# Patient Record
Sex: FEMALE | Race: WHITE | NOT HISPANIC OR LATINO | Employment: OTHER | URBAN - METROPOLITAN AREA
[De-identification: names, ages, dates, MRNs, and addresses within clinical notes are randomized per-mention and may not be internally consistent; named-entity substitution may affect disease eponyms.]

---

## 2017-02-15 ENCOUNTER — OFFICE VISIT - HEALTHEAST (OUTPATIENT)
Dept: AUDIOLOGY | Facility: CLINIC | Age: 67
End: 2017-02-15

## 2017-02-15 DIAGNOSIS — H93.13 TINNITUS, BILATERAL: ICD-10-CM

## 2017-02-15 DIAGNOSIS — H90.5 SENSORINEURAL HEARING LOSS OF LEFT EAR: ICD-10-CM

## 2017-03-22 ENCOUNTER — RECORDS - HEALTHEAST (OUTPATIENT)
Dept: MAMMOGRAPHY | Facility: CLINIC | Age: 67
End: 2017-03-22

## 2017-03-22 DIAGNOSIS — Z12.31 ENCOUNTER FOR SCREENING MAMMOGRAM FOR MALIGNANT NEOPLASM OF BREAST: ICD-10-CM

## 2017-03-27 ENCOUNTER — COMMUNICATION - HEALTHEAST (OUTPATIENT)
Dept: INTERNAL MEDICINE | Facility: CLINIC | Age: 67
End: 2017-03-27

## 2017-03-27 DIAGNOSIS — R92.8 MAMMOGRAM ABNORMAL: ICD-10-CM

## 2017-03-29 ENCOUNTER — HOSPITAL ENCOUNTER (OUTPATIENT)
Dept: MAMMOGRAPHY | Facility: HOSPITAL | Age: 67
Discharge: HOME | End: 2017-03-29
Attending: INTERNAL MEDICINE

## 2017-03-29 DIAGNOSIS — R92.8 ABNORMAL SCREENING MAMMOGRAM: ICD-10-CM

## 2017-10-04 ENCOUNTER — COMMUNICATION - HEALTHEAST (OUTPATIENT)
Dept: INTERNAL MEDICINE | Facility: CLINIC | Age: 67
End: 2017-10-04

## 2017-10-04 DIAGNOSIS — K21.9 GASTROESOPHAGEAL REFLUX DISEASE WITHOUT ESOPHAGITIS: ICD-10-CM

## 2017-10-19 ENCOUNTER — AMBULATORY - HEALTHEAST (OUTPATIENT)
Dept: NURSING | Facility: CLINIC | Age: 67
End: 2017-10-19

## 2017-10-19 DIAGNOSIS — Z23 NEED FOR IMMUNIZATION AGAINST INFLUENZA: ICD-10-CM

## 2018-01-31 ENCOUNTER — OFFICE VISIT - HEALTHEAST (OUTPATIENT)
Dept: INTERNAL MEDICINE | Facility: CLINIC | Age: 68
End: 2018-01-31

## 2018-01-31 DIAGNOSIS — M76.891 HAMSTRING TENDONITIS OF RIGHT THIGH: ICD-10-CM

## 2018-01-31 DIAGNOSIS — Z12.31 ENCOUNTER FOR SCREENING MAMMOGRAM FOR MALIGNANT NEOPLASM OF BREAST: ICD-10-CM
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
## 2018-01-31 DIAGNOSIS — Z78.0 POSTMENOPAUSAL: ICD-10-CM

## 2018-01-31 DIAGNOSIS — Z00.00 ROUTINE GENERAL MEDICAL EXAMINATION AT A HEALTH CARE FACILITY: ICD-10-CM

## 2018-01-31 DIAGNOSIS — C50.919 MALIGNANT NEOPLASM OF FEMALE BREAST (H): ICD-10-CM

## 2018-01-31 DIAGNOSIS — Z13.1 ENCOUNTER FOR SCREENING FOR DIABETES MELLITUS: ICD-10-CM

## 2018-01-31 DIAGNOSIS — Z13.220 ENCOUNTER FOR SCREENING FOR LIPOID DISORDERS: ICD-10-CM

## 2018-01-31 LAB
CHOLEST SERPL-MCNC: 227 MG/DL
FASTING STATUS PATIENT QL REPORTED: YES
FASTING STATUS PATIENT QL REPORTED: YES
GLUCOSE BLD-MCNC: 112 MG/DL (ref 70–99)
HDLC SERPL-MCNC: 77 MG/DL
LDLC SERPL CALC-MCNC: 134 MG/DL
TRIGL SERPL-MCNC: 80 MG/DL

## 2018-01-31 ASSESSMENT — MIFFLIN-ST. JEOR: SCORE: 1695.44

## 2018-02-07 ENCOUNTER — COMMUNICATION - HEALTHEAST (OUTPATIENT)
Dept: INTERNAL MEDICINE | Facility: CLINIC | Age: 68
End: 2018-02-07

## 2018-03-26 ENCOUNTER — RECORDS - HEALTHEAST (OUTPATIENT)
Dept: MAMMOGRAPHY | Facility: CLINIC | Age: 68
End: 2018-03-26

## 2018-03-26 ENCOUNTER — RECORDS - HEALTHEAST (OUTPATIENT)
Dept: BONE DENSITY | Facility: CLINIC | Age: 68
End: 2018-03-26

## 2018-03-26 ENCOUNTER — RECORDS - HEALTHEAST (OUTPATIENT)
Dept: ADMINISTRATIVE | Facility: OTHER | Age: 68
End: 2018-03-26

## 2018-03-26 DIAGNOSIS — Z12.31 ENCOUNTER FOR SCREENING MAMMOGRAM FOR MALIGNANT NEOPLASM OF BREAST: ICD-10-CM

## 2018-03-26 DIAGNOSIS — Z78.0 ASYMPTOMATIC MENOPAUSAL STATE: ICD-10-CM

## 2018-04-06 ENCOUNTER — COMMUNICATION - HEALTHEAST (OUTPATIENT)
Dept: INTERNAL MEDICINE | Facility: CLINIC | Age: 68
End: 2018-04-06

## 2018-04-06 DIAGNOSIS — K21.9 GASTROESOPHAGEAL REFLUX DISEASE WITHOUT ESOPHAGITIS: ICD-10-CM

## 2018-05-09 ENCOUNTER — RECORDS - HEALTHEAST (OUTPATIENT)
Dept: ADMINISTRATIVE | Facility: OTHER | Age: 68
End: 2018-05-09

## 2018-10-24 ENCOUNTER — COMMUNICATION - HEALTHEAST (OUTPATIENT)
Dept: INTERNAL MEDICINE | Facility: CLINIC | Age: 68
End: 2018-10-24

## 2018-10-24 DIAGNOSIS — K21.9 GASTROESOPHAGEAL REFLUX DISEASE WITHOUT ESOPHAGITIS: ICD-10-CM

## 2018-11-13 ENCOUNTER — AMBULATORY - HEALTHEAST (OUTPATIENT)
Dept: NURSING | Facility: CLINIC | Age: 68
End: 2018-11-13

## 2018-11-13 DIAGNOSIS — Z23 FLU VACCINE NEED: ICD-10-CM

## 2019-02-28 ENCOUNTER — OFFICE VISIT - HEALTHEAST (OUTPATIENT)
Dept: INTERNAL MEDICINE | Facility: CLINIC | Age: 69
End: 2019-02-28

## 2019-02-28 DIAGNOSIS — K21.9 GASTROESOPHAGEAL REFLUX DISEASE WITHOUT ESOPHAGITIS: ICD-10-CM

## 2019-02-28 DIAGNOSIS — M77.52 LEFT ANKLE TENDONITIS: ICD-10-CM

## 2019-02-28 DIAGNOSIS — Z12.11 SCREEN FOR COLON CANCER: ICD-10-CM

## 2019-04-02 ENCOUNTER — RECORDS - HEALTHEAST (OUTPATIENT)
Dept: ADMINISTRATIVE | Facility: OTHER | Age: 69
End: 2019-04-02

## 2019-04-02 ENCOUNTER — COMMUNICATION - HEALTHEAST (OUTPATIENT)
Dept: INTERNAL MEDICINE | Facility: CLINIC | Age: 69
End: 2019-04-02

## 2019-04-02 DIAGNOSIS — Z12.31 VISIT FOR SCREENING MAMMOGRAM: ICD-10-CM

## 2019-04-04 ENCOUNTER — RECORDS - HEALTHEAST (OUTPATIENT)
Dept: ADMINISTRATIVE | Facility: OTHER | Age: 69
End: 2019-04-04

## 2019-04-19 ENCOUNTER — COMMUNICATION - HEALTHEAST (OUTPATIENT)
Dept: SCHEDULING | Facility: CLINIC | Age: 69
End: 2019-04-19

## 2019-04-19 ENCOUNTER — OFFICE VISIT - HEALTHEAST (OUTPATIENT)
Dept: INTERNAL MEDICINE | Facility: CLINIC | Age: 69
End: 2019-04-19

## 2019-04-19 DIAGNOSIS — R42 POSTURAL DIZZINESS: ICD-10-CM

## 2019-04-19 ASSESSMENT — MIFFLIN-ST. JEOR: SCORE: 1718.48

## 2019-05-02 ENCOUNTER — AMBULATORY - HEALTHEAST (OUTPATIENT)
Dept: OTHER | Facility: CLINIC | Age: 69
End: 2019-05-02

## 2019-05-03 ENCOUNTER — RECORDS - HEALTHEAST (OUTPATIENT)
Dept: MAMMOGRAPHY | Facility: CLINIC | Age: 69
End: 2019-05-03

## 2019-05-03 ENCOUNTER — COMMUNICATION - HEALTHEAST (OUTPATIENT)
Dept: INTERNAL MEDICINE | Facility: CLINIC | Age: 69
End: 2019-05-03

## 2019-05-03 DIAGNOSIS — Z12.31 ENCOUNTER FOR SCREENING MAMMOGRAM FOR MALIGNANT NEOPLASM OF BREAST: ICD-10-CM

## 2019-05-17 ENCOUNTER — COMMUNICATION - HEALTHEAST (OUTPATIENT)
Dept: SCHEDULING | Facility: CLINIC | Age: 69
End: 2019-05-17

## 2019-05-21 ENCOUNTER — COMMUNICATION - HEALTHEAST (OUTPATIENT)
Dept: INTERNAL MEDICINE | Facility: CLINIC | Age: 69
End: 2019-05-21

## 2019-07-01 ENCOUNTER — AMBULATORY - HEALTHEAST (OUTPATIENT)
Dept: OTHER | Facility: CLINIC | Age: 69
End: 2019-07-01

## 2019-07-10 ENCOUNTER — AMBULATORY - HEALTHEAST (OUTPATIENT)
Dept: OTHER | Facility: CLINIC | Age: 69
End: 2019-07-10

## 2019-08-21 ENCOUNTER — OFFICE VISIT - HEALTHEAST (OUTPATIENT)
Dept: INTERNAL MEDICINE | Facility: CLINIC | Age: 69
End: 2019-08-21

## 2019-08-21 DIAGNOSIS — Z12.31 ENCOUNTER FOR SCREENING MAMMOGRAM FOR BREAST CANCER: ICD-10-CM

## 2019-08-21 DIAGNOSIS — R05.3 CHRONIC COUGH: ICD-10-CM

## 2019-08-21 DIAGNOSIS — C50.919 MALIGNANT NEOPLASM OF FEMALE BREAST, UNSPECIFIED ESTROGEN RECEPTOR STATUS, UNSPECIFIED LATERALITY, UNSPECIFIED SITE OF BREAST (H): ICD-10-CM

## 2019-08-21 DIAGNOSIS — K21.9 GASTROESOPHAGEAL REFLUX DISEASE WITHOUT ESOPHAGITIS: ICD-10-CM

## 2019-08-21 ASSESSMENT — MIFFLIN-ST. JEOR: SCORE: 1700.88

## 2019-10-09 ENCOUNTER — COMMUNICATION - HEALTHEAST (OUTPATIENT)
Dept: INTERNAL MEDICINE | Facility: CLINIC | Age: 69
End: 2019-10-09

## 2019-10-16 ENCOUNTER — OFFICE VISIT - HEALTHEAST (OUTPATIENT)
Dept: INTERNAL MEDICINE | Facility: CLINIC | Age: 69
End: 2019-10-16

## 2019-10-16 DIAGNOSIS — R05.3 CHRONIC COUGH: ICD-10-CM

## 2019-10-16 DIAGNOSIS — Z00.00 ROUTINE GENERAL MEDICAL EXAMINATION AT A HEALTH CARE FACILITY: ICD-10-CM

## 2019-10-16 DIAGNOSIS — Z13.220 ENCOUNTER FOR SCREENING FOR LIPOID DISORDERS: ICD-10-CM

## 2019-10-16 DIAGNOSIS — C50.919 MALIGNANT NEOPLASM OF FEMALE BREAST, UNSPECIFIED ESTROGEN RECEPTOR STATUS, UNSPECIFIED LATERALITY, UNSPECIFIED SITE OF BREAST (H): ICD-10-CM

## 2019-10-16 DIAGNOSIS — M72.2 PLANTAR FASCIITIS: ICD-10-CM

## 2019-10-16 LAB
ALBUMIN SERPL-MCNC: 3.9 G/DL (ref 3.5–5)
ALP SERPL-CCNC: 81 U/L (ref 45–120)
ALT SERPL W P-5'-P-CCNC: 20 U/L (ref 0–45)
ANION GAP SERPL CALCULATED.3IONS-SCNC: 7 MMOL/L (ref 5–18)
AST SERPL W P-5'-P-CCNC: 16 U/L (ref 0–40)
BILIRUB DIRECT SERPL-MCNC: 0.1 MG/DL
BILIRUB SERPL-MCNC: 0.5 MG/DL (ref 0–1)
BUN SERPL-MCNC: 16 MG/DL (ref 8–28)
CALCIUM SERPL-MCNC: 9.7 MG/DL (ref 8.5–10.5)
CHLORIDE BLD-SCNC: 106 MMOL/L (ref 98–107)
CHOLEST SERPL-MCNC: 258 MG/DL
CO2 SERPL-SCNC: 27 MMOL/L (ref 22–31)
CREAT SERPL-MCNC: 1 MG/DL (ref 0.6–1.1)
ERYTHROCYTE [DISTWIDTH] IN BLOOD BY AUTOMATED COUNT: 11.6 % (ref 11–14.5)
FASTING STATUS PATIENT QL REPORTED: YES
GFR SERPL CREATININE-BSD FRML MDRD: 55 ML/MIN/1.73M2
GLUCOSE BLD-MCNC: 109 MG/DL (ref 70–125)
HCT VFR BLD AUTO: 41.9 % (ref 35–47)
HDLC SERPL-MCNC: 84 MG/DL
HGB BLD-MCNC: 13.5 G/DL (ref 12–16)
LDLC SERPL CALC-MCNC: 160 MG/DL
MCH RBC QN AUTO: 31.6 PG (ref 27–34)
MCHC RBC AUTO-ENTMCNC: 32.3 G/DL (ref 32–36)
MCV RBC AUTO: 98 FL (ref 80–100)
PLATELET # BLD AUTO: 260 THOU/UL (ref 140–440)
PMV BLD AUTO: 6.9 FL (ref 7–10)
POTASSIUM BLD-SCNC: 4 MMOL/L (ref 3.5–5)
PROT SERPL-MCNC: 6.5 G/DL (ref 6–8)
RBC # BLD AUTO: 4.29 MILL/UL (ref 3.8–5.4)
SODIUM SERPL-SCNC: 140 MMOL/L (ref 136–145)
TRIGL SERPL-MCNC: 68 MG/DL
WBC: 7.3 THOU/UL (ref 4–11)

## 2019-10-16 ASSESSMENT — MIFFLIN-ST. JEOR: SCORE: 1696.86

## 2019-10-23 ENCOUNTER — COMMUNICATION - HEALTHEAST (OUTPATIENT)
Dept: INTERNAL MEDICINE | Facility: CLINIC | Age: 69
End: 2019-10-23

## 2019-11-01 ENCOUNTER — HOSPITAL ENCOUNTER (OUTPATIENT)
Dept: RESPIRATORY THERAPY | Facility: HOSPITAL | Age: 69
Discharge: HOME | End: 2019-11-01

## 2019-11-01 DIAGNOSIS — R05.3 CHRONIC COUGH: ICD-10-CM

## 2019-11-04 ENCOUNTER — COMMUNICATION - HEALTHEAST (OUTPATIENT)
Dept: INTERNAL MEDICINE | Facility: CLINIC | Age: 69
End: 2019-11-04

## 2019-11-04 DIAGNOSIS — R05.9 COUGH: ICD-10-CM

## 2019-11-14 ENCOUNTER — OFFICE VISIT - HEALTHEAST (OUTPATIENT)
Dept: PULMONOLOGY | Facility: OTHER | Age: 69
End: 2019-11-14

## 2019-11-14 DIAGNOSIS — E66.01 MORBID OBESITY (H): ICD-10-CM

## 2019-11-14 DIAGNOSIS — J45.30 MILD PERSISTENT ASTHMA WITHOUT COMPLICATION: ICD-10-CM

## 2019-11-14 DIAGNOSIS — K21.9 GASTROESOPHAGEAL REFLUX DISEASE, ESOPHAGITIS PRESENCE NOT SPECIFIED: ICD-10-CM

## 2019-11-14 DIAGNOSIS — R09.82 PND (POST-NASAL DRIP): ICD-10-CM

## 2019-11-14 RX ORDER — ALBUTEROL SULFATE 90 UG/1
2 AEROSOL, METERED RESPIRATORY (INHALATION) EVERY 4 HOURS PRN
Qty: 1 INHALER | Refills: 12 | Status: SHIPPED | OUTPATIENT
Start: 2019-11-14 | End: 2022-03-24

## 2019-11-14 ASSESSMENT — MIFFLIN-ST. JEOR: SCORE: 1716.98

## 2019-12-03 ENCOUNTER — COMMUNICATION - HEALTHEAST (OUTPATIENT)
Dept: INTERNAL MEDICINE | Facility: CLINIC | Age: 69
End: 2019-12-03

## 2019-12-09 ENCOUNTER — COMMUNICATION - HEALTHEAST (OUTPATIENT)
Dept: PULMONOLOGY | Facility: OTHER | Age: 69
End: 2019-12-09

## 2020-01-03 ENCOUNTER — COMMUNICATION - HEALTHEAST (OUTPATIENT)
Dept: PULMONOLOGY | Facility: OTHER | Age: 70
End: 2020-01-03

## 2020-02-07 ENCOUNTER — OFFICE VISIT - HEALTHEAST (OUTPATIENT)
Dept: PULMONOLOGY | Facility: OTHER | Age: 70
End: 2020-02-07

## 2020-02-07 DIAGNOSIS — J32.9 CHRONIC SINUSITIS, UNSPECIFIED LOCATION: ICD-10-CM

## 2020-02-07 DIAGNOSIS — J45.30 MILD PERSISTENT ASTHMA WITHOUT COMPLICATION: ICD-10-CM

## 2020-02-07 RX ORDER — FAMOTIDINE 10 MG
10 TABLET ORAL DAILY
Status: SHIPPED | COMMUNITY
Start: 2020-02-07

## 2020-02-07 ASSESSMENT — MIFFLIN-ST. JEOR: SCORE: 1726.06

## 2020-02-12 ENCOUNTER — AMBULATORY - HEALTHEAST (OUTPATIENT)
Dept: PULMONOLOGY | Facility: OTHER | Age: 70
End: 2020-02-12

## 2020-03-10 ENCOUNTER — COMMUNICATION - HEALTHEAST (OUTPATIENT)
Dept: PULMONOLOGY | Facility: OTHER | Age: 70
End: 2020-03-10

## 2020-03-21 ENCOUNTER — COMMUNICATION - HEALTHEAST (OUTPATIENT)
Dept: PULMONOLOGY | Facility: OTHER | Age: 70
End: 2020-03-21

## 2020-04-06 ENCOUNTER — RECORDS - HEALTHEAST (OUTPATIENT)
Dept: ADMINISTRATIVE | Facility: OTHER | Age: 70
End: 2020-04-06

## 2020-04-07 ENCOUNTER — COMMUNICATION - HEALTHEAST (OUTPATIENT)
Dept: INTERNAL MEDICINE | Facility: CLINIC | Age: 70
End: 2020-04-07

## 2020-04-30 ENCOUNTER — COMMUNICATION - HEALTHEAST (OUTPATIENT)
Dept: ADMINISTRATIVE | Facility: CLINIC | Age: 70
End: 2020-04-30

## 2020-05-08 ENCOUNTER — RECORDS - HEALTHEAST (OUTPATIENT)
Dept: ADMINISTRATIVE | Facility: OTHER | Age: 70
End: 2020-05-08

## 2020-05-08 ENCOUNTER — OFFICE VISIT - HEALTHEAST (OUTPATIENT)
Dept: PULMONOLOGY | Facility: OTHER | Age: 70
End: 2020-05-08

## 2020-05-08 DIAGNOSIS — J45.30 MILD PERSISTENT ASTHMA WITHOUT COMPLICATION: ICD-10-CM

## 2020-06-28 ENCOUNTER — COMMUNICATION - HEALTHEAST (OUTPATIENT)
Dept: PULMONOLOGY | Facility: OTHER | Age: 70
End: 2020-06-28

## 2020-07-06 ENCOUNTER — HOSPITAL ENCOUNTER (OUTPATIENT)
Dept: MAMMOGRAPHY | Facility: CLINIC | Age: 70
Discharge: HOME | End: 2020-07-06
Attending: INTERNAL MEDICINE

## 2020-07-06 DIAGNOSIS — Z12.31 VISIT FOR SCREENING MAMMOGRAM: ICD-10-CM

## 2020-07-15 ENCOUNTER — AMBULATORY - HEALTHEAST (OUTPATIENT)
Dept: MAMMOGRAPHY | Facility: CLINIC | Age: 70
End: 2020-07-15

## 2020-07-15 ENCOUNTER — HOSPITAL ENCOUNTER (OUTPATIENT)
Dept: MAMMOGRAPHY | Facility: CLINIC | Age: 70
Discharge: HOME | End: 2020-07-15
Attending: INTERNAL MEDICINE

## 2020-07-15 DIAGNOSIS — N64.89 BREAST ASYMMETRY: ICD-10-CM

## 2020-07-15 DIAGNOSIS — Z11.59 ENCOUNTER FOR SCREENING FOR OTHER VIRAL DISEASES: ICD-10-CM

## 2020-07-19 ENCOUNTER — COMMUNICATION - HEALTHEAST (OUTPATIENT)
Dept: INTERNAL MEDICINE | Facility: CLINIC | Age: 70
End: 2020-07-19

## 2020-07-19 ENCOUNTER — AMBULATORY - HEALTHEAST (OUTPATIENT)
Dept: FAMILY MEDICINE | Facility: CLINIC | Age: 70
End: 2020-07-19

## 2020-07-19 DIAGNOSIS — Z11.59 ENCOUNTER FOR SCREENING FOR OTHER VIRAL DISEASES: ICD-10-CM

## 2020-07-22 ENCOUNTER — HOSPITAL ENCOUNTER (OUTPATIENT)
Dept: MAMMOGRAPHY | Facility: CLINIC | Age: 70
Discharge: HOME | End: 2020-07-22
Attending: INTERNAL MEDICINE

## 2020-07-22 DIAGNOSIS — N63.20 LEFT BREAST MASS: ICD-10-CM

## 2020-07-22 DIAGNOSIS — N63.21 UNSPECIFIED LUMP IN THE LEFT BREAST, UPPER OUTER QUADRANT: ICD-10-CM

## 2020-07-24 ENCOUNTER — COMMUNICATION - HEALTHEAST (OUTPATIENT)
Dept: ONCOLOGY | Facility: HOSPITAL | Age: 70
End: 2020-07-24

## 2020-07-30 ENCOUNTER — HOSPITAL ENCOUNTER (OUTPATIENT)
Dept: SURGERY | Facility: CLINIC | Age: 70
Discharge: HOME | End: 2020-07-30
Attending: SPECIALIST

## 2020-07-30 DIAGNOSIS — Z17.0 MALIGNANT NEOPLASM OF CENTRAL PORTION OF LEFT BREAST IN FEMALE, ESTROGEN RECEPTOR POSITIVE (H): ICD-10-CM

## 2020-07-30 DIAGNOSIS — C50.112 MALIGNANT NEOPLASM OF CENTRAL PORTION OF LEFT BREAST IN FEMALE, ESTROGEN RECEPTOR POSITIVE (H): ICD-10-CM

## 2020-07-30 ASSESSMENT — MIFFLIN-ST. JEOR: SCORE: 1733.22

## 2020-08-04 ENCOUNTER — COMMUNICATION - HEALTHEAST (OUTPATIENT)
Dept: ONCOLOGY | Facility: HOSPITAL | Age: 70
End: 2020-08-04

## 2020-08-04 LAB
LAB AP CHARGES (HE HISTORICAL CONVERSION): ABNORMAL
LAB AP FISH HER2/NEU REPORT,ADDENDUM (HE HISTORICAL CONVERSION): ABNORMAL
PATH REPORT.COMMENTS IMP SPEC: ABNORMAL
PATH REPORT.COMMENTS IMP SPEC: ABNORMAL
PATH REPORT.FINAL DX SPEC: ABNORMAL
PATH REPORT.GROSS SPEC: ABNORMAL
PATH REPORT.MICROSCOPIC SPEC OTHER STN: ABNORMAL
PATH REPORT.MICROSCOPIC SPEC OTHER STN: ABNORMAL
PATH REPORT.RELEVANT HX SPEC: ABNORMAL
RESULT FLAG (HE HISTORICAL CONVERSION): ABNORMAL

## 2020-08-07 ENCOUNTER — COMMUNICATION - HEALTHEAST (OUTPATIENT)
Dept: ONCOLOGY | Facility: HOSPITAL | Age: 70
End: 2020-08-07

## 2020-08-07 ENCOUNTER — AMBULATORY - HEALTHEAST (OUTPATIENT)
Dept: SURGERY | Facility: CLINIC | Age: 70
End: 2020-08-07

## 2020-08-07 DIAGNOSIS — C50.112 MALIGNANT NEOPLASM OF CENTRAL PORTION OF LEFT BREAST IN FEMALE, ESTROGEN RECEPTOR POSITIVE (H): ICD-10-CM

## 2020-08-07 DIAGNOSIS — Z17.0 MALIGNANT NEOPLASM OF CENTRAL PORTION OF LEFT BREAST IN FEMALE, ESTROGEN RECEPTOR POSITIVE (H): ICD-10-CM

## 2020-08-10 ENCOUNTER — AMBULATORY - HEALTHEAST (OUTPATIENT)
Dept: SURGERY | Facility: AMBULATORY SURGERY CENTER | Age: 70
End: 2020-08-10

## 2020-08-10 ENCOUNTER — COMMUNICATION - HEALTHEAST (OUTPATIENT)
Dept: SURGERY | Facility: CLINIC | Age: 70
End: 2020-08-10

## 2020-08-10 DIAGNOSIS — Z11.59 ENCOUNTER FOR SCREENING FOR OTHER VIRAL DISEASES: ICD-10-CM

## 2020-08-13 ENCOUNTER — COMMUNICATION - HEALTHEAST (OUTPATIENT)
Dept: INTERNAL MEDICINE | Facility: CLINIC | Age: 70
End: 2020-08-13

## 2020-08-14 ENCOUNTER — OFFICE VISIT - HEALTHEAST (OUTPATIENT)
Dept: INTERNAL MEDICINE | Facility: CLINIC | Age: 70
End: 2020-08-14

## 2020-08-14 ENCOUNTER — OFFICE VISIT - HEALTHEAST (OUTPATIENT)
Dept: PULMONOLOGY | Facility: OTHER | Age: 70
End: 2020-08-14

## 2020-08-14 DIAGNOSIS — Z17.0 MALIGNANT NEOPLASM OF CENTRAL PORTION OF LEFT BREAST IN FEMALE, ESTROGEN RECEPTOR POSITIVE (H): ICD-10-CM

## 2020-08-14 DIAGNOSIS — J45.40 MODERATE PERSISTENT ASTHMA WITHOUT COMPLICATION: ICD-10-CM

## 2020-08-14 DIAGNOSIS — J45.909 UNCOMPLICATED ASTHMA, UNSPECIFIED ASTHMA SEVERITY, UNSPECIFIED WHETHER PERSISTENT: ICD-10-CM

## 2020-08-14 DIAGNOSIS — K21.9 GASTROESOPHAGEAL REFLUX DISEASE WITHOUT ESOPHAGITIS: ICD-10-CM

## 2020-08-14 DIAGNOSIS — Z01.818 PREOP GENERAL PHYSICAL EXAM: ICD-10-CM

## 2020-08-14 DIAGNOSIS — C50.112 MALIGNANT NEOPLASM OF CENTRAL PORTION OF LEFT BREAST IN FEMALE, ESTROGEN RECEPTOR POSITIVE (H): ICD-10-CM

## 2020-08-14 LAB
ERYTHROCYTE [DISTWIDTH] IN BLOOD BY AUTOMATED COUNT: 13.3 % (ref 11–14.5)
HCT VFR BLD AUTO: 39.3 % (ref 35–47)
HGB BLD-MCNC: 12.9 G/DL (ref 12–16)
MCH RBC QN AUTO: 32 PG (ref 27–34)
MCHC RBC AUTO-ENTMCNC: 32.8 G/DL (ref 32–36)
MCV RBC AUTO: 98 FL (ref 80–100)
PLATELET # BLD AUTO: 229 THOU/UL (ref 140–440)
PMV BLD AUTO: 7.3 FL (ref 7–10)
RBC # BLD AUTO: 4.03 MILL/UL (ref 3.8–5.4)
WBC: 5.9 THOU/UL (ref 4–11)

## 2020-08-14 ASSESSMENT — MIFFLIN-ST. JEOR: SCORE: 1737.39

## 2020-08-18 ENCOUNTER — COMMUNICATION - HEALTHEAST (OUTPATIENT)
Dept: SURGERY | Facility: CLINIC | Age: 70
End: 2020-08-18

## 2020-08-18 ENCOUNTER — AMBULATORY - HEALTHEAST (OUTPATIENT)
Dept: FAMILY MEDICINE | Facility: CLINIC | Age: 70
End: 2020-08-18

## 2020-08-18 DIAGNOSIS — Z11.59 ENCOUNTER FOR SCREENING FOR OTHER VIRAL DISEASES: ICD-10-CM

## 2020-08-19 ENCOUNTER — ANESTHESIA - HEALTHEAST (OUTPATIENT)
Dept: SURGERY | Facility: AMBULATORY SURGERY CENTER | Age: 70
End: 2020-08-19

## 2020-08-19 ASSESSMENT — MIFFLIN-ST. JEOR: SCORE: 1729.45

## 2020-08-20 ENCOUNTER — COMMUNICATION - HEALTHEAST (OUTPATIENT)
Dept: SCHEDULING | Facility: CLINIC | Age: 70
End: 2020-08-20

## 2020-08-21 ENCOUNTER — HOSPITAL ENCOUNTER (OUTPATIENT)
Dept: MAMMOGRAPHY | Facility: CLINIC | Age: 70
Discharge: HOME | End: 2020-08-21
Attending: SPECIALIST

## 2020-08-21 ENCOUNTER — HOSPITAL ENCOUNTER (OUTPATIENT)
Dept: NUCLEAR MEDICINE | Facility: HOSPITAL | Age: 70
Discharge: HOME | End: 2020-08-21
Attending: SPECIALIST

## 2020-08-21 ENCOUNTER — SURGERY - HEALTHEAST (OUTPATIENT)
Dept: SURGERY | Facility: AMBULATORY SURGERY CENTER | Age: 70
End: 2020-08-21

## 2020-08-21 DIAGNOSIS — Z17.0 MALIGNANT NEOPLASM OF CENTRAL PORTION OF LEFT BREAST IN FEMALE, ESTROGEN RECEPTOR POSITIVE (H): ICD-10-CM

## 2020-08-21 DIAGNOSIS — C50.112 MALIGNANT NEOPLASM OF CENTRAL PORTION OF LEFT BREAST IN FEMALE, ESTROGEN RECEPTOR POSITIVE (H): ICD-10-CM

## 2020-08-21 ASSESSMENT — MIFFLIN-ST. JEOR: SCORE: 1729.45

## 2020-08-25 ENCOUNTER — AMBULATORY - HEALTHEAST (OUTPATIENT)
Dept: SURGERY | Facility: CLINIC | Age: 70
End: 2020-08-25

## 2020-09-03 ENCOUNTER — RECORDS - HEALTHEAST (OUTPATIENT)
Dept: ADMINISTRATIVE | Facility: OTHER | Age: 70
End: 2020-09-03

## 2020-09-04 ENCOUNTER — HOSPITAL ENCOUNTER (OUTPATIENT)
Dept: SURGERY | Facility: CLINIC | Age: 70
Discharge: HOME | End: 2020-09-04
Attending: SPECIALIST

## 2020-09-04 ENCOUNTER — AMBULATORY - HEALTHEAST (OUTPATIENT)
Dept: SURGERY | Facility: HOSPITAL | Age: 70
End: 2020-09-04

## 2020-09-04 ENCOUNTER — COMMUNICATION - HEALTHEAST (OUTPATIENT)
Dept: SCHEDULING | Facility: CLINIC | Age: 70
End: 2020-09-04

## 2020-09-04 DIAGNOSIS — Z85.3 PERSONAL HISTORY OF BREAST CANCER: ICD-10-CM

## 2020-09-04 DIAGNOSIS — C50.112 MALIGNANT NEOPLASM OF CENTRAL PORTION OF LEFT BREAST IN FEMALE, ESTROGEN RECEPTOR POSITIVE (H): ICD-10-CM

## 2020-09-04 DIAGNOSIS — Z17.0 MALIGNANT NEOPLASM OF CENTRAL PORTION OF LEFT BREAST IN FEMALE, ESTROGEN RECEPTOR POSITIVE (H): ICD-10-CM

## 2020-09-04 DIAGNOSIS — Z11.59 ENCOUNTER FOR SCREENING FOR OTHER VIRAL DISEASES: ICD-10-CM

## 2020-09-09 ENCOUNTER — AMBULATORY - HEALTHEAST (OUTPATIENT)
Dept: INTERNAL MEDICINE | Facility: CLINIC | Age: 70
End: 2020-09-09

## 2020-09-09 ENCOUNTER — OFFICE VISIT - HEALTHEAST (OUTPATIENT)
Dept: INTERNAL MEDICINE | Facility: CLINIC | Age: 70
End: 2020-09-09

## 2020-09-09 DIAGNOSIS — D25.0 INTRAMURAL, SUBMUCOUS, AND SUBSEROUS LEIOMYOMA OF UTERUS: ICD-10-CM

## 2020-09-09 DIAGNOSIS — D25.2 INTRAMURAL, SUBMUCOUS, AND SUBSEROUS LEIOMYOMA OF UTERUS: ICD-10-CM

## 2020-09-09 DIAGNOSIS — J45.40 MODERATE PERSISTENT ASTHMA WITHOUT COMPLICATION: ICD-10-CM

## 2020-09-09 DIAGNOSIS — Z17.0 MALIGNANT NEOPLASM OF CENTRAL PORTION OF LEFT BREAST IN FEMALE, ESTROGEN RECEPTOR POSITIVE (H): ICD-10-CM

## 2020-09-09 DIAGNOSIS — Z00.00 HEALTH MAINTENANCE EXAMINATION: ICD-10-CM

## 2020-09-09 DIAGNOSIS — C50.112 MALIGNANT NEOPLASM OF CENTRAL PORTION OF LEFT BREAST IN FEMALE, ESTROGEN RECEPTOR POSITIVE (H): ICD-10-CM

## 2020-09-09 DIAGNOSIS — D25.1 INTRAMURAL, SUBMUCOUS, AND SUBSEROUS LEIOMYOMA OF UTERUS: ICD-10-CM

## 2020-09-10 ENCOUNTER — OFFICE VISIT - HEALTHEAST (OUTPATIENT)
Dept: INTERNAL MEDICINE | Facility: CLINIC | Age: 70
End: 2020-09-10

## 2020-09-10 DIAGNOSIS — Z01.818 PRE-OP EXAM: ICD-10-CM

## 2020-09-10 DIAGNOSIS — E66.01 MORBID OBESITY (H): ICD-10-CM

## 2020-09-10 DIAGNOSIS — Z23 NEED FOR PROPHYLACTIC VACCINATION AND INOCULATION AGAINST INFLUENZA: ICD-10-CM

## 2020-09-10 LAB
ATRIAL RATE - MUSE: 66 BPM
DIASTOLIC BLOOD PRESSURE - MUSE: NORMAL
INTERPRETATION ECG - MUSE: NORMAL
P AXIS - MUSE: 8 DEGREES
PR INTERVAL - MUSE: 158 MS
QRS DURATION - MUSE: 100 MS
QT - MUSE: 412 MS
QTC - MUSE: 431 MS
R AXIS - MUSE: -1 DEGREES
SYSTOLIC BLOOD PRESSURE - MUSE: NORMAL
T AXIS - MUSE: 13 DEGREES
VENTRICULAR RATE- MUSE: 66 BPM

## 2020-09-11 ENCOUNTER — AMBULATORY - HEALTHEAST (OUTPATIENT)
Dept: FAMILY MEDICINE | Facility: CLINIC | Age: 70
End: 2020-09-11

## 2020-09-11 DIAGNOSIS — Z11.59 ENCOUNTER FOR SCREENING FOR OTHER VIRAL DISEASES: ICD-10-CM

## 2020-09-11 ASSESSMENT — MIFFLIN-ST. JEOR: SCORE: 1694.3

## 2020-09-13 ENCOUNTER — COMMUNICATION - HEALTHEAST (OUTPATIENT)
Dept: SCHEDULING | Facility: CLINIC | Age: 70
End: 2020-09-13

## 2020-09-14 ENCOUNTER — ANESTHESIA - HEALTHEAST (OUTPATIENT)
Dept: SURGERY | Facility: HOSPITAL | Age: 70
End: 2020-09-14

## 2020-09-14 ENCOUNTER — SURGERY - HEALTHEAST (OUTPATIENT)
Dept: SURGERY | Facility: HOSPITAL | Age: 70
End: 2020-09-14

## 2020-09-14 ASSESSMENT — MIFFLIN-ST. JEOR: SCORE: 1703.38

## 2020-09-15 ASSESSMENT — MIFFLIN-ST. JEOR
SCORE: 1694.3
SCORE: 1694.3

## 2020-09-17 ENCOUNTER — HOSPITAL ENCOUNTER (OUTPATIENT)
Dept: SURGERY | Facility: CLINIC | Age: 70
Discharge: HOME | End: 2020-09-17
Attending: SPECIALIST

## 2020-09-17 ENCOUNTER — COMMUNICATION - HEALTHEAST (OUTPATIENT)
Dept: SURGERY | Facility: CLINIC | Age: 70
End: 2020-09-17

## 2020-09-17 DIAGNOSIS — Z17.0 MALIGNANT NEOPLASM OF CENTRAL PORTION OF LEFT BREAST IN FEMALE, ESTROGEN RECEPTOR POSITIVE (H): ICD-10-CM

## 2020-09-17 DIAGNOSIS — C50.112 MALIGNANT NEOPLASM OF CENTRAL PORTION OF LEFT BREAST IN FEMALE, ESTROGEN RECEPTOR POSITIVE (H): ICD-10-CM

## 2020-09-17 DIAGNOSIS — Z85.3 PERSONAL HISTORY OF BREAST CANCER: ICD-10-CM

## 2020-09-18 ENCOUNTER — COMMUNICATION - HEALTHEAST (OUTPATIENT)
Dept: ONCOLOGY | Facility: HOSPITAL | Age: 70
End: 2020-09-18

## 2020-09-21 ENCOUNTER — HOSPITAL ENCOUNTER (OUTPATIENT)
Dept: SURGERY | Facility: CLINIC | Age: 70
Discharge: HOME | End: 2020-09-21

## 2020-09-21 ENCOUNTER — OFFICE VISIT - HEALTHEAST (OUTPATIENT)
Dept: FAMILY MEDICINE | Facility: CLINIC | Age: 70
End: 2020-09-21

## 2020-09-21 DIAGNOSIS — C50.112 MALIGNANT NEOPLASM OF CENTRAL PORTION OF LEFT BREAST IN FEMALE, ESTROGEN RECEPTOR POSITIVE (H): ICD-10-CM

## 2020-09-21 DIAGNOSIS — Z17.0 MALIGNANT NEOPLASM OF CENTRAL PORTION OF LEFT BREAST IN FEMALE, ESTROGEN RECEPTOR POSITIVE (H): ICD-10-CM

## 2020-09-21 DIAGNOSIS — T81.31XA POSTOPERATIVE WOUND DEHISCENCE, INITIAL ENCOUNTER: ICD-10-CM

## 2020-09-22 ENCOUNTER — HOSPITAL ENCOUNTER (OUTPATIENT)
Dept: SURGERY | Facility: CLINIC | Age: 70
Discharge: HOME | End: 2020-09-22
Attending: SPECIALIST

## 2020-09-25 ENCOUNTER — COMMUNICATION - HEALTHEAST (OUTPATIENT)
Dept: SURGERY | Facility: CLINIC | Age: 70
End: 2020-09-25

## 2020-09-26 ENCOUNTER — AMBULATORY - HEALTHEAST (OUTPATIENT)
Dept: SURGERY | Facility: CLINIC | Age: 70
End: 2020-09-26

## 2020-09-26 DIAGNOSIS — L08.9 WOUND INFECTION: ICD-10-CM

## 2020-09-26 DIAGNOSIS — T14.8XXA WOUND INFECTION: ICD-10-CM

## 2020-09-27 ENCOUNTER — COMMUNICATION - HEALTHEAST (OUTPATIENT)
Dept: SURGERY | Facility: CLINIC | Age: 70
End: 2020-09-27

## 2020-09-28 ENCOUNTER — COMMUNICATION - HEALTHEAST (OUTPATIENT)
Dept: SURGERY | Facility: CLINIC | Age: 70
End: 2020-09-28

## 2020-09-29 ENCOUNTER — HOSPITAL ENCOUNTER (OUTPATIENT)
Dept: SURGERY | Facility: CLINIC | Age: 70
Discharge: HOME | End: 2020-09-29
Attending: SPECIALIST

## 2020-09-29 DIAGNOSIS — L08.9 WOUND INFECTION: ICD-10-CM

## 2020-09-29 DIAGNOSIS — T14.8XXA WOUND INFECTION: ICD-10-CM

## 2020-09-29 DIAGNOSIS — Z17.0 MALIGNANT NEOPLASM OF CENTRAL PORTION OF LEFT BREAST IN FEMALE, ESTROGEN RECEPTOR POSITIVE (H): ICD-10-CM

## 2020-09-29 DIAGNOSIS — C50.112 MALIGNANT NEOPLASM OF CENTRAL PORTION OF LEFT BREAST IN FEMALE, ESTROGEN RECEPTOR POSITIVE (H): ICD-10-CM

## 2020-10-06 ENCOUNTER — HOSPITAL ENCOUNTER (OUTPATIENT)
Dept: SURGERY | Facility: CLINIC | Age: 70
Discharge: HOME | End: 2020-10-06
Attending: SPECIALIST

## 2020-10-06 DIAGNOSIS — Z48.89 POSTOPERATIVE VISIT: ICD-10-CM

## 2020-10-12 ENCOUNTER — COMMUNICATION - HEALTHEAST (OUTPATIENT)
Dept: SURGERY | Facility: CLINIC | Age: 70
End: 2020-10-12

## 2020-10-12 ENCOUNTER — OFFICE VISIT - HEALTHEAST (OUTPATIENT)
Dept: ONCOLOGY | Facility: HOSPITAL | Age: 70
End: 2020-10-12

## 2020-10-12 DIAGNOSIS — Z17.0 MALIGNANT NEOPLASM OF CENTRAL PORTION OF LEFT BREAST IN FEMALE, ESTROGEN RECEPTOR POSITIVE (H): ICD-10-CM

## 2020-10-12 DIAGNOSIS — Z71.83 ENCOUNTER FOR NONPROCREATIVE GENETIC COUNSELING: ICD-10-CM

## 2020-10-12 DIAGNOSIS — Z80.0 FAMILY HISTORY OF COLON CANCER: ICD-10-CM

## 2020-10-12 DIAGNOSIS — C50.112 MALIGNANT NEOPLASM OF CENTRAL PORTION OF LEFT BREAST IN FEMALE, ESTROGEN RECEPTOR POSITIVE (H): ICD-10-CM

## 2020-10-12 DIAGNOSIS — Z80.3 FAMILY HISTORY OF MALIGNANT NEOPLASM OF BREAST: ICD-10-CM

## 2020-10-12 DIAGNOSIS — Z85.3 PERSONAL HISTORY OF MALIGNANT NEOPLASM OF BREAST: ICD-10-CM

## 2020-10-13 ENCOUNTER — HOSPITAL ENCOUNTER (OUTPATIENT)
Dept: SURGERY | Facility: CLINIC | Age: 70
Discharge: HOME | End: 2020-10-13
Attending: SPECIALIST

## 2020-10-13 DIAGNOSIS — Z48.89 POSTOPERATIVE VISIT: ICD-10-CM

## 2020-10-13 DIAGNOSIS — I89.0 LYMPHEDEMA OF LEFT UPPER EXTREMITY: ICD-10-CM

## 2020-10-15 ENCOUNTER — OFFICE VISIT - HEALTHEAST (OUTPATIENT)
Dept: VASCULAR SURGERY | Facility: CLINIC | Age: 70
End: 2020-10-15

## 2020-10-15 DIAGNOSIS — E66.01 MORBID OBESITY (H): ICD-10-CM

## 2020-10-15 DIAGNOSIS — I87.8 VENOUS STASIS OF BOTH LOWER EXTREMITIES: ICD-10-CM

## 2020-10-15 DIAGNOSIS — C50.911 MUCINOUS CARCINOMA OF BREAST, RIGHT (H): ICD-10-CM

## 2020-10-15 DIAGNOSIS — I97.2 POST-MASTECTOMY LYMPHEDEMA SYNDROME: ICD-10-CM

## 2020-10-15 DIAGNOSIS — Z17.0 MALIGNANT NEOPLASM OF CENTRAL PORTION OF LEFT BREAST IN FEMALE, ESTROGEN RECEPTOR POSITIVE (H): ICD-10-CM

## 2020-10-15 DIAGNOSIS — M79.89 LEG SWELLING: ICD-10-CM

## 2020-10-15 DIAGNOSIS — M24.511 CONTRACTURE OF SHOULDER, RIGHT: ICD-10-CM

## 2020-10-15 DIAGNOSIS — M24.512 CONTRACTURE OF SHOULDER, LEFT: ICD-10-CM

## 2020-10-15 DIAGNOSIS — C50.112 MALIGNANT NEOPLASM OF CENTRAL PORTION OF LEFT BREAST IN FEMALE, ESTROGEN RECEPTOR POSITIVE (H): ICD-10-CM

## 2020-10-15 ASSESSMENT — MIFFLIN-ST. JEOR: SCORE: 1698.84

## 2020-10-16 ENCOUNTER — COMMUNICATION - HEALTHEAST (OUTPATIENT)
Dept: VASCULAR SURGERY | Facility: CLINIC | Age: 70
End: 2020-10-16

## 2020-10-16 ENCOUNTER — COMMUNICATION - HEALTHEAST (OUTPATIENT)
Dept: SURGERY | Facility: CLINIC | Age: 70
End: 2020-10-16

## 2020-10-20 ENCOUNTER — COMMUNICATION - HEALTHEAST (OUTPATIENT)
Dept: ONCOLOGY | Facility: HOSPITAL | Age: 70
End: 2020-10-20

## 2020-10-20 ENCOUNTER — HOSPITAL ENCOUNTER (OUTPATIENT)
Dept: SURGERY | Facility: CLINIC | Age: 70
Discharge: HOME | End: 2020-10-20
Attending: SPECIALIST

## 2020-10-20 DIAGNOSIS — Z48.89 POSTOPERATIVE VISIT: ICD-10-CM

## 2020-10-22 ENCOUNTER — OFFICE VISIT - HEALTHEAST (OUTPATIENT)
Dept: RADIATION ONCOLOGY | Facility: HOSPITAL | Age: 70
End: 2020-10-22

## 2020-10-22 ENCOUNTER — OFFICE VISIT - HEALTHEAST (OUTPATIENT)
Dept: ONCOLOGY | Facility: HOSPITAL | Age: 70
End: 2020-10-22

## 2020-10-22 DIAGNOSIS — C50.112 MALIGNANT NEOPLASM OF CENTRAL PORTION OF LEFT BREAST IN FEMALE, ESTROGEN RECEPTOR POSITIVE (H): ICD-10-CM

## 2020-10-22 DIAGNOSIS — Z17.0 MALIGNANT NEOPLASM OF NIPPLE OF BOTH BREASTS IN FEMALE, ESTROGEN RECEPTOR POSITIVE (H): ICD-10-CM

## 2020-10-22 DIAGNOSIS — C50.012 MALIGNANT NEOPLASM OF NIPPLE OF BOTH BREASTS IN FEMALE, ESTROGEN RECEPTOR POSITIVE (H): ICD-10-CM

## 2020-10-22 DIAGNOSIS — Z17.0 MALIGNANT NEOPLASM OF CENTRAL PORTION OF LEFT BREAST IN FEMALE, ESTROGEN RECEPTOR POSITIVE (H): ICD-10-CM

## 2020-10-22 DIAGNOSIS — Z85.3 PERSONAL HISTORY OF BREAST CANCER: ICD-10-CM

## 2020-10-22 DIAGNOSIS — C50.011 MALIGNANT NEOPLASM OF NIPPLE OF BOTH BREASTS IN FEMALE, ESTROGEN RECEPTOR POSITIVE (H): ICD-10-CM

## 2020-10-22 DIAGNOSIS — M85.80 OSTEOPENIA, UNSPECIFIED LOCATION: ICD-10-CM

## 2020-10-22 DIAGNOSIS — M81.0 AGE-RELATED OSTEOPOROSIS WITHOUT CURRENT PATHOLOGICAL FRACTURE: ICD-10-CM

## 2020-10-22 RX ORDER — EXEMESTANE 25 MG/1
25 TABLET ORAL DAILY
Qty: 90 TABLET | Refills: 3 | Status: SHIPPED | OUTPATIENT
Start: 2020-10-29 | End: 2021-11-22

## 2020-10-22 ASSESSMENT — MIFFLIN-ST. JEOR: SCORE: 1683.87

## 2020-10-24 ENCOUNTER — COMMUNICATION - HEALTHEAST (OUTPATIENT)
Dept: ONCOLOGY | Facility: HOSPITAL | Age: 70
End: 2020-10-24

## 2020-10-26 ENCOUNTER — COMMUNICATION - HEALTHEAST (OUTPATIENT)
Dept: ONCOLOGY | Facility: HOSPITAL | Age: 70
End: 2020-10-26

## 2020-10-27 ENCOUNTER — HOSPITAL ENCOUNTER (OUTPATIENT)
Dept: SURGERY | Facility: CLINIC | Age: 70
Discharge: HOME | End: 2020-10-27
Attending: SPECIALIST

## 2020-10-27 DIAGNOSIS — T88.8XXD FLUID COLLECTION AT SURGICAL SITE, SUBSEQUENT ENCOUNTER: ICD-10-CM

## 2020-10-27 DIAGNOSIS — C50.112 MALIGNANT NEOPLASM OF CENTRAL PORTION OF LEFT BREAST IN FEMALE, ESTROGEN RECEPTOR POSITIVE (H): ICD-10-CM

## 2020-10-27 DIAGNOSIS — Z17.0 MALIGNANT NEOPLASM OF CENTRAL PORTION OF LEFT BREAST IN FEMALE, ESTROGEN RECEPTOR POSITIVE (H): ICD-10-CM

## 2020-10-27 DIAGNOSIS — Z48.89 POSTOPERATIVE VISIT: ICD-10-CM

## 2020-10-28 ENCOUNTER — COMMUNICATION - HEALTHEAST (OUTPATIENT)
Dept: ONCOLOGY | Facility: HOSPITAL | Age: 70
End: 2020-10-28

## 2020-10-28 ENCOUNTER — HOSPITAL ENCOUNTER (OUTPATIENT)
Dept: PET IMAGING | Facility: HOSPITAL | Age: 70
Setting detail: RADIATION/ONCOLOGY SERIES
Discharge: STILL ADMITTED OR ADMINISTRATIVE | End: 2020-10-28
Attending: INTERNAL MEDICINE

## 2020-10-28 DIAGNOSIS — C50.011 MALIGNANT NEOPLASM OF NIPPLE OF BOTH BREASTS IN FEMALE, ESTROGEN RECEPTOR POSITIVE (H): ICD-10-CM

## 2020-10-28 DIAGNOSIS — C50.012 MALIGNANT NEOPLASM OF NIPPLE OF BOTH BREASTS IN FEMALE, ESTROGEN RECEPTOR POSITIVE (H): ICD-10-CM

## 2020-10-28 DIAGNOSIS — Z17.0 MALIGNANT NEOPLASM OF NIPPLE OF BOTH BREASTS IN FEMALE, ESTROGEN RECEPTOR POSITIVE (H): ICD-10-CM

## 2020-10-28 LAB — GLUCOSE BLDC GLUCOMTR-MCNC: 99 MG/DL (ref 70–139)

## 2020-10-28 ASSESSMENT — MIFFLIN-ST. JEOR: SCORE: 1680.7

## 2020-11-03 ENCOUNTER — HOSPITAL ENCOUNTER (OUTPATIENT)
Dept: SURGERY | Facility: CLINIC | Age: 70
Discharge: HOME | End: 2020-11-03
Attending: SPECIALIST

## 2020-11-03 ENCOUNTER — AMBULATORY - HEALTHEAST (OUTPATIENT)
Dept: OTHER | Facility: CLINIC | Age: 70
End: 2020-11-03

## 2020-11-03 DIAGNOSIS — Z48.89 POSTOPERATIVE VISIT: ICD-10-CM

## 2020-11-04 ENCOUNTER — OFFICE VISIT - HEALTHEAST (OUTPATIENT)
Dept: PHYSICAL THERAPY | Facility: REHABILITATION | Age: 70
End: 2020-11-04

## 2020-11-04 DIAGNOSIS — I97.2 POST-MASTECTOMY LYMPHEDEMA SYNDROME: ICD-10-CM

## 2020-11-04 DIAGNOSIS — M25.612 DECREASED RANGE OF MOTION OF LEFT SHOULDER: ICD-10-CM

## 2020-11-04 DIAGNOSIS — I89.0 LYMPHEDEMA: ICD-10-CM

## 2020-11-04 DIAGNOSIS — C50.112 MALIGNANT NEOPLASM OF CENTRAL PORTION OF LEFT BREAST (H): ICD-10-CM

## 2020-11-04 DIAGNOSIS — R22.43 LOCALIZED SWELLING OF BOTH LOWER LEGS: ICD-10-CM

## 2020-11-05 ENCOUNTER — COMMUNICATION - HEALTHEAST (OUTPATIENT)
Dept: ONCOLOGY | Facility: HOSPITAL | Age: 70
End: 2020-11-05

## 2020-11-09 ENCOUNTER — COMMUNICATION - HEALTHEAST (OUTPATIENT)
Dept: ADMINISTRATIVE | Facility: HOSPITAL | Age: 70
End: 2020-11-09

## 2020-11-09 ENCOUNTER — AMBULATORY - HEALTHEAST (OUTPATIENT)
Dept: RADIATION ONCOLOGY | Facility: HOSPITAL | Age: 70
End: 2020-11-09

## 2020-11-17 ENCOUNTER — AMBULATORY - HEALTHEAST (OUTPATIENT)
Dept: OTHER | Facility: CLINIC | Age: 70
End: 2020-11-17

## 2020-11-17 ENCOUNTER — COMMUNICATION - HEALTHEAST (OUTPATIENT)
Dept: SURGERY | Facility: CLINIC | Age: 70
End: 2020-11-17

## 2020-11-18 ENCOUNTER — COMMUNICATION - HEALTHEAST (OUTPATIENT)
Dept: SURGERY | Facility: CLINIC | Age: 70
End: 2020-11-18

## 2020-11-19 ENCOUNTER — OFFICE VISIT - HEALTHEAST (OUTPATIENT)
Dept: INTERNAL MEDICINE | Facility: CLINIC | Age: 70
End: 2020-11-19

## 2020-11-19 ENCOUNTER — OFFICE VISIT - HEALTHEAST (OUTPATIENT)
Dept: PHYSICAL THERAPY | Facility: REHABILITATION | Age: 70
End: 2020-11-19

## 2020-11-19 ENCOUNTER — COMMUNICATION - HEALTHEAST (OUTPATIENT)
Dept: INTERNAL MEDICINE | Facility: CLINIC | Age: 70
End: 2020-11-19

## 2020-11-19 DIAGNOSIS — Z17.0 MALIGNANT NEOPLASM OF NIPPLE OF BOTH BREASTS IN FEMALE, ESTROGEN RECEPTOR POSITIVE (H): ICD-10-CM

## 2020-11-19 DIAGNOSIS — I89.0 LYMPHEDEMA: ICD-10-CM

## 2020-11-19 DIAGNOSIS — R22.43 LOCALIZED SWELLING OF BOTH LOWER LEGS: ICD-10-CM

## 2020-11-19 DIAGNOSIS — E78.2 MIXED HYPERLIPIDEMIA: ICD-10-CM

## 2020-11-19 DIAGNOSIS — E55.9 VITAMIN D INSUFFICIENCY: ICD-10-CM

## 2020-11-19 DIAGNOSIS — R73.03 PREDIABETES: ICD-10-CM

## 2020-11-19 DIAGNOSIS — J45.40 MODERATE PERSISTENT ASTHMA WITHOUT COMPLICATION: ICD-10-CM

## 2020-11-19 DIAGNOSIS — I97.2 POST-MASTECTOMY LYMPHEDEMA SYNDROME: ICD-10-CM

## 2020-11-19 DIAGNOSIS — M25.612 DECREASED RANGE OF MOTION OF LEFT SHOULDER: ICD-10-CM

## 2020-11-19 DIAGNOSIS — Z00.00 HEALTH MAINTENANCE EXAMINATION: ICD-10-CM

## 2020-11-19 DIAGNOSIS — C50.112 MALIGNANT NEOPLASM OF CENTRAL PORTION OF LEFT BREAST (H): ICD-10-CM

## 2020-11-19 DIAGNOSIS — C50.011 MALIGNANT NEOPLASM OF NIPPLE OF BOTH BREASTS IN FEMALE, ESTROGEN RECEPTOR POSITIVE (H): ICD-10-CM

## 2020-11-19 DIAGNOSIS — C50.012 MALIGNANT NEOPLASM OF NIPPLE OF BOTH BREASTS IN FEMALE, ESTROGEN RECEPTOR POSITIVE (H): ICD-10-CM

## 2020-11-19 LAB
ALBUMIN SERPL-MCNC: 4 G/DL (ref 3.5–5)
ALP SERPL-CCNC: 83 U/L (ref 45–120)
ALT SERPL W P-5'-P-CCNC: 29 U/L (ref 0–45)
ANION GAP SERPL CALCULATED.3IONS-SCNC: 8 MMOL/L (ref 5–18)
AST SERPL W P-5'-P-CCNC: 19 U/L (ref 0–40)
BILIRUB SERPL-MCNC: 0.7 MG/DL (ref 0–1)
BUN SERPL-MCNC: 11 MG/DL (ref 8–28)
CALCIUM SERPL-MCNC: 9.5 MG/DL (ref 8.5–10.5)
CHLORIDE BLD-SCNC: 106 MMOL/L (ref 98–107)
CHOLEST SERPL-MCNC: 226 MG/DL
CO2 SERPL-SCNC: 27 MMOL/L (ref 22–31)
CREAT SERPL-MCNC: 0.84 MG/DL (ref 0.6–1.1)
FASTING STATUS PATIENT QL REPORTED: YES
GFR SERPL CREATININE-BSD FRML MDRD: >60 ML/MIN/1.73M2
GLUCOSE BLD-MCNC: 96 MG/DL (ref 70–125)
HBA1C MFR BLD: 5.4 %
HDLC SERPL-MCNC: 73 MG/DL
LDLC SERPL CALC-MCNC: 139 MG/DL
POTASSIUM BLD-SCNC: 4.3 MMOL/L (ref 3.5–5)
PROT SERPL-MCNC: 6.6 G/DL (ref 6–8)
SODIUM SERPL-SCNC: 141 MMOL/L (ref 136–145)
TRIGL SERPL-MCNC: 68 MG/DL
TSH SERPL DL<=0.005 MIU/L-ACNC: 1.45 UIU/ML (ref 0.3–5)

## 2020-11-19 ASSESSMENT — MIFFLIN-ST. JEOR: SCORE: 1659.15

## 2020-11-20 ENCOUNTER — OFFICE VISIT - HEALTHEAST (OUTPATIENT)
Dept: RADIATION ONCOLOGY | Facility: HOSPITAL | Age: 70
End: 2020-11-20

## 2020-11-20 ENCOUNTER — COMMUNICATION - HEALTHEAST (OUTPATIENT)
Dept: RADIATION ONCOLOGY | Facility: HOSPITAL | Age: 70
End: 2020-11-20

## 2020-11-20 ENCOUNTER — AMBULATORY - HEALTHEAST (OUTPATIENT)
Dept: RADIATION ONCOLOGY | Facility: HOSPITAL | Age: 70
End: 2020-11-20

## 2020-11-20 DIAGNOSIS — C50.112 MALIGNANT NEOPLASM OF CENTRAL PORTION OF LEFT BREAST IN FEMALE, ESTROGEN RECEPTOR POSITIVE (H): ICD-10-CM

## 2020-11-20 DIAGNOSIS — Z17.0 MALIGNANT NEOPLASM OF CENTRAL PORTION OF LEFT BREAST IN FEMALE, ESTROGEN RECEPTOR POSITIVE (H): ICD-10-CM

## 2020-11-20 LAB
25(OH)D3 SERPL-MCNC: 49.7 NG/ML (ref 30–80)
25(OH)D3 SERPL-MCNC: 49.7 NG/ML (ref 30–80)

## 2020-11-23 ENCOUNTER — AMBULATORY - HEALTHEAST (OUTPATIENT)
Dept: RADIATION ONCOLOGY | Facility: HOSPITAL | Age: 70
End: 2020-11-23

## 2020-11-23 ENCOUNTER — OFFICE VISIT - HEALTHEAST (OUTPATIENT)
Dept: PHYSICAL THERAPY | Facility: REHABILITATION | Age: 70
End: 2020-11-23

## 2020-11-23 DIAGNOSIS — I89.0 LYMPHEDEMA: ICD-10-CM

## 2020-11-23 DIAGNOSIS — R22.43 LOCALIZED SWELLING OF BOTH LOWER LEGS: ICD-10-CM

## 2020-11-23 DIAGNOSIS — I97.2 POST-MASTECTOMY LYMPHEDEMA SYNDROME: ICD-10-CM

## 2020-11-23 DIAGNOSIS — M25.612 DECREASED RANGE OF MOTION OF LEFT SHOULDER: ICD-10-CM

## 2020-11-23 DIAGNOSIS — C50.112 MALIGNANT NEOPLASM OF CENTRAL PORTION OF LEFT BREAST (H): ICD-10-CM

## 2020-11-25 ENCOUNTER — OFFICE VISIT - HEALTHEAST (OUTPATIENT)
Dept: PHYSICAL THERAPY | Facility: REHABILITATION | Age: 70
End: 2020-11-25

## 2020-11-25 DIAGNOSIS — C50.112 MALIGNANT NEOPLASM OF CENTRAL PORTION OF LEFT BREAST (H): ICD-10-CM

## 2020-11-25 DIAGNOSIS — I89.0 LYMPHEDEMA: ICD-10-CM

## 2020-11-25 DIAGNOSIS — I97.2 POST-MASTECTOMY LYMPHEDEMA SYNDROME: ICD-10-CM

## 2020-11-25 DIAGNOSIS — M25.612 DECREASED RANGE OF MOTION OF LEFT SHOULDER: ICD-10-CM

## 2020-11-25 DIAGNOSIS — R22.43 LOCALIZED SWELLING OF BOTH LOWER LEGS: ICD-10-CM

## 2020-11-28 ENCOUNTER — AMBULATORY - HEALTHEAST (OUTPATIENT)
Dept: FAMILY MEDICINE | Facility: CLINIC | Age: 70
End: 2020-11-28

## 2020-11-28 DIAGNOSIS — C50.112 MALIGNANT NEOPLASM OF CENTRAL PORTION OF LEFT BREAST IN FEMALE, ESTROGEN RECEPTOR POSITIVE (H): ICD-10-CM

## 2020-11-28 DIAGNOSIS — Z17.0 MALIGNANT NEOPLASM OF CENTRAL PORTION OF LEFT BREAST IN FEMALE, ESTROGEN RECEPTOR POSITIVE (H): ICD-10-CM

## 2020-11-29 ENCOUNTER — COMMUNICATION - HEALTHEAST (OUTPATIENT)
Dept: SCHEDULING | Facility: CLINIC | Age: 70
End: 2020-11-29

## 2020-11-30 ENCOUNTER — OFFICE VISIT - HEALTHEAST (OUTPATIENT)
Dept: PHYSICAL THERAPY | Facility: REHABILITATION | Age: 70
End: 2020-11-30

## 2020-11-30 ENCOUNTER — AMBULATORY - HEALTHEAST (OUTPATIENT)
Dept: RADIATION ONCOLOGY | Facility: HOSPITAL | Age: 70
End: 2020-11-30

## 2020-11-30 DIAGNOSIS — I89.0 LYMPHEDEMA: ICD-10-CM

## 2020-11-30 DIAGNOSIS — C50.112 MALIGNANT NEOPLASM OF CENTRAL PORTION OF LEFT BREAST (H): ICD-10-CM

## 2020-11-30 DIAGNOSIS — M25.612 DECREASED RANGE OF MOTION OF LEFT SHOULDER: ICD-10-CM

## 2020-11-30 DIAGNOSIS — R22.43 LOCALIZED SWELLING OF BOTH LOWER LEGS: ICD-10-CM

## 2020-11-30 DIAGNOSIS — I97.2 POST-MASTECTOMY LYMPHEDEMA SYNDROME: ICD-10-CM

## 2020-12-01 ENCOUNTER — HOSPITAL ENCOUNTER (OUTPATIENT)
Dept: SURGERY | Facility: CLINIC | Age: 70
Discharge: HOME | End: 2020-12-01
Attending: SPECIALIST | Admitting: SPECIALIST

## 2020-12-01 DIAGNOSIS — T88.8XXD FLUID COLLECTION AT SURGICAL SITE, SUBSEQUENT ENCOUNTER: ICD-10-CM

## 2020-12-02 ENCOUNTER — OFFICE VISIT - HEALTHEAST (OUTPATIENT)
Dept: PHYSICAL THERAPY | Facility: REHABILITATION | Age: 70
End: 2020-12-02

## 2020-12-02 DIAGNOSIS — M25.612 DECREASED RANGE OF MOTION OF LEFT SHOULDER: ICD-10-CM

## 2020-12-02 DIAGNOSIS — C50.112 MALIGNANT NEOPLASM OF CENTRAL PORTION OF LEFT BREAST (H): ICD-10-CM

## 2020-12-02 DIAGNOSIS — I89.0 LYMPHEDEMA: ICD-10-CM

## 2020-12-02 DIAGNOSIS — I97.2 POST-MASTECTOMY LYMPHEDEMA SYNDROME: ICD-10-CM

## 2020-12-02 DIAGNOSIS — R22.43 LOCALIZED SWELLING OF BOTH LOWER LEGS: ICD-10-CM

## 2020-12-03 ENCOUNTER — AMBULATORY - HEALTHEAST (OUTPATIENT)
Dept: RADIATION ONCOLOGY | Facility: HOSPITAL | Age: 70
End: 2020-12-03

## 2020-12-04 ENCOUNTER — OFFICE VISIT - HEALTHEAST (OUTPATIENT)
Dept: PHYSICAL THERAPY | Facility: REHABILITATION | Age: 70
End: 2020-12-04

## 2020-12-04 ENCOUNTER — AMBULATORY - HEALTHEAST (OUTPATIENT)
Dept: RADIATION ONCOLOGY | Facility: HOSPITAL | Age: 70
End: 2020-12-04

## 2020-12-04 DIAGNOSIS — M25.612 DECREASED RANGE OF MOTION OF LEFT SHOULDER: ICD-10-CM

## 2020-12-04 DIAGNOSIS — C50.112 MALIGNANT NEOPLASM OF CENTRAL PORTION OF LEFT BREAST (H): ICD-10-CM

## 2020-12-04 DIAGNOSIS — I89.0 LYMPHEDEMA: ICD-10-CM

## 2020-12-04 DIAGNOSIS — I97.2 POST-MASTECTOMY LYMPHEDEMA SYNDROME: ICD-10-CM

## 2020-12-04 DIAGNOSIS — R22.43 LOCALIZED SWELLING OF BOTH LOWER LEGS: ICD-10-CM

## 2020-12-05 ENCOUNTER — COMMUNICATION - HEALTHEAST (OUTPATIENT)
Dept: ONCOLOGY | Facility: HOSPITAL | Age: 70
End: 2020-12-05

## 2020-12-07 ENCOUNTER — OFFICE VISIT - HEALTHEAST (OUTPATIENT)
Dept: RADIATION ONCOLOGY | Facility: HOSPITAL | Age: 70
End: 2020-12-07

## 2020-12-07 ENCOUNTER — COMMUNICATION - HEALTHEAST (OUTPATIENT)
Dept: OTHER | Facility: CLINIC | Age: 70
End: 2020-12-07

## 2020-12-07 ENCOUNTER — OFFICE VISIT - HEALTHEAST (OUTPATIENT)
Dept: PHYSICAL THERAPY | Facility: REHABILITATION | Age: 70
End: 2020-12-07

## 2020-12-07 ENCOUNTER — AMBULATORY - HEALTHEAST (OUTPATIENT)
Dept: RADIATION ONCOLOGY | Facility: HOSPITAL | Age: 70
End: 2020-12-07

## 2020-12-07 DIAGNOSIS — C50.112 MALIGNANT NEOPLASM OF CENTRAL PORTION OF LEFT BREAST (H): ICD-10-CM

## 2020-12-07 DIAGNOSIS — M25.612 DECREASED RANGE OF MOTION OF LEFT SHOULDER: ICD-10-CM

## 2020-12-07 DIAGNOSIS — R22.43 LOCALIZED SWELLING OF BOTH LOWER LEGS: ICD-10-CM

## 2020-12-07 DIAGNOSIS — C50.112 MALIGNANT NEOPLASM OF CENTRAL PORTION OF LEFT BREAST IN FEMALE, ESTROGEN RECEPTOR POSITIVE (H): ICD-10-CM

## 2020-12-07 DIAGNOSIS — I97.2 POST-MASTECTOMY LYMPHEDEMA SYNDROME: ICD-10-CM

## 2020-12-07 DIAGNOSIS — Z17.0 MALIGNANT NEOPLASM OF CENTRAL PORTION OF LEFT BREAST IN FEMALE, ESTROGEN RECEPTOR POSITIVE (H): ICD-10-CM

## 2020-12-07 DIAGNOSIS — I89.0 LYMPHEDEMA: ICD-10-CM

## 2020-12-08 ENCOUNTER — AMBULATORY - HEALTHEAST (OUTPATIENT)
Dept: RADIATION ONCOLOGY | Facility: HOSPITAL | Age: 70
End: 2020-12-08

## 2020-12-09 ENCOUNTER — AMBULATORY - HEALTHEAST (OUTPATIENT)
Dept: RADIATION ONCOLOGY | Facility: HOSPITAL | Age: 70
End: 2020-12-09

## 2020-12-09 ENCOUNTER — OFFICE VISIT - HEALTHEAST (OUTPATIENT)
Dept: PHYSICAL THERAPY | Facility: REHABILITATION | Age: 70
End: 2020-12-09

## 2020-12-09 DIAGNOSIS — I89.0 LYMPHEDEMA: ICD-10-CM

## 2020-12-09 DIAGNOSIS — R22.43 LOCALIZED SWELLING OF BOTH LOWER LEGS: ICD-10-CM

## 2020-12-09 DIAGNOSIS — C50.112 MALIGNANT NEOPLASM OF CENTRAL PORTION OF LEFT BREAST (H): ICD-10-CM

## 2020-12-09 DIAGNOSIS — I97.2 POST-MASTECTOMY LYMPHEDEMA SYNDROME: ICD-10-CM

## 2020-12-09 DIAGNOSIS — M25.612 DECREASED RANGE OF MOTION OF LEFT SHOULDER: ICD-10-CM

## 2020-12-10 ENCOUNTER — AMBULATORY - HEALTHEAST (OUTPATIENT)
Dept: RADIATION ONCOLOGY | Facility: HOSPITAL | Age: 70
End: 2020-12-10

## 2020-12-11 ENCOUNTER — AMBULATORY - HEALTHEAST (OUTPATIENT)
Dept: RADIATION ONCOLOGY | Facility: HOSPITAL | Age: 70
End: 2020-12-11

## 2020-12-11 ENCOUNTER — OFFICE VISIT - HEALTHEAST (OUTPATIENT)
Dept: PHYSICAL THERAPY | Facility: REHABILITATION | Age: 70
End: 2020-12-11

## 2020-12-11 DIAGNOSIS — C50.112 MALIGNANT NEOPLASM OF CENTRAL PORTION OF LEFT BREAST (H): ICD-10-CM

## 2020-12-11 DIAGNOSIS — I89.0 LYMPHEDEMA: ICD-10-CM

## 2020-12-11 DIAGNOSIS — R22.43 LOCALIZED SWELLING OF BOTH LOWER LEGS: ICD-10-CM

## 2020-12-11 DIAGNOSIS — M25.612 DECREASED RANGE OF MOTION OF LEFT SHOULDER: ICD-10-CM

## 2020-12-11 DIAGNOSIS — I97.2 POST-MASTECTOMY LYMPHEDEMA SYNDROME: ICD-10-CM

## 2020-12-14 ENCOUNTER — AMBULATORY - HEALTHEAST (OUTPATIENT)
Dept: RADIATION ONCOLOGY | Facility: HOSPITAL | Age: 70
End: 2020-12-14

## 2020-12-14 ENCOUNTER — OFFICE VISIT - HEALTHEAST (OUTPATIENT)
Dept: PHYSICAL THERAPY | Facility: REHABILITATION | Age: 70
End: 2020-12-14

## 2020-12-14 ENCOUNTER — OFFICE VISIT - HEALTHEAST (OUTPATIENT)
Dept: RADIATION ONCOLOGY | Facility: HOSPITAL | Age: 70
End: 2020-12-14

## 2020-12-14 DIAGNOSIS — M25.612 DECREASED RANGE OF MOTION OF LEFT SHOULDER: ICD-10-CM

## 2020-12-14 DIAGNOSIS — I97.2 POST-MASTECTOMY LYMPHEDEMA SYNDROME: ICD-10-CM

## 2020-12-14 DIAGNOSIS — Z17.0 MALIGNANT NEOPLASM OF CENTRAL PORTION OF LEFT BREAST IN FEMALE, ESTROGEN RECEPTOR POSITIVE (H): ICD-10-CM

## 2020-12-14 DIAGNOSIS — I89.0 LYMPHEDEMA: ICD-10-CM

## 2020-12-14 DIAGNOSIS — R22.43 LOCALIZED SWELLING OF BOTH LOWER LEGS: ICD-10-CM

## 2020-12-14 DIAGNOSIS — C50.112 MALIGNANT NEOPLASM OF CENTRAL PORTION OF LEFT BREAST IN FEMALE, ESTROGEN RECEPTOR POSITIVE (H): ICD-10-CM

## 2020-12-14 DIAGNOSIS — C50.112 MALIGNANT NEOPLASM OF CENTRAL PORTION OF LEFT BREAST (H): ICD-10-CM

## 2020-12-15 ENCOUNTER — AMBULATORY - HEALTHEAST (OUTPATIENT)
Dept: RADIATION ONCOLOGY | Facility: HOSPITAL | Age: 70
End: 2020-12-15

## 2020-12-16 ENCOUNTER — OFFICE VISIT - HEALTHEAST (OUTPATIENT)
Dept: PHYSICAL THERAPY | Facility: REHABILITATION | Age: 70
End: 2020-12-16

## 2020-12-16 ENCOUNTER — AMBULATORY - HEALTHEAST (OUTPATIENT)
Dept: RADIATION ONCOLOGY | Facility: HOSPITAL | Age: 70
End: 2020-12-16

## 2020-12-16 DIAGNOSIS — C50.112 MALIGNANT NEOPLASM OF CENTRAL PORTION OF LEFT BREAST (H): ICD-10-CM

## 2020-12-16 DIAGNOSIS — R22.43 LOCALIZED SWELLING OF BOTH LOWER LEGS: ICD-10-CM

## 2020-12-16 DIAGNOSIS — I89.0 LYMPHEDEMA: ICD-10-CM

## 2020-12-16 DIAGNOSIS — M25.612 DECREASED RANGE OF MOTION OF LEFT SHOULDER: ICD-10-CM

## 2020-12-16 DIAGNOSIS — I97.2 POST-MASTECTOMY LYMPHEDEMA SYNDROME: ICD-10-CM

## 2020-12-17 ENCOUNTER — AMBULATORY - HEALTHEAST (OUTPATIENT)
Dept: RADIATION ONCOLOGY | Facility: HOSPITAL | Age: 70
End: 2020-12-17

## 2020-12-18 ENCOUNTER — COMMUNICATION - HEALTHEAST (OUTPATIENT)
Dept: RADIATION ONCOLOGY | Facility: HOSPITAL | Age: 70
End: 2020-12-18

## 2020-12-18 ENCOUNTER — AMBULATORY - HEALTHEAST (OUTPATIENT)
Dept: RADIATION ONCOLOGY | Facility: HOSPITAL | Age: 70
End: 2020-12-18

## 2020-12-18 ENCOUNTER — OFFICE VISIT - HEALTHEAST (OUTPATIENT)
Dept: RADIATION ONCOLOGY | Facility: HOSPITAL | Age: 70
End: 2020-12-18

## 2020-12-18 DIAGNOSIS — Z17.0 MALIGNANT NEOPLASM OF CENTRAL PORTION OF LEFT BREAST IN FEMALE, ESTROGEN RECEPTOR POSITIVE (H): ICD-10-CM

## 2020-12-18 DIAGNOSIS — C50.112 MALIGNANT NEOPLASM OF CENTRAL PORTION OF LEFT BREAST IN FEMALE, ESTROGEN RECEPTOR POSITIVE (H): ICD-10-CM

## 2020-12-21 ENCOUNTER — AMBULATORY - HEALTHEAST (OUTPATIENT)
Dept: RADIATION ONCOLOGY | Facility: HOSPITAL | Age: 70
End: 2020-12-21

## 2020-12-21 ENCOUNTER — OFFICE VISIT - HEALTHEAST (OUTPATIENT)
Dept: PHYSICAL THERAPY | Facility: REHABILITATION | Age: 70
End: 2020-12-21

## 2020-12-21 ENCOUNTER — OFFICE VISIT - HEALTHEAST (OUTPATIENT)
Dept: RADIATION ONCOLOGY | Facility: HOSPITAL | Age: 70
End: 2020-12-21

## 2020-12-21 DIAGNOSIS — Z17.0 MALIGNANT NEOPLASM OF CENTRAL PORTION OF LEFT BREAST IN FEMALE, ESTROGEN RECEPTOR POSITIVE (H): ICD-10-CM

## 2020-12-21 DIAGNOSIS — I89.0 LYMPHEDEMA: ICD-10-CM

## 2020-12-21 DIAGNOSIS — C50.112 MALIGNANT NEOPLASM OF CENTRAL PORTION OF LEFT BREAST (H): ICD-10-CM

## 2020-12-21 DIAGNOSIS — I97.2 POST-MASTECTOMY LYMPHEDEMA SYNDROME: ICD-10-CM

## 2020-12-21 DIAGNOSIS — M25.612 DECREASED RANGE OF MOTION OF LEFT SHOULDER: ICD-10-CM

## 2020-12-21 DIAGNOSIS — R22.43 LOCALIZED SWELLING OF BOTH LOWER LEGS: ICD-10-CM

## 2020-12-21 DIAGNOSIS — C50.112 MALIGNANT NEOPLASM OF CENTRAL PORTION OF LEFT BREAST IN FEMALE, ESTROGEN RECEPTOR POSITIVE (H): ICD-10-CM

## 2020-12-22 ENCOUNTER — AMBULATORY - HEALTHEAST (OUTPATIENT)
Dept: RADIATION ONCOLOGY | Facility: HOSPITAL | Age: 70
End: 2020-12-22

## 2020-12-22 ENCOUNTER — AMBULATORY - HEALTHEAST (OUTPATIENT)
Dept: OTHER | Facility: CLINIC | Age: 70
End: 2020-12-22

## 2020-12-23 ENCOUNTER — AMBULATORY - HEALTHEAST (OUTPATIENT)
Dept: RADIATION ONCOLOGY | Facility: HOSPITAL | Age: 70
End: 2020-12-23

## 2020-12-23 ENCOUNTER — AMBULATORY - HEALTHEAST (OUTPATIENT)
Dept: VASCULAR SURGERY | Facility: CLINIC | Age: 70
End: 2020-12-23

## 2020-12-23 DIAGNOSIS — I97.2 POST-MASTECTOMY LYMPHEDEMA SYNDROME: ICD-10-CM

## 2020-12-24 ENCOUNTER — AMBULATORY - HEALTHEAST (OUTPATIENT)
Dept: RADIATION ONCOLOGY | Facility: HOSPITAL | Age: 70
End: 2020-12-24

## 2020-12-28 ENCOUNTER — AMBULATORY - HEALTHEAST (OUTPATIENT)
Dept: RADIATION ONCOLOGY | Facility: HOSPITAL | Age: 70
End: 2020-12-28

## 2020-12-28 ENCOUNTER — OFFICE VISIT - HEALTHEAST (OUTPATIENT)
Dept: ONCOLOGY | Facility: HOSPITAL | Age: 70
End: 2020-12-28

## 2020-12-28 ENCOUNTER — OFFICE VISIT - HEALTHEAST (OUTPATIENT)
Dept: RADIATION ONCOLOGY | Facility: HOSPITAL | Age: 70
End: 2020-12-28

## 2020-12-28 ENCOUNTER — OFFICE VISIT - HEALTHEAST (OUTPATIENT)
Dept: PHYSICAL THERAPY | Facility: REHABILITATION | Age: 70
End: 2020-12-28

## 2020-12-28 DIAGNOSIS — C50.112 MALIGNANT NEOPLASM OF CENTRAL PORTION OF LEFT BREAST (H): ICD-10-CM

## 2020-12-28 DIAGNOSIS — Z17.0 MALIGNANT NEOPLASM OF CENTRAL PORTION OF LEFT BREAST IN FEMALE, ESTROGEN RECEPTOR POSITIVE (H): ICD-10-CM

## 2020-12-28 DIAGNOSIS — Z17.0 MALIGNANT NEOPLASM OF NIPPLE OF RIGHT BREAST IN FEMALE, ESTROGEN RECEPTOR POSITIVE (H): ICD-10-CM

## 2020-12-28 DIAGNOSIS — R22.43 LOCALIZED SWELLING OF BOTH LOWER LEGS: ICD-10-CM

## 2020-12-28 DIAGNOSIS — C50.112 MALIGNANT NEOPLASM OF CENTRAL PORTION OF LEFT BREAST IN FEMALE, ESTROGEN RECEPTOR POSITIVE (H): ICD-10-CM

## 2020-12-28 DIAGNOSIS — I97.2 POST-MASTECTOMY LYMPHEDEMA SYNDROME: ICD-10-CM

## 2020-12-28 DIAGNOSIS — M25.612 DECREASED RANGE OF MOTION OF LEFT SHOULDER: ICD-10-CM

## 2020-12-28 DIAGNOSIS — C50.011 MALIGNANT NEOPLASM OF NIPPLE OF RIGHT BREAST IN FEMALE, ESTROGEN RECEPTOR POSITIVE (H): ICD-10-CM

## 2020-12-28 DIAGNOSIS — I89.0 LYMPHEDEMA: ICD-10-CM

## 2020-12-29 ENCOUNTER — AMBULATORY - HEALTHEAST (OUTPATIENT)
Dept: RADIATION ONCOLOGY | Facility: HOSPITAL | Age: 70
End: 2020-12-29

## 2020-12-30 ENCOUNTER — AMBULATORY - HEALTHEAST (OUTPATIENT)
Dept: RADIATION ONCOLOGY | Facility: HOSPITAL | Age: 70
End: 2020-12-30

## 2020-12-31 ENCOUNTER — AMBULATORY - HEALTHEAST (OUTPATIENT)
Dept: OTHER | Facility: CLINIC | Age: 70
End: 2020-12-31

## 2020-12-31 ENCOUNTER — AMBULATORY - HEALTHEAST (OUTPATIENT)
Dept: RADIATION ONCOLOGY | Facility: HOSPITAL | Age: 70
End: 2020-12-31

## 2021-01-04 ENCOUNTER — OFFICE VISIT - HEALTHEAST (OUTPATIENT)
Dept: RADIATION ONCOLOGY | Facility: HOSPITAL | Age: 71
End: 2021-01-04

## 2021-01-04 ENCOUNTER — AMBULATORY - HEALTHEAST (OUTPATIENT)
Dept: RADIATION ONCOLOGY | Facility: HOSPITAL | Age: 71
End: 2021-01-04

## 2021-01-04 DIAGNOSIS — Z17.0 MALIGNANT NEOPLASM OF CENTRAL PORTION OF LEFT BREAST IN FEMALE, ESTROGEN RECEPTOR POSITIVE (H): ICD-10-CM

## 2021-01-04 DIAGNOSIS — C50.112 MALIGNANT NEOPLASM OF CENTRAL PORTION OF LEFT BREAST IN FEMALE, ESTROGEN RECEPTOR POSITIVE (H): ICD-10-CM

## 2021-01-05 ENCOUNTER — AMBULATORY - HEALTHEAST (OUTPATIENT)
Dept: RADIATION ONCOLOGY | Facility: HOSPITAL | Age: 71
End: 2021-01-05

## 2021-01-06 ENCOUNTER — AMBULATORY - HEALTHEAST (OUTPATIENT)
Dept: RADIATION ONCOLOGY | Facility: HOSPITAL | Age: 71
End: 2021-01-06

## 2021-01-07 ENCOUNTER — AMBULATORY - HEALTHEAST (OUTPATIENT)
Dept: RADIATION ONCOLOGY | Facility: HOSPITAL | Age: 71
End: 2021-01-07

## 2021-01-08 ENCOUNTER — AMBULATORY - HEALTHEAST (OUTPATIENT)
Dept: RADIATION ONCOLOGY | Facility: HOSPITAL | Age: 71
End: 2021-01-08

## 2021-01-11 ENCOUNTER — AMBULATORY - HEALTHEAST (OUTPATIENT)
Dept: RADIATION ONCOLOGY | Facility: HOSPITAL | Age: 71
End: 2021-01-11

## 2021-01-11 ENCOUNTER — OFFICE VISIT - HEALTHEAST (OUTPATIENT)
Dept: RADIATION ONCOLOGY | Facility: HOSPITAL | Age: 71
End: 2021-01-11

## 2021-01-11 DIAGNOSIS — Z17.0 MALIGNANT NEOPLASM OF CENTRAL PORTION OF LEFT BREAST IN FEMALE, ESTROGEN RECEPTOR POSITIVE (H): ICD-10-CM

## 2021-01-11 DIAGNOSIS — C50.112 MALIGNANT NEOPLASM OF CENTRAL PORTION OF LEFT BREAST IN FEMALE, ESTROGEN RECEPTOR POSITIVE (H): ICD-10-CM

## 2021-01-12 ENCOUNTER — AMBULATORY - HEALTHEAST (OUTPATIENT)
Dept: RADIATION ONCOLOGY | Facility: HOSPITAL | Age: 71
End: 2021-01-12

## 2021-01-13 ENCOUNTER — AMBULATORY - HEALTHEAST (OUTPATIENT)
Dept: RADIATION ONCOLOGY | Facility: HOSPITAL | Age: 71
End: 2021-01-13

## 2021-01-13 ENCOUNTER — OFFICE VISIT - HEALTHEAST (OUTPATIENT)
Dept: PHYSICAL THERAPY | Facility: REHABILITATION | Age: 71
End: 2021-01-13

## 2021-01-13 DIAGNOSIS — I89.0 LYMPHEDEMA: ICD-10-CM

## 2021-01-13 DIAGNOSIS — M25.612 DECREASED RANGE OF MOTION OF LEFT SHOULDER: ICD-10-CM

## 2021-01-13 DIAGNOSIS — I97.2 POST-MASTECTOMY LYMPHEDEMA SYNDROME: ICD-10-CM

## 2021-01-13 DIAGNOSIS — C50.112 MALIGNANT NEOPLASM OF CENTRAL PORTION OF LEFT BREAST (H): ICD-10-CM

## 2021-01-13 DIAGNOSIS — R22.43 LOCALIZED SWELLING OF BOTH LOWER LEGS: ICD-10-CM

## 2021-01-14 ENCOUNTER — AMBULATORY - HEALTHEAST (OUTPATIENT)
Dept: RADIATION ONCOLOGY | Facility: HOSPITAL | Age: 71
End: 2021-01-14

## 2021-01-15 ENCOUNTER — AMBULATORY - HEALTHEAST (OUTPATIENT)
Dept: RADIATION ONCOLOGY | Facility: HOSPITAL | Age: 71
End: 2021-01-15

## 2021-01-18 ENCOUNTER — OFFICE VISIT - HEALTHEAST (OUTPATIENT)
Dept: RADIATION ONCOLOGY | Facility: HOSPITAL | Age: 71
End: 2021-01-18

## 2021-01-18 ENCOUNTER — AMBULATORY - HEALTHEAST (OUTPATIENT)
Dept: RADIATION ONCOLOGY | Facility: HOSPITAL | Age: 71
End: 2021-01-18

## 2021-01-18 DIAGNOSIS — C50.112 MALIGNANT NEOPLASM OF CENTRAL PORTION OF LEFT BREAST IN FEMALE, ESTROGEN RECEPTOR POSITIVE (H): ICD-10-CM

## 2021-01-18 DIAGNOSIS — Z17.0 MALIGNANT NEOPLASM OF CENTRAL PORTION OF LEFT BREAST IN FEMALE, ESTROGEN RECEPTOR POSITIVE (H): ICD-10-CM

## 2021-01-19 ENCOUNTER — AMBULATORY - HEALTHEAST (OUTPATIENT)
Dept: RADIATION ONCOLOGY | Facility: HOSPITAL | Age: 71
End: 2021-01-19

## 2021-01-20 ENCOUNTER — AMBULATORY - HEALTHEAST (OUTPATIENT)
Dept: RADIATION ONCOLOGY | Facility: HOSPITAL | Age: 71
End: 2021-01-20

## 2021-01-20 ENCOUNTER — COMMUNICATION - HEALTHEAST (OUTPATIENT)
Dept: INTERNAL MEDICINE | Facility: CLINIC | Age: 71
End: 2021-01-20

## 2021-01-21 ENCOUNTER — AMBULATORY - HEALTHEAST (OUTPATIENT)
Dept: RADIATION ONCOLOGY | Facility: HOSPITAL | Age: 71
End: 2021-01-21

## 2021-01-21 DIAGNOSIS — C50.112 MALIGNANT NEOPLASM OF CENTRAL PORTION OF LEFT BREAST IN FEMALE, ESTROGEN RECEPTOR POSITIVE (H): ICD-10-CM

## 2021-01-21 DIAGNOSIS — Z17.0 MALIGNANT NEOPLASM OF CENTRAL PORTION OF LEFT BREAST IN FEMALE, ESTROGEN RECEPTOR POSITIVE (H): ICD-10-CM

## 2021-02-01 ENCOUNTER — COMMUNICATION - HEALTHEAST (OUTPATIENT)
Dept: RADIATION ONCOLOGY | Facility: HOSPITAL | Age: 71
End: 2021-02-01

## 2021-02-02 ENCOUNTER — AMBULATORY - HEALTHEAST (OUTPATIENT)
Dept: OTHER | Facility: CLINIC | Age: 71
End: 2021-02-02

## 2021-02-11 ENCOUNTER — OFFICE VISIT - HEALTHEAST (OUTPATIENT)
Dept: VASCULAR SURGERY | Facility: CLINIC | Age: 71
End: 2021-02-11

## 2021-02-11 DIAGNOSIS — I97.2 POST-MASTECTOMY LYMPHEDEMA SYNDROME: ICD-10-CM

## 2021-02-11 DIAGNOSIS — E66.01 MORBID OBESITY (H): ICD-10-CM

## 2021-02-11 DIAGNOSIS — M24.512 CONTRACTURE OF SHOULDER, LEFT: ICD-10-CM

## 2021-02-11 DIAGNOSIS — M24.511 CONTRACTURE OF SHOULDER, RIGHT: ICD-10-CM

## 2021-02-11 DIAGNOSIS — C50.112 MALIGNANT NEOPLASM OF CENTRAL PORTION OF LEFT BREAST IN FEMALE, ESTROGEN RECEPTOR POSITIVE (H): ICD-10-CM

## 2021-02-11 DIAGNOSIS — I87.8 VENOUS STASIS OF BOTH LOWER EXTREMITIES: ICD-10-CM

## 2021-02-11 DIAGNOSIS — Z17.0 MALIGNANT NEOPLASM OF CENTRAL PORTION OF LEFT BREAST IN FEMALE, ESTROGEN RECEPTOR POSITIVE (H): ICD-10-CM

## 2021-02-11 DIAGNOSIS — C50.911 MUCINOUS CARCINOMA OF BREAST, RIGHT (H): ICD-10-CM

## 2021-02-11 DIAGNOSIS — M79.89 LEG SWELLING: ICD-10-CM

## 2021-02-11 ASSESSMENT — MIFFLIN-ST. JEOR: SCORE: 1685.23

## 2021-02-13 ENCOUNTER — COMMUNICATION - HEALTHEAST (OUTPATIENT)
Dept: VASCULAR SURGERY | Facility: CLINIC | Age: 71
End: 2021-02-13

## 2021-02-13 ENCOUNTER — COMMUNICATION - HEALTHEAST (OUTPATIENT)
Dept: ONCOLOGY | Facility: HOSPITAL | Age: 71
End: 2021-02-13

## 2021-02-17 ENCOUNTER — COMMUNICATION - HEALTHEAST (OUTPATIENT)
Dept: ONCOLOGY | Facility: HOSPITAL | Age: 71
End: 2021-02-17

## 2021-02-18 ENCOUNTER — OFFICE VISIT - HEALTHEAST (OUTPATIENT)
Dept: PULMONOLOGY | Facility: OTHER | Age: 71
End: 2021-02-18

## 2021-02-18 DIAGNOSIS — J45.909 UNCOMPLICATED ASTHMA, UNSPECIFIED ASTHMA SEVERITY, UNSPECIFIED WHETHER PERSISTENT: ICD-10-CM

## 2021-02-18 DIAGNOSIS — J30.9 ALLERGIC RHINITIS, UNSPECIFIED SEASONALITY, UNSPECIFIED TRIGGER: ICD-10-CM

## 2021-02-19 ENCOUNTER — COMMUNICATION - HEALTHEAST (OUTPATIENT)
Dept: RADIATION ONCOLOGY | Facility: HOSPITAL | Age: 71
End: 2021-02-19

## 2021-02-26 ENCOUNTER — OFFICE VISIT - HEALTHEAST (OUTPATIENT)
Dept: RADIATION ONCOLOGY | Facility: HOSPITAL | Age: 71
End: 2021-02-26

## 2021-02-26 DIAGNOSIS — C50.112 MALIGNANT NEOPLASM OF CENTRAL PORTION OF LEFT BREAST IN FEMALE, ESTROGEN RECEPTOR POSITIVE (H): ICD-10-CM

## 2021-02-26 DIAGNOSIS — Z17.0 MALIGNANT NEOPLASM OF CENTRAL PORTION OF LEFT BREAST IN FEMALE, ESTROGEN RECEPTOR POSITIVE (H): ICD-10-CM

## 2021-02-26 RX ORDER — NIACIN 500 MG
2000 TABLET ORAL
Status: SHIPPED | COMMUNITY
Start: 2021-02-26 | End: 2021-11-22

## 2021-03-03 ENCOUNTER — COMMUNICATION - HEALTHEAST (OUTPATIENT)
Dept: PULMONOLOGY | Facility: OTHER | Age: 71
End: 2021-03-03

## 2021-03-03 DIAGNOSIS — J45.909 UNCOMPLICATED ASTHMA, UNSPECIFIED ASTHMA SEVERITY, UNSPECIFIED WHETHER PERSISTENT: ICD-10-CM

## 2021-03-10 ENCOUNTER — COMMUNICATION - HEALTHEAST (OUTPATIENT)
Dept: INTERNAL MEDICINE | Facility: CLINIC | Age: 71
End: 2021-03-10

## 2021-03-13 ENCOUNTER — COMMUNICATION - HEALTHEAST (OUTPATIENT)
Dept: PULMONOLOGY | Facility: OTHER | Age: 71
End: 2021-03-13

## 2021-03-13 DIAGNOSIS — J45.909 UNCOMPLICATED ASTHMA, UNSPECIFIED ASTHMA SEVERITY, UNSPECIFIED WHETHER PERSISTENT: ICD-10-CM

## 2021-03-15 ENCOUNTER — COMMUNICATION - HEALTHEAST (OUTPATIENT)
Dept: RADIATION ONCOLOGY | Facility: HOSPITAL | Age: 71
End: 2021-03-15

## 2021-03-19 ENCOUNTER — COMMUNICATION - HEALTHEAST (OUTPATIENT)
Dept: INTERNAL MEDICINE | Facility: CLINIC | Age: 71
End: 2021-03-19

## 2021-03-29 ENCOUNTER — OFFICE VISIT - HEALTHEAST (OUTPATIENT)
Dept: ONCOLOGY | Facility: HOSPITAL | Age: 71
End: 2021-03-29

## 2021-03-29 DIAGNOSIS — C50.011 MALIGNANT NEOPLASM OF NIPPLE OF RIGHT BREAST IN FEMALE, UNSPECIFIED ESTROGEN RECEPTOR STATUS (H): ICD-10-CM

## 2021-04-09 ENCOUNTER — AMBULATORY - HEALTHEAST (OUTPATIENT)
Dept: OTHER | Facility: CLINIC | Age: 71
End: 2021-04-09

## 2021-04-09 DIAGNOSIS — C50.011 MALIGNANT NEOPLASM OF NIPPLE AND AREOLA OF FEMALE BREAST, RIGHT (H): ICD-10-CM

## 2021-04-27 ENCOUNTER — AMBULATORY - HEALTHEAST (OUTPATIENT)
Dept: OTHER | Facility: CLINIC | Age: 71
End: 2021-04-27

## 2021-05-22 ENCOUNTER — COMMUNICATION - HEALTHEAST (OUTPATIENT)
Dept: PULMONOLOGY | Facility: OTHER | Age: 71
End: 2021-05-22

## 2021-05-22 DIAGNOSIS — J45.909 UNCOMPLICATED ASTHMA, UNSPECIFIED ASTHMA SEVERITY, UNSPECIFIED WHETHER PERSISTENT: ICD-10-CM

## 2021-05-24 ENCOUNTER — RECORDS - HEALTHEAST (OUTPATIENT)
Dept: ADMINISTRATIVE | Facility: CLINIC | Age: 71
End: 2021-05-24

## 2021-05-24 RX ORDER — BUDESONIDE 180 UG/1
AEROSOL, POWDER RESPIRATORY (INHALATION)
Qty: 1 INHALER | Refills: 11 | Status: SHIPPED | OUTPATIENT
Start: 2021-05-24 | End: 2022-03-24

## 2021-05-25 ENCOUNTER — RECORDS - HEALTHEAST (OUTPATIENT)
Dept: ADMINISTRATIVE | Facility: CLINIC | Age: 71
End: 2021-05-25

## 2021-05-26 ENCOUNTER — RECORDS - HEALTHEAST (OUTPATIENT)
Dept: ADMINISTRATIVE | Facility: CLINIC | Age: 71
End: 2021-05-26

## 2021-05-26 VITALS
OXYGEN SATURATION: 96 % | TEMPERATURE: 97.9 F | SYSTOLIC BLOOD PRESSURE: 118 MMHG | DIASTOLIC BLOOD PRESSURE: 70 MMHG | RESPIRATION RATE: 12 BRPM | HEART RATE: 83 BPM

## 2021-05-27 ENCOUNTER — RECORDS - HEALTHEAST (OUTPATIENT)
Dept: ADMINISTRATIVE | Facility: CLINIC | Age: 71
End: 2021-05-27

## 2021-05-27 VITALS
OXYGEN SATURATION: 98 % | DIASTOLIC BLOOD PRESSURE: 66 MMHG | SYSTOLIC BLOOD PRESSURE: 152 MMHG | TEMPERATURE: 97.6 F | HEART RATE: 66 BPM

## 2021-05-28 ENCOUNTER — RECORDS - HEALTHEAST (OUTPATIENT)
Dept: ADMINISTRATIVE | Facility: CLINIC | Age: 71
End: 2021-05-28

## 2021-05-28 ASSESSMENT — ASTHMA QUESTIONNAIRES
ACT_TOTALSCORE: 20
ACT_TOTALSCORE: 25
ACT_TOTALSCORE: 24

## 2021-05-28 NOTE — TELEPHONE ENCOUNTER
Spoke with pt to get more information.  Pt wants help filling out her health care directive, would like to go over it with someone. Per Lynne, with care management, pt can call the Advance Care Planning team at [phone] to fill out documents.  CA gave this information to pt.  Pt understanding.

## 2021-05-28 NOTE — TELEPHONE ENCOUNTER
"    Vertigo / dizziness - started a few weeks ago  - slow/gradual onset     > Worsening in the past week    > Walking - \"tippier than NL\"    > \"Head not felt quite right\"   > No fevers   > Bending over and standing upright can trigger the spells  - is fine when sitting still     > \"Ears feel a little plugged\"  > No falls or head injuries       No recent med changes around time of the onset of this       A/P   > Appt for today            Reason for Disposition    Lightheadedness (dizziness) present now, after 2 hours of rest and fluids    Protocols used: DIZZINESS-A-OH      "

## 2021-05-28 NOTE — PROGRESS NOTES
"  Office Visit - Follow Up   Camilla Wilson   69 y.o. female    Date of Visit: 4/19/2019    Chief Complaint   Patient presents with     Dizziness     Feels off balance x few wks. Today first episode of spinning room sensation        Assessment and Plan   1. Postural dizziness  Her physical exam is rather normal.  Possibly just a mild amount of balance difficulty.  He plans to try and resume some yoga poses to restore some of her balance difficulty.  If this trouble with imbalance worsen she needs further evaluation by Dr. Austin.  Do not think imaging studies are indicated at this time.          No follow-ups on file.     History of Present Illness   This 69 y.o. old planes that she is been feeling a bit off balance for the past 2-3 weeks.  She prided herself in the past of being able to do some balance yoga poses quite well.  She is not been active involved in her yoga in the past year or 2 noticed that she has a hard time standing on one foot now.  She is had occasional diligent dizziness but this morning upon arising she stood up in the room spin for 1-2 minutes.  She reports no ear pain.  No trauma.  No falls.  Head injuries.  No syncope.    Review of Systems: A comprehensive review of systems was negative except as noted.     Medications, Allergies and Problem List   Reviewed, reconciled and updated     Physical Exam   General Appearance:     /82 (Patient Site: Left Arm, Patient Position: Sitting, Cuff Size: Adult Regular)   Pulse (!) 53   Ht 5' 4.5\" (1.638 m)   Wt (!) 269 lb 1.3 oz (122.1 kg)   SpO2 99%   Breastfeeding? No   BMI 45.47 kg/m      EOMs are full.  No nystagmus.  Cranial nerves III through XII are intact.  No carotid bruit.  Rapid alternating movements are normal.  Finger dexterity is normal.  Heel to toe walk is fairly good.  Walking on her heels is normal.  Dizziness on changing position notes no dizziness on turning her head.  Speech is normal she is alert and oriented.  No " headaches.     Additional Information   Current Outpatient Medications   Medication Sig Dispense Refill     5-hydroxytryptophan (5-HTP) 100 mg cap Take 2 capsules by mouth daily. 1000mg       calcium citrate 250 mg calcium Tab Take 500 mg by mouth 2 (two) times a day.        cholecalciferol, vitamin D3, (VITAMIN D3) 2,000 unit cap Take 1 capsule by mouth daily.        melatonin 5 mg cap 1 cap nightly  0     niacin (SLO-NIACIN) 500 mg ER tablet Take 2,000 mg by mouth.       peg 400-propylene glycol (SYSTANE) 0.4-0.3 % Drop Administer 1 drop to both eyes 4 (four) times a day as needed.       s-adenosylmethionine (AISHA-E, ENTERIC COATED,) 200 mg TbEC Take 2 tablets by mouth daily.       sodium fluoride-pot nitrate (PREVIDENT 5000 SENSITIVE) 1.1-5 % Pste Take by mouth daily.        vit C,R-Gl-vkktu-lutein-zeaxan (OCUVITE LUTEIN & ZEAXANTHIN) 60 mg-30 unit- 15 mg-2 mg-6 mg cap Take by mouth.       OMEGA-3/DHA/EPA/FISH OIL (FISH OIL-OMEGA-3 FATTY ACIDS) 300-1,000 mg capsule Take 2 g by mouth daily.       omeprazole (PRILOSEC) 20 MG capsule Take 1 capsule (20 mg total) by mouth daily. 90 capsule 1     No current facility-administered medications for this visit.      Allergies   Allergen Reactions     Lipitor [Atorvastatin]      Memory  Impairment       Social History     Tobacco Use     Smoking status: Former Smoker     Last attempt to quit: 1982     Years since quittin.9     Smokeless tobacco: Never Used   Substance Use Topics     Alcohol use: Not on file     Drug use: Not on file       Review and/or order of clinical lab tests:  Review and/or order of radiology tests:  Review and/or order of medicine tests:  Discussion of test results with performing physician:  Decision to obtain old records and/or obtain history from someone other than the patient:  Review and summarization of old records and/or obtaining history from someone other than the patient and.or discussion of case with another health care  provider:  Independent visualization of image, tracing or specimen itself:    Time: total time spent with the patient was 25 minutes of which >50% was spent in counseling and coordination of care     Oliver Caal MD

## 2021-05-29 ENCOUNTER — RECORDS - HEALTHEAST (OUTPATIENT)
Dept: ADMINISTRATIVE | Facility: CLINIC | Age: 71
End: 2021-05-29

## 2021-05-30 ENCOUNTER — RECORDS - HEALTHEAST (OUTPATIENT)
Dept: ADMINISTRATIVE | Facility: CLINIC | Age: 71
End: 2021-05-30

## 2021-05-31 VITALS — BODY MASS INDEX: 43.99 KG/M2 | HEIGHT: 65 IN | WEIGHT: 264 LBS

## 2021-05-31 NOTE — PROGRESS NOTES
[facility] Note    Camilla Wilson   69 y.o. female    Date of Visit: 8/21/2019  Chief Complaint   Patient presents with     Cough     Comes and goes. Ongoing x 3 month     Chest Congestion     Breast Cancer     2002. [place] info on news regarding lab testing with this DX       ASSESSMENT/PLAN  1. Encounter for screening mammogram for breast cancer     2. Malignant neoplasm of female breast, unspecified estrogen receptor status, unspecified laterality, unspecified site of breast (H)     3. Chronic cough  ipratropium (ATROVENT) 0.03 % nasal spray   4. Gastroesophageal reflux disease without esophagitis       ---------------------------------------------    1.  She has history of breast cancer, treated with lumpectomy, radiation, and exemestane.  She heard in the news that there are new recommendations by the [place]PSTF for BRCA screening.  She was under the impression that anyone who had breast cancer should be screened, though this was not my interpretation of this.  This news just came out today.  I will research this further, we can readdress at the annual physical.  The only possible risk factor would be a family member who had bilateral breast cancer (at different times)    2.  See above    3.  Chronic cough, differential diagnosis includes postnasal drip, GERD, cough variant asthma.  Structural lung disease also possibility, but lung exam was normal, and this is low likelihood.  I did offer a chest x-ray.  We decided together to try Atrovent first to see if that helps to alleviate it.  I do not see her on any medications which could cause chronic cough.    4.  See above.  She does not feel that this cough is likely GERD cough, which she feels is different.    Return in about 3 months (around 11/21/2019) for Annual physical.      SUBJECTIVE    Camilla Wilson is here for follow-up.    She has a 3 month cough which comes and goes, feels dry, mild congestion in the chest.  It often will just go away.     It may last 7 days.    She is not on prescription meds.    She does since she has posterior nasal drainage.  She has history of GERD, has expressed cough with this, but feels that the current cough is different.    A while ago every spring she would get itchy eyes.  She had an allergy test and was negative.  She does not think this is from allergies.    Her left ankle still hurts-- I saw her for this Feb 2019.  It is more swollen.  Everyone in her family amongst the women have swollen ankles.  We talked about getting her into a foot and ankle specialist since some of the discomfort has not gone away since earlier this year.  She would like to just see if it goes away by itself.    She saw something on the news about breast cancer screening- BCRA.  Tx lumpectomy and radiation 2002, exemestane clinical trial.  Read on the news today that BRCA1 screening guidelines have been updated by the [place] preventative services task force.  She asked about this.    No fhx uterine, ovarian, cervical cancer.  Maternal aunt had breast cancer in each breast, sister had colon cancer.  Uncle Ed had lung cancer.  One uncle had myasthenia gravis.     She stopped omeprazole- still takes pepcid occ.     ROS:   Per HPI, all other systems negative     Medications, allergies, and problem list were reviewed and updated    Patient Active Problem List   Diagnosis     Breast Cancer     Dyslipidemia     Spinal Stenosis     Obesity     Osteopenia     GERD (gastroesophageal reflux disease)     Skin cancer     Hamstring tendonitis of right thigh     Left ankle tendonitis     Past Medical History:   Diagnosis Date     Acute streptococcal pharyngitis      Breast cancer (H)      Breast cyst      Hx of radiation therapy      Uterine fibroid     embolized     Current Outpatient Medications   Medication Sig Dispense Refill     5-hydroxytryptophan (5-HTP) 100 mg cap Take 2 capsules by mouth daily. 1000mg       calcium citrate 250 mg calcium Tab Take 500 mg  "by mouth 2 (two) times a day.        cholecalciferol, vitamin D3, (VITAMIN D3) 2,000 unit cap Take 1 capsule by mouth daily.        melatonin 5 mg cap 1 cap nightly  0     niacin (SLO-NIACIN) 500 mg ER tablet Take 2,000 mg by mouth.       peg 400-propylene glycol (SYSTANE) 0.4-0.3 % Drop Administer 1 drop to both eyes 4 (four) times a day as needed.       s-adenosylmethionine (AISHA-E, ENTERIC COATED,) 200 mg TbEC Take 2 tablets by mouth daily.       sodium fluoride-pot nitrate (PREVIDENT 5000 SENSITIVE) 1.1-5 % Pste Take by mouth daily.        vit C,R-Ri-jndjj-lutein-zeaxan (OCUVITE LUTEIN & ZEAXANTHIN) 60 mg-30 unit- 15 mg-2 mg-6 mg cap Take by mouth.       ipratropium (ATROVENT) 0.03 % nasal spray 2 sprays into each nostril 3 (three) times a day. 30 mL 2     No current facility-administered medications for this visit.      Allergies   Allergen Reactions     Lipitor [Atorvastatin]      Memory  Impairment         EXAM  Vitals:    08/21/19 1336   BP: 120/80   Patient Site: Left Arm   Patient Position: Sitting   Cuff Size: Adult Large   Pulse: 60   SpO2: 97%   Weight: (!) 265 lb 3.2 oz (120.3 kg)   Height: 5' 4.5\" (1.638 m)         General: Alert, no distress  Heart: Regular rate rhythm, no murmurs  Lungs: Clear to auscultation  Lymphatics: Lymphedematous changes on left ankle greater than the right ankle.  She does have varicose veins.    This visit lasted a total of 25 minutes.  Over 50% of the time was spent counseling regarding he management of leg edema, breast cancer screening, chronic cough.       Willie Austin, DO  Internal Medicine  [facility]    "

## 2021-06-02 VITALS — HEIGHT: 65 IN | WEIGHT: 269.08 LBS | BODY MASS INDEX: 44.83 KG/M2

## 2021-06-02 VITALS — BODY MASS INDEX: 45.29 KG/M2 | WEIGHT: 268 LBS

## 2021-06-02 NOTE — PROGRESS NOTES
Assessment and Plan:       1. Routine general medical examination at a health care facility  Camilla Wilson is a 69-year-old woman who presents for annual wellness visit.  Her current BMI is 44, I recommended getting more exercise, currently she is only swimming about once per week.    2. Malignant neoplasm of female breast, unspecified estrogen receptor status, unspecified laterality, unspecified site of breast (H)  She has history of breast cancer in the year 2000, received radiation, she is done with chronic cough, see below.  We will check the following labs as follow-up for breast cancer.  She declines clinical breast exam, though I recommended this in accordance with the guidelines.  She had a normal mammogram earlier this year.  Apparently she has history of breast lumps which have caused unnecessary concern in the past, and she would rather not deal with that.  We also talked about BRCA screening, I did not find reason to screen her, but if still interested, she could follow-up with a genetic counselor.  She is not sure she wants to do this.  - Hepatic Profile  - HM2(CBC w/o Differential)  - Basic Metabolic Panel    3. Encounter for screening for lipoid disorders  Check fasting labs  - Lipid Collier, FASTING    4. Chronic cough  He has chronic cough at this point, going on for about 5 months, but intermittently.  Ipratropium nasal spray did not work.  She is treating herself intermittently with Pepcid for GERD.  I recommend doing a chest x-ray to evaluate for structural issues, also pulmonary function tests.  She did receive radiation as part of breast cancer, though unclear if this is playing a role.  If she has a restriction on lung function tests, could consider high-resolution CT versus pulmonary referral.  - XR Chest 2 Views  - PFT Complete; Future    5. Plantar fasciitis  She has mild plantar fasciitis of the right heel, recommended calf stretching.  This was likely when she was walking a lot in  Amount Injected At This Location In Cc (Will Not Render If 0): 0 Detail Level: Zone the [place] as part of her trip in September.  I also mention the possibility of ankle brace, podiatry referral.  She still has left ankle pain, has more swelling in the left ankle, and some restricted inversion.  She likely has some structural issues with her left ankle, though she did not want to follow-up with that right now.        The patient's current medical problems were reviewed.    I have had an Advance Directives discussion with the patient.  The following high BMI interventions were performed this visit: encouragement to exercise and weight monitoring  The following health maintenance schedule was reviewed with the patient and provided in printed form in the after visit summary:   Health Maintenance   Topic Date Due     ZOSTER VACCINES (2 of 3) 03/02/2012     MEDICARE ANNUAL WELLNESS VISIT  01/31/2019     INFLUENZA VACCINE RULE BASED (1) 08/01/2019     FALL RISK ASSESSMENT  02/28/2020     DXA SCAN  03/26/2020     MAMMOGRAM  05/03/2021     TD 18+ HE  08/15/2021     COLONOSCOPY  04/04/2024     ADVANCE CARE PLANNING  07/10/2024     HEPATITIS C SCREENING  Completed     PNEUMOCOCCAL IMMUNIZATION 65+ LOW/MEDIUM RISK  Completed        Subjective:     Camilla is a 69-year-old woman who presents for AWV/physical.     At the last visit in August, she asked about BRCA screening for women who have had breast cancer in the past.    We talked about chronic cough for 3 months as well, decided to do Atrovent to see if that helped prior to entertaining further work-up such as x-ray, though this was offered to her.  She did not feel the cough was related to GERD.  She is on Pepcid instead of omeprazole.  She has had a negative allergy panel which was tested.    She mentioned her left ankle which is chronically painful and swollen, though attributes some of this to family history of swollen ankles.    She notes she is forgetful at times, such as walking into the room and not remembering.      She swims weekly.       The left arch has been painful to walk.  She got some good walking shoes for her [place] trip.  Now on the right foot she has pain on the heel.    She is in the midst of completing advanced directive.    Review of Systems:    Please see above.  The rest of the review of systems are negative for all systems.    Patient Care Team:  Willie Austin DO as PCP - General (Internal Medicine)  Willie Austin DO as Assigned PCP     Patient Active Problem List   Diagnosis     Breast Cancer     Dyslipidemia     Spinal Stenosis     Obesity     Osteopenia     GERD (gastroesophageal reflux disease)     Skin cancer     Hamstring tendonitis of right thigh     Left ankle tendonitis     Past Medical History:   Diagnosis Date     Acute streptococcal pharyngitis      Breast cancer (H)      Breast cyst      Hx of radiation therapy      Uterine fibroid     embolized      Past Surgical History:   Procedure Laterality Date     BREAST BIOPSY Left      BREAST LUMPECTOMY Right 2007     PA EMBOLIZATION UTERINE FIBROID      Description: Uterine Fibroid Embolization;  Proc Date: 09/06/2000;     PA EXCISE BREAST CYST      Description: Breast Surgery Lumpectomy;  Recorded: 12/18/2007;     PA SHLDR ARTHROSCOP,SURG,W/REMOVAL,LOOSE/FB      Description: Shoulder Arthroscopy;  Recorded: 02/25/2009;  Comments: right 2004; left 2007      Family History   Problem Relation Age of Onset     Colon cancer Maternal Aunt      Breast cancer Maternal Aunt         40s     Kidney disease Mother      Pneumonia Father      Hyperlipidemia Father       Social History     Socioeconomic History     Marital status: Single     Spouse name: Not on file     Number of children: Not on file     Years of education: Not on file     Highest education level: Not on file   Occupational History     Not on file   Social Needs     Financial resource strain: Not on file     Food insecurity:     Worry: Not on file     Inability: Not on file     Transportation needs:      Location #1: tear troughs , nlfs , marionette lines Depth In Mm (Will Not Render If 0): 0.4 Medical: Not on file     Non-medical: Not on file   Tobacco Use     Smoking status: Former Smoker     Last attempt to quit: 1982     Years since quittin.4     Smokeless tobacco: Never Used   Substance and Sexual Activity     Alcohol use: Not on file     Drug use: Not on file     Sexual activity: Not on file   Lifestyle     Physical activity:     Days per week: Not on file     Minutes per session: Not on file     Stress: Not on file   Relationships     Social connections:     Talks on phone: Not on file     Gets together: Not on file     Attends Sikh service: Not on file     Active member of club or organization: Not on file     Attends meetings of clubs or organizations: Not on file     Relationship status: Not on file     Intimate partner violence:     Fear of current or ex partner: Not on file     Emotionally abused: Not on file     Physically abused: Not on file     Forced sexual activity: Not on file   Other Topics Concern     Not on file   Social History Narrative     Not on file      Current Outpatient Medications   Medication Sig Dispense Refill     5-hydroxytryptophan (5-HTP) 100 mg cap Take 2 capsules by mouth daily. 1000mg       calcium citrate 250 mg calcium Tab Take 500 mg by mouth 2 (two) times a day.        cholecalciferol, vitamin D3, (VITAMIN D3) 2,000 unit cap Take 1 capsule by mouth daily.        melatonin 5 mg cap 1 cap nightly  0     niacin (SLO-NIACIN) 500 mg ER tablet Take 2,000 mg by mouth.       peg 400-propylene glycol (SYSTANE) 0.4-0.3 % Drop Administer 1 drop to both eyes 4 (four) times a day as needed.       s-adenosylmethionine (AISHA-E, ENTERIC COATED,) 200 mg TbEC Take 2 tablets by mouth daily.       sodium fluoride-pot nitrate (PREVIDENT 5000 SENSITIVE) 1.1-5 % Pste Take by mouth daily.        vit C,K-Pt-inpou-lutein-zeaxan (OCUVITE LUTEIN & ZEAXANTHIN) 60 mg-30 unit- 15 mg-2 mg-6 mg cap Take by mouth.       No current facility-administered medications for this visit.      "  Objective:   Vital Signs:   Visit Vitals  /76 (Patient Site: Left Arm, Patient Position: Sitting, Cuff Size: Adult Large)   Pulse 76   Ht 5' 5\" (1.651 m)   Wt (!) 262 lb 9 oz (119.1 kg)   SpO2 97%   BMI 43.69 kg/m         VisionScreening:  No exam data present     PHYSICAL EXAM    General: alert, no distress  HEENT: sclerae anicteric, moist oral mucosa  Heart: Regular rate and rhythm, no murmurs.  No pretibial edema.  Warm extremities  Lungs: Clear to auscultation bilat  Gastrointestinal: abdomen is soft, non-tender, non-distended.    Skin: warm/dry, no rashes  Neuro: no gross abnormalities  Breast: pt declines due to history of lumps leading to unnecessary concern       Assessment Results 10/16/2019   Activities of Daily Living No help needed   Instrumental Activities of Daily Living No help needed   Mini Cog Total Score 3   Some recent data might be hidden     A Mini-Cog score of 0-2 suggests the possibility of dementia, score of 3-5 suggests no dementia    Identified Health Risks:     She is at risk for lack of exercise and has been provided with information to increase physical activity for the benefit of her well-being.  The patient was counseled and encouraged to consider modifying their diet and eating habits. She was provided with information on recommended healthy diet options.  Information on urinary incontinence and treatment options given to patient.  She is at risk for falling and has been provided with information to reduce the risk of falling at home.  Information regarding advance directives (living sosa), including where she can download the appropriate form, was provided to the patient via the AVS.     The 10-year ASCVD risk score (Donaldo DIEGO Jr., et al., 2013) is: 9.1%    Values used to calculate the score:      Age: 69 years      Sex: Female      Is Non- : No      Diabetic: No      Tobacco smoker: No      Systolic Blood Pressure: 118 mmHg      Is BP treated: No      " Consent: Written consent obtained, risks reviewed including but not limited to pain, scarring, infection and incomplete improvement. Patient understands the procedure is cosmetic in nature and will require out of pocket payment. Depth In Mm (Will Not Render If 0): 1 HDL Cholesterol: 84 mg/dL      Total Cholesterol: 258 mg/dL    Which Technique?: Default Show Additional Techniques: Yes Post-Care Instructions: After the procedure, take precautions agains sun exposure. Do not apply sunscreen for 12 hours after the procedure. Do not apply make-up for 12 hours after the procedure. Avoid alcohol based toners for 10-14 days. After 2-3 days patients can return to their regular skin regimen. Recommended boost therapy treatment next visit as needed in 4 weeks. Amount Injected At This Location In Cc (Will Not Render If 0): 7 Amount Injected At This Location In Cc (Will Not Render If 0): 4

## 2021-06-02 NOTE — PROGRESS NOTES
RESPIRATORY CARE NOTE    Complete Pulmonary Function Test completed by patient.  Good patient effort and cooperation. Albuterol 2.5 mg neb given for bronchodilation.  The results of this test meet the ATS standards for acceptability and repeatability. PT returned to baseline and left in no distress.    Evan Bhat, LRT  11/1/2019

## 2021-06-03 VITALS
HEIGHT: 65 IN | OXYGEN SATURATION: 97 % | DIASTOLIC BLOOD PRESSURE: 62 MMHG | RESPIRATION RATE: 12 BRPM | HEART RATE: 68 BPM | WEIGHT: 267 LBS | BODY MASS INDEX: 44.48 KG/M2 | SYSTOLIC BLOOD PRESSURE: 122 MMHG

## 2021-06-03 VITALS — HEIGHT: 65 IN | WEIGHT: 265.2 LBS | BODY MASS INDEX: 44.18 KG/M2

## 2021-06-03 VITALS
HEIGHT: 65 IN | OXYGEN SATURATION: 97 % | HEART RATE: 76 BPM | BODY MASS INDEX: 43.75 KG/M2 | WEIGHT: 262.56 LBS | DIASTOLIC BLOOD PRESSURE: 76 MMHG | SYSTOLIC BLOOD PRESSURE: 118 MMHG

## 2021-06-03 NOTE — PATIENT INSTRUCTIONS - HE
Thank you for coming in for your appointment to discuss your cough.     Your imaging showed normal lungs on a recent chest X-ray.    Your lung function testing showed normal lung function. The only unusual finding we see is the increased efficiency of oxygen diffusion/passage through the lung tissue -- this can be seen with asthma, obesity (unclear reason for why this happens), abnormally high hemoglobin (which you do not have), and certain cardiac defects (you do not have usual symptoms of this).    I think that it would be reasonable to start you on treatment for asthma -- you have quite a few symptoms that are very suggestive of asthma.    Plan:     Start FLOVENT (fluticasone) 1 puff twice (2 times) daily. This is an inhaler you will use everyday, even if your breathing feels fine. You will not feel the effects of this inhaler immediately -- it works in the background to help reduce the inflammation in the airways and to help open up the airways. It does not work better if you use it more frequently than prescribed. If you have acute breathing issues, you should use your rescue inhaler (Albuterol).     Start using ALBUTEROL inhaler as needed for acutely worsened shortness of breath, coughing, or wheezing. This is a rescue inhaler that starts working within 5-15 minutes of administration. Its effects last for 4-6 hours. You can use it even if you are using your maintenance inhaler (Flovent/fluticasone).  I recommend trying nasal irrigation to help with your nasal congestion and post-nasal drip symptoms. Nasal irrigation can help wash away dirt, allergens, and mucus from your nose & communicating sinuses.   Make sure to follow all the instructions provided with the irrigation devices for cleaning, use the premixed solution packets, & follow all the instructions regarding appropriate water preparation. Do NOT add any oils (essential or mineral) to the solution.   I recommend performing nasal irrigation at least 1-2  times per day, shortly before you use your nasal spray (Flonase/fluticasone nasal spray).     You can safely use nasal irrigation several times a day for your symptoms.     There are multiple devices available at pharmacies for nasal irrigation: a neti pot (ex/ [employer] Med NasaFlo Neti Pot), a squeeze bottle (ex/ [employer] Sinus rinse), a pulsating irrigation device (ex/ [employer] Med Sinugator, Grossan HydroPulse, Waterpik Sinusense Water Pulsator).     There are numerous videos available online to help you use the irrigation devices correctly.    Flonase (fluticasone) is a steroid nasal spray that works by reducing inflammation in nasal passages. Because it is a steroid, you will not feel the effects immediately -- it can take 2-7 days before you start to notice the results. It works best when used regularly (daily) to maintain the anti-inflammatory effect. If you use nasal irrigation devices such as neti pot, you should irrigate your nasal passages BEFORE using your Flonase spray.     After using FLOVENT (fluticasone), remember to thoroughly gargle your mouth and your throat to wash away any medication particles, reduce risk of developing thrush (a yeast infection in the mouth), & to prevent voice hoarseness. Some people find it helpful to drink a few sips of warm water after gargling to clean the throat more thoroughly.     I want you to start taking daily acid reflux medication to help reduce possible triggers for your asthma and cough.    Please, remember that appropriate use of inhalers is essential to their efficacy. If you cannot remember the instructions on how to properly use the prescribed inhalers, you can visit your pharmacist to request a demonstration, or you can review a video online. There are many helpful videos on [employer] that you can access to review instructions and demonstrations on the use of different inhalers. You should exercise common sense when looking for videos -- best and most informative  inhaler videos come from health care organizations.    You should follow up in 3 months.     If you have any questions or concerns, please, call our clinic at [phone].     My Asthma Action Plan    Name: Camilla Wilson   YOB: 1950  Date: 11/14/2019   My doctor: Gemini Rosaroi MD   My clinic: [facility]        My Control Medicine: Fluticasone propionate (Flovent HFA) - 110 mcg 1 puff twice a day  My Rescue Medicine: Albuterol (Proair/Ventolin/Proventil HFA) 2-4 puffs EVERY 4 HOURS as needed. Use a spacer if recommended by your provider.   My Oral Steroid Medicine: NONE My Asthma Severity:   Mild Persistent  Know your asthma triggers: upper respiratory infections and Gastric Reflux               GREEN ZONE   Good Control    I feel good    No cough or wheeze    Can work, sleep and play without asthma symptoms     Take your asthma control medicine every day.     1. If exercise triggers your asthma, take your rescue medication    15 minutes before exercise or sports, and    During exercise if you have asthma symptoms  2. Spacer to use with inhaler: If you have a spacer, make sure to use it with your inhaler             YELLOW ZONE Getting Worse  I have ANY of these:    I do not feel good    Cough or wheeze    Chest feels tight    Wake up at night 1. Keep taking your Green Zone medications  2. Start taking your rescue medicine:    every 20 minutes for up to 1 hour. Then every 4 hours for 24-48 hours.  3. If you stay in the Yellow Zone for more than 12-24 hours, contact your doctor.  4. If you do not return to the Green Zone in 12-24 hours or you get worse, start taking your oral steroid medicine if prescribed by your provider.           RED ZONE Medical Alert - Get Help  I have ANY of these:    I feel awful    Medicine is not helping    Breathing getting harder    Trouble walking or talking    Nose opens wide to breathe     1. Take your rescue medicine NOW  2. If your  provider has prescribed an oral steroid medicine, start taking it NOW  3. Call your doctor NOW  4. If you are still in the Red Zone after 20 minutes and you have not reached your doctor:    Take your rescue medicine again and    Call 911 or go to the emergency room right away    See your regular doctor within 2 weeks of an Emergency Room or Urgent Care visit for follow-up treatment.          Annual Reminders:  Meet with Asthma Educator,  Flu Shot in the Fall, consider Pneumonia Vaccination for patients with asthma (aged 19 and older).    Pharmacy:   [facility] - [place], [place] - [address]  [address] [place] [address]  Phone: [phone] Fax: [phone]                            Asthma Triggers  How To Control Things That Make Your Asthma Worse    Triggers are things that make your asthma worse.  Look at the list below to help you find your triggers and what you can do about them.  You can help prevent asthma flare-ups by staying away from your triggers.      Trigger                                                          What you can do   Cigarette Smoke  Tobacco smoke can make asthma worse. Do not allow smoking in your home, car or around you.  Be sure no one smokes at a child s day care or school.  If you smoke, ask your health care provider for ways to help you quit.  Ask family members to quit too.  Ask your health care provider for a referral to Quit Plan to help you quit smoking, or call 3-075-619-PLAN.     Colds, Flu, Bronchitis  These are common triggers of asthma. Wash your hands often.  Don t touch your eyes, nose or mouth.  Get a flu shot every year.     Dust Mites  These are tiny bugs that live in cloth or carpet. They are too small to see. Wash sheets and blankets in hot water every week.   Encase pillows and mattress in dust mite proof covers.  Avoid having carpet if you can. If you have carpet, vacuum weekly.   Use a dust mask and HEPA vacuum.   Pollen and Outdoor Mold  Some  people are allergic to trees, grass, or weed pollen, or molds. Try to keep your windows closed.  Limit time out doors when pollen count is high.   Ask you health care provider about taking medicine during allergy season.     Animal Dander  Some people are allergic to skin flakes, urine or saliva from pets with fur or feathers. Keep pets with fur or feathers out of your home.    If you can t keep the pet outdoors, then keep the pet out of your bedroom.  Keep the bedroom door closed.  Keep pets off cloth furniture and away from stuffed toys.     Mice, Rats, and Cockroaches   Some people are allergic to the waste from these pests.   Cover food and garbage.  Clean up spills and food crumbs.  Store grease in the refrigerator.   Keep food out of the bedroom.   Indoor Mold  This can be a trigger if your home has high moisture. Fix leaking faucets, pipes, or other sources of water.   Clean moldy surfaces.  Dehumidify basement if it is damp and smelly.   Smoke, Strong Odors, and Sprays  These can reduce air quality. Stay away from strong odors and sprays, such as perfume, powder, hair spray, paints, smoke incense, paint, cleaning products, candles and new carpet.   Exercise or Sports  Some people with asthma have this trigger. Be active!  Ask your doctor about taking medicine before sports or exercise to prevent symptoms.    Warm up for 5-10 minutes before and after sports or exercise.     Other Triggers of Asthma  Cold air:  Cover your nose and mouth with a scarf.  Sometimes laughing or crying can be a trigger.  Some medicines and food can trigger asthma.

## 2021-06-03 NOTE — PROGRESS NOTES
Patient instructed in use of flovent hfa and albuterol hfa.  Patient states good understanding of how to use the inhaler device.  Return demo completed in clinic with good technique demonstrated.  Printed instructions as well as phone numbers to call with any questions sent home with patient.  Patient also instructed to rinse, gargle, spit after using the flovent inhaler.

## 2021-06-04 VITALS
BODY MASS INDEX: 44.82 KG/M2 | WEIGHT: 269 LBS | OXYGEN SATURATION: 95 % | HEART RATE: 98 BPM | HEIGHT: 65 IN | DIASTOLIC BLOOD PRESSURE: 62 MMHG | SYSTOLIC BLOOD PRESSURE: 112 MMHG

## 2021-06-04 VITALS
OXYGEN SATURATION: 96 % | BODY MASS INDEX: 42.94 KG/M2 | DIASTOLIC BLOOD PRESSURE: 72 MMHG | WEIGHT: 262 LBS | HEART RATE: 69 BPM | SYSTOLIC BLOOD PRESSURE: 136 MMHG

## 2021-06-04 VITALS
BODY MASS INDEX: 43.07 KG/M2 | SYSTOLIC BLOOD PRESSURE: 110 MMHG | DIASTOLIC BLOOD PRESSURE: 68 MMHG | OXYGEN SATURATION: 98 % | WEIGHT: 268 LBS | HEART RATE: 93 BPM | HEIGHT: 66 IN

## 2021-06-04 VITALS — BODY MASS INDEX: 42.92 KG/M2 | WEIGHT: 267.08 LBS | HEIGHT: 66 IN

## 2021-06-04 VITALS — WEIGHT: 268 LBS | HEIGHT: 66 IN | BODY MASS INDEX: 43.07 KG/M2

## 2021-06-04 NOTE — TELEPHONE ENCOUNTER
Call from patient. States she was started on an asthma med at her last office visit (I am assuming the Flovent).  She has been using for 3 weeks now and does not notice any changes. She still feels the same.    Should she continue to use?  Or how long should it take for this to start working??

## 2021-06-04 NOTE — TELEPHONE ENCOUNTER
Was not able to reach patient at number she provided.  Left detailed VM message with Dr Rosario's response:  I'd like her to use it for at least 6 weeks before we decide it is not working for her. I want her to do her nasal irrigation & sprays regularly as well. If after another 3 weeks she is still feeling the same, we can switch her to Advair & see if that makes a difference. OB     Left my phone number to call back if any questions.

## 2021-06-05 ENCOUNTER — RECORDS - HEALTHEAST (OUTPATIENT)
Dept: BONE DENSITY | Facility: CLINIC | Age: 71
End: 2021-06-05

## 2021-06-05 VITALS — BODY MASS INDEX: 43.47 KG/M2 | WEIGHT: 257.2 LBS

## 2021-06-05 VITALS
TEMPERATURE: 98.6 F | HEIGHT: 65 IN | BODY MASS INDEX: 43.32 KG/M2 | RESPIRATION RATE: 20 BRPM | HEART RATE: 72 BPM | SYSTOLIC BLOOD PRESSURE: 116 MMHG | WEIGHT: 260 LBS | DIASTOLIC BLOOD PRESSURE: 78 MMHG

## 2021-06-05 VITALS
WEIGHT: 267.3 LBS | OXYGEN SATURATION: 96 % | HEART RATE: 80 BPM | SYSTOLIC BLOOD PRESSURE: 183 MMHG | BODY MASS INDEX: 44.48 KG/M2 | TEMPERATURE: 98.4 F | DIASTOLIC BLOOD PRESSURE: 82 MMHG

## 2021-06-05 VITALS — BODY MASS INDEX: 43.15 KG/M2 | HEIGHT: 65 IN | WEIGHT: 259 LBS

## 2021-06-05 VITALS
RESPIRATION RATE: 16 BRPM | HEIGHT: 65 IN | TEMPERATURE: 97.6 F | SYSTOLIC BLOOD PRESSURE: 128 MMHG | WEIGHT: 263 LBS | DIASTOLIC BLOOD PRESSURE: 60 MMHG | BODY MASS INDEX: 43.82 KG/M2 | HEART RATE: 72 BPM

## 2021-06-05 VITALS
HEART RATE: 63 BPM | DIASTOLIC BLOOD PRESSURE: 55 MMHG | BODY MASS INDEX: 43.31 KG/M2 | WEIGHT: 256.3 LBS | OXYGEN SATURATION: 97 % | SYSTOLIC BLOOD PRESSURE: 154 MMHG

## 2021-06-05 VITALS — BODY MASS INDEX: 43.65 KG/M2 | WEIGHT: 262 LBS | HEIGHT: 65 IN

## 2021-06-05 VITALS — BODY MASS INDEX: 43.82 KG/M2 | WEIGHT: 259.3 LBS

## 2021-06-05 VITALS
HEIGHT: 65 IN | DIASTOLIC BLOOD PRESSURE: 66 MMHG | BODY MASS INDEX: 43.27 KG/M2 | OXYGEN SATURATION: 98 % | SYSTOLIC BLOOD PRESSURE: 152 MMHG | HEART RATE: 66 BPM | TEMPERATURE: 97.6 F | WEIGHT: 259.7 LBS

## 2021-06-05 VITALS
HEART RATE: 56 BPM | SYSTOLIC BLOOD PRESSURE: 138 MMHG | TEMPERATURE: 97.6 F | WEIGHT: 256 LBS | BODY MASS INDEX: 42.65 KG/M2 | HEIGHT: 65 IN | DIASTOLIC BLOOD PRESSURE: 80 MMHG | OXYGEN SATURATION: 99 %

## 2021-06-05 VITALS
SYSTOLIC BLOOD PRESSURE: 149 MMHG | HEART RATE: 66 BPM | OXYGEN SATURATION: 100 % | TEMPERATURE: 97.7 F | WEIGHT: 257.7 LBS | DIASTOLIC BLOOD PRESSURE: 56 MMHG | BODY MASS INDEX: 43.55 KG/M2

## 2021-06-05 VITALS — WEIGHT: 256.2 LBS | BODY MASS INDEX: 43.3 KG/M2

## 2021-06-05 VITALS — BODY MASS INDEX: 43.26 KG/M2 | WEIGHT: 256 LBS

## 2021-06-05 VITALS — WEIGHT: 259.4 LBS | BODY MASS INDEX: 43.84 KG/M2

## 2021-06-05 DIAGNOSIS — M81.0 OSTEOPOROSIS: ICD-10-CM

## 2021-06-05 NOTE — PROGRESS NOTES
Pulmonary Clinic Outpatient Consultation  2/7/2020     Assessment and Plan:   69 year old former smoker with the most pertinent medical history of breast cancer, GERD, & obesity, presenting for evaluation of subacute-on-chronic cough.     #. Cough, intermittent, subacute-on-chronic.  Associated with respiratory symptoms suggestive of asthma.  Based on our discussion today, I suspect that the sensation of congestion she is experiencing might be in fact sensation of airway obstruction or tightness.  #.  Postnasal drip, chronic.  Possible environmental allergies versus vasomotor rhinitis.  No clear seasonal triggers.  Compliant with Flonase and a regular nasal rinses.  #.  Acid reflux, not addressed on this visit.  #.  Obesity.      Switch Flovent to Symbicort high-dose 2 puffs twice daily    Patient provided with hands-on education on how to use the Symbicort inhaler with a spacer    Start DuoNeb nebulizer treatments twice a day as needed; patient provided with nebulizer machine and hands-on instructions on its use    Recommended patient try using her DuoNeb nebulizer about 15 minutes before her Symbicort to see if this might have an added effect    Reiterated importance of gargling her throat after using the Symbicort    Patient should return to clinic in 3 months for a follow up.    Gemini Rosario MD  Pulmonary and Critical Care   ______________________________________________________________________________    CC: follow up asthma      HPI:   Camilla Wilson is a 69 y.o. former smoker with history of GERD, breast cancer (s/p lumpectomy, radiation, & chemotherapy), & obesity, presenting today for follow up of subacute-on-chronic cough. Initially seen 11/14/19 --I started her on Flovent for possible asthma with bronchitis symptoms, albuterol as needed, Flonase, nasal irrigation regimen, and recommended she restart taking her acid reflux medication.    Today patient reports that she is feeling about the same.  She used  "Flovent for about a month and felt that it did improve some of her breathlessness, but it did not remove the sensation of \"congestion\".  In fact she felt that before starting Flovent her congestion felt \"spongy\" and it was easier to cough.  Once she started Flovent she started to feel that the congestion became \"more concentrated\" and became harder to cough up.  She stopped using her Flovent about 2 weeks ago and feels that her congestion went back to \"spongy\" sensation, but she still unable to cough up any mucus.  She still having issues with sinus congestion, but overall she feels that she is doing better since starting to use her Neti pot regularly and using her Flonase daily.    Again, she recall to me that while her congestion cough have bothered her for a while, she did notice a significant improvement in her symptoms when she was on a weeklong prednisone burst several years ago.    Her ACT score is 5+5+5+5+5 = 25.  No emergency room visits or hospitalizations for her asthma in the last 12 months.    She is a former smoker with a 10 pack smoking history.  She quit smoking in 1983.  No childhood respiratory symptoms or recurrent illnesses.  She has always fostered or kept cats. Has a cat named Kasey Murillo.  The cat sleeps in her bed.  The animal is healthy.    ROS:  Review of Systems - 10 point ROS reviewed and noted to be negative w/ exceptions as detailed in the HPI.    PMH:  Patient Active Problem List    Diagnosis Date Noted     Morbid obesity (H) 11/14/2019     Left ankle tendonitis 02/28/2019     Hamstring tendonitis of right thigh 01/31/2018     GERD (gastroesophageal reflux disease) 02/04/2016     Skin cancer 02/04/2016     Osteopenia 12/07/2015     Obesity      Breast Cancer      Dyslipidemia      Spinal Stenosis      PSH:  Past Surgical History:   Procedure Laterality Date     BREAST BIOPSY Left      BREAST LUMPECTOMY Right 2007     NC EMBOLIZATION UTERINE FIBROID      Description: " Uterine Fibroid Embolization;  Proc Date: 2000;     IN EXCISE BREAST CYST      Description: Breast Surgery Lumpectomy;  Recorded: 2007;     IN SHLDR ARTHROSCOP,SURG,W/REMOVAL,LOOSE/FB      Description: Shoulder Arthroscopy;  Recorded: 2009;  Comments: right ; left      Allergies:  Allergies   Allergen Reactions     Lipitor [Atorvastatin]      Memory  Impairment       Family HX:  Family History   Problem Relation Age of Onset     Colon cancer Maternal Aunt      Breast cancer Maternal Aunt         40s     Kidney disease Mother      Pneumonia Father      Hyperlipidemia Father      Social Hx:  Social History     Socioeconomic History     Marital status: Single     Spouse name: Not on file     Number of children: Not on file     Years of education: Not on file     Highest education level: Not on file   Occupational History     Not on file   Social Needs     Financial resource strain: Not on file     Food insecurity:     Worry: Not on file     Inability: Not on file     Transportation needs:     Medical: Not on file     Non-medical: Not on file   Tobacco Use     Smoking status: Former Smoker     Packs/day: 0.50     Years: 15.00     Pack years: 7.50     Last attempt to quit: 1982     Years since quittin.7     Smokeless tobacco: Never Used   Substance and Sexual Activity     Alcohol use: Not on file     Drug use: Not on file     Sexual activity: Not on file   Lifestyle     Physical activity:     Days per week: Not on file     Minutes per session: Not on file     Stress: Not on file   Relationships     Social connections:     Talks on phone: Not on file     Gets together: Not on file     Attends Christianity service: Not on file     Active member of club or organization: Not on file     Attends meetings of clubs or organizations: Not on file     Relationship status: Not on file     Intimate partner violence:     Fear of current or ex partner: Not on file     Emotionally abused: Not on file      "Physically abused: Not on file     Forced sexual activity: Not on file   Other Topics Concern     Not on file   Social History Narrative     Not on file     Current Meds:  Current Outpatient Medications   Medication Sig Dispense Refill     5-hydroxytryptophan (5-HTP) 100 mg cap Take 2 capsules by mouth daily. 1000mg       albuterol (PROAIR HFA;PROVENTIL HFA;VENTOLIN HFA) 90 mcg/actuation inhaler Inhale 2 puffs every 4 (four) hours as needed for wheezing or shortness of breath (cough). 1 Inhaler 12     calcium citrate 250 mg calcium Tab Take 500 mg by mouth 2 (two) times a day.        cholecalciferol, vitamin D3, (VITAMIN D3) 2,000 unit cap Take 1 capsule by mouth daily.        fluticasone propionate (FLONASE) 50 mcg/actuation nasal spray 2 sprays into each nostril daily. 16 g 12     fluticasone propionate (FLOVENT HFA) 110 mcg/actuation inhaler Inhale 1 puff 2 (two) times a day. 1 Inhaler 12     melatonin 5 mg cap 1 cap nightly  0     niacin (SLO-NIACIN) 500 mg ER tablet Take 2,000 mg by mouth.       peg 400-propylene glycol (SYSTANE) 0.4-0.3 % Drop Administer 1 drop to both eyes 4 (four) times a day as needed.       s-adenosylmethionine (AISHA-E, ENTERIC COATED,) 200 mg TbEC Take 2 tablets by mouth daily.       sodium fluoride-pot nitrate (PREVIDENT 5000 SENSITIVE) 1.1-5 % Pste Take by mouth daily.        vit C,M-Se-drmpk-lutein-zeaxan (OCUVITE LUTEIN & ZEAXANTHIN) 60 mg-30 unit- 15 mg-2 mg-6 mg cap Take by mouth.       No current facility-administered medications for this visit.      Physical Exam:  /62   Pulse 98   Ht 5' 5\" (1.651 m)   Wt (!) 269 lb (122 kg)   SpO2 95%   Breastfeeding No   BMI 44.76 kg/m    Gen: obese elderly  woman, alert, oriented, no distress  HEENT: EOMI, MMM; no stridor  CV: RRR, no M/G/R  Resp: equal bilateral air entry, breath sounds clear throughout, no focal crackles or wheezes; able to converse in full sentences w/ no respiratory distress  Abd: soft, nontender, no " palpable organomegaly  Skin: no apparent rashes  Ext: no cyanosis, clubbing or edema  Neuro: alert, nonfocal    Labs: personally reviewed in the EMR.    Imaging studies: personally reviewed and interpreted. Below are the Radiology interpretations.  CXR, 10/16/19: Negative chest.    PFT's (11/01/19): normal spirometry without clinically significant bronchodilator response, normal lung volumes, and supranormal diffusion capacity.   FEV1/FVC is 0.80 and is normal. FEV1 is 2.62 L (116% predicted) and is normal. FVC is 3.27 L (112% predicted) and normal. There was no improvement in spirometry after a single inhaled dose of bronchodilator. TLC is 5.29 L (103% predicted) and is normal. DLCO is 126% predicted and is increased when it is corrected for hemoglobin.

## 2021-06-05 NOTE — PROGRESS NOTES
[facility] Patient states good understanding of how to use the nebulizer machine. All paperwork signed and scanned to patient chart.  Phone numbers given to patient to call with any questions.

## 2021-06-05 NOTE — PATIENT INSTRUCTIONS - HE
Today you were seen for follow up of your breathing and congestion.     Plan:    Start SYMBICORT 2 puffs TWICE daily. This is an inhaler you will use everyday, even if your breathing feels fine. You will not feel the effects of this inhaler immediately -- it works in the background to help reduce the inflammation in the airways and to help open up the airways. It does not work better if you use it more frequently than prescribed. If you have acute breathing issues, you should use your rescue inhaler (albuterol). This medication has both an inhaled steroid (simillar to Flovent) and an additional medication to see if it helps open up your airways a bit more.    After using Symbicort, remember to thoroughly gargle your mouth and your throat to wash away any medication particles, reduce risk of developing thrush (a yeast infection in the mouth), & to prevent voice hoarseness. Some people find it helpful to drink a few sips of warm water after gargling to clean the throat more thoroughly.     Start using DUONEB nebulizer as needed for acutely worsened shortness of breath, coughing, or wheezing. This is a rescue nebulizer that starts working within 5-15 minutes of administration. Its effects last for 4-6 hours.    I suggest you try using DUONEB nebulizer before using your SYMBICORT to see if this combination can help you feel a bit better, or feel that you are getting more of the Symbicort into your airways.    Keep your albuterol around for any sort of breathing emergencies.    I think you should still keep using your Flonase and nasal rinses.    It is very important to use good technique when using inhalers. If you cannot remember the instructions provided in clinic, you can visit your pharmacist to request a demonstration, or you can review a video online. There are also many helpful videos online. You should exercise common sense when looking for videos -- best and most informative inhaler videos come from health care  organizations.    You should follow up in 3 months.     If you have any questions or concerns, please, call our clinic at [phone].

## 2021-06-05 NOTE — PROGRESS NOTES
Patient instructed in use of symbicort inhaler with a spacer device.  Patient states good understanding of how to use the inhaler device with a spacer.  Return demo completed in clinic with good technique demonstrated.  Printed instructions as well as phone numbers to call with any questions sent home with patient.

## 2021-06-06 NOTE — TELEPHONE ENCOUNTER
Call from patient.  States that she does not feel that the fluticasone-salmeterol that was prescribed at last office visit has done anything for her.  Thinks that the Flovent she was on was working better.  Will switch back to using the Flovent and albuterol.

## 2021-06-08 NOTE — PATIENT INSTRUCTIONS - HE
"Pulmonary concerns: COUGH, CHEST/AIRWAY CONGESTION, ACID REFLUX    Plan:    Switch Flovent to a twice daily nebulized medication called BUDESONIDE (PULMICORT). This is a nebulized form of an inhaled steroid. Once you start using it, you should stop using your Flovent. You do not need to use the albuterol nebulizer before using your budesonide nebulizer.    You can use your albuterol nebulizer on an \"as-needed\" basis for cough, chest congestion, or shortness of breath.    For you acid reflux I would like you to start with high dose omeprazole 40 mg two times a day. After a month if your acid reflux is gone, you can decrease that to either 20 mg two times a day or 40 mg every morning -- based on that response, we can determine how you should decrease this dosing in the future.    Omeprazole works best if you take it regularly with plain water on an empty stomach -- about 20-30 minutes before eating.     I recommend that you stick to your nasal rinses and daily Flonase nasal spray use.    I also think that for the time being you would benefit from having a humidifier on in your bedroom at night to help loosen up any nasal/airway congestion.    You should follow up in via phone or video in 3 months.     If you have any questions or concerns, please, call our clinic at [phone].     "

## 2021-06-08 NOTE — PROGRESS NOTES
Audiology Report:    Referring Provider:  Dr. Willie Austin    History:  Camilla Wilson is seen today for comprehensive hearing evaluation. She has a history of difficulty hearing bilaterally for the past few years. She reportedly experiences intermittent tinnitus bilaterally. Camilla reports that she experiences dizziness infrequently when she is getting out of her car after a long drive. The dizziness typically feels like a lightheadedness and it is sometimes accompanied by a plugged feeling in her ears and tinnitus.  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 Camilla states that her mother and father experienced some difficulties hearing as they got older. She denies a history of otalgia, otorrhea, aural fullness, and ear surgery.    Results:     Left Ear Right Ear   Otoscopy clear canals clear canals   Pure Tone Audiometry normal hearing from 250-6000 Hz sloping to moderate likely sensorineural hearing loss at 8000 Hz   normal hearing   Word Recognition excellent excellent   Tympanometry normal (Type A)  normal (Type A)     Transducer: Insert earphones and Circumaural headphones (retested left ear with circumaural headphones)    Reliability was good  and there was good  SRT to PTA agreement.       Plan:  Results are discussed in detail.  She should return for retesting annually to monitor the difference between the ears at 8000 Hz.    Please see audiogram under  media  and  audiogram  in the patient s chart.     Ash Maza., CCC-A  [place] Licensed Audiologist #[id number]

## 2021-06-08 NOTE — PROGRESS NOTES
"Camilla Wilson is a 70 y.o. female who is being evaluated via a billable video visit.      The patient has been notified of following:     \"This video visit will be conducted via a call between you and your physician/provider. We have found that certain health care needs can be provided without the need for an in-person physical exam.  This service lets us provide the care you need with a video conversation.  If a prescription is necessary we can send it directly to your pharmacy.  If lab work is needed we can place an order for that and you can then stop by our lab to have the test done at a later time.    Video visits are billed at different rates depending on your insurance coverage. Please reach out to your insurance provider with any questions.    If during the course of the call the physician/provider feels a video visit is not appropriate, you will not be charged for this service.\"    Patient has given verbal consent to a Video visit? Yes --> patient could not get the video to work, so we proceeded w/ a phone visit.    Patient would like to receive their AVS by AVS Preference: Lizy.    Patient would like the video invitation sent by: Send to e-mail at: [email or web address]    Will anyone else be joining your video visit? No        Additional provider notes:  Camilla Wilson was last seen in person 2/7/20 for cough & asthma follow-up. We tried Flovent w/o much success (did not improve cough, but did improve some dyspnea). Switched to Symbicort, but she did not feel that it was as helpful as Flovent, & she went back to using Flovent.     Today she reports that her cough is a bit better. Initially Flovent dried up her bronchial secretions. Symbicort has \"loosened\" it up. She is now back on Flovent + albuterol nebulizer. She thinks that her phlegm remains \"loose\". The cough remains persistent, but a bit better. Her acid reflux is acting up right now as well.     Assessment/Plan:  70 y.o. former smoker with " most pertinent history of breast cancer, GERD, chronic cough, PND, & moderate persistent asthma, being evaluated via phone for an asthma/cough follow-up.      Restart high dose omeprazole two times a day      Switch Flovent two times a day to budesonide neb two times a day    Continue albuterol neb PRN    RTC via phone/video in 3 months    Phone visit time: 24 minutes    Gemini Rosario MD  Pulmonary and Critical Care

## 2021-06-10 NOTE — ANESTHESIA POSTPROCEDURE EVALUATION
Patient: Camilla Wilson  Procedure(s):  Left Lumpectomy after Wire Localization: [place] Lymph Node Biopsy (Left)  Anesthesia type: MAC    Patient location: Phase II Recovery  Last vitals:   Vitals Value Taken Time   /78 8/21/2020  1:00 PM   Temp 35.9  C (96.6  F) 8/21/2020 12:20 PM   Pulse 55 8/21/2020  1:01 PM   Resp 16 8/21/2020  1:00 PM   SpO2 94 % 8/21/2020  1:01 PM   Vitals shown include unvalidated device data.  Post vital signs: stable  Level of consciousness: awake and responds to simple questions  Post-anesthesia pain: pain controlled  Post-anesthesia nausea and vomiting: no  Pulmonary: unassisted, return to baseline  Cardiovascular: stable and blood pressure at baseline  Hydration: adequate  Anesthetic events: no    QCDR Measures:  ASA# 11 - Lyssa-op Cardiac Arrest: ASA11B - Patient did NOT experience unanticipated cardiac arrest  ASA# 12 - Lyssa-op Mortality Rate: ASA12B - Patient did NOT die  ASA# 13 - PACU Re-Intubation Rate: ASA13B - Patient did NOT require a new airway mgmt  ASA# 10 - Composite Anes Safety: ASA10A - No serious adverse event    Additional Notes: required LMA placement shortly after initiation of anesthetic, due to airway obstruction and desaturation. Tolerated remaining anesthetic well.

## 2021-06-10 NOTE — ANESTHESIA PREPROCEDURE EVALUATION
Anesthesia Evaluation      Patient summary reviewed   History of anesthetic complications (ponv)     Airway   Mallampati: III  Neck ROM: full   Pulmonary - normal exam   (+) asthma  mild,  well controlled, a smoker (remote)                         Cardiovascular - negative ROS and normal exam  Exercise tolerance: > or = 4 METS   Neuro/Psych - negative ROS     Endo/Other    (+) obesity (bmi 44),      GI/Hepatic/Renal    (+) GERD well controlled and intermittent,             Dental - normal exam   (+) caps                       Anesthesia Plan  Planned anesthetic: MAC  MAC - propofol gtt, versed/fentanyl/ketamine bolus PRN for pain  Decadron/Zofran for antiemesis   Convert to General with LMA if unable to maintain adequate SpO2 on reduced FiO2 (<30%)  Fire precautions     ASA 3   Induction: intravenous   Anesthetic plan and risks discussed with: patient  Anesthesia plan special considerations: antiemetics,   Post-op plan: routine recovery        Results for orders placed or performed in visit on 08/18/20   COVID-19 Virus PCR MRF    Specimen: Respiratory   Result Value Ref Range    COVID-19 VIRUS SPECIMEN SOURCE Nasopharyngeal     2019-nCOV Not Detected      Lab Results   Component Value Date    WBC 5.9 08/14/2020    HGB 12.9 08/14/2020    HCT 39.3 08/14/2020    MCV 98 08/14/2020     08/14/2020       Chemistry        Component Value Date/Time     10/16/2019 1140    K 4.0 10/16/2019 1140     10/16/2019 1140    CO2 27 10/16/2019 1140    BUN 16 10/16/2019 1140    CREATININE 1.00 10/16/2019 1140     10/16/2019 1140        Component Value Date/Time    CALCIUM 9.7 10/16/2019 1140    ALKPHOS 81 10/16/2019 1140    AST 16 10/16/2019 1140    ALT 20 10/16/2019 1140    BILITOT 0.5 10/16/2019 1140

## 2021-06-10 NOTE — TELEPHONE ENCOUNTER
Telephoned and left voice message for Camilla requesting call back to inform her of HER2 by FISH results.

## 2021-06-10 NOTE — TELEPHONE ENCOUNTER
Telephoned and discussed HER2 negative by FISH results from Camilla's left breast US-guided core biopsy of mass performed on 7/22/2020 at [facility].  She verbalized understanding of the results.  She reflected on her experience of breast cancer in [id number] and we discussed aspects of radiation therapy and how the technology and research may have changed since then. Keisha Manjarrez

## 2021-06-10 NOTE — TELEPHONE ENCOUNTER
New Appointment Needed  What is the reason for the visit:    Pre-Op Appt Request  When is the surgery? :  8/21/20  Where is the surgery?:   [facility]  Who is the surgeon? :  Margarita Cook MD  What type of surgery is being done?: Left Lumpectomy after Wire Localization: [place] Lymph Node Biopsy  Provider Preference: PCP only  How soon do you need to be seen?: tomorrow  Waitlist offered?: No  Okay to leave a detailed message:  Yes

## 2021-06-10 NOTE — PROGRESS NOTES
"Camilla presents to [facility] today for a surgical consult with Dr. Cook regarding her newly diagnosed breast cancer.  She is 18 years out from a previous right breast cancer, having had lumpectomy with radiation followed by 5 years of hormonal therapy.  RN assessment and EMR update. Resp 16   Ht 5' 6\" (1.676 m)   Wt (!) 267 lb 1.3 oz (121.1 kg)   Breastfeeding No   BMI 43.11 kg/m  .  Patient given a breast cancer packet, contents reviewed.  She met with Dr. Cook, see dictation for details of visit. She will await results of Her 2 by FISH and schedule her surgery once we have that.  RN time 15 mins  "

## 2021-06-10 NOTE — TELEPHONE ENCOUNTER
Telephoned and spoke with Camilla to follow up on her voice message inquiring about surgery scheduling.  This will be handled by surgery scheduler and writer sent message about Camilla's interest in proceeding. Keisha Manjarrez RN

## 2021-06-10 NOTE — PATIENT INSTRUCTIONS - HE
"  Preparing for Your Surgery  Getting started  A surgery nurse will call you to review your health history and instructions. They will give you an arrival time based on your scheduled surgery time.  Please be ready to share the following:    Your doctor's clinic name and phone number    Your medical, surgical and anesthesia history    A list of allergies and sensitivities    A list of medicines, including herbal treatments and over-the-counter drugs    Whether the patient has a legal guardian (ask how to send us the papers in advance)  If your child is having surgery, please ask for a copy of Preparing for Your Child's Surgery.    Preparing for surgery    Within 30 days of surgery: Have an exam at your family clinic (primary care clinic), or go to a pre-operative clinic. This exam is called a \"History and Physical,\" or H&P.    At your H&P exam, talk to your care team about all medicines you take. If you need to stop any medicines before surgery, ask when to start taking them again.  ? We do this for your safety. Many medicines can make you bleed too much during surgery. Some change how well surgery (anesthesia) drugs work.    Call your insurance company to see what it will and won't pay for. Ask if they need to pre-approve the surgery. (If no insurance, call [phone].)    Call your surgeon's clinic if there's any change in your health. This includes signs of a cold or flu (sore throat, runny nose, cough, rash, fever). It also includes a scrape or scratch near the surgery site.    If you have questions on the day of surgery, call your surgery center.  Eating and drinking guidelines  For your safety: Unless your surgeon tells you otherwise, follow the guidelines below.    Eat and drink as usual until 8 hours before surgery. After that, no food or milk.    Drink clear liquids until 2 hours before surgery. These are liquids you can see through, like water, Gatorade and Propel Water. You may also have black coffee " and tea (no cream or milk).    Nothing by mouth within 2 hours of surgery. This includes gum, candy and breath mints.    Stop alcohol the midnight before surgery.    If your family clinic tells you to take medicine on the morning of surgery, it's okay to take it with a sip of water.  Preventing infection    Shower or bathe the night before and morning of your surgery. Follow the instructions your clinic gave you. (If no instructions, use regular soap.)    Don't shave or clip hair near your surgery site. This can lead to skin infection.    Don't smoke the morning of surgery. Smoking increases the risk of infection. You may chew nicotine gum up to 2 hours before surgery. A nicotine patch is okay.  ? Note: Some surgeries require you to completely quit smoking and nicotine. Check with your surgeon.    Your care team will make every effort to keep you safe from infection. We will:  ? Clean our hands often with soap and water (or an alcohol-based hand rub).  ? Clean the skin at your surgery site with a special soap that kills germs. We'll also remove hair from the site as needed.  ? Wear special hair covers, masks, gowns and gloves during surgery.  ? Give antibiotic medicine, if prescribed. Not all surgeries need antibiotics.  What to bring on the day of surgery    Photo ID and insurance card    Copy of your health care directive, if you have one    Glasses and hearing aides (bring cases)  ? You can't wear contacts during surgery    Inhaler and eye drops, if you use them (tell us about these when you arrive)    CPAP machine or breathing device, if you use them    A few personal items, if spending the night    If you have . . .  ? A pacemaker or ICD (cardiac defibrillator): Bring the ID card.  ? An implanted stimulator: Bring the remote control.  ? A legal guardian: Bring a copy of the certified (court-stamped) guardianship papers.  Please remove any jewelry, including body piercings. Leave jewelry and other valuables at  home.  If you're going home the day of surgery  Important: If you don't follow the rules below, we must cancel your surgery.     Arrange for someone to drive you home after surgery. You may not drive, take a taxi or take public transportation by yourself (unless you'll have local anesthesia only).    Arrange for a responsible adult to stay with you overnight. If you don't, we may keep you in the hospital overnight, and you may need to pay the costs yourself.  Questions?   If you have any questions for your care team, list them here: _________________________________________________________________________________________________________________________________________________________________________________________________________________________________________________________________________________________________________________________  For informational purposes only. Not to replace the advice of your health care provider. Copyright   8305-2283 [employer]. All rights reserved. Clinically reviewed by Jodi Bailey MD. [employer] 828266 - REV 07/19.      Before Your Procedure or Hospital Admission  Testing for COVID-19 (Coronavirus)  Thank you for choosing [facility] for your health care needs. This is a very challenging time for everyone. The World Health Organization and the [place] of [place] have declared the COVID-19 virus a pandemic.   Our goal is to keep you and our team here at [facility] safe and healthy. We've taken several steps to make this happen. For example:    We screen our staff, care teams and patients for COVID-19.    Everyone at [facility] must wear a mask and stay 6 feet apart.    We are limiting hospital and clinic visitors.  Before you come in  All patients must get tested for COVID-19. Your test needs to happen 1 to 4 days before you check in to the hospital or surgery site.  We'll call about a week in advance to set up a testing time. The call may come  from a phone number you don't know. Then, you'll need to travel to a testing clinic, where we'll take a swab of your nose or throat.   After the test, please stay at home and stay out of contact with other people. It will be harder for you to recover if you get COVID-19 before your treatment.   Please follow all current safety guidelines, including:    Limit trips outside your home.    Limit the number of people you see.    Always wear a mask outside your home.    Use social distancing. (Stay 6 feet away from others whenever you can.)    Wash your hands often.  If your test shows you have COVID-19  If your test is positive, we'll let you know. A positive test means that you have the virus.   We'll probably have to postpone your admission, surgery or procedure. Your doctor will discuss this with you. After that, we'll let you know what to do and when you can reschedule.   We may need to cancel your treatment on short notice for other reasons, too.  If your test shows you DON'T have COVID-19  Even if your test is negative, you may still get COVID-19. It's rare but, sometimes, the test result is wrong. You could also catch the virus after taking the test.   There's a very small chance that you could catch COVID-19 in the hospital or surgery center. [facility] has taken many steps to prevent this from happening.   Day of your surgery or procedure    Please come wearing a mask or something else that covers both your nose and mouth.    When you arrive, we'll ask you some questions to find out if you have any signs or symptoms of COVID-19.    Ask your care team if you can have visitors. All visitors must wear masks and will be screened for signs of COVID-19.  ? Even if no visitors are allowed, you can still have with you:    Your legal guardian or legal decision maker    A parent and one other visitor, if you are younger than 18 years old    A partner and a , if you are in labor  ? We might need to teach you  about taking care of yourself after surgery. If so, a visitor can come into the hospital to learn about it, too.  ? The rules for visitors change often, depending on how much the virus is spreading. To learn more, see Visiting a Loved One in the Hospital during the COVID-19 Outbreak.  Please call your care team, hospital or surgery center if you have any questions. We thank you for your understanding and for choosing [facility] for your care.   Questions and Answers  Does it matter where I get tested for COVID-19?  Yes. We urge you to get tested at one of our [facility] COVID-19 testing sites. We process these tests in our lab and can get the results quickly. Your [facility] care team needs to get your results before you check in.  What should I do if I can't get tested at [facility]?  You can get tested somewhere else, but you'll need to take these extra steps:  1. Contact your family doctor or clinic to arrange your test.  2. Take the test within 4 days of your surgery or procedure. We can't accept tests older than 4 days.  3. Make sure your doctor or clinic faxes your results to [facility] at [phone].  If we don't get your results in time, we may have to postpone or cancel your treatment.  For informational purposes only. Not to replace the advice of your health care provider. Copyright   2020 [facility]. All rights reserved. Clinically reviewed by Infection Prevention and the [facility] COVID-19 Clinical Team. [employer] 307816 - 07/20.    How to Take Your Medication Before Surgery  Stop taking all vitamins and herbal medication 7 days prior to surgery  Avoid aspirin and NSAIDs for 7 days prior to surgery  Keep taking your budesonide nebs and Advair/Flovent up until and including the morning of surgery  Take your usual dose of Pepcid on the morning of surgery with small sip of water

## 2021-06-10 NOTE — PROGRESS NOTES
Preoperative Exam    Scheduled Procedure: Left Lumpectomy  Surgery Date:  08/21/2020  Surgery Location: [facility], fax [phone]    Surgeon:  Dr Cook    Assessment/Plan:     1. Preop general physical exam  Low to moderate risk surgery and patient without risk of major adverse cardiac events.  No contraindications to proceeding with surgery and anesthesia.  History of postop nausea should be noted and appropriate preop medication provided.  She will avoid aspirin, NSAIDs and vitamins/herbal medications for 1 week prior to surgery.  - HM2(CBC w/o Differential)    2. Malignant neoplasm of central portion of left breast in female, estrogen receptor positive (H)  Found to have cancer involving left breast with plans for lumpectomy next week    3. Moderate persistent asthma without complication  Asthma well controlled.  She will continue usual doses of budesonide nebs and Advair up until surgery including on the morning of surgery.    4. Gastroesophageal reflux disease without esophagitis  Reflux well controlled and she will take her usual dose of Pepcid on the morning of surgery with small sip of water.        Surgical Procedure Risk: Intermediate (reported cardiac risk generally 1-5%)  Have you had prior anesthesia?: Yes  Have you or any family members had a previous anesthesia reaction:  No  Do you or any family members have a history of a clotting or bleeding disorder?: No  Cardiac Risk Assessment: no increased risk for major cardiac complications    APPROVAL GIVEN to proceed with proposed procedure, without further diagnostic evaluation        Functional Status: Independent  Patient plans to recover at home with family.     Subjective:      Camilla Wilson is a 70 y.o. female who presents for a preoperative consultation.  Previous history of rest cancer with right lumpectomy 2002 now with abnormal mammogram and biopsy confirming cancer involving left breast.  Plans for lumpectomy next week.    She  has tolerated previous surgeries including her lumpectomy with no significant problems although has had some mild postop nausea past.    Asthma is well controlled using budesonide nebs twice daily.    No history of coronary artery disease or CHF.  No exertional chest pain.  Able to exercise including swimming without symptoms.    No personal or family history of DVT or pulmonary embolus    All other systems reviewed and are negative, other than those listed in the HPI.    Pertinent History  Do you have difficulty breathing or chest pain after walking up a flight of stairs: Yes: little out of breath  History of obstructive sleep apnea: No  Steroid use in the last 6 months: No  Frequent Aspirin/NSAID use: No  Prior Blood Transfusion: No  Prior Blood Transfusion Reaction: No  If for some reason prior to, during or after the procedure, if it is medically indicated, would you be willing to have a blood transfusion?:  There is no transfusion refusal.    Current Outpatient Medications   Medication Sig Dispense Refill     5-hydroxytryptophan (5-HTP) 100 mg cap Take 2 capsules by mouth daily. 1000mg       albuterol (PROAIR HFA;PROVENTIL HFA;VENTOLIN HFA) 90 mcg/actuation inhaler Inhale 2 puffs every 4 (four) hours as needed for wheezing or shortness of breath (cough). 1 Inhaler 12     calcium citrate 250 mg calcium Tab Take 500 mg by mouth 2 (two) times a day.        cholecalciferol, vitamin D3, (VITAMIN D3) 2,000 unit cap Take 1 capsule by mouth daily.        famotidine (FOR PEPCID) 10 MG tablet Take 10 mg by mouth daily.       fluticasone propionate (FLONASE) 50 mcg/actuation nasal spray 2 sprays into each nostril daily. 16 g 12     ipratropium-albuterol (DUO-NEB) 0.5-2.5 mg/3 mL nebulizer Take 3 mL by nebulization 2 (two) times a day as needed. 360 mL 6     melatonin 5 mg cap 1 cap nightly  0     niacin (SLO-NIACIN) 500 mg ER tablet Take 2,000 mg by mouth.       peg 400-propylene glycol (SYSTANE) 0.4-0.3 % Drop Administer  1 drop to both eyes 4 (four) times a day as needed.       s-adenosylmethionine (AISHA-E, ENTERIC COATED,) 200 mg TbEC Take 2 tablets by mouth daily.       sodium fluoride-pot nitrate (PREVIDENT 5000 SENSITIVE) 1.1-5 % Pste Take by mouth daily.        vit C,L-Qq-pzlds-lutein-zeaxan (OCUVITE LUTEIN & ZEAXANTHIN) 60 mg-30 unit- 15 mg-2 mg-6 mg cap Take by mouth.       budesonide (PULMICORT) 180 mcg/actuation inhaler Inhale 2 puffs 2 (two) times a day. 1 Inhaler 6     cetirizine (ZYRTEC) 10 MG tablet Take 1 tablet (10 mg total) by mouth daily. 30 tablet 6     No current facility-administered medications for this visit.         Allergies   Allergen Reactions     Lipitor [Atorvastatin]      Memory  Impairment         Patient Active Problem List   Diagnosis     Breast cancer (H)     Dyslipidemia     Spinal Stenosis     Obesity     Osteopenia     GERD (gastroesophageal reflux disease)     Skin cancer     Hamstring tendonitis of right thigh     Left ankle tendonitis     Morbid obesity (H)     Pain in joint involving lower leg     Malignant neoplasm of central portion of left breast in female, estrogen receptor positive (H)     Moderate persistent asthma without complication       Past Medical History:   Diagnosis Date     Acute streptococcal pharyngitis      Breast cancer (H) 2000     Breast cyst      Hx of radiation therapy      Moderate persistent asthma without complication 8/14/2020     Uterine fibroid     embolized       Past Surgical History:   Procedure Laterality Date     BREAST BIOPSY Left      BREAST LUMPECTOMY Right 2007     VT EMBOLIZATION UTERINE FIBROID      Description: Uterine Fibroid Embolization;  Proc Date: 09/06/2000;     VT EXCISE BREAST CYST      Description: Breast Surgery Lumpectomy;  Recorded: 12/18/2007;     VT SHLDR ARTHROSCOP,SURG,W/REMOVAL,LOOSE/FB      Description: Shoulder Arthroscopy;  Recorded: 02/25/2009;  Comments: right 2004; left 2007      BREAST CORE BIOPSY LEFT Left 7/22/2020  "      Social History     Socioeconomic History     Marital status: Single     Spouse name: Not on file     Number of children: Not on file     Years of education: Not on file     Highest education level: Not on file   Occupational History     Not on file   Social Needs     Financial resource strain: Not on file     Food insecurity     Worry: Not on file     Inability: Not on file     Transportation needs     Medical: Not on file     Non-medical: Not on file   Tobacco Use     Smoking status: Former Smoker     Packs/day: 0.50     Years: 15.00     Pack years: 7.50     Last attempt to quit: 1982     Years since quittin.3     Smokeless tobacco: Never Used   Substance and Sexual Activity     Alcohol use: Not on file     Drug use: Not on file     Sexual activity: Not on file   Lifestyle     Physical activity     Days per week: Not on file     Minutes per session: Not on file     Stress: Not on file   Relationships     Social connections     Talks on phone: Not on file     Gets together: Not on file     Attends Buddhist service: Not on file     Active member of club or organization: Not on file     Attends meetings of clubs or organizations: Not on file     Relationship status: Not on file     Intimate partner violence     Fear of current or ex partner: Not on file     Emotionally abused: Not on file     Physically abused: Not on file     Forced sexual activity: Not on file   Other Topics Concern     Not on file   Social History Narrative     Not on file             Objective:     Vitals:    20 0827   BP: 110/68   Pulse: 93   SpO2: 98%   Weight: (!) 268 lb (121.6 kg)   Height: 5' 6\" (1.676 m)         Physical Exam:  HEENT normal  RESPIRATORY: Breathing pattern was normal and the chest moved symmetrically.   Lung sounds were normal and there were no rales or wheezes.  CARDIOVASCULAR: Heart rate and rhythm were normal.  S1 and S2 were normal and there were no extra sounds or murmurs. Peripheral pulses in arms " "and legs were normal.  Jugular venous pressure was normal.  There was no peripheral edema.  No carotid bruits.  SKIN/HAIR/NAILS: No cyanosis or pallor  NEUROLOGIC: Grossly nonfocal  PSYCHIATRIC:  Mood and affect were normal       Patient Instructions       Preparing for Your Surgery  Getting started  A surgery nurse will call you to review your health history and instructions. They will give you an arrival time based on your scheduled surgery time.  Please be ready to share the following:    Your doctor's clinic name and phone number    Your medical, surgical and anesthesia history    A list of allergies and sensitivities    A list of medicines, including herbal treatments and over-the-counter drugs    Whether the patient has a legal guardian (ask how to send us the papers in advance)  If your child is having surgery, please ask for a copy of Preparing for Your Child's Surgery.    Preparing for surgery    Within 30 days of surgery: Have an exam at your family clinic (primary care clinic), or go to a pre-operative clinic. This exam is called a \"History and Physical,\" or H&P.    At your H&P exam, talk to your care team about all medicines you take. If you need to stop any medicines before surgery, ask when to start taking them again.  ? We do this for your safety. Many medicines can make you bleed too much during surgery. Some change how well surgery (anesthesia) drugs work.    Call your insurance company to see what it will and won't pay for. Ask if they need to pre-approve the surgery. (If no insurance, call [phone].)    Call your surgeon's clinic if there's any change in your health. This includes signs of a cold or flu (sore throat, runny nose, cough, rash, fever). It also includes a scrape or scratch near the surgery site.    If you have questions on the day of surgery, call your surgery center.  Eating and drinking guidelines  For your safety: Unless your surgeon tells you otherwise, follow the guidelines " below.    Eat and drink as usual until 8 hours before surgery. After that, no food or milk.    Drink clear liquids until 2 hours before surgery. These are liquids you can see through, like water, Gatorade and Propel Water. You may also have black coffee and tea (no cream or milk).    Nothing by mouth within 2 hours of surgery. This includes gum, candy and breath mints.    Stop alcohol the midnight before surgery.    If your family clinic tells you to take medicine on the morning of surgery, it's okay to take it with a sip of water.  Preventing infection    Shower or bathe the night before and morning of your surgery. Follow the instructions your clinic gave you. (If no instructions, use regular soap.)    Don't shave or clip hair near your surgery site. This can lead to skin infection.    Don't smoke the morning of surgery. Smoking increases the risk of infection. You may chew nicotine gum up to 2 hours before surgery. A nicotine patch is okay.  ? Note: Some surgeries require you to completely quit smoking and nicotine. Check with your surgeon.    Your care team will make every effort to keep you safe from infection. We will:  ? Clean our hands often with soap and water (or an alcohol-based hand rub).  ? Clean the skin at your surgery site with a special soap that kills germs. We'll also remove hair from the site as needed.  ? Wear special hair covers, masks, gowns and gloves during surgery.  ? Give antibiotic medicine, if prescribed. Not all surgeries need antibiotics.  What to bring on the day of surgery    Photo ID and insurance card    Copy of your health care directive, if you have one    Glasses and hearing aides (bring cases)  ? You can't wear contacts during surgery    Inhaler and eye drops, if you use them (tell us about these when you arrive)    CPAP machine or breathing device, if you use them    A few personal items, if spending the night    If you have . . .  ? A pacemaker or ICD (cardiac defibrillator):  Bring the ID card.  ? An implanted stimulator: Bring the remote control.  ? A legal guardian: Bring a copy of the certified (court-stamped) guardianship papers.  Please remove any jewelry, including body piercings. Leave jewelry and other valuables at home.  If you're going home the day of surgery  Important: If you don't follow the rules below, we must cancel your surgery.     Arrange for someone to drive you home after surgery. You may not drive, take a taxi or take public transportation by yourself (unless you'll have local anesthesia only).    Arrange for a responsible adult to stay with you overnight. If you don't, we may keep you in the hospital overnight, and you may need to pay the costs yourself.  Questions?   If you have any questions for your care team, list them here: _________________________________________________________________________________________________________________________________________________________________________________________________________________________________________________________________________________________________________________________  For informational purposes only. Not to replace the advice of your health care provider. Copyright   9165-7106 [employer] [facility]. All rights reserved. Clinically reviewed by Jodi Bailey MD. [employer] [id number] - REV 07/19.      Before Your Procedure or Hospital Admission  Testing for COVID-19 (Coronavirus)  Thank you for choosing [facility] for your health care needs. This is a very challenging time for everyone. The World Health Organization and the [facility] have declared the COVID-19 virus a pandemic.   Our goal is to keep you and our team here at [facility] safe and healthy. We've taken several steps to make this happen. For example:    We screen our staff, care teams and patients for COVID-19.    Everyone at [facility] must wear a mask and stay 6 feet apart.    We are limiting hospital and  clinic visitors.  Before you come in  All patients must get tested for COVID-19. Your test needs to happen 1 to 4 days before you check in to the hospital or surgery site.  We'll call about a week in advance to set up a testing time. The call may come from a phone number you don't know. Then, you'll need to travel to a testing clinic, where we'll take a swab of your nose or throat.   After the test, please stay at home and stay out of contact with other people. It will be harder for you to recover if you get COVID-19 before your treatment.   Please follow all current safety guidelines, including:    Limit trips outside your home.    Limit the number of people you see.    Always wear a mask outside your home.    Use social distancing. (Stay 6 feet away from others whenever you can.)    Wash your hands often.  If your test shows you have COVID-19  If your test is positive, we'll let you know. A positive test means that you have the virus.   We'll probably have to postpone your admission, surgery or procedure. Your doctor will discuss this with you. After that, we'll let you know what to do and when you can reschedule.   We may need to cancel your treatment on short notice for other reasons, too.  If your test shows you DON'T have COVID-19  Even if your test is negative, you may still get COVID-19. It's rare but, sometimes, the test result is wrong. You could also catch the virus after taking the test.   There's a very small chance that you could catch COVID-19 in the hospital or surgery center. [facility] has taken many steps to prevent this from happening.   Day of your surgery or procedure    Please come wearing a mask or something else that covers both your nose and mouth.    When you arrive, we'll ask you some questions to find out if you have any signs or symptoms of COVID-19.    Ask your care team if you can have visitors. All visitors must wear masks and will be screened for signs of COVID-19.  ? Even if  no visitors are allowed, you can still have with you:    Your legal guardian or legal decision maker    A parent and one other visitor, if you are younger than 18 years old    A partner and a , if you are in labor  ? We might need to teach you about taking care of yourself after surgery. If so, a visitor can come into the hospital to learn about it, too.  ? The rules for visitors change often, depending on how much the virus is spreading. To learn more, see Visiting a Loved One in the Hospital during the COVID-19 Outbreak.  Please call your care team, hospital or surgery center if you have any questions. We thank you for your understanding and for choosing [facility] for your care.   Questions and Answers  Does it matter where I get tested for COVID-19?  Yes. We urge you to get tested at one of our [facility] COVID-19 testing sites. We process these tests in our lab and can get the results quickly. Your [facility] care team needs to get your results before you check in.  What should I do if I can't get tested at [facility]?  You can get tested somewhere else, but you'll need to take these extra steps:  1. Contact your family doctor or clinic to arrange your test.  2. Take the test within 4 days of your surgery or procedure. We can't accept tests older than 4 days.  3. Make sure your doctor or clinic faxes your results to [facility] at [phone].  If we don't get your results in time, we may have to postpone or cancel your treatment.  For informational purposes only. Not to replace the advice of your health care provider. Copyright   2020 [facility]. All rights reserved. Clinically reviewed by Infection Prevention and the [facility] COVID-19 Clinical Team. [employer] [id number] - 07/20.    How to Take Your Medication Before Surgery  Stop taking all vitamins and herbal medication 7 days prior to surgery  Avoid aspirin and NSAIDs for 7 days prior to  surgery  Keep taking your budesonide nebs and Advair/Flovent up until and including the morning of surgery  Take your usual dose of Pepcid on the morning of surgery with small sip of water          EKG not indicated    Labs:  Recent Results (from the past 24 hour(s))   HM2(CBC w/o Differential)    Collection Time: 08/14/20  9:05 AM   Result Value Ref Range    WBC 5.9 4.0 - 11.0 thou/uL    RBC 4.03 3.80 - 5.40 mill/uL    Hemoglobin 12.9 12.0 - 16.0 g/dL    Hematocrit 39.3 35.0 - 47.0 %    MCV 98 80 - 100 fL    MCH 32.0 27.0 - 34.0 pg    MCHC 32.8 32.0 - 36.0 g/dL    RDW 13.3 11.0 - 14.5 %    Platelets 229 140 - 440 thou/uL    MPV 7.3 7.0 - 10.0 fL       Immunization History   Administered Date(s) Administered     DT (pediatric) 04/12/2001     Influenza high dose,seasonal,PF, 65+ yrs 11/04/2015, 10/19/2017, 11/13/2018     Influenza, Seasonal, Inj PF IIV3 10/01/2009     Influenza, inj, historic,unspecified 12/10/2008, 11/15/2016     Influenza, seasonal,quad inj 6-35 mos 11/13/2013, 09/26/2014     Influenza,seasonal, Inj IIV3 10/23/2006, 12/10/2008, 10/11/2010, 11/13/2013, 09/26/2014     Pneumo Conj 13-V (2010&after) 05/06/2015     Pneumo Polysac 23-V 09/26/2014     Td, Adult, Absorbed 03/29/1984, 12/23/1994, 04/12/2001     Td,adult,historic,unspecified 03/29/1984, 04/12/2001     Tdap 08/15/2011     ZOSTER, LIVE 01/06/2012           Electronically signed by Antwan Douglass MD 08/14/20 8:32 AM

## 2021-06-10 NOTE — ANESTHESIA CARE TRANSFER NOTE
Last vitals:   Vitals:    08/21/20 1220   BP: (!) 197/81   Pulse: 70   Resp: 18   Temp: 35.9  C (96.6  F)   SpO2: 91%     Patient's level of consciousness is awake, wide awake and alert. Denies pain. Denies nuasea. Room air.   Spontaneous respirations: yes  Maintains airway independently: yes  Dentition unchanged: yes  Oropharynx: oropharynx clear of all foreign objects    QCDR Measures:  ASA# 20 - Surgical Safety Checklist: WHO surgical safety checklist completed prior to induction    PQRS# 430 - Adult PONV Prevention: 4558F - Pt received => 2 anti-emetic agents (different classes) preop & intraop  ASA# 8 - Peds PONV Prevention: NA - Not pediatric patient, not GA or 2 or more risk factors NOT present  PQRS# 424 - [place]-op Temp Management: 4559F - At least one body temp DOCUMENTED => 35.5C or 95.9F within required timeframe  PQRS# 426 - PACU Transfer Protocol: - Transfer of care checklist used  ASA# 14 - Acute Post-op Pain: ASA14B - Patient did NOT experience pain >= 7 out of 10

## 2021-06-10 NOTE — PROGRESS NOTES
This is a 70 y.o.  female who I'm asked to see by Willie Austni DO for evaluation of a left breast cancer.  This was picked up  on screening mammogram.  The patient cannot feel a mass.  A needle biopsy was done which shows an invasive lobular carcinoma.  It is estrogen receptor positive, progesterone receptor positive and HER-2 Is indeterminate and has been sent for FISH.  She is about 18 years status post right lumpectomy with radiation followed by 5 years of hormone therapy.    She has no significant family history of breast cancer.      PAST MEDICAL HISTORY:  Past Medical History:   Diagnosis Date     Acute streptococcal pharyngitis      Breast cancer (H) 2000     Breast cyst      Hx of radiation therapy      Uterine fibroid     embolized     Past Surgical History:   Procedure Laterality Date     BREAST BIOPSY Left      BREAST LUMPECTOMY Right 2007     ID EMBOLIZATION UTERINE FIBROID      Description: Uterine Fibroid Embolization;  Proc Date: 09/06/2000;     ID EXCISE BREAST CYST      Description: Breast Surgery Lumpectomy;  Recorded: 12/18/2007;     ID SHLDR ARTHROSCOP,SURG,W/REMOVAL,LOOSE/FB      Description: Shoulder Arthroscopy;  Recorded: 02/25/2009;  Comments: right 2004; left 2007      BREAST CORE BIOPSY LEFT Left 7/22/2020       Medications:    Current Outpatient Medications:      5-hydroxytryptophan (5-HTP) 100 mg cap, Take 2 capsules by mouth daily. 1000mg, Disp: , Rfl:      albuterol (PROAIR HFA;PROVENTIL HFA;VENTOLIN HFA) 90 mcg/actuation inhaler, Inhale 2 puffs every 4 (four) hours as needed for wheezing or shortness of breath (cough)., Disp: 1 Inhaler, Rfl: 12     budesonide (PULMICORT) 0.5 mg/2 mL nebulizer solution, Take 2 mL (0.5 mg total) by nebulization 2 (two) times a day., Disp: 360 mL, Rfl: 2     calcium citrate 250 mg calcium Tab, Take 500 mg by mouth 2 (two) times a day. , Disp: , Rfl:      cholecalciferol, vitamin D3, (VITAMIN D3) 2,000 unit cap, Take 1 capsule by mouth daily. ,  "Disp: , Rfl:      famotidine (FOR PEPCID) 10 MG tablet, Take 10 mg by mouth daily., Disp: , Rfl:      fluticasone propion-salmeterol (ADVAIR DISKUS) 250-50 mcg/dose DISKUS, Inhale 1 puff 2 (two) times a day. Rinse mouth with water, gargle,and spit after each use, Disp: 60 each, Rfl: 11     fluticasone propionate (FLONASE) 50 mcg/actuation nasal spray, 2 sprays into each nostril daily., Disp: 16 g, Rfl: 12     ipratropium-albuterol (DUO-NEB) 0.5-2.5 mg/3 mL nebulizer, Take 3 mL by nebulization 2 (two) times a day as needed., Disp: 360 mL, Rfl: 6     melatonin 5 mg cap, 1 cap nightly, Disp: , Rfl: 0     niacin (SLO-NIACIN) 500 mg ER tablet, Take 2,000 mg by mouth., Disp: , Rfl:      peg 400-propylene glycol (SYSTANE) 0.4-0.3 % Drop, Administer 1 drop to both eyes 4 (four) times a day as needed., Disp: , Rfl:      s-adenosylmethionine (AISHA-E, ENTERIC COATED,) 200 mg TbEC, Take 2 tablets by mouth daily., Disp: , Rfl:      sodium fluoride-pot nitrate (PREVIDENT 5000 SENSITIVE) 1.1-5 % Pste, Take by mouth daily. , Disp: , Rfl:      vit C,A-Lr-nuimf-lutein-zeaxan (OCUVITE LUTEIN & ZEAXANTHIN) 60 mg-30 unit- 15 mg-2 mg-6 mg cap, Take by mouth., Disp: , Rfl:     Allergies:  Allergies   Allergen Reactions     Lipitor [Atorvastatin]      Memory  Impairment         Social History:   reports that she quit smoking about 38 years ago. She has a 7.50 pack-year smoking history. She has never used smokeless tobacco.    ROS:  A 12 point comprehensive review of systems was negative except as noted.    Physical Exam  Resp 16   Ht 5' 6\" (1.676 m)   Wt (!) 267 lb 1.3 oz (121.1 kg)   Breastfeeding No   BMI 43.11 kg/m      General:alert, appears stated age, cooperative and morbidly obese  Lungs:clear to auscultation bilaterally  CV:regular rate and rhythm, S1, S2 normal, no murmur, click, rub or gallop  Breasts: No palpable masses in either breast.  Her scar on the right is not even visible anymore.  There are some mild radiation " changes on the right.  Lymph Nodes:no axillary nodes palpated  Neuro:Grossly normal  Musculoskeletal: Normal range of motion of her upper extremities.  Skin: Normal skin turgor.  Psych: Very calm.    Reviewed her mammograms and ultrasound and pathology.     Impression: Left Breast Cancer. Clinically T2, N0.  Discussed the surgical options for treatment of breast cancer which generally are a lumpectomy (partial mastectomy) combined with radiation versus a mastectomy.  Explained that the survival benefit is the same for both.  The difference is in local recurrence risk.  The patient is a Good candidate for a lumpectomy.  I did warn her that lobular carcinomas are often times larger than they appear on mammogram and ultrasound and so you never know if you are a good candidate for lumpectomy and to you try it and see if you can get negative margins.  It is not uncommon to need to go back for reexcision.  Her biggest concern is the radiation.  She really does not want radiation.  I told her that at her age, if we can get a good margin around this tumor then we would not necessarily push radiation.  I really would not recommend a mastectomy just to avoid radiation because if the lymph node is involved, we are still going to recommend radiation.  Discussed SLN biopsy.  The procedure and rationale were explained.  Discussed that at this point we do not know yet whether or not she will need chemotherapy and we may not know until we get all of the results of surgery back.  If, however the FISH comes back positive, then she clearly will need chemotherapy and then we should consider neoadjuvant chemotherapy.      Plan: We are going to wait for the FISH to come back but likely we'll schedule for a left  lumpectomy with wire localization with sentinel lymph node biopsy.  This is typically an outpatient procedure under local MAC anesthetic.  The risks and benefits of surgery were explained.  Also talked about expected recovery  time.           Statement Selected Attending Attestation (For Attendings USE Only)...

## 2021-06-10 NOTE — PATIENT INSTRUCTIONS - HE
Pulmonary concerns: asthma, post-nasal drip, environmental allergies    Plan:    We will switch your budesonide nebulizer to a budesonide inhaler. Hopefully, this medication would be more effective for your airways (compared to Flovent), but more convenient than a nebulizer version.    Remember to rinse & gargle your mouth & throat after using the budesonide inhaler.    Let's add a daily allergy medication to you Flonase -- Zyrtec is a pretty gentle allergy pill that you would take once a day (every day). Try it for the next month to see if it makes a positive difference in your breathing and your congestion.     One of the ways to help with the thicker mucus/phlegm in post-nasal drip is to do regular (at least once daily) nasal rinses. It helps moisturize the airways, AND helps flush out the sinuses over time. The idea would be to thin out the mucus you feel at the back of your throat. It usually takes about a week or two of consistent use before you notice a difference. If you do re-start using nasal rinses, use them BEFORE the Flonase. It will also make your Flonase more effective as a result.    It is very important to use good technique when using inhalers. If you cannot remember the instructions provided in clinic, you can visit your pharmacist to request a demonstration, or you can review a video online. You should exercise common sense when looking for videos -- best and most informative inhaler videos come from health care organizations. [employer] has videos posted by [employer] & [facility] (hospital organization) that provide detailed demonstrations.    You should follow up in about 6 months. Of course, if you have worsening in your breathing or you think that we need to switch up your inhaler back to Flovent, you should give us a call sooner (or message me in [employer]).     I am going to be sending the most positive thoughts in your direction while you take on breast cancer once  again.    If you have any questions or concerns, please, call our clinic at [phone].

## 2021-06-10 NOTE — PROGRESS NOTES
"Camilla Wilson is a 70 y.o. female who is being evaluated via a billable video visit.      The patient has been notified of following:     \"This video visit will be conducted via a call between you and your physician/provider. We have found that certain health care needs can be provided without the need for an in-person physical exam.  This service lets us provide the care you need with a video conversation.  If a prescription is necessary we can send it directly to your pharmacy.  If lab work is needed we can place an order for that and you can then stop by our lab to have the test done at a later time.    Video visits are billed at different rates depending on your insurance coverage. Please reach out to your insurance provider with any questions.    If during the course of the call the physician/provider feels a video visit is not appropriate, you will not be charged for this service.\"    Patient has given verbal consent to a Video visit? Yes  How would you like to obtain your AVS? AVS Preference: [employer].  If dropped by the video visit, the video invitation should be sent to: Send to e-mail at: [email or web address]  Will anyone else be joining your video visit? No        Additional provider notes:   Camilla Wilson was last seen on 5/08/20 for discussion of her asthma & cough. We switched her Flovent to Pulmicort neb & restarted omeprazole.     Camilla Wilson reports that the nebulizer makes it easier to breath \"deeper\" but it is more cumbersome to use, and does not seem to impact her lung for the better (basically her breathing feels the same). She would like to go back to an inhaler for the convenience. She is rarely using her rescue inhaler. Flonase helped w/ her nasal congestion, but now she feels that she has more pronounced PND d/t thicker phlegm running down the back of her throat.     She will be getting treatment for a newly diagnosed breast cancer soon.   She is spending time outside regularly w/ " her ca.    Limited physical examination:  General: well-appearing  woman in no acute distress  HEENT: MMM, normal caliber voice, EOMI  Resp: no accessory muscle use; able to talk in full sentences; no audible wheezing  Neuro: AOx3  Psych: calm    Assessment/Plan:  70 y.o. non-smoker with most pertinent history of asthma, environmental allergies, & PND, being evaluated via video for her asthma & PND.      Switch budesonide neb to budesonide inhaler two times a day    Continue PRN albuterol    Start Zyrtec daily    Continue daily Flonase AND re-start nasal rinses    RTC in 6 months      Video-Visit Details    Type of service:  Video Visit    Video Start Time: 11:34 AM  Video End Time (time video stopped): 11:56 AM  Originating Location (pt. Location): Home    Distant Location (provider location):  [facility]     Platform used for Video Visit: Mora Rosario MD  Pulmonary and Critical Care

## 2021-06-11 NOTE — TELEPHONE ENCOUNTER
Camilla will meet with Dr. Princess Laguna, medical oncologist, and Dr. Anna Beckman, radiation oncologist, on 10/22/2020.  Patient verbalized understanding of visitor policy and will arrive wearing a mask.  Release of Information will be sent to patient in US mail with return envelope.  A separate mailing will include welcome letters and patient history form.      New Patient Oncology Nurse Navigator Note     Referring provider: Margarita Cook MD     Referring Clinic/Organization: [facility]     Referred to (specialty): Medical and radiation oncology    Requested provider (if applicable): N/A     Date Referral Received: 9/17/2020     Evaluation for : Breast cancer     Clinical History (per Nurse review of records provided):     7/22/20 - Left core biopsy   1) INVASIVE LOBULAR CARCINOMA:         a) GRADE: JAYSON GRADE II (of III)         b) SCORE: 6 (OF 9)     2) LOBULAR CARCINOMA IN SITU (DCIS):     3) ADDITIONAL FINDINGS: STROMAL FIBROSIS     4)  a) ESTROGEN RECEPTOR: STRONGLY POSITIVE (GREATER THAN 95%; 3+)         b) PROGESTERONE RECEPTOR: STRONGLY POSITIVE (GREATER THAN 95%; 3+)         c) HER-2/FABRICE: INDETERMINATE (2+); PARAFFIN EMBEDDED TISSUE WILL BE SUBMITTED FOR               REFLEX HER-2/FABRICE ANALYSIS BY FISH (SEE SUPPLEMENTAL REPORT)         d) E-CADHERIN:  NEGATIVE; CONSISTENT WITH LOBULAR CARCINOMA,  HER2 NEGATIVE BY FISH    8/21/20 - left lumpectomy after wire localization, and sentinel lymph node biopsy   A) BREAST, LEFT, ORIENTED LUMPECTOMY        1) INVASIVE LOBULAR CARCINOMA             a) Grade: Jayson grade II (of III)             b) Size:  65 X 28 X 25 mm  c) Margins: Positive, superior and inferior margins, also very close to   (< 1 mm) lateral, anterior and posterior margins        2) DUCTAL CARCINOMA IN SITU: Not identified        3) LOBULAR CARCINOMA IN SITU: Present, diffuse   B) LEFT SENTINEL LYMPH NODE, BIOPSY:   - FOUR BENIGN LYMPH NODES (0/4) WITH NO  EVIDENCE OF METASTATIC CARCINOMA   IN HE STAINED SLIDES        - NO EVIDENCE OF MICROSCOPIC METASTASIS IDENTIFIED IN CONTRERAS   CYTOKERATIN          IMMUNOSTAIN     PATHOLOGIC STAGE: pT3, (sn)pN0, pM-Not applicable     9/14/20-  bilateral mastectomies without reconstruction  A) BREAST AND AXILLARY LYMPH NODES, LEFT, MASTECTOMY:        1) RESIDUAL INVASIVE LOBULAR CARCINOMA:            a) GRADE: JAYSON GRADE II (of III)            b) SIZE: 12 X 8 X 8-MM TUMOR NODULE, LATERAL ASPECT OF LUMPECTOMY CAVITY;                 0.2-MM TUMOR NODULE SUBAREOLAR BREAST TISSUE            c) MARGINS: NEGATIVE; LUMPECTOMY CAVITY 3 MM FROM DEEP MARGIN        2) LOBULAR CARCINOMA IN SITU        3) METASTATIC LOBULAR CARCINOMA PRESENT IN 2 OF 5 AXILLARY LYMPH NODES (2 OF 5)            a) 2.5-MM MACROMETASTASES            b) RARE CYTOKERATIN-POSITIVE ISOLATED TUMOR CELLS PRESENT IN ONE LYMPH NODE        4) BACKGROUND BREAST SHOWS:            a) 8.9 X 7.6 X 4.3-CM LUMPECTOMY CAVITY WITH SURROUNDING FIBROSIS, FAT                NECROSIS, AND FOREIGN BODY GRANULOMATOUS INFLAMMATION, LOWER OUTER                QUADRANT            b) PROLIFERATIVE FIBROCYSTIC CHANGES WITH RADIAL SCAR, FLORID USUAL DUCTAL                HYPERPLASIA, DUCT ECTASIA WITH APOCRINE METAPLASIA, COLUMNAR CELL CHANGES,                FIBROADENOMATOSIS AND SCLEROSING ADENOSIS, WITH ASSOCIATED                MICROCALCIFICATIONS        5) MULTIPLE BENIGN SEBORRHEIC KERATOSES     B) RIGHT BREAST AND AXILLARY LYMPH NODES, SIMPLE MASTECTOMY:        1) 1.4 X 1.1 X 0.9 CM FIBROTIC SCAR WITH FAT NECROSIS AND DYSTROPHIC CALCIFICATION,            CONSISTENT WITH PREVIOUS LUMPECTOMY SITE, UPPER INNER QUADRANT        2) NO EVIDENCE OF IN SITU OR INVASIVE MALIGNANCY        3) PROLIFERATIVE FIBROCYSTIC CHANGES WITH USUAL DUCTAL HYPERPLASIA, COLUMNAR            CELL CHANGES AND ATYPICAL LOBULAR HYPERPLASIA        4) LATERAL LYMPH NODES WITH NO EVIDENCE OF METASTATIC CARCINOMA (0 OF 5)         5) MULTIPLE BENIGN SEBORRHEIC KERATOSES        6) NO EVIDENCE OF PAGET'S DISEASE OR DERMAL LYMPHATIC TUMOR SPREAD     2002 - Right breast cancer in 2002.  Dr. Cook performed a lumpectomy at that time and then Camilla recalls having radiation therapy at [facility].  Dr. Lisa Oh of [facility] was her medical oncologist and patient believes she participated in a trial of exemestane.  She does not recall the name of the trial, but knows she was in the exemestane arm.       Clinical Assessment / Barriers to Care (Per Nurse): Camilla feels well supported at present.  She's eager to get her drains out after bilateral mastectomies.  She has two friend from her book club that have been of support to her.  They are both nurses and one had bilateral mastectomies herself.     Records Location (Care Everywhere, Media, etc.):        Records Needed: [facility]     Additional testing needed prior to consult: N/A

## 2021-06-11 NOTE — PROGRESS NOTES
Patient presents to [facility] for a site check with Dr. Cook.  She is accompanied by her friend for consult.  RN assessment and EMR update. Patient met with Dr. Cook, see dictation for details of visit and follow up plan.  RN time 10mins

## 2021-06-11 NOTE — PROGRESS NOTES
[facility]  [address], [address]  [address]  [facility] [address]  Dept: [phone]  Dept Fax: [phone]  Primary Provider: Berna Morel MD  Pre-op Performing Provider: TAMRA LOPEZ    PREOPERATIVE EVALUATION:  Today's date: 9/10/2020  Camilla Wilson is a 70 y.o. female who presents for a preoperative evaluation.    Surgical Information:  Surgery Details 9/7/2020   Surgery/Procedure: bilateral mastectomy   Surgery Location: [facility]   Surgeon: Dr. Margarita Cook   Surgery Date: September 14, 2020   Time of Surgery: 5:30 a.m.   Where patient plans to recover: other   Additional recovery plan details: [facility] overnight, then at home with volunteer help     Fax number for surgical facility: Note does not need to be faxed, will be available electronically in Epic.  Type of Anesthesia Anticipated: General    Subjective     HPI related to upcoming procedure: BP has been elevated since lumpectomy since 8/21/20, scale 3/10 and she hates acetaminophen. Her friend Rayne is taking her to the surgery, and she will be in the hospital overnight. Lives alone and will be under the care of friends Rayne and Jackie. Invasive lobular carcinoma with positive margins and negative sentinel node biopsy. Does take NSAIDs for her pain, but not often.  Emotionally she thinks she is in a good mental state to tolerate the surgery and her status postoperatively.  She has a good friendship supportive network and trying to maintain a good outlook on the bigger picture for why she is undergoing the surgery.    Preop Questions 9/7/2020   Have you ever had a heart attack or stroke? No   Have you ever had surgery on your heart or blood vessels, such as a stent placement, a coronary artery bypass, or surgery on an artery in your head, neck, heart, or legs? No   Do you have chest pain with activity? No   Do you have a history of  heart failure? No   Do you currently have a cold, bronchitis or  symptoms of other infection? No   Do you have a cough, shortness of breath, or wheezing? YES - notes she has asthma and after a really bad flu 1.5 years ago. Started out in her chest and was dx by pulmonary for asthma. Uses pulmicort two times a day and has not needed to use albuterol   Do you or anyone in your family have previous history of blood clots? No - not to her knowledge   Do you or does anyone in your family have a serious bleeding problem such as prolonged bleeding following surgeries or cuts? No   Have you ever had problems with anemia or been told to take iron pills? YES - years ago, when menstruating   Have you had any abnormal blood loss such as black, tarry or bloody stools, or abnormal vaginal bleeding? No   Have you ever had a blood transfusion? No   Are you willing to have a blood transfusion if it is medically needed before, during, or after your surgery? Yes   Have you or any of your relatives ever had problems with anesthesia? YES - some nausea and emesis X 1 after first colonoscopy.    Do you have sleep apnea, excessive snoring or daytime drowsiness? UNKNOWN - but does have daytime drowsiness   Do you have any artifical heart valves or other implanted medical devices like a pacemaker, defibrillator, or continuous glucose monitor? No   Do you have artificial joints? No   Are you allergic to latex? No   Is there any chance that you may be pregnant? No   Patient does not have a Health Care Directive or Living Will: Discussed advance care planning with patient; however, patient declined at this time.  RX [place] ([place] reviewed:  reviewed - no concerns  25 tabs of hydrocodone-acetaminophen 5-325 filled on 8/21/2020.    HYPERLIPIDEMIA - Patient has a long history of significant Hyperlipidemia requiring medication for treatment with recent good control. Patient reports no problems or side effects with the medication.  10-year ASCVD event risk of 8.9%.  Optimal 8.3%.    RENAL  INSUFFICIENCY - Patient has a longstanding history of moderate-severe chronic renal insufficiency. Last CrCl 95 ml/min  ASTHMA: Mild to moderate asthma well controlled with Pulmicort.  Sparingly uses her rescue inhaler.    ELEVATED BLOOD PRESSURE: Not formally on any medication for hypertension.  Systolic blood pressures have been in the 130s to 140s since 8/21/2020 which was lumpectomy.    Review of Systems  Constitutional, neuro, ENT, endocrine, pulmonary, cardiac, gastrointestinal, genitourinary, musculoskeletal, integument and psychiatric systems are negative, except as otherwise noted.    Patient Active Problem List    Diagnosis Date Noted     Health maintenance examination 09/09/2020     Intramural, submucous, and subserous leiomyoma of uterus 09/09/2020     Personal history of breast cancer 09/04/2020     Moderate persistent asthma without complication 08/14/2020     Malignant neoplasm of central portion of left breast in female, estrogen receptor positive (H) 08/10/2020     Morbid obesity (H) 11/14/2019     GERD (gastroesophageal reflux disease) 02/04/2016     Skin cancer 02/04/2016     Osteopenia 12/07/2015     Obesity      Dyslipidemia      Spinal Stenosis      Breast cancer (H) 06/06/2011     Pain in joint involving lower leg 12/08/2010     Past Medical History:   Diagnosis Date     Acute streptococcal pharyngitis      Breast cancer (H) 2000     Breast cyst      Hx of radiation therapy      Moderate persistent asthma without complication 8/14/2020     PONV (postoperative nausea and vomiting)      Uterine fibroid     embolized     Past Surgical History:   Procedure Laterality Date     BREAST BIOPSY Left      BREAST LUMPECTOMY Right 2007     BREAST LUMPECTOMY Left 8/21/2020    Procedure: Left Lumpectomy after Wire Localization: [place] Lymph Node Biopsy;  Surgeon: Margarita Cook MD;  Location: [facility];  Service: General     ME EMBOLIZATION UTERINE FIBROID      Description: Uterine Fibroid  Embolization;  Proc Date: 09/06/2000;     CT EXCISE BREAST CYST      Description: Breast Surgery Lumpectomy;  Recorded: 12/18/2007;     CT SHLDR ARTHROSCOP,SURG,W/REMOVAL,LOOSE/FB      Description: Shoulder Arthroscopy;  Recorded: 02/25/2009;  Comments: right 2004; left 2007     US BREAST CORE BIOPSY LEFT Left 7/22/2020      BREAST LOC W SENT NODE INJ LEFT Left 8/21/2020     Current Outpatient Medications   Medication Sig Dispense Refill     5-hydroxytryptophan (5-HTP) 100 mg cap Take 2 capsules by mouth daily. 1000mg       albuterol (PROAIR HFA;PROVENTIL HFA;VENTOLIN HFA) 90 mcg/actuation inhaler Inhale 2 puffs every 4 (four) hours as needed for wheezing or shortness of breath (cough). 1 Inhaler 12     budesonide (PULMICORT) 180 mcg/actuation inhaler Inhale 2 puffs 2 (two) times a day. 1 Inhaler 6     calcium citrate 250 mg calcium Tab Take 500 mg by mouth 2 (two) times a day.        cetirizine (ZYRTEC) 10 MG tablet Take 1 tablet (10 mg total) by mouth daily. 30 tablet 6     cholecalciferol, vitamin D3, (VITAMIN D3) 2,000 unit cap Take 1 capsule by mouth daily.        famotidine (FOR PEPCID) 10 MG tablet Take 10 mg by mouth daily.       ipratropium-albuterol (DUO-NEB) 0.5-2.5 mg/3 mL nebulizer Take 3 mL by nebulization 2 (two) times a day as needed. 360 mL 6     melatonin 5 mg cap 1 cap nightly  0     niacin (SLO-NIACIN) 500 mg ER tablet Take 2,000 mg by mouth.       peg 400-propylene glycol (SYSTANE) 0.4-0.3 % Drop Administer 1 drop to both eyes 4 (four) times a day as needed.       s-adenosylmethionine (AISHA-E, ENTERIC COATED,) 200 mg TbEC Take 2 tablets by mouth daily.       sodium fluoride-pot nitrate (PREVIDENT 5000 SENSITIVE) 1.1-5 % Pste Take by mouth daily.        vit C,J-Qt-oupge-lutein-zeaxan (OCUVITE LUTEIN & ZEAXANTHIN) 60 mg-30 unit- 15 mg-2 mg-6 mg cap Take by mouth.       No current facility-administered medications for this visit.      Allergies   Allergen Reactions     Lipitor [Atorvastatin]       Memory  Impairment       Social History     Tobacco Use     Smoking status: Former Smoker     Packs/day: 0.50     Years: 15.00     Pack years: 7.50     Last attempt to quit: 1982     Years since quittin.3     Smokeless tobacco: Never Used   Substance Use Topics     Alcohol use: Yes      Family History   Problem Relation Age of Onset     Colon cancer Maternal Aunt      Breast cancer Maternal Aunt         40s     Kidney disease Mother      Pneumonia Father      Hyperlipidemia Father      Substance and Sexual Activity   Drug Use Never        Objective   /72 (Patient Site: Left Arm, Patient Position: Sitting, Cuff Size: Adult Regular)   Pulse 69   Wt (!) 262 lb (118.8 kg)   SpO2 96%   Breastfeeding No   BMI 42.94 kg/m    Physical Exam    GENERAL APPEARANCE: healthy, alert and no distress     RESP: lungs clear to auscultation - no rales, rhonchi or wheezes     CV: regular rates and rhythm, normal S1 S2, no S3 or S4 and no murmur, click or rub     ABDOMEN:  soft, nontender, no HSM or masses and bowel sounds normal     MS: extremities normal- no gross deformities noted, no evidence of inflammation in joints, FROM in all extremities.  No calf tenderness or pain.     NEURO: Normal strength and tone, mentation intact and speech normal     PSYCH: mentation appears normal. and affect normal/bright    Recent Labs   Lab Test 20  0905 10/16/19  1140   HGB 12.9 13.5    260   NA  --  140   K  --  4.0   CREATININE  --  1.00      PRE-OP Diagnostics:   No labs were ordered during this visit.  EKG: Heart rate 66, normal sinus rhythm, no ST/T wave changes,  MS     Assessment & Plan   The proposed surgical procedure is considered LOW risk.    REVISED CARDIAC RISK INDEX   The patient has the following serious cardiovascular risks for perioperative complications:  No serious cardiac risks = 0 points    INTERPRETATION: 0 points: Class I (very low risk - 0.4% complication rate)    1. Pre-op exam  3.  Morbid obesity (H)  Low risk surgery with no history of CAD.  Only risk factor is hyperlipidemia however ASCVD 10-year event risk is quite low.  Hemodynamically stable with most recent elevated systolic blood pressures in the setting of pain status post lumpectomy.  Has a history of asthma and thus will need to practice good pulmonary toileting postoperatively.  Mobility and ambulation is important in the setting of morbid obesity.  Also is some concern for sleep apnea which she intends to have worked up postoperatively, and thus encourage close monitoring of nocturnal oxygen saturations.  - Electrocardiogram Perform and Read    2. Need for prophylactic vaccination and inoculation against influenza  - Influenza,Quad,High Dose,PF 65 YR+    The patient has the following additional risks and recommendations for perioperative complications:    PULMONARY:    - Incentive spirometry post-op   -Has a history of asthma on Pulmicort which should be continued as an inpatient.  There is some concern for potential JONATHAN and thus please be aware for nocturnal desats during her brief hospital stay (see below)    POSSIBLE SLEEP APNEA:    - Hospital staff are advised to monitor for sleep related oxygen desaturations due to suspicion of JONATHAN     MEDICATION INSTRUCTIONS:  Patient is to take all scheduled medications on the day of surgery EXCEPT for modifications listed below:    RECOMMENDATION:  APPROVAL GIVEN to proceed with proposed procedure, without further diagnostic evaluation.    Return if symptoms worsen or fail to improve.    Signed Electronically by: Mina Jalloh MD    Copy of this evaluation report is provided to requesting physician.    Preop [facility] Preop Guidelines    Revised Cardiac Risk Index

## 2021-06-11 NOTE — PROGRESS NOTES
Patient presents to [facility] for a post op appointment with Dr. Cook.  She is accompanied by her friend for consult. RN assessment and EMR update.  Patient still has drains.  Frustrated at still having a large amount of drainage from both sides.  Support and encouragement provided.  Patient is on abx, feeling better since starting those. Patient met with Dr. Cook, see dictation for details of visit.  Gave her a pair of knitted knockers.  Made her an appointment to see Dr. Cook again in a week.  Told her if drains reach 20 cc or less in a 24 hour period x 2.  Support provided, invited calls.  RN time 20 mins

## 2021-06-11 NOTE — PROGRESS NOTES
Camilla presents to [facility] today for an RN site check.  She is accompanied by her friend today.  She was just seen in the [facility] as her right breast incision had opened up over the weekend again.  She said she called the on call md who suggested she go to [facility] for wound check.  Dr. Tee applied more steri strips then sent her up to [facility] for further assessment.  Wound is now closed, steri strips dry and intact.  No redness, pain or warmth.  Left mastectomy incision is closed with steri strips.  Drains are patent and draining clear pink drainage.  She is still having approx 90 cc out per 24 hours.  Will update Dr. Cook.  Made her an appointment to come see Dr. Cook tomorrow at 1120.  Told her when I hear back from Dr. Cook, I will call her if there is a change in plans.  Support provided, invited calls.  RN time 12 mins

## 2021-06-11 NOTE — PROGRESS NOTES
In for continued follow-up of her bilateral mastectomies. She is very frustrated today.  She developed what appeared to be an infection over the weekend and got started on antibiotics and has already improved markedly from that but she continues to complain of pain.  She is only taking 1 pain pill at night.  She states she cannot move with the drains in.    Physical exam:  Appears well.  Does not appear in any discomfort.  Breasts: Incisions are healing nicely.  No swelling.  CHIOMA drains are still putting out way too much to be pulled.  There is only some slight erythema around the drains.  The most lateral part of her right incision that had opened is clean and granulating.        Impression: Postop visit.  Reassured Camilla that all of this is within normal follow-up.  I did not tell her that 1 of the biggest contributors to wound infections is morbid obesity as I do not think she would have taken that well today.  She is just in a very angry mood.  Unfortunately I did have to tell her that the radiation oncologist would likely recommend radiation to her.  I told her it is her choice whether to do it or not.  She does not have to, but it likely would be recommended.    Plan: We will have them use a silver alginate dressing to the posterior edge of the right wound.  I suggested they cleanse the drain sites with hydrogen peroxide daily.  We will keep her on her antibiotic for another week just to be safe.  We will try to set her up for home health to help with the dressing change starting next week as her friends will not be available next week to help her.  Reinforced that she needs to start becoming more active and the drains and of themselves should not prevent her from getting out of bed and walking.  We will also put in a referral to genetic counseling although she is not sure that she would want to get it.  I think it is up to her whether or not she gets tested but I think it is worth at least having the visit.   She is also willing to at least visit with the radiation oncologist but just does not want to commit to it yet.

## 2021-06-11 NOTE — ANESTHESIA POSTPROCEDURE EVALUATION
Patient: Camilla Wilson  Procedure(s):  Bilateral Mastectomies (Bilateral)  Anesthesia type: general    Patient location: PACU  Last vitals:   Vitals Value Taken Time   /63 9/14/2020 12:59 PM   Temp 36.4  C (97.6  F) 9/14/2020 12:59 PM   Pulse 77 9/14/2020 12:59 PM   Resp 18 9/14/2020 12:59 PM   SpO2 94 % 9/14/2020 12:59 PM     Post vital signs: stable  Level of consciousness: awake and responds to simple questions  Post-anesthesia pain: pain controlled  Post-anesthesia nausea and vomiting: no  Pulmonary: unassisted, return to baseline  Cardiovascular: stable and blood pressure at baseline  Hydration: adequate  Anesthetic events: no    QCDR Measures:  ASA# 11 - [place]-op Cardiac Arrest: ASA11B - Patient did NOT experience unanticipated cardiac arrest  ASA# 12 - [place]-op Mortality Rate: ASA12B - Patient did NOT die  ASA# 13 - PACU Re-Intubation Rate: ASA13B - Patient did NOT require a new airway mgmt  ASA# 10 - Composite Anes Safety: ASA10A - No serious adverse event    Additional Notes:

## 2021-06-11 NOTE — ANESTHESIA CARE TRANSFER NOTE
Last vitals:   Vitals:    09/14/20 1030   BP: 148/70   Pulse: 72   Resp: 17   Temp: 36.4  C (97.6  F)   SpO2: 100%     Patient's level of consciousness is awake  Spontaneous respirations: yes  Maintains airway independently: yes  Dentition unchanged: yes  Oropharynx: oropharynx clear of all foreign objects    QCDR Measures:  ASA# 20 - Surgical Safety Checklist: WHO surgical safety checklist completed prior to induction    PQRS# 430 - Adult PONV Prevention: 4558F - Pt received => 2 anti-emetic agents (different classes) preop & intraop  ASA# 8 - Peds PONV Prevention: None peds patient  PQRS# 424 - Lyssa-op Temp Management: 4559F - At least one body temp DOCUMENTED => 35.5C or 95.9F within required timeframe  PQRS# 426 - PACU Transfer Protocol: - Transfer of care checklist used  ASA# 14 - Acute Post-op Pain: ASA14B - Patient did NOT experience pain >= 7 out of 10

## 2021-06-11 NOTE — TELEPHONE ENCOUNTER
New Appointment Needed  What is the reason for the visit:  Pre-op and Est Care for Dr. Austin is no longer here  Pre-Op Appt Request  When is the surgery? :  9/14  Where is the surgery?:   [facility]  Who is the surgeon? :  Dr. Margarita Cook  What type of surgery is being done?: Bilateral Mastectomy   Provider Preference: Dr. Addison or Shauna Flynn  How soon do you need to be seen?: next week; Any day but Tuesday   Waitlist offered?: No  Okay to leave a detailed message:  Yes

## 2021-06-11 NOTE — TELEPHONE ENCOUNTER
Per Dr. Cook's request, called [facility] to update them that following patient's mastectomy, it was found she has 2+ lymph nodes.  Per the , the report will be updated and will be issued in approx 2 business days.

## 2021-06-11 NOTE — TELEPHONE ENCOUNTER
Establish of care scheduled for 9/9/20 at 1:40 with Dr. Morel.    Pre-op scheduled with Dr. Jalloh on 9/10/20 at 2:40 pm.    Marta COLLAZO LPN .......... 1:36 PM  09/04/20  [facility]

## 2021-06-11 NOTE — TELEPHONE ENCOUNTER
Per Dr. Cook's request, called patient to let her know that due to her bilateral breast cancers, she would qualify for genetic testing if she was interested.  LMOM for her with a generic message to return my call or we can talk at her appointment tomorrow am.

## 2021-06-11 NOTE — PROGRESS NOTES
In for follow-up of her bilateral mastectomies.  She is feeling well but this morning when they took off the dressings and noticed a small opening in the very posterior part of the incision on the right.    Physical exam:  Appears well.  Does not appear in any discomfort.  Breasts: Overall things look great.  In the very lateral portion of her incision on the right the skin edges have come apart about half an inch.  I prepped this with Betadine and then was able to close it with long Steri-Strips.  I think this will hold it in place.    Pathology: Reviewed her pathology.  Unfortunately there was tumor found in 2 intramammary lymph nodes at the lateral portion of her breast.  There was also residual tumor at the lumpectomy site but her margins are now widely negative.    Impression: Status post bilateral mastectomies.  With this new finding of lymph node involvement on the left, I told her that very most likely they are going to recommend radiation to her.  She was not happy to hear this.  Apparently she really had a hard time with radiation on the right side.  We will also have them redo her Oncotype now based on node positive disease versus node-negative disease.    Plan: She will follow-up with me in 2 weeks as planned.  We'll refer her onto [facility] oncology.

## 2021-06-11 NOTE — PROGRESS NOTES
Assessment:     1. Postoperative wound dehiscence, initial encounter            Plan:     Patient is status post bilateral mastectomy postoperative day #7 now with wound dehiscence of the lateral aspect of her incision on the right side.  Wound was reapproximated with Steri-Strips and recommended that she make an appointment today for follow-up with her surgeon for definitive treatment of this.  Patient is agreeable with this plan.      Subjective:       70 y.o. female who status post bilateral mastectomy postop day 7 presents for evaluation of a wound dehiscence on the right.  It is gotten significantly worse over the past day.  Her friend has tried to apply some Steri-Strips onto her to keep the wound together but it continues to be gaping open and is now here today for further evaluation.  She has not had any surrounding erythema, warmth, or increased drainage.  It is mildly tender.    Patient Active Problem List   Diagnosis     Breast cancer (H)     Dyslipidemia     Spinal Stenosis     Obesity     Osteopenia     GERD (gastroesophageal reflux disease)     Skin cancer     Morbid obesity (H)     Pain in joint involving lower leg     Malignant neoplasm of central portion of left breast in female, estrogen receptor positive (H)     Moderate persistent asthma without complication     Personal history of breast cancer     Health maintenance examination     Intramural, submucous, and subserous leiomyoma of uterus       Past Medical History:   Diagnosis Date     Breast cancer (H) 2000     Breast cyst      Hx of radiation therapy      Moderate persistent asthma without complication 8/14/2020     PONV (postoperative nausea and vomiting)      Uterine fibroid     embolized       Past Surgical History:   Procedure Laterality Date     BREAST BIOPSY Left      BREAST LUMPECTOMY Right 2007     BREAST LUMPECTOMY Left 8/21/2020    Procedure: Left Lumpectomy after Wire Localization: [place] Lymph Node Biopsy;  Surgeon: Margarita Cook  MD NIRMALA;  Location: [facility];  Service: General     KS EMBOLIZATION UTERINE FIBROID      Description: Uterine Fibroid Embolization;  Proc Date: 09/06/2000;     KS EXCISE BREAST CYST      Description: Breast Surgery Lumpectomy;  Recorded: 12/18/2007;     KS MASTECTOMY, SIMPLE, COMPLETE Bilateral 9/14/2020    Procedure: Bilateral Mastectomies;  Surgeon: Margarita Cook MD;  Location: [facility];  Service: General     KS SHLDR ARTHROSCOP,SURG,W/REMOVAL,LOOSE/FB      Description: Shoulder Arthroscopy;  Recorded: 02/25/2009;  Comments: right 2004; left 2007     US BREAST CORE BIOPSY LEFT Left 7/22/2020     US BREAST LOC W SENT NODE INJ LEFT Left 8/21/2020       Current Outpatient Medications on File Prior to Visit   Medication Sig Dispense Refill     5-hydroxytryptophan (5-HTP) 100 mg cap Take 2 capsules by mouth daily. 1000mg       albuterol (PROAIR HFA;PROVENTIL HFA;VENTOLIN HFA) 90 mcg/actuation inhaler Inhale 2 puffs every 4 (four) hours as needed for wheezing or shortness of breath (cough). 1 Inhaler 12     budesonide (PULMICORT) 180 mcg/actuation inhaler Inhale 2 puffs 2 (two) times a day. 1 Inhaler 6     calcium citrate 250 mg calcium Tab Take 500 mg by mouth daily.        cetirizine (ZYRTEC) 10 MG tablet Take 10 mg by mouth daily.       cholecalciferol, vitamin D3, (VITAMIN D3) 2,000 unit cap Take 1 capsule by mouth daily.        famotidine (FOR PEPCID) 10 MG tablet Take 10 mg by mouth daily.       HYDROcodone-acetaminophen 5-325 mg per tablet Take 1-2 tablets by mouth every 6 (six) hours as needed. 20 tablet 0     melatonin 5 mg Tab tablet Take 5 mg by mouth at bedtime.       niacin (SLO-NIACIN) 500 mg ER tablet Take 2,000 mg by mouth daily.        peg 400-propylene glycol (SYSTANE) 0.4-0.3 % Drop Administer 1 drop to both eyes 4 (four) times a day as needed.       s-adenosylmethionine (AISHA-E, ENTERIC COATED,) 200 mg TbEC Take 2 tablets by mouth daily.       sodium fluoride-pot nitrate  (PREVIDENT 5000 SENSITIVE) 1.1-5 % Pste Take by mouth daily.        vit C,Z-Po-vrhuf-lutein-zeaxan (OCUVITE LUTEIN & ZEAXANTHIN) 60 mg-30 unit- 15 mg-2 mg-6 mg cap Take 1 tablet by mouth daily.        No current facility-administered medications on file prior to visit.        Allergies   Allergen Reactions     Lipitor [Atorvastatin]      Memory  Impairment         Family History   Problem Relation Age of Onset     Colon cancer Maternal Aunt      Breast cancer Maternal Aunt         40s     Kidney disease Mother      Pneumonia Father      Hyperlipidemia Father        Social History     Socioeconomic History     Marital status: Single     Spouse name: None     Number of children: None     Years of education: None     Highest education level: None   Occupational History     None   Social Needs     Financial resource strain: None     Food insecurity     Worry: None     Inability: None     Transportation needs     Medical: None     Non-medical: None   Tobacco Use     Smoking status: Former Smoker     Packs/day: 0.50     Years: 15.00     Pack years: 7.50     Last attempt to quit: 1982     Years since quittin.4     Smokeless tobacco: Never Used   Substance and Sexual Activity     Alcohol use: Yes     Alcohol/week: 7.0 standard drinks     Types: 7 Glasses of wine per week     Drug use: Never     Sexual activity: None   Lifestyle     Physical activity     Days per week: None     Minutes per session: None     Stress: None   Relationships     Social connections     Talks on phone: None     Gets together: None     Attends Voodoo service: None     Active member of club or organization: None     Attends meetings of clubs or organizations: None     Relationship status: None     Intimate partner violence     Fear of current or ex partner: None     Emotionally abused: None     Physically abused: None     Forced sexual activity: None   Other Topics Concern     None   Social History Narrative     None         Review of  Systems  A 12 point comprehensive review of systems was negative except as noted.      Objective:     Vitals:    09/21/20 0849   BP: 118/70   Pulse: 83   Resp: 12   Temp: 97.9  F (36.6  C)   SpO2: 96%      General appearance: alert, appears stated age and cooperative  Skin: Postoperative changes noted from bilateral mastectomy.  Drains are still in place.  Over the lateral aspect of her incision on the right side there is air in the area of wound dehiscence measuring approximately 7 cm.  There is no surrounding erythema or warmth.  No significant drainage noted.       This note has been dictated using voice recognition software. Any grammatical or context distortions are unintentional and inherent to the software

## 2021-06-11 NOTE — ANESTHESIA PREPROCEDURE EVALUATION
Anesthesia Evaluation      Patient summary reviewed   History of anesthetic complications (ponv)     Airway   Mallampati: III  Neck ROM: full   Pulmonary - normal exam    breath sounds clear to auscultation  (+) asthma  a smoker                         Cardiovascular - normal exam  Exercise tolerance: > or = 4 METS  ECG reviewed  Rhythm: regular  Rate: normal,         Neuro/Psych - negative ROS     Endo/Other    (+) obesity,      Comments: Breast ca      GI/Hepatic/Renal    (+) GERD,             Dental    (+) poor dentition and chipped                       Anesthesia Plan  Planned anesthetic: general endotracheal  -- RSI with Succ  -- Esmolol drawn up and ready for administration  -- PONV prophylaxis with Decadron 10 mg and Zofran 4 mg  -- toradol if ok with surgeon    ASA 3   Induction: intravenous   Anesthetic plan and risks discussed with: patient  Anesthesia plan special considerations: antiemetics,   Post-op plan: routine recovery

## 2021-06-11 NOTE — PATIENT INSTRUCTIONS - HE
Patient Education   Remember to speak with Dr Morel regarding a sleep study to evaluate for sleep apnea  Chronic Kidney Disease (CKD)     The role of the kidneys is to remove waste products and extra water from the blood.  When the kidneys do not work as they should, waste products begin to build up in the blood. This is called chronic kidney disease (CKD). CKD means that you have kidney damage or a decrease in kidney function lasting at least 3 months. CKD allows extra water, waste, and toxins to build up in the body. This can eventually become life-threatening. You might need dialysis or a kidney transplant to stay alive. This most severe form is called end stage renal disease.  Diabetes is the leading causes of chronic renal failure. Other causes include high blood pressure, hardening of the arteries (atherosclerosis), lupus, inflammation of the blood vessels (vasculitis), and past viral or bacterial infections. Certain over-the-counter pain medicines can cause renal failure when taken often over a long period of time. These include aspirin, ibuprofen, and related anti-inflammatory medicines called NSAIDs (nonsteroidal anti-inflammatory drugs).  Home care  The following guidelines will help you care for yourself at home:    If you have diabetes, talk with your healthcare provider about keeping your blood sugar under control. Ask if you need to make and changes to your diet, lifestyle, or medicines.    If you have high blood pressure:  ? Take prescribed medicine to lower your blood pressure to the recommended goal of less than 130/80.  ? Start a regular exercise program that you enjoy. Check with your healthcare provider to be sure your planned exercise program is right for you.  ? Eat less salt (sodium). Your healthcare provider can tell you how much salt per day is safe for you.    If you are overweight, talk with your healthcare provider about a weight loss plan.    If you smoke, you must quit. Smoking makes  kidney disease worse. Talk with your healthcare provider about ways to help you quit.  For more information, visit the following links:  ? www.smokefree.gov/sites/default/files/pdf/clearing-the-air-accessible.pdf  ? www.smokefree.gov  ? www.cancer.org/healthy/stayawayfromtobacco/guidetoquittingsmoking/    Most people with CKD need to follow a special diet.  Be sure you understand yours. In general, you will need to limit protein, salt, potassium, and phosphorus. You also need to limit how much fluid you drink.     CKD is a risk factor for heart disease. Talk with your healthcare provider about any other risk factors you might have and what you can do to lessen them.    Talk with your healthcare provider about any medicines you are taking to find out if they need to be reduced or stopped.    Don't use the following over-the-counter medicines, or consult your healthcare provider before using:  ? Aspirin and NSAIDs such as ibuprofen or naproxen. Using acetaminophen for fever or pain is OK.  ? Laxatives and antacids containing magnesium or aluminum  ? Fleet or phospho soda enemas containing phosphorus  ? Certain stomach acid-blocking medicine such as cimetidine or ranitidine   ? Decongestants containing pseudoephedrine   ? Herbal supplements  Follow-up care  Follow up with your healthcare provider, or as advised. Contact one of the following for more information:    American Association of Kidney Patients 030-539-1723 www.aakp.org    National Kidney Foundation 113-931-0638 www.kidney.org    American Kidney Fund 130-353-6230 www.kidneyfund.org    National Kidney Disease Education Program 866-4KIDNEY www.nkdep.nih.gov  If an X-ray, ECG (cardiogram), or other diagnostic test was taken, you will be told of any new findings that may affect your care.  Call 911  Call 911 if you have any of the following:    Severe weakness, dizziness, fainting, drowsiness, or confusion    Chest pain or shortness of breath    Heart beating  fast, slow, or irregularly  When to seek medical advice  Call your healthcare provider right away if any of these occur:    Nausea or vomiting    Fever of 100.4 F (38 C) or higher, or as directed by your healthcare provider    Unexpected weight gain or swelling in the legs, ankles, or around the eyes    Decrease or absent urine output

## 2021-06-11 NOTE — PROGRESS NOTES
Camilla presents to [facility] today for a post op appointment with Dr. Cook.  States she was taking her dressing off today and noticed her incision had opened up about an inch.  She is not having pain. She is accompanied by her friend for consult. RN assessment and EMR update. Patient met with Dr. Cook, see dictation for details of visit and follow up plan.  Rn time 10 mins

## 2021-06-11 NOTE — PROGRESS NOTES
Camilla presents to [facility] for a post op appointment with Dr. Cook .  She is accompanied by her friend for appointment.  She states she is doing well, minimal pain.  She has good ROM, reviewed ROM exercises with her.  Patient met with Dr. Cook .  See dictation for details of visit.  Unfortunately, due to her positive margins, she is needing more surgery.  She is planning a bilateral mastectomy. She declines reconstruction at this time.  Pre and post op teaching complete.  Walked her to [facility] for surgery scheduling.  Gave her a drain apron for her CHIOMA drains.   Invited calls sooner if she has any questions.  RN time 15 mins

## 2021-06-11 NOTE — PROGRESS NOTES
Camilla comes in again to recheck her incision.  She was seen in urgent care over the weekend because some more of the Steri-Strips fell off.    Physical exam:  Overall the incision looks good to me.  There is just some slight redness laterally and there is a couple areas where this Steri-Strips have fallen off but the skin edges are holding just fine.  I told them just to leave these alone for now.  If the Steri-Strips come off again in the skin edges  all I want him just to keep this covered with a little bacitracin or Neosporin and reassured her and her friend that this will all heal in secondarily.  The CHIOMA drains are still putting out too much to be pulled today.    Impression: Status post bilateral mastectomies.  Doing well.  Plan to see her back next week as planned.  I also did review the update on the Oncotype now that we know she is node positive.  The score is still 13 it just puts her at a slightly higher risk for recurrence but the feeling is there is still no benefit from doing chemotherapy.  We will be discussing her at tumor conference this week to discuss whether or not she would benefit from radiation.  I think she would.

## 2021-06-11 NOTE — PROGRESS NOTES
In for follow-up of her left lumpectomy  with sentinel lymph node biopsy.  She is feeling well.  She is having very minimal pain.      Physical exam:  Appears well.  Does not appear in any discomfort.  Breasts: Incisions are healing nicely with no signs of infection.  No swelling.    Pathology: The tumor was greater than 6 cm.  The margins are positive.  Her sentinel lymph nodes were negative.    Impression: Left breast cancer that is much larger than what we anticipated from her mammogram or exam.  Explained to her that because of the size of this mass, I do think that a mastectomy is in her best interest.  On a good note, her Oncotype has come back at 13 which means she does not need to think about chemotherapy at least.  Technically from the size of the mass radiation should be considered, but she has a very large breasts and I think if we can get a nice wide margin around this, I do not think radiation is necessary at her age.  I spent almost 1 hour with Camilla and a friend of hers answering questions about the surgery.  Camilla wants to do bilateral mastectomies which I think is reasonable given her past history of a cancer in the right breast.  Also with her size, it can be very hard to wear prosthesis to match the opposite breast.  She is not at all interested in reconstruction.  Answered all of her questions as best as we could.    Plan: Bilateral mastectomies.  Typically this can be done as an outpatient but Camilla does not feel that she would be able to do that and so she will spend the night in the hospital.  I did explain that this is still considered outpatient surgery.  We explained the typical follow-up and recovery but explained also how variable this can be depending on the patient.

## 2021-06-11 NOTE — TELEPHONE ENCOUNTER
Per Dr. Cook's request, I called Camilla to let her know that she would qualify for genetic testing if that was something she was interested.  She will think about this and talk with Dr. Cook about it at her appointment tomorrow.    She asked if she can apply bacitracin to her drain insertion sites.  She said they are still quite painful. She is taking an abx given to her by the surgeon on call over the weekend and said she is feeling a bit better.  Open area on incision is still draining but less.  She will bring her drain totals to her appointment tomorrow.  Support provided, invited calls.

## 2021-06-12 NOTE — PROGRESS NOTES
Comes in for continued follow-up of her bilateral mastectomies to recheck her seromas.  Feeling well.    Physical exam:  Both incisions look great.  Healing up nicely.  She maybe has a tiny seroma on the left but not enough to aspirate.    Impression: Status post bilateral mastectomies.  Healing well.  She has pretty much made up her mind that she will do radiation but is going to wait till after Thanksgiving.    Plan: Follow-up with me in 6 months.  Encouraged her to call if she has any questions or concerns.

## 2021-06-12 NOTE — PROGRESS NOTES
[facility] Certification Request    November 5, 2020      Patient: Camilla Wilson  MR Number: [id number]  YOB: 1950  Date of Visit: 11/4/2020      Dear Dr. Cantor:    Thank you for this referral.   We are seeing Camilla Wilson for Physical Therapy of post-mastectomy lymphedema syndrome and B LE swelling.    Medicare and/or Medicaid requires physician review and approval of the treatment plan. Please review the plan of care and verify that you agree with the therapy plan of care by co-signing this note.      Plan of Care  Authorization / Certification Start Date: 11/04/20  Authorization / Certification End Date: 02/02/21  Authorization / Certification Number of Visits: 12  Communication with: Referral Source  Patient Related Instruction: Nature of Condition;Treatment plan and rationale;Self Care instruction;Basis of treatment;Precautions;Next steps;Expected outcome  Times per Week: 2-3  Number of Weeks: 4-6  Number of Visits: 12  Discharge Planning: when goals are met or pt I with home program  Precautions / Restrictions :   Therapeutic Exercise: ROM;Stretching;Strengthening;Lymphedema  Neuromuscular Reeducation: core;TNE  Manual Therapy: soft tissue mobilization;myofascial release;joint mobilization;lymphatic drainage massage  Functional Training (ADL's): skin care;self care;lymphedema precautions;bandage/garment wear and care;meal prep;safety procedures;instructions for equipment;ADL's;compensatory training;instructions in transfers;ergonomics  Equipment: compression bandages      Goals:  Patient will reach / maintain arm movement: forward;overhead;with less pain;with less difficulty;in 12 weeks;Comment  Comment: be able to reach into overhead position to get chemo treatment with pain rating <2/10    Patient will have a decreased volume in : bilateral;LE;by 5%;to decrease risk of infection;for better fit of clothing;for improved body image;for ease of movement;in 12 weeks  Patient  will have decreased fibrosis, scar tissue for improved lymphatic mobilitiy : in 12 weeks;Comment  Comment: improved mastectomy fascial movement before/during/after radiation  Patient will perform, verbalize self-management of: skin care;self-monitoring;exercise;massage;self-compression;compression wear;compression care;infection prevention;in 12 weeks        If you have any questions or concerns, please don't hesitate to call.    Sincerely,      Betzy Rainey, PT, DPT, OCS, CLT        Physician recommendation:     _X__ Follow therapist's recommendation        ___ Modify therapy      *Physician co-signature indicates they certify the need for these services furnished within this plan and while under their care.      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 Lymphedema  I89.0    2. Decreased range of motion of left shoulder  M25.612    3. Localized swelling of both lower legs  R22.43    4. Post-mastectomy lymphedema syndrome  I97.2    5. Malignant neoplasm of central portion of left breast (H)  C50.112        Assessment:        Camilla Wilson is a 70 y.o. female who presents to therapy today with chief complaints of recent L UE swelling and pain after double mastectomy on 9/14/20 as well as B LE swelling for many years (leg swelling insidious onset, family female h/o leg swelling).  Pt reported h/o first cancer with R sided breast cancer in 2002 with lumpectomy only, then L lumpectomy  August 2020 with resulting double mastectomy Sept 2020.  Pt is currently awaiting to schedule radiation as it was recommended - pt conflicted about decision to participate in this. Pain symptoms are minimal with occasional burning sensation into her L hand but this pain is mostly resolving.  Functional impairments include difficulty and pain with ADLs with use of L UE and ability to wear shoes.  Pt demo's signs and sx consistent with post-mastectomy lymphedema syndrome with L UE tightness, decreased ROM and mild swelling as well as B LE swelling.     Pt. is appropriate for skilled PT intervention as outlined in the Plan of Care (POC).  Pt. is a good candidate for skilled PT services to improve pain levels and function.  Pt. would be a good candidate for edema management and to develop a home management program.    Goals:   Patient will reach / maintain arm movement: forward;overhead;with less pain;with less difficulty;in 12 weeks;Comment  Comment: be able to reach into overhead position to get chemo treatment with pain rating <2/10    Patient will have a decreased volume in : bilateral;LE;by 5%;to decrease risk of infection;for better fit of clothing;for improved body image;for ease of movement;in 12 weeks  Patient will have decreased fibrosis, scar tissue for improved lymphatic mobilitiy : in 12 weeks;Comment  Comment: improved mastectomy fascial movement before/during/after radiation  Patient will perform, verbalize self-management of: skin care;self-monitoring;exercise;massage;self-compression;compression wear;compression care;infection prevention;in 12 weeks      Patient's expectations/goals are realistic.    Barriers to Learning or Achieving Goals:  Co-morbidities or other medical factors.  Potential radiation to start soon    Lymphedema Category:  VI - Stage 2 with Mod-High Functional Demand       Plan / Patient Instructions:      Plan of Care:   Authorization / Certification Start Date:  11/04/20  Authorization / Certification End Date: 02/02/21  Authorization / Certification Number of Visits: 12  Communication with: Referral Source  Patient Related Instruction: Nature of Condition;Treatment plan and rationale;Self Care instruction;Basis of treatment;Precautions;Next steps;Expected outcome  Times per Week: 2-3  Number of Weeks: 4-6  Number of Visits: 12  Discharge Planning: when goals are met or pt I with home program  Precautions / Restrictions :   Therapeutic Exercise: ROM;Stretching;Strengthening;Lymphedema  Neuromuscular Reeducation: core;TNE  Manual Therapy: soft tissue mobilization;myofascial release;joint mobilization;lymphatic drainage massage  Functional Training (ADL's): skin care;self care;lymphedema precautions;bandage/garment wear and care;meal prep;safety procedures;instructions for equipment;ADL's;compensatory training;instructions in transfers;ergonomics  Equipment: compression bandages    Plan for next visit: Assess response to neck lymph ROM exercises and UE flexion stretching. Educate pt on rest of lymph HEP.  Measure B LE swelling and initiate B LE compression bandaging as needed.  Initiate MLD for L chest wall and UE and B LEs.  Perform L UQ MFR and stretching in preparation for potential radiation (need end range elevation and ER).    Treatment techniques, plan of care, and goals were discussed with the patient.  The patient agrees to the plan as outlined.  The plan of care is dynamic and will be modified on an ongoing basis.       Subjective:         Social information:   Occupation:retired   Work Status:NA    History of Present Illness:  R breast cancer with lumpectomy and radiation in 2002 and didn't have any issue with swelling on R arm - 1 sentinel.    Pt then had breast cancer again in 2020 - had a lumpectomy in August - took 1 sentinel node - but then they couldn't get all the tumor so she had a double mastectomy 9/14/20. Pt noticed after surgery that she was feeling  swelling into her L hand and forearm and some burning so she went to see Dr. Cantor about swelling and MD noticed some swelling in her ankles as well.  Pt will start radiation in a couple weeks - pt very conflicted about doing radiation. She hasn't scheduled this yet.  Pt has also started a hormone inhibitor as she is estrogen positive.    Pt reports her drain on the left side fell out so she had a couple of times that she had to have the left side drained but yesterday she went and they couldn't drain anymore so this is good now.  Pt is getting a compression sleeve for L UE - advised pt to wait on stockings for B LEs until decreased volumes through treatment.    Pt was trying to go swimming a couple times per week but not since having surgery.    Surgery: Lumpectomy  Mastectomy:  Bilateral  Treatments: Radiation  Precautions/Restrictions: Will start radiation soon, mastectomy ~7 weeks old    Pain Ratin  Pain rating at best: 0  Pain rating at worst: 1  Pain description: burning    Functional limitations are described as occurring with:         Objective:      Patient Outcome Measures :    Lymphedema Life Impact Score (%): 28     Scores range from %, where a score of 20% represents the least impact on the individual's life (minimal physical, psychosocial and functional concerns).     Right Upper Extremity Total Estimated Volume (cm3): 3712.44  Left Upper Extremity Total Estimated Volume (cm3): 3688.02  Upper Extremity Swelling Comparison (%): 0.66 %    Lymphedema Assessment 2020   Right Upper Extremity Total Estimated Volume (cm3) 3712.44   Left Upper Extremity Total Estimated Volume (cm3) 3688.02   Swelling Description Right>Left   Upper Extremity Swelling Comparison (%) 0.66   Unable to measure B LE volumes due to time constraints - visualized swelling B ankles to calves through clothing    L anterior chest wall with increased swelling near axilla versus R side    Precautions/Restrictions:  None  Involved side: Bilateral  Posture Observation:      General sitting posture is  fair.  Involved area: Bilateral Arm  Bilateral Leg  Edema: Minimal and Moderate min L UE, mod B LE  Induration: No  Fibrosis: mild L chest wall  Temperature: Normal  Sensation: hypo L chest wall near scar  Skin color: Normal  Skin texture: Normal B UE  Scars: Double mastectomy scar - closed without signs of redness or warmth  Wounds: Absent  Muscle tone: Normal  ROM: R UE WNL, L shoulder elevation mild limitation with increased L chest wall skin tension   B LE WFL  Strength: B UE grossly 5/5  Gait: WFL with B trendelenberg     Strength: (average of 3) normal      Treatment Today      TREATMENT MINUTES COMMENTS   Evaluation 34 Lymph, mastectomy, L chest wall, L UE, B LE   Self-care/ Home management 16 Educated pt on lymphedema, precautions, skin care, infection prevention and potential compression options.   Manual therapy     Neuromuscular Re-education     Therapeutic Activity     Therapeutic Exercises 8 Initiated lymph and stretching HEP per pt instructions and flow sheet and printed AVS for home.   Gait training     Modality__________________                Total 58    Blank areas are intentional and mean the treatment did not include these items.     PT Evaluation Code: (Please list factors)  Patient History/Comorbidities: see subj  Examination: L UE, B LE, chest wall  Clinical Presentation: evolving  Clinical Decision Making: kitty Rainey PT, DPT, OCS, CLT  11/5/2020  2:32 PM

## 2021-06-12 NOTE — PROGRESS NOTES
Pt here by herself for consult with Dr. Beckman. Pt intake form data entered into Epic. She had RT to right breast at [facility] in 2003 and we do not have those records today. She now has left breast cancer and had a double mastectomy by Dr. Cook just over a month ago. She states she's healing okay, still some scabbing and not full ROM yet with arms. I gave her the new patient folder and reviewed the routine for RT including consult, simulation, tx position, daily treatment, weekly RO visit, and common s/e of skin irritation and fatigue. She verbalized her understanding.

## 2021-06-12 NOTE — PROGRESS NOTES
"10/12/2020    Camilla Wilson is a 70 y.o. female who is being evaluated via a billable telephone visit.      The patient has been notified of following:     \"This telephone visit will be conducted via a call between you and your physician/provider. We have found that certain health care needs can be provided without the need for a physical exam.  This service lets us provide the care you need with a short phone conversation.  If a prescription is necessary we can send it directly to your pharmacy.  If lab work is needed we can place an order for that and you can then stop by our lab to have the test done at a later time.    Telephone visits are billed at different rates depending on your insurance coverage. During this emergency period, for some insurers they may be billed the same as an in-person visit.  Please reach out to your insurance provider with any questions.    If during the course of the call the physician/provider feels a telephone visit is not appropriate, you will not be charged for this service.\"    Patient has given verbal consent to a Telephone visit? Yes    What phone number would you like to be contacted at? [phone]    Patient would like to receive their AVS by AVS Preference: Lizy.    Referring Provider: Margarita Cook MD    Present for Today's Visit: Camilla    Presenting Information:   I spoke with Camilla Wilson over the phone today for genetic counseling to discuss her personal and family history of breast cancer.  She is here today to review this history, cancer screening recommendations, and available genetic testing options.    Personal History:  Camilla is a 70 y.o. female. She was diagnosed with a left breast cancer in July 2020 off of a screening mammogram. Biopsy completed on 7/22/2020 revealed an invasive lobular carcinoma; ER positive, ID positive, and HER2 negative. She underwent a lumpectomy on 08/21/2020 that unfortunately showed positive margins and subsequently " underwent bilateral mastectomies on 09/14/2020; 2 of 5 lymph nodes positive. She has appointments coming up with radiation oncology (10/22 Dr. Beckman) and medical oncology (10/22 Dr. Laguna).    Camilla also has a history of breast cancer in her right breast diagnosed in 2002 at age 52. She reports that she underwent a lumpectomy, radiation, and hormone therapy.     She also reports a history of a basal cell carcinoma on her lower back around 2010.       She had her first menstrual period at age 10, she does not have any biological children, and reports that she went through menopause between 2015-4373.  Camilla has her ovaries, fallopian tubes and uterus in place, and she has had no ovarian cancer screening to date.  She reports a short history of oral contraceptive use (1 year in the 1970's) and that she has never been on hormone replacement therapy.      Her most recent OB-GYN exam and Pap smear in 2013 were normal. She began having colonoscopies at the age of 53. His most recent colonoscopy in April of 2019 resulted in the removal of one 3mm tubular adenoma from her cecum and one 3mm sessile serrated adenoma from her ascending colon and follow-up was recommended in 5 years. She had a colonoscopy in 2013 in which one 3mm tubular adenoma was removed from her rectum and one 4mm sessile serrated adenoma was removed from her cecum. She does not regularly do any other cancer screening at this time.  Camilla reported that she quit smoking about 38 years ago, and about 3-4 drinks per week for alcohol use.    Family History: Camilla's family history is significant for the following (Please see scanned pedigree for detailed family history information)    Camilla has one brother, age 77, with a history of an unknown type of skin cancer removed from his ear in his mid 70's.     Camilla's mother passed away at age 98 from old age with no reported history of cancer.     Camilla has a maternal aunt who has passed away  with a history of colon cancer diagnosed at an unknown age, but Camilla believes she was over 50. This aunt has a daughter, in her late 60's, with a history of an unknown type of cancer (Camilla reports that she believes it may have been a kidney cancer.)    Camilla has a maternal aunt who passed in her early 90's with a history of breast cancer diagnosed in her 50's and contralateral breast cancer diagnosed in her 60's or 70's.     Camilla has a maternal first-cousin, in her late 60's/70's, with a history of bladder cancer diagnosed at an unknown age.     Camilla has a maternal uncle who passed at age 60 from lung cancer diagnosed at age 60. He was a smoker.     Camilla's father passed away at age 81 with a history of a non-melanoma skin cancer on his nose diagnosed a few years before he passed.     Her maternal ethnicity is Faroese. Her paternal ethnicity is English and Faroese.  There is no known Ashkenazi Muslim ancestry on either side of her family. There is no reported consanguinity.    Discussion:    Camilla's personal and family history of breast cancer is suggestive of a hereditary cancer syndrome such as hereditary breast and ovarian cancer syndrome.    We reviewed the features of sporadic, familial, and hereditary cancers. In looking at Camilla's family history, it is possible that a cancer susceptibility gene is present due to her bilateral breast cancer history and her maternal aunt's bilateral breast cancer history.    We discussed the natural history and genetics of hereditary breast cancer. A detailed handout regarding the information we discussed will be sent to Camilla via [employer]. Topics included: inheritance pattern, cancer risks, cancer screening recommendations, and also risks, benefits and limitations of testing.    We reviewed that the most common cause of hereditary breast cancer is Hereditary Breast and Ovarian Cancer (HBOC) syndrome, which is caused by mutations in the genes BRCA1  and BRCA2. BRCA1 and BRCA2 are two genes that increase the risk for breast and ovarian cancers, among others. Women who inherit a BRCA mutation have a 50 to 85% lifetime risk of breast cancer and up to a 40% lifetime risk of ovarian cancer. This is higher than the general population lifetime risks of 12% for breast cancer and less than 2% for ovarian cancer. Men with BRCA gene mutations have up to a 7% risk of breast cancer and 20% risk of prostate cancer. Other cancers, such as pancreatic cancer and melanoma, have also been associated with BRCA mutations.    Based on her personal and family history, Camilla meets current National Comprehensive Cancer Network (NCCN) criteria for genetic testing of high-penetrance breast and/or ovarian cancer susceptibility genes.      We discussed that there are additional genes that could cause increased risk for breast cancers. As many of these genes present with overlapping features in a family and accurate cancer risk cannot always be established based upon the pedigree analysis alone, it would be reasonable for Camilla to consider panel genetic testing to analyze multiple genes at once.    We discussed genetic testing options for Camilla given her personal and family history including breast-focused genetic testing (BRCANext pr BRCANext-Expanded) and including genes related to increased risk for colon cancer given her family history (CancerNext).     Medical Management: For Camilla, we reviewed that the information from genetic testing may determine:    additional cancer screening for which Camilla may qualify (i.e.  more frequent colonoscopies, more frequent dermatologic exams, etc.),    options for risk reducing surgeries Camilla could consider (i.e. surgery to remove her ovaries and/or uterus, etc.),      and targeted chemotherapies for Camilla's active cancer, or if she were to develop certain cancers in the future (i.e. immunotherapy for individuals with Schmidt  syndrome, PARP inhibitors, etc.).     These recommendations and possible targeted chemotherapies will be discussed in detail once genetic testing is completed.     Camilla expressed wanting to hold off on genetic testing at this time and think about her options first. I shared that she should reach out to me if she should decide that she would like to proceed with testing.     Plan:  1) Camilla opted to proceed with no genetic testing today.   2) Camilla should reach out to me if she should decide that she wants to move forward with genetic testing.     Time spent over the phone: 55 minutes    Stephanie Littlejohn MS, [facility]  Licensed Genetic Counselor  [facility]  [phone]

## 2021-06-12 NOTE — PROGRESS NOTES
Camilla presents to [facility] for a post op appointment with Dr. Cook .  She is accompanied by herself for appointment.  She states she is doing well, less discomfort.  She has good ROM, reviewed ROM exercises with her.  Patient met with Dr. Cook .  See dictation for details of visit.  One drain removed.   She will plan to see Dr. Laguna as planned on 10-22-20.  Made her a follow up post op in a week to see Dr. Cook. Invited calls sooner if she has any questions.  RN time 18 mins

## 2021-06-12 NOTE — PROGRESS NOTES
Camilla comes in for continued up of her bilateral mastectomies.  Overall feeling well.  Since I saw her last, she did visit with .  She felt that what he told her and what I told her were to different things.  We went over it again and it turns out that she really just did not hear me worried.  I explained to her that chest wall radiation it will include the entire chest and part of the axilla.  She did meet also with the genetic counselor but opted not to do genetic testing.  I do not feel strongly that she needs to do that and I left that one up to her.     Physical exam:  No seroma on the right. I aspirated 40cc of serous fluid on the left.  The incisions themselves look great.      Impression: Status post bilateral mastectomies for a left breast cancer.  Went over her pathology again with her.  Explained that the 2 lymph nodes that they found that were positive for actually before the sentinel node that was negative.  I am not particularly worried that she has residual lymph nodes involved.  She does have a PET scan ordered which I think is reasonable but I am not horribly worried that they will find anything.  Again went over the rationale for why I think radiation for her is a reasonable thing.  Did explain that what we consider high risk may not seem like high risk to her and that is why there is a lot of variability and what patients choose to do.  I told her she chooses not to do radiation, I am not going to be upset with her.  We will continue to treat her and watch her closely.  It is just that I would recommend radiation.    Plan: Follow-up with me again next week.  I did tell her she would not start radiation until the seroma is completely resolved.

## 2021-06-12 NOTE — TELEPHONE ENCOUNTER
Called pt to schedule her Planning Scan or SIM for Radiation.    lft breast - non contrast - per IB from Dr. Beckman pt needs to come early to sign consent.     vm lft for pt to c/b [phone] to schedule.

## 2021-06-12 NOTE — TELEPHONE ENCOUNTER
Patient called, said she had talked to Dr. Pierson over the weekend.  States her drain seems to have pulled out a bit, she can see approx 1/2 inch area of white tubing.  She said she can't see if the stitch is still there or not.  She said the drain is still patent and putting out approx 30 ml per day.  She has an appointment to see Dr. Cook tomorrow at 1240.  Dr. Cook consulted and agrees with keeping appointment for tomorrow.  Told Camilla in the meantime, she should keep a dressing over the top of the insertion site.  She states it is not leaking around drain insertion site.  Support provided, invited calls.

## 2021-06-12 NOTE — PROGRESS NOTES
Camilla comes in for continued follow-up of her bilateral mastectomies.  Overall doing well.  Having much less pain and is very happy about that.    Physical exam:  Looks well.  Incisions are both healing up very nicely.  She does have a fairly large seroma on the left and I aspirated that for about 80 cc of serous fluid today.  She also has a seroma on the right that I aspirated and removed about 40 to 50 cc of a somewhat bloody fluid.    Impression: Status post bilateral mastectomies.  Seromas are completely expected in her and I reassured her that this will not interfere with her further treatment.  She has appointments with radiation oncology and oncology later this week.    Plan: Follow-up with me again next week and I warned her she will likely need repeated aspirations for a while.

## 2021-06-12 NOTE — TELEPHONE ENCOUNTER
Telephoned and spoke with Camilla returning her voice message from earlier today.  She had numerous questions expressed in voice mail that have since been answered by Dr. Cook.      Camilla is still considering her options for radiation therapy locations and also is having aspiration for seroma on the right.   Keisha Manjarrez RN

## 2021-06-12 NOTE — PROGRESS NOTES
Camilla presents to [facility] for a post op/ seroma check with Dr. Cook.  She said she is doing well.  She met with Dr. Cook, see dictation for details of visit. Made Camilla an appointment to return in one week for seroma check again.  Support provided, invited calls.  RN time 10 mins

## 2021-06-12 NOTE — CONSULTS
[facility] Hematology and Oncology Consult Note    Patient: Camilla Wilson  MRN: [id number]  Date of Service: 10/22/2020        Reason for Visit    I was consulted by Dr. Cook regarding left breast cancer    Assessment/Plan    T3 N1 M0, stage IIIa left breast cancer status post bilateral mastectomies September 15, 2020  Tumor greater than 5 cm, 2 lymph nodes positive, ER/VT positive, HER-2 negative, Oncotype DX recurrence score of 13  Previous history of right breast cancer status post lumpectomy, radiation and 5 years of exemestane  Family history of cancer    Pathology is reviewed and shows stage IIIa breast cancer.  Recommend PET scan for systemic staging to rule out any metastatic disease.  She has been recommended adjuvant radiation therapy but is planning to wait until at least December or January to start this.  Discussed adjuvant hormonal therapy and would recommend an aromatase inhibitor daily for up to 10 years.  Will need to recheck bone density as she has history of osteopenia and was on Fosamax with her previous hormonal therapy.  Discussed potential benefit of adjuvant chemotherapy as well.  Based on her pathology there would be consideration for adjuvant chemotherapy which could provide a 3 to 4% reduction in risk of distant recurrence.  Her Oncotype recurrence score however shows no apparent benefit from chemotherapy.  Explained to the patient that this information is based on limited number of patients who were evaluated retrospectively.  Unfortunately results of the completed prospective trial are not available to help us make a decision.  Given her age she may not benefit from adjuvant chemotherapy.    After our discussion I recommended getting PET scan to rule out any metastatic disease.  Did not recommend adjuvant chemotherapy.  We will plan to start exemestane 25 mg p.o. daily after PET scan results are available.  We will see patient back in a month with bone density to determine if any  other treatment is indicated.  In her case may need to overlap hormonal therapy with adjuvant radiation therapy.    Discussed her personal and family history of cancer.  She did meet with the  and was recommended genetic testing but has not proceeded with this yet.  I reviewed potential implications and considerations if she has a positive genetic testing including bilateral salpingo-oophorectomy.    Discuss follow-up for the breast cancer.  We typically see every 3 months for 2 years and then every 6 months until she is 5 to 10 years out.  No role for any other lab or radiologic testing.    Patient's questions were answered.    Plan: Schedule PET scan now  And exemestane 25 mg p.o. daily in about a week  Follow-up in a month with bone density          ECOG Performance      Distress Assessment           Problem List    1. Malignant neoplasm of nipple of both breasts in female, estrogen receptor positive (H)  NM PET CT Skull to Mid Thigh    exemestane (AROMASIN) 25 mg tablet   2. Malignant neoplasm of central portion of left breast in female, estrogen receptor positive (H)     3. Personal history of breast cancer     4. Osteopenia, unspecified location  DXA Bone Density Scan    DXA Bone Density Scan   5. Age-related osteoporosis without current pathological fracture   DXA Bone Density Scan    DXA Bone Density Scan           CC: Berna Morel MD      ______________________________________________________________________________      Staging History    Cancer Staging  No matching staging information was found for the patient.    History    Ms. Camilla Wilson is a 70-year-old past history of asthma and right-sided breast cancer who is referred with diagnosis of a new left-sided breast cancer.  This was found back in July by screening mammogram and she underwent needle biopsy showing invasive lobular cancer, grade 2 which is ER/OK positive and HER-2 negative by FISH.  Her previous cancer was on the  right side treated with lumpectomy, radiation and exemestane on clinical trial for 5 years.  She initially had left lumpectomy and sentinel lymph node biopsy which showed a tumor greater than 6 cm in size, much larger than that suggested by exam or mammogram.  Margin was positive and sentinel lymph nodes were negative.  Initial Oncotype recurrence score was 13.  She then had bilateral mastectomy showing additional 1.2 cm tumor with 2 lymph nodes positive.  She has had some wound issues and is slowly recovering from surgery.  She did meet with and was recommended adjuvant radiation therapy.  She is probably going to consider delaying starting this until at least December or even into January.    No headaches dizziness or focal weakness.  No shortness of breath or cough.  No new bone or abdominal pain.  No change in bowels or urine.  No bleeding or rash.    Past medical history of asthma.  Also history of GERD and uterine fibroid.    Maternal aunt had breast cancer.  Maternal aunt had colon cancer.  Dalia have had other cancer as well.    She has 15-pack-year smoking history stopped in 1982.  Drinks occasionally.      Past History  Past Medical History:   Diagnosis Date     Breast cancer (H) 2000     Breast cyst      GERD (gastroesophageal reflux disease)      Hx of radiation therapy      Moderate persistent asthma without complication 8/14/2020     PONV (postoperative nausea and vomiting)      Uterine fibroid     embolized     Past Surgical History:   Procedure Laterality Date     BREAST BIOPSY Left      BREAST LUMPECTOMY Right 2007     BREAST LUMPECTOMY Left 8/21/2020    Procedure: Left Lumpectomy after Wire Localization: [place] Lymph Node Biopsy;  Surgeon: Margarita Cook MD;  Location: [facility];  Service: General     MASTECTOMY Bilateral 09/14/2020    Dr. Cook     DC EMBOLIZATION UTERINE FIBROID      Description: Uterine Fibroid Embolization;  Proc Date: 09/06/2000;     DC EXCISE BREAST CYST       Description: Breast Surgery Lumpectomy;  Recorded: 2007;     UT MASTECTOMY, SIMPLE, COMPLETE Bilateral 2020    Procedure: Bilateral Mastectomies;  Surgeon: Margarita Cook MD;  Location: [facility] OR;  Service: General     UT SHLDR ARTHROSCOP,SURG,W/REMOVAL,LOOSE/FB      Description: Shoulder Arthroscopy;  Recorded: 2009;  Comments: right ; left      US BREAST CORE BIOPSY LEFT Left 2020     US BREAST LOC W SENT NODE INJ LEFT Left 2020     Family History   Problem Relation Age of Onset     Colon cancer Maternal Aunt      Breast cancer Maternal Aunt         40s     Kidney disease Mother      Pneumonia Father      Hyperlipidemia Father      Cancer Cousin      Cancer Cousin      Social History     Socioeconomic History     Marital status: Single     Spouse name: None     Number of children: None     Years of education: None     Highest education level: None   Occupational History     None   Social Needs     Financial resource strain: None     Food insecurity     Worry: None     Inability: None     Transportation needs     Medical: None     Non-medical: None   Tobacco Use     Smoking status: Former Smoker     Packs/day: 0.50     Years: 15.00     Pack years: 7.50     Quit date: 1982     Years since quittin.4     Smokeless tobacco: Never Used   Substance and Sexual Activity     Alcohol use: Yes     Alcohol/week: 5.0 standard drinks     Types: 5 Glasses of wine per week     Drug use: Never     Sexual activity: Never   Lifestyle     Physical activity     Days per week: None     Minutes per session: None     Stress: None   Relationships     Social connections     Talks on phone: None     Gets together: None     Attends Rastafari service: None     Active member of club or organization: None     Attends meetings of clubs or organizations: None     Relationship status: None     Intimate partner violence     Fear of current or ex partner: None     Emotionally abused: None      Physically abused: None     Forced sexual activity: None   Other Topics Concern     None   Social History Narrative     None     Allergies    Allergies   Allergen Reactions     Lipitor [Atorvastatin]      Memory  Impairment         Review of Systems    General  General (WDL): Exceptions to WDL  Fever: Yes, Recent (Less than 3 months)  Generalized Muscle Weakness: Yes - Chronic (Greater than 3 months)  ENT  ENT (WDL): Exceptions to WDL  Glasses or Contacts: Yes - Chronic (Greater than 3 months)  Sinus Congestion/Drainage: Yes - Chronic (Greater than 3 months)  Hoarseness: Yes - Recent (Less than 3 months)  Respiratory  Respiratory (WDL): Exceptions to WDL  Dyspnea: Yes - Chronic (Greater than 3 months)  Cardiovascular  Cardiovascular (WDL): Exceptions to WDL  Edema Limbs: Yes - Chronic (Greater than 3 months)  Endocrine  Endocrine (WDL): Exceptions to WDL  Hotflashes: Yes- Chronic (Greater than 3 months)  Gastrointestinal  Gastrointestinal (WDL): Exceptions to WDL  Heartburn: Yes - Chronic (Greater than 3 months)  Hemorrhoids: Yes - Chronic (Greater than 3 months)  Musculoskeletal  Musculoskeletal (WDL): Exceptions to WDL  Range of Motion Limitation: Yes - Recent (Less than 3 months)  Joint pain: Yes - Chronic (Greater than 3 months)  Back Pain: Yes - Chronic (Greater than 3 months)  Pain interfering with walking: Yes - Recent (Less than 3 months)  Neurological  Neurological (WDL): All neurological elements are within defined limits  Dominant Hand: Right  Psychological/Emotional  Psychological/Emotional (WDL): Exceptions to WDL  Daytime sleepiness: Yes - Recent (Less than 3 months)  Hematological/Lymphatic  Hematological/Lymphatic (WDL): All hematological/lymphatic elements are within defined limits  Dermatological  Dermatologic (WDL): Exceptions to WDL  Open wounds: Yes - Recent (Less than 3 months)  Healing Incision: Yes - Recent (Less than 3 months)  Genitourinary/Reproductive  Genitourinary/Reproductive (WDL):  "All genitourinary/reproductive elements are within defined limits  Reproductive (Females only)     Pain       Physical Exam    Recent Vitals 10/22/2020   Height 5' 5\"   Weight 259 lbs 11 oz   BSA (m2) 2.32 m2   /66   Pulse 66   Temp 97.6   Temp src 1   SpO2 98   Some recent data might be hidden       GENERAL: Alert and oriented to time place and person. Seated comfortably. In no distress.    HEAD: Atraumatic and normocephalic.    EYES: MONIQUE, EOMI.  No pallor.  No icterus.    Oral cavity: no mucosal lesion or tonsillar enlargement.    NECK: supple. JVP normal.  No thyroid enlargement.    LYMPH NODES: No palpable, cervical, axillary or inguinal lymphadenopathy.    CHEST: clear to auscultation bilaterally.  Resonant to percussion throughout bilaterally.  Symmetrical breath movements bilaterally.    CVS: S1 and S2 are heard. Regular rate and rhythm.  No murmur or gallop or rub heard.  No peripheral edema.    ABDOMEN: Soft. Not tender. Not distended.  No palpable hepatomegaly or splenomegaly.  No other mass palpable.  Bowel sounds heard.    EXTREMITIES: Warm.    SKIN: no rash, or bruising or purpura.  Has a full head of hair.    CNS:: Nonfocal      Lab Results    No results found for this or any previous visit (from the past 168 hour(s)).    Imaging Results    No results found.      Signed by: Princess Laguna MD  "

## 2021-06-12 NOTE — PROGRESS NOTES
Camilla comes in for continued follow-up of her bilateral mastectomies.  She is in much better spirits today.  Pain seems to be better controlled and she is moving around better.    Physical exam:  Incisions are healing up nicely.  Even the small wound on the lateral aspect of the right incision is healing up very well.  Nearly completely healed already.  I did pull her right drain today.  The left drain is still putting out just a bit too much.    Impression: Status post bilateral mastectomies.  Overall doing better.  I told her that the wound on the edge of her right mastectomy site is healing in well.  I do not think she needs a visiting nurse.  The dressing I replaced today can come off in 2 to 3 days and I think she can just treat this with bacitracin and a dry gauze.  I asked her to put this on the drain site also.    Plan: Follow-up with me again in 1 week to recheck the other drain.

## 2021-06-12 NOTE — TELEPHONE ENCOUNTER
Telephoned and left voice message requesting call back to check in after Camilla's recent consults with Dr. Laguna and Dr. Beckman.

## 2021-06-12 NOTE — CONSULTS
Consults   [facility] Radiation Oncology Consult Note     Patient: Camilla Wilson  MRN: [id number]  Date of Service: 10/22/2020          Margarita Cook MD  [address] [place] [address]       Dear Dr. Cook:    Thank you very much for referring this patient for consideration of radiotherapy. As you know Ms. Wilson is a 70 y.o. female with a prior history of right breast cancer, early stage, status post lumpectomy sentinel resection followed by postop radiation therapy and 5 years hormone therapy.  The patient was find to have new left breast cancer, pathologic stage T3 N1 aM0, ER/AR positive, HER-2 negative, status post lumpectomy and sentinel resection followed by mastectomy and lymph node dissection with negative margin.  The Oncotype DX score is 13.  The patient is referred to radiation oncology for evaluation and discussion of possible postoperation therapy to further reduce likelihood of cancer recurrence.    HISTORY OF PRESENT ILLNESS:   Ms. Wilson is a 70 y.o. female who is well-known to radiation oncology.  The patient had a history of right breast cancer diagnosed initially in 2003.  The patient is status post lumpectomy sentinel resection followed by adjuvant radiation therapy and hormonal therapy.  The patient had a 6 weeks of radiation therapy to the right breast region only.  I do not have copy of the radiation record at the time of evaluation.  Patient has been doing well with no evidence of cancer recurrence.    Patient presented with a recent history of abnormal finding by routine screening mammogram for which she was a seeking further evaluation.  The mammogram followed by ultrasound reviewed a 1.3 x 1.1 x 1.1 cm irregular mass of the left breast at 3 o'clock position 6 cm from nipple suspicious for malignancy.  She underwent ultrasound-guided core biopsy on 7/22/2020 with pathology showed invasive lobular carcinoma.  ER/AR positive and HER-2 negative.  After discussed with  the patient about various treatment options, patient had to proceed with breast preservation protocol as her treatment choice and underwent left breast lumpectomy and sentinel resection on 8/21/2020.  The pathology reviewed 65 x 28 x 25 mm invasive lobular carcinoma, Amy grade 2 with multiple area of positive margin.  Four removed left sentinel node showed no evidence of metastatic disease.  Given the pathological finding, the patient proceeded with bilateral simple mastectomy and left axillary lymph node resection on 9/14/2020.  The pathology showed 12 mm residual lobular carcinoma with negative margin.  The closest margin was measured 3 mm deep.  There was also associated lobular carcinoma in situ.  2/5 removed axillary lymph nodes showed evidence of metastatic disease with the largest metastatic deposit measured 2.5 mm and no evidence of extra capsular extension.  Her Oncotype DX score is 13.  Postoperatively she has been recovering well.  The patient still have mildly limited motion of the left shoulder.  She is referred to radiation oncology for evaluation and discussion of possible postoperation therapy to further reduce likelihood of cancer recurrence.  She is also scheduled to see Dr. Laguna, medical oncology later on today for systemic therapy recommendation.    CHEMOTHERAPY HISTORY: Concurrent Chemotherapy: No    RADIATION THERAPY HISTORY: Prior Radiation: Yes    IMPLANTED CARDIAC DEVICE: none     PREGNANCY: The patient is informed not to be pregnant during the radiation therapy.  She is post menopausal.    Current Outpatient Medications   Medication Sig Dispense Refill     5-hydroxytryptophan (5-HTP) 100 mg cap Take 2 capsules by mouth daily. 1000mg       albuterol (PROAIR HFA;PROVENTIL HFA;VENTOLIN HFA) 90 mcg/actuation inhaler Inhale 2 puffs every 4 (four) hours as needed for wheezing or shortness of breath (cough). 1 Inhaler 12     budesonide (PULMICORT) 180 mcg/actuation inhaler Inhale 2  puffs 2 (two) times a day. 1 Inhaler 6     calcium citrate 250 mg calcium Tab Take 500 mg by mouth daily.        cetirizine (ZYRTEC) 10 MG tablet Take 10 mg by mouth daily.       cholecalciferol, vitamin D3, (VITAMIN D3) 2,000 unit cap Take 1 capsule by mouth daily.        [START ON 10/29/2020] exemestane (AROMASIN) 25 mg tablet Take 1 tablet (25 mg total) by mouth daily. 90 tablet 3     famotidine (FOR PEPCID) 10 MG tablet Take 10 mg by mouth daily.       melatonin 5 mg Tab tablet Take 5 mg by mouth at bedtime.       niacin (SLO-NIACIN) 500 mg ER tablet Take 2,000 mg by mouth daily.        peg 400-propylene glycol (SYSTANE) 0.4-0.3 % Drop Administer 1 drop to both eyes 4 (four) times a day as needed.       s-adenosylmethionine (AISHA-E, ENTERIC COATED,) 200 mg TbEC Take 2 tablets by mouth daily.       sodium fluoride-pot nitrate (PREVIDENT 5000 SENSITIVE) 1.1-5 % Pste Take by mouth daily.        vit C,L-Bo-ttbji-lutein-zeaxan (OCUVITE LUTEIN & ZEAXANTHIN) 60 mg-30 unit- 15 mg-2 mg-6 mg cap Take 1 tablet by mouth daily.        No current facility-administered medications for this visit.      Past Medical History:   Diagnosis Date     Breast cancer (H) 2000     Breast cyst      GERD (gastroesophageal reflux disease)      Hx of radiation therapy      Moderate persistent asthma without complication 8/14/2020     PONV (postoperative nausea and vomiting)      Uterine fibroid     embolized     Past Surgical History:   Procedure Laterality Date     BREAST BIOPSY Left      BREAST LUMPECTOMY Right 2007     BREAST LUMPECTOMY Left 8/21/2020    Procedure: Left Lumpectomy after Wire Localization: [place] Lymph Node Biopsy;  Surgeon: Margarita Cook MD;  Location: [facility];  Service: General     MASTECTOMY Bilateral 09/14/2020    Dr. Cook     [place] EMBOLIZATION UTERINE FIBROID      Description: Uterine Fibroid Embolization;  Proc Date: 09/06/2000;     [place] EXCISE BREAST CYST      Description: Breast Surgery Lumpectomy;   Recorded: 2007;     IN MASTECTOMY, SIMPLE, COMPLETE Bilateral 2020    Procedure: Bilateral Mastectomies;  Surgeon: Margarita Cook MD;  Location: [facility];  Service: General     IN SHLDR ARTHROSCOP,SURG,W/REMOVAL,LOOSE/FB      Description: Shoulder Arthroscopy;  Recorded: 2009;  Comments: right ; left      US BREAST CORE BIOPSY LEFT Left 2020     US BREAST LOC W SENT NODE INJ LEFT Left 2020     Lipitor [atorvastatin]  Family History   Problem Relation Age of Onset     Colon cancer Maternal Aunt      Breast cancer Maternal Aunt         40s     Kidney disease Mother      Pneumonia Father      Hyperlipidemia Father      Cancer Cousin      Cancer Cousin      Social History     Socioeconomic History     Marital status: Single     Spouse name: Not on file     Number of children: Not on file     Years of education: Not on file     Highest education level: Not on file   Occupational History     Not on file   Social Needs     Financial resource strain: Not on file     Food insecurity     Worry: Not on file     Inability: Not on file     Transportation needs     Medical: Not on file     Non-medical: Not on file   Tobacco Use     Smoking status: Former Smoker     Packs/day: 0.50     Years: 15.00     Pack years: 7.50     Quit date: 1982     Years since quittin.4     Smokeless tobacco: Never Used   Substance and Sexual Activity     Alcohol use: Yes     Alcohol/week: 5.0 standard drinks     Types: 5 Glasses of wine per week     Drug use: Never     Sexual activity: Never   Lifestyle     Physical activity     Days per week: Not on file     Minutes per session: Not on file     Stress: Not on file   Relationships     Social connections     Talks on phone: Not on file     Gets together: Not on file     Attends Shinto service: Not on file     Active member of club or organization: Not on file     Attends meetings of clubs or organizations: Not on file     Relationship status:  Not on file     Intimate partner violence     Fear of current or ex partner: Not on file     Emotionally abused: Not on file     Physically abused: Not on file     Forced sexual activity: Not on file   Other Topics Concern     Not on file   Social History Narrative     Not on file        Review of Systems:      General  General (WDL): Exceptions to WDL  Fever: Yes, Recent (Less than 3 months)  Generalized Muscle Weakness: Yes - Chronic (Greater than 3 months)  EENT  ENT (WDL): Exceptions to WDL  Glasses or Contacts: Yes - Chronic (Greater than 3 months)  Sinus Congestion/Drainage: Yes - Chronic (Greater than 3 months)  Hoarseness: Yes - Recent (Less than 3 months)  Respiratory   Respiratory (WDL): Exceptions to WDL  Dyspnea: Yes - Chronic (Greater than 3 months)  Cardiovascular  Cardiovascular (WDL): Exceptions to WDL  Edema Limbs: Yes - Chronic (Greater than 3 months)  Endocrine  Endocrine (WDL): Exceptions to WDL  Hotflashes: Yes- Chronic (Greater than 3 months)  Gastrointestinal  Gastrointestinal (WDL): Exceptions to WDL  Heartburn: Yes - Chronic (Greater than 3 months)  Musculoskeletal  Musculoskeletal (WDL): Exceptions to WDL  Range of Motion Limitation: Yes - Recent (Less than 3 months)  Joint pain: Yes - Chronic (Greater than 3 months)  Back Pain: Yes - Chronic (Greater than 3 months)  Pain interfering with walking: Yes - Recent (Less than 3 months)  Integumentary                  Neurological  Neurological (WDL): All neurological elements are within defined limits  Dominant Hand: Right  Psychological/Emotional   Psychological/Emotional (WDL): Exceptions to WDL  Daytime sleepiness: Yes - Recent (Less than 3 months)  Hematological/Lymphatic  Hematological/Lymphatic (WDL): All hematological/lymphatic elements are within defined limits  Dermatologic  Dermatologic (WDL): Exceptions to WDL  Open wounds: Yes - Recent (Less than 3 months)  Healing Incision: Yes - Recent (Less than 3  months)  Genitourinary/Reproductive  Genitourinary/Reproductive (WDL): All genitourinary/reproductive elements are within defined limits  Reproductive     Pain              Currently in Pain: Yes  Pain Score (Initial OR Reassessment): 8  Pain Frequency: Intermittent(with walking more than 5 blocks)  Location: R buttock  Accompanied by       Imaging: Reviewed    Pathology: Reviewed    ECOG Peformance Status  ECOG Performance Status: 1  Distress Assessment Score: 6(dx, tx decision making)    Objective:     PHYSICAL EXAMINATION:    /66   Pulse 66   Temp 97.6  F (36.4  C) (Oral)   SpO2 98%     Gen: Alert, in NAD  Examination is limited today.  Patient is only here for evaluation and discussion of possible treatment recommendation.  Back: No step-offs or pain to palpation along the thoracolumbar spine  Rectal: Deferred  : Deferred  Musculoskeletal: Normal muscle bulk and tone  Skin: Normal color and turgor  Neurologic: A/Ox3, CN II-XII intact, normal gait and station  Psychiatric: Appropriate mood and affect    Intent of Therapy: Curative    We recommend adjuvant radiation as part of her breast conserving therapy to decrease her chance of local recurrence to the single digits. ROM stretches and skin care were discussed with the patient. She understands that these maneuvers need to continue for 6 months following completion of radiation as skin and muscle fibrosis continue to form for weeks to months following completion of therapy.      Side effects that may occur during or within weeks after radiation therapy      Fatigue and general weakness    Darkening, irritation, itchiness, redness, dryness, erythema, peeling, scabbing, ulceration and contraction of the skin of the breast and chest    Swelling of the breast    Loss of armpit hair    Lung irritation    Decrease in appetite    Side effects that may occur months or years after radiation therapy      Development of another tumor or cancer    Thickening,  telangiectasias (development of spider like blood vessels in the skin) and ulceration of the skin of the breast and chest    Firming, fibrosis (scar tissue), fat necrosis, and distortion of the breast    Poor healing after a trauma or surgery in the irradiated area    Nerve damage resulting in loss of arm strength and sensation    Coronary artery blockage causing angina pain or a heart attack    Lung inflammation of fibrosis causing cough, fever and shortness of breath    Fracture of the ribs    Swellingof an arm and hand    The risks, benefits and alternatives to radiation therapy were outlined with the patient. All questions were answered and a consent was signed.     Impression     A prior history of right breast cancer, early stage, status post lumpectomy sentinel resection followed by postop radiation therapy and 5 years hormone therapy.  The patient was find to have new left breast cancer, pathologic stage T3 N1 aM0, ER/CT positive, HER-2 negative, status post lumpectomy and sentinel resection followed by mastectomy and lymph node dissection with negative margin.  The Oncotype DX score is 13.     Assessment & Plan:     I have personally reviewed her upcoming medical record today.  I have also reviewed her most recent radiology study including mammogram.  The possible treatment options for breast cancer including surgery, systemic therapy, and the ration therapy has been discussed with the patient in detail and at the great lengths.  The possible risks and side effects of radiation therapy has also been explained to the patient.  Questions are answered to patient satisfaction.  The NCCN guideline for breast cancer treatment recommendation has also been reviewed with the patient.  Given the pathological evidence of T3 disease and positive lymph node involvement, I agree the patient is at significant increased risk of cancer recurrence.  Therefore I think the patient is a good candidate to consider postoperation  therapy.  I believe the patient can be potentially benefited by radiation therapy for local control and possibly better survival.  The pros and the cons of different treatment options has also been discussed with the patient.  Patient is going to think it over and to make her final decision in the next few weeks.  The patient will contact radiation oncology and set up time for follow-up and simulation if she decide to proceed with postop radiation therapy.  Patient also asked the possibility of having radiation therapy in the cancer center near her home.  I think this would be okay.  We will make referral in the future if she decide to proceed with radiation therapy.    Again, thank you very much for the referral and allowing me to participate in the care of this patient.  If you have any questions or concerns about this consultation, please do not hesitate to call.  I spent approximately 60 minutes today with the patient and 80% time was used for counseling.      Sincerely,          Anna Beckman MD, PhD  Department of Radiation Oncology   [facility]  Tel: [phone]  Page: [phone]    [facility]  [address] [place] [address]     [address] [place] [address]    CC:  Patient Care Team:  Berna Morel MD as PCP - General (Internal Medicine)  Antwan Douglass MD as Assigned PCP  Margarita Cook MD as Physician (General Surgery)  Anna Beckman MD as Physician (Radiation Oncology)  Princess Laguna MD as Physician (Hematology and Oncology)  Keisha Manjarrez RN as Oncology Nurse Navigator (Hematology and Oncology)  Dyana uQiles CNP as Nurse Practitioner (Hematology and Oncology)

## 2021-06-12 NOTE — PROGRESS NOTES
Reviewed instructions with patient and made follow up appointment. Provided [facility] catalog.

## 2021-06-12 NOTE — TELEPHONE ENCOUNTER
Current 2020 records in Epic, images in Nil.  2002 - 2013 [place] [facility] records in [facility].  2003 [facility] Radiation Oncology [place] records not available, storage facility can not find archived box.    Epic Notes  7/30/2020 - Progress Notes / Consult, Dr. Cook.  8/14/2020 - Progress Notes / Pre Op physical, Dr. Douglass.  8/21/2020 - Op Note / left lumpectomy, Dr. Cook.  9/4/2020 - Progress Notes / Post Op visit, Dr. Cook.  9/14/2020 - Op Note / Bilateral Mastectomies, Dr. Cook.   9/17/2020 - Progress Notes / Post Op visit, Dr. Cook.  9/29/2020- Progress Notes / Post Op visit, Dr. Cook.  10/12/2020 - Progress Notes / Genetic, Stephanie Littlejohn.  10/20/2020 - Progress Notes, Dr. Cook.    Labs  7/22/2020 - Surgical Pathology.  8/21/2020 - Surgical Pathology.  9/14/2020 - Surgical Pathology.    Imaging  7/6/2020 - Mammo screening bilateral.  7/15/2020 - Mammo diagnostic left, US left breast.  7/22/2020 - US Breast core biopsy left & post clip placement.  8/21/2020 - Image guided breast/sent node injection left & post clip placement.    Media  9/14/2020 - Procedure Note / Oncotype 9/3/2020, [facility].  7/22/2020 - Lab Managed Result / HER2 FISH, [employer].  6/18/2013 - Consultation External / [place] [facility] visit note, Dr. Lisa Oh.  6/21/2012, 6/15/2011, 6/1/2010 - Consultation External / Progress notes, [place] [facility].  1/13/2003 - Consultation External / [place] [facility] consult, Dr. Lisa Mclain.    Writer has requested [facility] Radiation records from central release of information.  Still in process.

## 2021-06-12 NOTE — PROGRESS NOTES
S: Patient was seen in [place] to be measured for bilateral compression arm sleeves with attached gauntlets 20-30 mmHg, as well as knee high compression stockings 20-30 mmHg. RX on-file is current from Dr. Cantor.    O: Goal is to evaluate and measure patient for a compression garment that will help control her swelling in her left arm, swelling in her bilateral legs, as well as post-mastectomy proactivity for her right arm before she starts radiation in a few weeks. Observations show there is swelling present. The expected outcome with compliant use is to reduce swelling and control the patient s condition.     A: Measurements were taken for OTS knee highs as requested by Dr. Cantor and Camilla liked the [employer] knee highs 20-30 mmHg so size XL, standard was pulled from the  stock supply and set aside. She liked the [employer] material so size large, long for her left arm and size medium, long for her right arm were ordered. Order submitted for fabrication to [employer].    P: The patient is scheduled for a fitting appointment two weeks out in [place].

## 2021-06-12 NOTE — PATIENT INSTRUCTIONS - HE
Patient Education     Exemestane Oral tablet  What is this medicine?  EXEMESTANE (ex e MES tane) blocks the production of the hormone estrogen. Some types of breast cancer depend on estrogen to grow, and this medicine can stop tumor growth by blocking estrogen production. This medicine is for the treatment of breast cancer in postmenopausal women only.  This medicine may be used for other purposes; ask your health care provider or pharmacist if you have questions.  What should I tell my health care provider before I take this medicine?  They need to know if you have any of these conditions:    an unusual or allergic reaction to exemestane, other medicines, foods, dyes, or preservatives    pregnant or trying to get pregnant    breast-feeding  How should I use this medicine?  Take this medicine by mouth with a glass of water. Follow the directions on the prescription label. Take your doses at regular intervals after a meal. Do not take your medicine more often than directed. Do not stop taking except on the advice of your doctor or health care professional.  Contact your pediatrician regarding the use of this medicine in children. Special care may be needed.  Overdosage: If you think you have taken too much of this medicine contact a poison control center or emergency room at once.  NOTE: This medicine is only for you. Do not share this medicine with others.  What if I miss a dose?  If you miss a dose, take the next dose as usual. Do not try to make up the missed dose. Do not take double or extra doses.  What may interact with this medicine?  Do not take this medicine with any of the following medications:    female hormones, like estrogens and birth control pills  This medicine may also interact with the following medications:    androstenedione    phenytoin    rifabutin, rifampin, or rifapentine    Beth's Wort  This list may not describe all possible interactions. Give your health care provider a list of all the  medicines, herbs, non-prescription drugs, or dietary supplements you use. Also tell them if you smoke, drink alcohol, or use illegal drugs. Some items may interact with your medicine.  What should I watch for while using this medicine?  Visit your doctor or health care professional for regular checks on your progress.  If you experience hot flashes or sweating while taking this medicine, avoid alcohol, smoking and drinks with caffeine. This may help to decrease these side effects.  What side effects may I notice from receiving this medicine?  Side effects that you should report to your doctor or health care professional as soon as possible:    any new or unusual symptoms    changes in vision    fever    leg or arm swelling    pain in bones, joints, or muscles    pain in hips, back, ribs, arms, shoulders, or legs  Side effects that usually do not require medical attention (report to your doctor or health care professional if they continue or are bothersome):    difficulty sleeping    headache    hot flashes    sweating    unusually weak or tired  This list may not describe all possible side effects. Call your doctor for medical advice about side effects. You may report side effects to FDA at 0-569-FDA-6871.  Where should I keep my medicine?  Keep out of the reach of children.  Store at room temperature between 15 and 30 degrees C (59 and 86 degrees F). Throw away any unused medicine after the expiration date.  NOTE:This sheet is a summary. It may not cover all possible information. If you have questions about this medicine, talk to your doctor, pharmacist, or health care provider. Copyright  2015 Gold Standard         Patient Education     Anastrozole Oral tablet  What is this medicine?  ANASTROZOLE (an AS troe zole) is used to treat breast cancer in women who have gone through menopause. Some types of breast cancer depend on estrogen to grow, and this medicine can stop tumor growth by blocking estrogen  production.  This medicine may be used for other purposes; ask your health care provider or pharmacist if you have questions.  What should I tell my health care provider before I take this medicine?  They need to know if you have any of these conditions:    liver disease    an unusual or allergic reaction to anastrozole, other medicines, foods, dyes, or preservatives    pregnant or trying to get pregnant    breast-feeding  How should I use this medicine?  Take this medicine by mouth with a glass of water. Follow the directions on the prescription label. You can take this medicine with or without food. Take your doses at regular intervals. Do not take your medicine more often than directed. Do not stop taking except on the advice of your doctor or health care professional.  Talk to your pediatrician regarding the use of this medicine in children. Special care may be needed.  Overdosage: If you think you have taken too much of this medicine contact a poison control center or emergency room at once.  NOTE: This medicine is only for you. Do not share this medicine with others.  What if I miss a dose?  If you miss a dose, take it as soon as you can. If it is almost time for your next dose, take only that dose. Do not take double or extra doses.  What may interact with this medicine?  Do not take this medicine with any of the following medications:    female hormones, like estrogens or progestins and birth control pills  This medicine may also interact with the following medications:    tamoxifen  This list may not describe all possible interactions. Give your health care provider a list of all the medicines, herbs, non-prescription drugs, or dietary supplements you use. Also tell them if you smoke, drink alcohol, or use illegal drugs. Some items may interact with your medicine.  What should I watch for while using this medicine?  Visit your doctor or health care professional for regular checks on your progress. Let your  doctor or health care professional know about any unusual vaginal bleeding.  Do not treat yourself for diarrhea, nausea, vomiting or other side effects. Ask your doctor or health care professional for advice.  What side effects may I notice from receiving this medicine?  Side effects that you should report to your doctor or health care professional as soon as possible:    allergic reactions like skin rash, itching or hives, swelling of the face, lips, or tongue    any new or unusual symptoms    breathing problems    chest pain    leg pain or swelling    vomiting  Side effects that usually do not require medical attention (report to your doctor or health care professional if they continue or are bothersome):    back or bone pain    cough, or throat infection    diarrhea or constipation    dizziness    headache    hot flashes    loss of appetite    nausea    sweating    weakness and tiredness    weight gain  This list may not describe all possible side effects. Call your doctor for medical advice about side effects. You may report side effects to FDA at 6-201-FDA-7741.  Where should I keep my medicine?  Keep out of the reach of children.  Store at room temperature between 20 and 25 degrees C (68 and 77 degrees F). Throw away any unused medicine after the expiration date.  NOTE:This sheet is a summary. It may not cover all possible information. If you have questions about this medicine, talk to your doctor, pharmacist, or health care provider. Copyright  2015 Gold Standard

## 2021-06-12 NOTE — PATIENT INSTRUCTIONS - HE
For new compression sleeve:  [facility] (Please call to make an appointment)    [facility]  [address], [address]  [place], [place].  Phone: [phone]      For over the counter compression socks: Compression level 20-30 mmHg, knee high, and open or closed toe

## 2021-06-12 NOTE — TELEPHONE ENCOUNTER
Camilla left voice message today requesting notes from genetic counseling be sent to her in the mail.  [facility] but she now prefers a copy to be sent.  Message will be conveyed to genetic counselor. Keisha Manjarrez RN

## 2021-06-13 NOTE — PROGRESS NOTES
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 Lymphedema  I89.0    2. Decreased range of motion of left shoulder  M25.612    3. Localized swelling of both lower legs  R22.43    4. Post-mastectomy lymphedema syndrome  I97.2    5. Malignant neoplasm of central portion of left breast (H)  C50.112          Assessment:     Pt with visibly decreased B ankle selling.   Pt with improved exercise compliance.  HEP/POC compliance is  good .  Therapy interventions being performed are medically necessary, are being delivered according to accepted standards of medical practice, and require the skills of a therapist to perform the services.      Goal Status:  Patient will reach / maintain arm movement: forward;overhead;with less pain;with less difficulty;in 12 weeks;Comment  Comment: be able to reach into overhead position to get chemo treatment with pain rating <2/10    Patient will have a decreased volume in : bilateral;LE;by 5%;to decrease risk of infection;for better fit of clothing;for improved body image;for ease of movement;in 12 weeks  Patient will have decreased fibrosis, scar tissue for improved lymphatic mobilitiy : in 12 weeks;Comment  Comment: improved mastectomy fascial movement before/during/after radiation  Patient will perform, verbalize self-management of: skin care;self-monitoring;exercise;massage;self-compression;compression wear;compression care;infection prevention;in 12 weeks      Plan / Patient  "Education:     Continue with initial plan of care.  Progress with home program as tolerated. Continue with MLD and compression wrapping for bing LEs, progress L UE stretching (pt needed full ER for radiation and update when she will be starting), treatment of L UE as needed  Pt to start radiation 11/3.     Subjective:     Pain Ratin  Pt states her legs are feeling better. \" My ankles looked smaller.\"  Pt reports her shoulders are feeling better. Pt did not have her sleeves on today. \" I washed them.\"    Objective:     Caregiver present: No    Observation of swelling: bing LEs is unchanged.     Volume measurements taken:  No  Lymphedema Assessment 2020   Right Upper Extremity Total Estimated Volume (cm3) 3712.44 -   Left Upper Extremity Total Estimated Volume (cm3) 3688.02 -   Swelling Description Right>Left -   Upper Extremity Swelling Comparison (%) 0.66 -   Right Lower Extremity Total Estimated Volume (cm3) - 6728.47   Left Lower Extremity Total Estimated Volume (cm3) - 7431.64   Swelling Description - Left>Right   Lower Extremity Swelling Comparison (%) - 10.45       Compression:compression sleeves on B UE  No compression on LEs        Medication for infection:  No    Treatment Today     TREATMENT MINUTES COMMENTS   Evaluation     Self-care/ Home management     Manual therapy 50 -Manual therapy: manual lymph drainage for bilateral lower extremities lymphedema  Deep abdominal breath, neck effleurage, short neck,  bilateral inguinal, left lower extremity evacuation, right lower extremity evacuation, abdomen, neck effleurage.        -Wrapping bing LEs as follows:  -Cotton stockinette  -Artiflex  -8 cm x 5 m foot to ankle with fig 8   -10 cm x 5 m, ankle to knee spiral   Reviewed  with pt  to take compression off if signs of pain, numbness, tingling or decreased blood flow     Neuromuscular Re-education     Therapeutic Activity     Therapeutic Exercises 5 Exercises: Reviewed  Exercise #1: Cervical " flex, rotation and SB ROM, shoulder shrugs and scap retract lymph exercises x5-10 reps 1-2x/day  Comment #1: B shoulder flexion overhead x5-10 reps 1-2x/day  Exercise #2: LE lymph exercises: starting with trunk and bing LEs in standing 5x  Exercise #3: wand AB/ER supine  Comment #3: x10  Exercise #4: supine wand flexion  Comment #4: x10  Added to HEP:  -supine wand AB/ER  -supine wand flexion    -see flow sheet for date completed   Gait training     Modality__________________                Total 54    Blank areas are intentional and mean the treatment did not include these items.       Vidhya Hayden PTA, CLT  11/25/2020

## 2021-06-13 NOTE — PROGRESS NOTES
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 Lymphedema  I89.0    2. Decreased range of motion of left shoulder  M25.612    3. Localized swelling of both lower legs  R22.43    4. Post-mastectomy lymphedema syndrome  I97.2    5. Malignant neoplasm of central portion of left breast (H)  C50.112          Assessment:     Pt tolerated first treatment of MLD and compression bandages in LEs well today.   HEP/POC compliance is  good .  Therapy interventions being performed are medically necessary, are being delivered according to accepted standards of medical practice, and require the skills of a therapist to perform the services.      Goal Status:  Patient will reach / maintain arm movement: forward;overhead;with less pain;with less difficulty;in 12 weeks;Comment  Comment: be able to reach into overhead position to get chemo treatment with pain rating <2/10    Patient will have a decreased volume in : bilateral;LE;by 5%;to decrease risk of infection;for better fit of clothing;for improved body image;for ease of movement;in 12 weeks  Patient will have decreased fibrosis, scar tissue for improved lymphatic mobilitiy : in 12 weeks;Comment  Comment: improved mastectomy fascial movement before/during/after radiation  Patient will perform, verbalize self-management of: skin care;self-monitoring;exercise;massage;self-compression;compression wear;compression care;infection prevention;in 12 weeks      Plan / Patient Education:      Continue with initial plan of care.  Progress with home program as tolerated. Continue with MLD and compression wrapping for bing LEs, progress L UE stretching (pt needed full ER for radiation and update when she will be starting), treatment of L UE as needed    Subjective:     Pain Ratin  Pt reports she has been working on the neck lymphedema exercises. Pt recently got a compression sleeve and gets some pressure under her arm while wearing it. She has an appointment with her surgeon to check that. She continues to have swelling around her ankles and some pain in her knees- likely from arthritis. She will be getting compression socks - they are ordered at [facility] but waiting to see if she will fit into a smaller size. She is going to start radiation therapy likely after gi.     Objective:     Caregiver present: No    Observation of swelling: bing LEs is unchanged.     Volume measurements taken:  Yes:   Lymphedema Assessment 2020   Right Upper Extremity Total Estimated Volume (cm3) 3712.44 -   Left Upper Extremity Total Estimated Volume (cm3) 3688.02 -   Swelling Description Right>Left -   Upper Extremity Swelling Comparison (%) 0.66 -   Right Lower Extremity Total Estimated Volume (cm3) - 6728.47   Left Lower Extremity Total Estimated Volume (cm3) - 7431.64   Swelling Description - Left>Right   Lower Extremity Swelling Comparison (%) - 10.45       Compression:  No LE compression, pt brought compression sleeve for left arm    Medication for infection:  No    Treatment Today     TREATMENT MINUTES COMMENTS   Evaluation     Self-care/ Home management     Manual therapy 50 -Manual therapy: manual lymph drainage for bilateral lower extremities lymphedema  Deep abdominal breath, neck effleurage, short neck,  bilateral inguinal, left lower extremity evacuation, right lower extremity evacuation, abdomen, neck effleurage.    -Measured bing LEs    -Wrapping bing LEs as follows:  -Cotton  stockinette  -Rosidal foam ankle to knee  -8 cm x 5 m foot to ankle with fig 8   -10 cm x 5 m, ankle to knee spiral     Educated to take compression off if signs of pain, numbness, tingling or decreased blood flow     Neuromuscular Re-education     Therapeutic Activity     Therapeutic Exercises 10 Exercises:  Exercise #1: Cervical flex, rotation and SB ROM, shoulder shrugs and scap retract lymph exercises x5-10 reps 1-2x/day  Comment #1: B shoulder flexion overhead x5-10 reps 1-2x/day    -see flow sheet for date completed   Gait training     Modality__________________                Total 60    Blank areas are intentional and mean the treatment did not include these items.       Chantel Harvey PT, DPT, CLT  11/19/2020

## 2021-06-13 NOTE — PROGRESS NOTES
Camilla presents to [facility] for a follow up appointment with Dr. Cook.  She is wondering if she has more fluid built up and wanted it checked before she started radiation on 12-3-20.  She has been working with Dr. Aaron and physical therapy for her lymphedema.  She is also wrapping her lower extremities for swelling. Patient met with Dr. Cook, see dictation for details of visit and follow up plan. Rn time 12 mins

## 2021-06-13 NOTE — PROGRESS NOTES
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 Lymphedema  I89.0    2. Decreased range of motion of left shoulder  M25.612    3. Localized swelling of both lower legs  R22.43    4. Post-mastectomy lymphedema syndrome  I97.2    5. Malignant neoplasm of central portion of left breast (H)  C50.112          Assessment:     Pt tolerated f treatment of MLD and compression bandages in LEs well today.   HEP/POC compliance is  good .  Therapy interventions being performed are medically necessary, are being delivered according to accepted standards of medical practice, and require the skills of a therapist to perform the services.      Goal Status:  Patient will reach / maintain arm movement: forward;overhead;with less pain;with less difficulty;in 12 weeks;Comment  Comment: be able to reach into overhead position to get chemo treatment with pain rating <2/10    Patient will have a decreased volume in : bilateral;LE;by 5%;to decrease risk of infection;for better fit of clothing;for improved body image;for ease of movement;in 12 weeks  Patient will have decreased fibrosis, scar tissue for improved lymphatic mobilitiy : in 12 weeks;Comment  Comment: improved mastectomy fascial movement before/during/after radiation  Patient will perform, verbalize self-management of: skin care;self-monitoring;exercise;massage;self-compression;compression wear;compression care;infection prevention;in 12 weeks      Plan / Patient Education:  "    Continue with initial plan of care.  Progress with home program as tolerated. Continue with MLD and compression wrapping for bing LEs, progress L UE stretching (pt needed full ER for radiation and update when she will be starting), treatment of L UE as needed    Subjective:   Pt reports her legs are \"doing ok.\"  Pt has not done the exercises. \" I keep forgetting to do them. \" \"I did try them this morning.\"  Py with B are sleeves. \" They seem long.\"   Pt to start radiation on 11/3.   Pain Ratin      Objective:     Caregiver present: No    Observation of swelling: bing LEs is unchanged.     Volume measurements taken:  No  Lymphedema Assessment 2020   Right Upper Extremity Total Estimated Volume (cm3) 3712.44 -   Left Upper Extremity Total Estimated Volume (cm3) 3688.02 -   Swelling Description Right>Left -   Upper Extremity Swelling Comparison (%) 0.66 -   Right Lower Extremity Total Estimated Volume (cm3) - 6728.47   Left Lower Extremity Total Estimated Volume (cm3) - 7431.64   Swelling Description - Left>Right   Lower Extremity Swelling Comparison (%) - 10.45       Compression:compression sleeves on B UE  No compression on LEs        Medication for infection:  No    Treatment Today     TREATMENT MINUTES COMMENTS   Evaluation     Self-care/ Home management     Manual therapy 50 -Manual therapy: manual lymph drainage for bilateral lower extremities lymphedema  Deep abdominal breath, neck effleurage, short neck,  bilateral inguinal, left lower extremity evacuation, right lower extremity evacuation, abdomen, neck effleurage.        -Wrapping bing LEs as follows:  -Cotton stockinette  -Rosidal foam ankle to knee  -8 cm x 5 m foot to ankle with fig 8   -10 cm x 5 m, ankle to knee spiral   Reviewed  with pt  to take compression off if signs of pain, numbness, tingling or decreased blood flow     Neuromuscular Re-education     Therapeutic Activity     Therapeutic Exercises 8 Exercises:  Exercise #1: " Cervical flex, rotation and SB ROM, shoulder shrugs and scap retract lymph exercises x5-10 reps 1-2x/day  Comment #1: B shoulder flexion overhead x5-10 reps 1-2x/day  Exercise #2: LE lymph exercises: starting with trunk and bing LEs in standing 5x  Exercise #3: wand AB/ER supine  Comment #3: x10  Exercise #4: supine wand flexion  Comment #4: x10  Added to HEP:  -supine wand AB/ER  -supine wand flexion    -see flow sheet for date completed   Gait training     Modality__________________                Total 58    Blank areas are intentional and mean the treatment did not include these items.       Vidhya Hayden PTA, CLT  11/23/2020

## 2021-06-13 NOTE — PROGRESS NOTES
Camilla comes in for continued follow-up of her bilateral mastectomies.  Its been about a month since I saw her and she is doing well then.  She has now noticed some swelling again in her left axilla.  She has also been diagnosed with bilateral lymphedema, worse on the left.  She has sleeves on both arms.  She also has lymphedema in her lower extremities and is wearing wraps.    Physical exam:  Looks well.  Incisions are healed up nicely.  Morbidly obese so hard to really appreciate a seroma but when I aspirated the left side I did get approximately 40 cc of serous fluid back.    Impression: Residual seroma on the left.  Not surprised given her morbid obesity.  It has been well over a month since I saw her last so I do not think this can be a continued problem.  She actually starting radiation next week.    Plan: Follow-up with me in the immediacy as needed.  If she feels anything building up again all she has to do is call.  Otherwise to follow-up with her as planned in several months.

## 2021-06-13 NOTE — PROGRESS NOTES
S: Camilla's items came in from [employer]. RX on-file is current from Dr. Cantor.    A: Before donning her compression garments, Camilla informed she starts lymphedema therapy this Thursday with [facility] and believes she is supposed to wait to be measured for her compression knee highs until after she completes therapy. This makes sense so a discussion was had about waiting until she reduces in size in her legs before getting her compression knee highs 20-30 mmHg. She is also interested in looking at donning devices at that time. She was assisted in donning her [employer] Lite compression arm sleeves 20-30 mmHg and once on, they appeared to be fitting well and she stated they were comfortable. She was instructed on donning/doffing instructions as well as how to care for the items. She went home wearing the two compression arm sleeves with attached gauntlets.    P: She is to call with any further needs and knows to call within seven days for issues with her sleeves.  Goal is to maintain a home program.

## 2021-06-13 NOTE — PROGRESS NOTES
I called Camilla to go over her treatment schedule due to differences between the calendar she was given and what was appearing on her my chart in [facility].  Patient confirmed dates and times and all questions around her scheduling were answered.

## 2021-06-13 NOTE — TELEPHONE ENCOUNTER
Camilla called and left a VM asking if she had an appnt set up for her knee high compression stockings. Also, she mentioned issues with her compression arm sleeve and the band rolling down. She called and left this message on 12/2/2020. I called her back and left a VM informing her that we will need a new appnt for me to take new measurements of her legs if she has completed therapy, then I will need to see her arm sleeve as well to understand the problems going on. I provided the scheduling line number for her to call back.

## 2021-06-13 NOTE — PROCEDURES
Procedures    SIMULATION NOTE:       DIAGNOSIS: A prior history of right breast cancer, early stage, status post lumpectomy sentinel resection followed by postop radiation therapy and 5 years hormone therapy.  The patient was find to have new left breast cancer, pathologic stage T3 N1 aM0, ER/TN positive, HER-2 negative, status post lumpectomy and sentinel resection followed by mastectomy and lymph node dissection with negative margin.  The Oncotype DX score is 13.     INDICATION:  Postoperative radiation therapy is recommended for patient to further reduce the likelihood of cancer recurrence.    CONSENT:  The possible risks and the side effects of radiation therapy have been discussed with patient in detail and at great length.  Questions are answered to patient's satisfaction.  Written consent was obtained.    SIMULATION:  The patient is in a supine position with a wing board to help keep the same position during the daily radiation therapy.  Tentative isocenter is set up in the center of the thoracic region.  We will acquire CT information to help us to better locate target and design radiation therapy field.    We are also going to obtain 4-D CT and use respiratory gating (or breath holding) technique to help us to reduce the radiation dose to the heart and lung.      BLOCKS:  Custom blocks will be drawn to minimize radiation to normal tissues and to protect normal organs including, but not limited to, lungs, heart, liver, bone and soft tissue.    DOSAGE:  I plan to give her radiation therapy at 180 cGy each fraction to a total of 5040 cGy in 28 treatments targeted to the left breast/chest wall and regional lymph nodes using a 4 field.  An additional 1000 cGy in 5 fractions will be given to the primary tumor bed using an electron. I will consider to use 3D conformer technique to help us better locate target and to protect normal tissue.      Anna Beckman MD, PhD  Department of Radiation Oncology   [facility]  Care  Tel: [phone]  Page: [phone]    [facility]  [address]  [place] [address]     [id number] [facility]   [place] [address]

## 2021-06-13 NOTE — PROGRESS NOTES
[facility] Re-Certification Request    December 14, 2020      Patient: Camilla Wilson  MR Number: [id number]  YOB: 1950  Date of Visit: 12/14/2020  Onset Date:  9/14/2020  Date of Eval: 11/5/2020    Dear Dr. Cantor:    As you may recall, we have been seeing Camilla Wilson for Physical Therapy of her lymphedema.    For therapy to continue, Medicare and/or Medicaid requires periodic physician review of the treatment plan. Please review the summary of the patient's progress and our plan for continued therapy, and verify  that you agree therapy should continue by entering certification dates at the bottom of this note and co-signing this note.    Plan of Care  Authorization / Certification Start Date: 12/14/20  Authorization / Certification End Date: 01/13/21  Authorization / Certification Number of Visits: 4  Communication with: Referral Source  Patient Related Instruction: Nature of Condition;Body mechanics;Posture;Treatment plan and rationale;Precautions;Next steps;Expected outcome;Basis of treatment;Self Care instruction  Times per Week: 1-2  Number of Weeks: 4  Number of Visits: 4  Discharge Planning: when goals are met or pt has reached a plateau in progress  Therapeutic Exercise: ROM;Stretching;Strengthening;Lymphedema  Neuromuscular Reeducation: kinesio tape;posture;balance/proprioception;core  Manual Therapy: soft tissue mobilization;myofascial release;joint mobilization;muscle energy;lymphatic drainage massage  Modalities: cold pack;hot pack  Equipment: compression bandages      Goal  Patient will reach / maintain arm movement: Progressing toward  Comment: be able to reach into overhead position to get chemo treatment with pain rating <2/10 (MET)    Patient will have a decreased volume in : bilateral;LE;by 5%;to decrease risk of infection;for better fit of clothing;for improved body image;for ease of movement;in 12 weeks (Met on left, progressing toward on R)  Patient  will have decreased fibrosis, scar tissue for improved lymphatic mobilitiy : in 12 weeks;Comment  Comment: improved mastectomy fascial movement before/during/after radiation  Patient will perform, verbalize self-management of: skin care;self-monitoring;exercise;massage;self-compression;compression wear;compression care;infection prevention;in 12 weeks;Progressing toward        If you have any questions or concerns, please don't hesitate to call.    Sincerely,      Chantel Harvey, PT        Physician recommendation:     __X_ Follow therapist's recommendation        ___ Modify therapy      *Physician co-signature indicates they certify the need for these services furnished within this plan and while under their care.         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 Lymphedema  I89.0    2. Decreased range of motion of left shoulder  M25.612    3. Localized swelling of both lower legs  R22.43    4. Post-mastectomy lymphedema syndrome  I97.2    5. Malignant neoplasm of central portion of left breast (H)  C50.112          Assessment:     Pt reports decreased shoulder pain but has not been as compliant with her HEP recently. She was encouraged to complete aerobic exercise (pt likes the pool but has been unable to due to COVID-19 pandemic) as able and return to gym once safe. Pt has 2 sessions left prior to being fitted for compression socks for her LEs and they are going to re-evaluate  her UEs compression garments for fit.   HEP/POC compliance is  good .  Therapy interventions being performed are medically necessary, are being delivered according to accepted standards of medical practice, and require the skills of a therapist to perform the services.      Goal Status:  Patient will reach / maintain arm movement: Progressing toward  Comment: be able to reach into overhead position to get chemo treatment with pain rating <2/10 (MET)    Patient will have a decreased volume in : bilateral;LE;by 5%;to decrease risk of infection;for better fit of clothing;for improved body image;for ease of movement;in 12 weeks (Met on left, progressing toward on R)  Patient will have decreased fibrosis, scar tissue for improved lymphatic mobilitiy : in 12 weeks;Comment  Comment: improved mastectomy fascial movement before/during/after radiation  Patient will perform, verbalize self-management of: skin care;self-monitoring;exercise;massage;self-compression;compression wear;compression care;infection prevention;in 12 weeks;Progressing toward      Plan / Patient Education:     Updated POC to extend visits.     Authorization / Certification Start Date: 12/14/20  Authorization / Certification End Date: 01/13/21  Authorization / Certification Number of Visits: 4  Communication with: Referral Source  Patient Related Instruction: Nature of Condition;Body mechanics;Posture;Treatment plan and rationale;Precautions;Next steps;Expected outcome;Basis of treatment;Self Care instruction  Times per Week: 1-2  Number of Weeks: 4  Number of Visits: 4  Discharge Planning: when goals are met or pt has reached a plateau in progress  Therapeutic Exercise: ROM;Stretching;Strengthening;Lymphedema  Neuromuscular Reeducation: kinesio tape;posture;balance/proprioception;core  Manual Therapy: soft tissue mobilization;myofascial release;joint mobilization;muscle energy;lymphatic drainage massage  Modalities: cold pack;hot pack  Equipment:  compression bandages     Continue with MLD and compression wrapping for bing LEs, progress L UE strengthening if needed, treatment of L UE as needed  Pt to see [facility] for compression fitting of LEs and adjust fit of UEs on .    Subjective:     Pain Ratin   Pt is more tired today. She was doing a lot of shopping this morning and was unable to have radiation (the machine was broken). Her arm is feeling better and she did not wear the compression sleeves over the weekend.     Objective:     Caregiver present: No    Observation of swelling: bing LEs is better. Stable.  Volume measurements taken:  no  Lymphedema Assessment 2020   Right Upper Extremity Total Estimated Volume (cm3) 3712.44 - -   Left Upper Extremity Total Estimated Volume (cm3) 3688.02 - -   Swelling Description Right>Left - -   Upper Extremity Swelling Comparison (%) 0.66 - -   Right Lower Extremity Total Estimated Volume (cm3) - 6728.47 6530.61   Right LE change of volume from last visit (%) - - -2.94   Right LE change of volume from initial (%) - - -2.94   Left Lower Extremity Total Estimated Volume (cm3) - 7431.64 6891.43   Left LE change of volume from last visit (%) - - -7.27   Left LE change of volume from initial (%) - - -7.27   Swelling Description - Left>Right Left>Right   Lower Extremity Swelling Comparison (%) - 10.45 5.53       Compression:compression sleeves on B UE  Tubigrip on LE - took bandages off last night     Medication for infection:  No     MMT: Shoulder ER: R 4/5 L 4-/5    Treatment Today     TREATMENT MINUTES COMMENTS   Evaluation     Self-care/ Home management     Manual therapy 55 -Manual therapy: manual lymph drainage for bilateral lower extremities and left UE  lymphedema  Deep abdominal breath, neck effleurage, short neck, bilateral axilla, left chest, left upper extremity, bilateral inguinal, left lower extremity evacuation, right lower extremity evacuation, abdomen, neck  effleurage.    -educated on skin care and self-management of symptoms      -Wrapping bing LEs as follows:  -Lotion  -Cotton stockinette  -Artiflex ankle to knee  -8 cm x 5 m foot to ankle with fig 8   -10 cm x 5 m, ankle to knee spiral   Reviewed with pt to take compression off if signs of pain, numbness, tingling or decreased blood flow       Neuromuscular Re-education     Therapeutic Activity     Therapeutic Exercises      Exercises:  Reviewed  Exercise #1: Cervical flex, rotation and SB ROM, shoulder shrugs and scap retract lymph exercises- verbal review  Comment #1: B shoulder flexion overhead x5-10 reps 1-2x/day  Exercise #2: LE lymph exercises: starting with trunk and bing LEs in standing - verbal review  Comment #2: standing heel raises 10x B  Exercise #3: wand AB and ER supine- can do abd in standing  Comment #3: x10 reps  Exercise #4: supine wand flexion  Comment #4: x10 reps  Exercise #5: standing gastroc stretch 2 x 30 sec hold  Comment #5: shoulder bing ER: 10-30 reps with L1 band  Exercise #6: mini squat with bing UE support 10x 2-3 ec hold      -see flow sheet for date completed   Gait training     Modality__________________                Total 55    Blank areas are intentional and mean the treatment did not include these items.       Chantel Harvey PT, DPT, CLT-ANGELA  12/14/2020

## 2021-06-13 NOTE — PROGRESS NOTES
"  Assessment and Plan:     Patient has been advised of split billing requirements and indicates understanding: Yes  1. Malignant neoplasm of nipple of both breasts in female, estrogen receptor positive (H)  Opted to do bilateral mastectomies .There is lymph node spread. Right breast had no cancer . Still is eligible for radiation , on exemastine . Is going a DEXA scan.  Is going to meet up with on radiation oncology as well.  We took some time to go over her PET scan.  It showed no metastatic lesions in any of the other organ systems.  She just wanted to clarify some point she has an accessory spleen, some white matter senescent changes, a left kidney cyst and some spondylolisthesis in her lumbar spine.  We did go over them    2. Moderate persistent asthma without complication  Unclear diagnosis.  Her pulmonary function tests were normal she did see a lung specialist who put her on budesonide.  And advised her to use antiallergen dust mite prevention covers.  She still feels like a choking sensation but no real dyspnea on exertion or wheezing.  Her examination was normal.  Of note her PET scan did not show any chest abnormalities.  She can add Flonase and Claritin and Zyrtec I believe this is allergies or postnasal drip and not true asthma    3. Health maintenance examination  She is up-to-date in her colonoscopy and is getting a DEXA scan    4. Mixed hyperlipidemia  Recommended against taking niacin.  She said Lipitor made her feel \"stupid\".  We could try Crestor if she has a high high ASCVD score.  Otherwise we could consider acetamide  - Lipid [place] FASTING  - Comprehensive Metabolic Panel  - Thyroid Stimulating Hormone (TSH)    5. Prediabetes    - Glycosylated Hemoglobin A1c    6. Vitamin D insufficiency    - Vitamin D, Total (25-Hydroxy)    She does have some mild right foot drop.  But none on real elicitable on physical exam but no actual radiculopathy.  We talked in detail about her lumbar arthritis and did " not think at this point an MRI would be necessary.  Or EMG because what ever she has had the symptoms its been for years.  The only indication would be new symptoms of right leg shooting pain or worsening foot drop.  Or bladder symptoms she does not want to do physical therapy at this point.  We could consider that at her next visit  11/2019   Symptoms started after a flu like illess   FEV1/FVC is 0.80 and is normal.  FEV1 is 116% predicted and is normal.   FVC is 112% predicted and is normal.  There was no improvement in spirometry after a single inhaled dose of bronchodilator.  TLC is 103% predicted and is normal.  RV is 104% predicted and is normal.  DLCO is 126% predicted and is normal when it   is corrected for hemoglobin.  The flow volume loop is normal.     Impression:  Full Pulmonary Function Test is normal.   dexa 2018 osteopenia   Colon 2019 polyps next 5 years .      The patient's current medical problems were reviewed.    I have had an Advance Directives discussion with the patient.  The following health maintenance schedule was reviewed with the patient and provided in printed form in the after visit summary:   Health Maintenance   Topic Date Due     ZOSTER VACCINES (2 of 3) 03/02/2012     DXA SCAN  03/26/2020     TD 18+ HE  08/15/2021     ASTHMA ACTION PLAN  05/08/2021     Asthma Control Test  11/19/2021     MEDICARE ANNUAL WELLNESS VISIT  11/19/2021     FALL RISK ASSESSMENT  11/19/2021     MAMMOGRAM  07/15/2022     COLORECTAL CANCER SCREENING  04/04/2024     LIPID  10/16/2024     ADVANCE CARE PLANNING  10/16/2024     HEPATITIS C SCREENING  Completed     Pneumococcal Vaccine: Pediatrics (0 to 5 Years) and At-Risk Patients (6 to 64 Years)  Completed     Pneumococcal Vaccine: 65+ Years  Completed     INFLUENZA VACCINE RULE BASED  Completed     HEPATITIS B VACCINES  Aged Out        Subjective:   Chief Complaint: Camilla Wilson is an 70 y.o. female here for an Annual Wellness visit.   HPI: Very pleasant  patient who I establish care virtually is here for her annual wellness she feels about the same she just wants to go over her PET scan.Budesonide maybe helps a little bit .  She has no children and is single is a member of a Book club , two of the memebrs are nurses , who had breast cancer   Has extended family  No children  marrid for a few years   Used to be a    Is not in any pain no digestive issues she does have lymphedema of her arms and wears a sleeve and some of her legs and is seeing the lymphedema clinic.  She does not have a history of diabetes ischemic heart disease.    Review of Systems:    Please see above.  The rest of the review of systems are negative for all systems.    Patient Care Team:  Berna Morel MD as PCP - General (Internal Medicine)  Margarita Cook MD as Physician (General Surgery)  Anna Beckman MD as Physician (Radiation Oncology)  Princess Laguna MD as Physician (Hematology and Oncology)  Keisha Manjarrez RN as Oncology Nurse Navigator (Hematology and Oncology)  Dyana Quiles, CNP as Nurse Practitioner (Hematology and Oncology)  Mina Jalloh MD as Assigned PCP  Gemini Rosario MD as Assigned Pulmonology Provider  Annette Cantor MD as Physician (Physical Medicine and Rehabilitation)     Patient Active Problem List   Diagnosis     Breast cancer (H)     Dyslipidemia     Spinal Stenosis     Obesity     Osteopenia     GERD (gastroesophageal reflux disease)     Skin cancer     Morbid obesity (H)     Pain in joint involving lower leg     Malignant neoplasm of central portion of left breast in female, estrogen receptor positive (H)     Moderate persistent asthma without complication     Personal history of breast cancer     Health maintenance examination     Intramural, submucous, and subserous leiomyoma of uterus     Mucinous carcinoma of breast, right (H)     Past Medical History:   Diagnosis Date     Breast  cancer (H) 2000     Breast cyst      GERD (gastroesophageal reflux disease)      Hx of radiation therapy      Moderate persistent asthma without complication 8/14/2020     PONV (postoperative nausea and vomiting)      Uterine fibroid     embolized      Past Surgical History:   Procedure Laterality Date     BREAST BIOPSY Left      BREAST LUMPECTOMY Right 2007     BREAST LUMPECTOMY Left 8/21/2020    Procedure: Left Lumpectomy after Wire Localization: [place] Lymph Node Biopsy;  Surgeon: Margarita Cook MD;  [facility];  Service: General     MASTECTOMY Bilateral 09/14/2020    Dr. Cook     HI EMBOLIZATION UTERINE FIBROID      Description: Uterine Fibroid Embolization;  Proc Date: 09/06/2000;     HI EXCISE BREAST CYST      Description: Breast Surgery Lumpectomy;  Recorded: 12/18/2007;     HI MASTECTOMY, SIMPLE, COMPLETE Bilateral 9/14/2020    Procedure: Bilateral Mastectomies;  Surgeon: Margarita Cook MD;  [place] [facility] [place]  Service: General     HI SHLDR ARTHROSCOP,SURG,W/REMOVAL,LOOSE/FB      Description: Shoulder Arthroscopy;  Recorded: 02/25/2009;  Comments: right 2004; left 2007     US BREAST CORE BIOPSY LEFT Left 7/22/2020     US BREAST LOC W SENT NODE INJ LEFT Left 8/21/2020      Family History   Problem Relation Age of Onset     Colon cancer Maternal Aunt      Breast cancer Maternal Aunt         40s     Kidney disease Mother      Pneumonia Father      Hyperlipidemia Father      Cancer Cousin      Cancer Cousin       Social History     Socioeconomic History     Marital status: Single     Spouse name: Not on file     Number of children: Not on file     Years of education: Not on file     Highest education level: Not on file   Occupational History     Not on file   Social Needs     Financial resource strain: Not on file     Food insecurity     Worry: Not on file     Inability: Not on file     Transportation needs     Medical: Not on file     Non-medical: Not on file   Tobacco Use      Smoking status: Former Smoker     Packs/day: 0.50     Years: 15.00     Pack years: 7.50     Quit date: 1982     Years since quittin.5     Smokeless tobacco: Never Used   Substance and Sexual Activity     Alcohol use: Yes     Alcohol/week: 5.0 standard drinks     Types: 5 Glasses of wine per week     Drug use: Never     Sexual activity: Never   Lifestyle     Physical activity     Days per week: Not on file     Minutes per session: Not on file     Stress: Not on file   Relationships     Social connections     Talks on phone: Not on file     Gets together: Not on file     Attends Anabaptist service: Not on file     Active member of club or organization: Not on file     Attends meetings of clubs or organizations: Not on file     Relationship status: Not on file     Intimate partner violence     Fear of current or ex partner: Not on file     Emotionally abused: Not on file     Physically abused: Not on file     Forced sexual activity: Not on file   Other Topics Concern     Not on file   Social History Narrative     Not on file      Current Outpatient Medications   Medication Sig Dispense Refill     5-hydroxytryptophan (5-HTP) 100 mg cap Take 2 capsules by mouth daily. 1000mg       albuterol (PROAIR HFA;PROVENTIL HFA;VENTOLIN HFA) 90 mcg/actuation inhaler Inhale 2 puffs every 4 (four) hours as needed for wheezing or shortness of breath (cough). 1 Inhaler 12     budesonide (PULMICORT) 180 mcg/actuation inhaler Inhale 2 puffs 2 (two) times a day. 1 Inhaler 6     calcium citrate 250 mg calcium Tab Take 500 mg by mouth daily.        cetirizine (ZYRTEC) 10 MG tablet Take 10 mg by mouth daily.       cholecalciferol, vitamin D3, (VITAMIN D3) 2,000 unit cap Take 1 capsule by mouth daily.        exemestane (AROMASIN) 25 mg tablet Take 1 tablet (25 mg total) by mouth daily. 90 tablet 3     famotidine (FOR PEPCID) 10 MG tablet Take 10 mg by mouth daily.       melatonin 5 mg Tab tablet Take 5 mg by mouth at bedtime.        "niacin (SLO-NIACIN) 500 mg ER tablet Take 2,000 mg by mouth daily.        peg 400-propylene glycol (SYSTANE) 0.4-0.3 % Drop Administer 1 drop to both eyes 4 (four) times a day as needed.       s-adenosylmethionine (AISHA-E, ENTERIC COATED,) 200 mg TbEC Take 2 tablets by mouth daily.       sodium fluoride-pot nitrate (PREVIDENT 5000 SENSITIVE) 1.1-5 % Pste Take by mouth daily.        vit C,C-Xp-eaink-lutein-zeaxan (OCUVITE LUTEIN & ZEAXANTHIN) 60 mg-30 unit- 15 mg-2 mg-6 mg cap Take 1 tablet by mouth daily.        No current facility-administered medications for this visit.       Objective:   Vital Signs:   Visit Vitals  BP (!) 164/96 (Patient Site: Left Arm, Patient Position: Sitting, Cuff Size: Adult Large)   Pulse (!) 56   Temp 97.6  F (36.4  C) (Tympanic)   Ht 5' 4.5\" (1.638 m)   Wt (!) 256 lb (116.1 kg)   SpO2 99%   BMI 43.26 kg/m           VisionScreening:  No exam data present     PHYSICAL EXAM  Physical Examination: General appearance - alert, well appearing, and in no distress  Mental status - alert, oriented to person, place, and time  Neck - supple, no significant adenopathy  Lymphatics - no palpable lymphadenopathy, no hepatosplenomegaly  Chest - clear to auscultation, no wheezes, rales or rhonchi, symmetric air entry  Heart - normal rate, regular rhythm, normal S1, S2, no murmurs, rubs, clicks or gallops  Abdomen - soft, nontender, nondistended, no masses or organomegaly  Breasts -scars appear normal, no suspicious masses, no skin or nipple changes or axillary nodes  Neurological - alert, oriented, normal speech, no focal findings or movement disorder noted she has slightly weakened right forefoot muscle power but straight leg raising is negative gait is normal Romberg's is negative she has enough power to cool and stand up on her feet.  Musculoskeletal - no joint tenderness, deformity or swelling  Extremities - peripheral pulses normal, no pedal edema, no clubbing or cyanosis  Skin - normal coloration and " turgor, no rashes, no suspicious skin lesions noted  She has multiple SK lesions that are benign  She has nonpitting edema with some mild varicosities with no ulcerations.    Assessment Results 11/19/2020   Activities of Daily Living No help needed   Instrumental Activities of Daily Living No help needed   Mini Cog Total Score 4   Some recent data might be hidden     A Mini-Cog score of 0-2 suggests the possibility of dementia, score of 3-5 suggests no dementia        Identified Health Risks:     She is at risk for lack of exercise and has been provided with information to increase physical activity for the benefit of her well-being.  The patient was counseled and encouraged to consider modifying their diet and eating habits. She was provided with information on recommended healthy diet options.  Information on urinary incontinence and treatment options given to patient.  Information regarding advance directives (living sosa), including where she can download the appropriate form, was provided to the patient via the AVS.

## 2021-06-13 NOTE — TELEPHONE ENCOUNTER
Pt called and concerned about care. She had right breast RT in 2000's and now being tx on left side. She is very concerned that we are reradiating the right side. Informed pt that we are not radiating the right side. Explained our process of peer review. She is feeling that Dr. Beckman isn't explaining everything to her. Informed pt that I would talk to Dr. Beckman and convey the information that she needs. If she still feels she wants to change doctors we can discuss after. Pt appreciative of conversation and of RN listening. Spoke with Dr. Beckman and will meet with pt today and show films and explain to pt treatment and how radiation works.

## 2021-06-13 NOTE — PROGRESS NOTES
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 Lymphedema  I89.0    2. Decreased range of motion of left shoulder  M25.612    3. Localized swelling of both lower legs  R22.43    4. Post-mastectomy lymphedema syndrome  I97.2    5. Malignant neoplasm of central portion of left breast (H)  C50.112          Assessment:     Pt has slight increase in L shoulder pain likely from donning right compression sleeve. Provided new exercise for strengthening to help reduce pain.  HEP/POC compliance is  good .  Therapy interventions being performed are medically necessary, are being delivered according to accepted standards of medical practice, and require the skills of a therapist to perform the services.      Goal Status:  Patient will reach / maintain arm movement: Progressing toward  Comment: be able to reach into overhead position to get chemo treatment with pain rating <2/10 (MET)    Patient will have a decreased volume in : bilateral;LE;by 5%;to decrease risk of infection;for better fit of clothing;for improved body image;for ease of movement;in 12 weeks (Met on left, progressing toward on R)  Patient will have decreased fibrosis, scar tissue for improved lymphatic mobilitiy : in 12 weeks;Comment  Comment: improved mastectomy fascial movement before/during/after radiation  Patient will perform, verbalize self-management of: skin  care;self-monitoring;exercise;massage;self-compression;compression wear;compression care;infection prevention;in 12 weeks;Progressing toward      Plan / Patient Education:     Continue with initial plan of care.  Progress with home program as tolerated. Continue with MLD and compression wrapping for bing LEs, progress L UE strengthening if needed, treatment of L UE as needed  Pt to see [facility] for compression fitting of LEs and adjust fit of UEs on .    Subjective:     Pain Ratin   Pt reports slight pain into her left shoulder when putting on her seatbelt. Unsure if it is related to the positioning of radiation. After questions, thinks it is related to donning her right compression sleeve.     Objective:     Caregiver present: No    Observation of swelling: bing LEs is better. Stable.  Volume measurements taken:  yes  Lymphedema Assessment 2020   Right Upper Extremity Total Estimated Volume (cm3) 3712.44 - -   Left Upper Extremity Total Estimated Volume (cm3) 3688.02 - -   Swelling Description Right>Left - -   Upper Extremity Swelling Comparison (%) 0.66 - -   Right Lower Extremity Total Estimated Volume (cm3) - 6728.47 6530.61   Right LE change of volume from last visit (%) - - -2.94   Right LE change of volume from initial (%) - - -2.94   Left Lower Extremity Total Estimated Volume (cm3) - 7431.64 6891.43   Left LE change of volume from last visit (%) - - -7.27   Left LE change of volume from initial (%) - - -7.27   Swelling Description - Left>Right Left>Right   Lower Extremity Swelling Comparison (%) - 10.45 5.53       Compression:compression sleeves on B UE  Tubigrip on LE - took bandages off last night     Medication for infection:  No     MMT: Shoulder ER: R 4/5 L 4-/5    Treatment Today     TREATMENT MINUTES COMMENTS   Evaluation     Self-care/ Home management     Manual therapy 45 -Manual therapy: manual lymph drainage for bilateral lower extremities lymphedema  Deep  abdominal breath, neck effleurage, short neck,  bilateral inguinal, left lower extremity evacuation, right lower extremity evacuation, abdomen, neck effleurage.    -educated on skin care and self-management of symptoms    -Wrapping bing LEs as follows:  -Lotion  -Cotton stockinette  -Artiflex ankle to knee  -8 cm x 5 m foot to ankle with fig 8   -10 cm x 5 m, ankle to knee spiral   Reviewed with pt to take compression off if signs of pain, numbness, tingling or decreased blood flow       Neuromuscular Re-education     Therapeutic Activity     Therapeutic Exercises 10 Discussed activity modification to reduce arm pain    Exercises:  Reviewed  Exercise #1: Cervical flex, rotation and SB ROM, shoulder shrugs and scap retract lymph exercises- verbal review  Comment #1: B shoulder flexion overhead x5-10 reps 1-2x/day  Exercise #2: LE lymph exercises: starting with trunk and bing LEs in standing - verbal review  Comment #2: standing heel raises 10x B  Exercise #3: wand AB and ER supine- can do abd in standing  Comment #3: x10 reps  Exercise #4: supine wand flexion  Comment #4: x10 reps  Exercise #5: standing gastroc stretch 2 x 30 sec hold  Comment #5: shoulder bing ER: 10-30 reps with L1 band  Exercise #6: mini squat with bing UE support 10x 2-3 ec hold      -see flow sheet for date completed   Gait training     Modality__________________                Total 55    Blank areas are intentional and mean the treatment did not include these items.       Chantel Harvey PT, DPT, CLT-ANGELA  12/11/2020

## 2021-06-13 NOTE — PROGRESS NOTES
[facility] Re-Certification Request    December 16, 2020      Patient: Caimlla Wilson  MR Number: [id number]  YOB: 1950  Date of Visit: 12/16/2020  Onset Date:  9/14/2020  Date of Eval: 11/5/2020    Dear Dr. Cantor:    As you may recall, we have been seeing Camilla Wilson for Physical Therapy of her lymphedema.    For therapy to continue, Medicare and/or Medicaid requires periodic physician review of the treatment plan. Please review the summary of the patient's progress and our plan for continued therapy, and verify  that you agree therapy should continue by entering certification dates at the bottom of this note and co-signing this note.    Plan of Care  Authorization / Certification Start Date: 12/14/20  Authorization / Certification End Date: 01/13/21  Authorization / Certification Number of Visits: 4  Communication with: Referral Source  Patient Related Instruction: Nature of Condition;Body mechanics;Posture;Treatment plan and rationale;Precautions;Next steps;Expected outcome;Basis of treatment;Self Care instruction  Times per Week: 1-2  Number of Weeks: 4  Number of Visits: 4  Discharge Planning: when goals are met or pt has reached a plateau in progress  Therapeutic Exercise: ROM;Stretching;Strengthening;Lymphedema  Neuromuscular Reeducation: kinesio tape;posture;balance/proprioception;core  Manual Therapy: soft tissue mobilization;myofascial release;joint mobilization;muscle energy;lymphatic drainage massage  Modalities: cold pack;hot pack  Equipment: compression bandages      Goal  Patient will reach / maintain arm movement: Progressing toward  Comment: be able to reach into overhead position to get chemo treatment with pain rating <2/10 (MET)    Patient will have a decreased volume in : bilateral;LE;by 5%;to decrease risk of infection;for better fit of clothing;for improved body image;for ease of movement;in 12 weeks (Met on left, progressing toward on R)  Patient  will have decreased fibrosis, scar tissue for improved lymphatic mobilitiy : in 12 weeks;Comment  Comment: improved mastectomy fascial movement before/during/after radiation  Patient will perform, verbalize self-management of: skin care;self-monitoring;exercise;massage;self-compression;compression wear;compression care;infection prevention;in 12 weeks;Progressing toward        If you have any questions or concerns, please don't hesitate to call.    Sincerely,      Vidhya Hayden, PTA        Physician recommendation:     __X_ Follow therapist's recommendation        ___ Modify therapy      *Physician co-signature indicates they certify the need for these services furnished within this plan and while under their care.         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 Lymphedema  I89.0    2. Decreased range of motion of left shoulder  M25.612    3. Localized swelling of both lower legs  R22.43    4. Post-mastectomy lymphedema syndrome  I97.2    5. Malignant neoplasm of central portion of left breast (H)  C50.112          Assessment:       Pt with a slight increase in swelling around B ankles today.   Pt has not been doing her exercises. Pt voices concerns regarding the area on her R chest wall. As stated in the subjective she has sp[oken to her MD about this.     HEP/POC compliance is  good .  Therapy interventions being performed are medically necessary, are being delivered according  to accepted standards of medical practice, and require the skills of a therapist to perform the services.      Goal Status:  Patient will reach / maintain arm movement: Progressing toward  Comment: be able to reach into overhead position to get chemo treatment with pain rating <2/10 (MET)    Patient will have a decreased volume in : bilateral;LE;by 5%;to decrease risk of infection;for better fit of clothing;for improved body image;for ease of movement;in 12 weeks (Met on left, progressing toward on R)  Patient will have decreased fibrosis, scar tissue for improved lymphatic mobilitiy : in 12 weeks;Comment  Comment: improved mastectomy fascial movement before/during/after radiation  Patient will perform, verbalize self-management of: skin care;self-monitoring;exercise;massage;self-compression;compression wear;compression care;infection prevention;in 12 weeks;Progressing toward      Plan / Patient Education:     Updated POC to extend visits.     Authorization / Certification Start Date: 12/14/20  Authorization / Certification End Date: 01/13/21  Authorization / Certification Number of Visits: 4  Communication with: Referral Source  Patient Related Instruction: Nature of Condition;Body mechanics;Posture;Treatment plan and rationale;Precautions;Next steps;Expected outcome;Basis of treatment;Self Care instruction  Times per Week: 1-2  Number of Weeks: 4  Number of Visits: 4  Discharge Planning: when goals are met or pt has reached a plateau in progress  Therapeutic Exercise: ROM;Stretching;Strengthening;Lymphedema  Neuromuscular Reeducation: kinesio tape;posture;balance/proprioception;core  Manual Therapy: soft tissue mobilization;myofascial release;joint mobilization;muscle energy;lymphatic drainage massage  Modalities: cold pack;hot pack  Equipment: compression bandages     Continue with MLD and compression wrapping for bing LEs, progress L UE strengthening if needed, treatment of L UE as needed  Pt to see [facility]  "for compression fitting of LEs and adjust fit of UEs on .    Subjective:    Pt reports she has been very stressed lately.   \"I'm very short tempered.  Pt stats she does try to do deep breathing and some meditation.   Pt reports her legs feel good. Pt states her arms feel better.   Pt reports she is concerned about her R chest wall which is getting \"tan\" from the radiation. \" This is not the area that is being radiated. She has talked to her MD regarding this. \" I can't remember what he told me.     Pain Ratin       Objective:     Caregiver present: No    Observation of swelling: bing LEs is better. Stable.  Volume measurements taken:  no  Lymphedema Assessment 2020   Right Upper Extremity Total Estimated Volume (cm3) 3712.44 - -   Left Upper Extremity Total Estimated Volume (cm3) 3688.02 - -   Swelling Description Right>Left - -   Upper Extremity Swelling Comparison (%) 0.66 - -   Right Lower Extremity Total Estimated Volume (cm3) - 6728.47 6530.61   Right LE change of volume from last visit (%) - - -2.94   Right LE change of volume from initial (%) - - -2.94   Left Lower Extremity Total Estimated Volume (cm3) - 7431.64 6891.43   Left LE change of volume from last visit (%) - - -7.27   Left LE change of volume from initial (%) - - -7.27   Swelling Description - Left>Right Left>Right   Lower Extremity Swelling Comparison (%) - 10.45 5.53       Compression:compression sleeves on B UE  Tubigrip on LE - took bandages off last night     Medication for infection:  No     MMT: Shoulder ER: R 4/5 L 4-/5    Treatment Today     TREATMENT MINUTES COMMENTS   Evaluation     Self-care/ Home management     Manual therapy 55 -Manual therapy: manual lymph drainage for bilateral lower extremities and left UE  lymphedema  Deep abdominal breath, neck effleurage, short neck, bilateral axilla, left chest, left upper extremity, bilateral inguinal, left lower extremity evacuation, right lower extremity " evacuation, abdomen, neck effleurage.    -educated on skin care and self-management of symptoms      -Wrapping bing LEs as follows:  -Lotion  -Cotton stockinette  -Artiflex ankle to knee  -8 cm x 5 m foot to ankle with fig 8   -10 cm x 5 m, ankle to knee spiral   Reviewed with pt to take compression off if signs of pain, numbness, tingling or decreased blood flow       Neuromuscular Re-education     Therapeutic Activity     Therapeutic Exercises      Exercises:  Reviewed  Exercise #1: Cervical flex, rotation and SB ROM, shoulder shrugs and scap retract lymph exercises- verbal review  Comment #1: B shoulder flexion overhead x5-10 reps 1-2x/day  Exercise #2: LE lymph exercises: starting with trunk and bing LEs in standing - verbal review  Comment #2: standing heel raises 10x B  Exercise #3: wand AB and ER supine- can do abd in standing  Comment #3: x10 reps  Exercise #4: supine wand flexion  Comment #4: x10 reps  Exercise #5: standing gastroc stretch 2 x 30 sec hold  Comment #5: shoulder bing ER: 10-30 reps with L1 band  Exercise #6: mini squat with bing UE support 10x 2-3 ec hold         Gait training     Modality__________________                Total 55    Blank areas are intentional and mean the treatment did not include these items.       Vidhya Hayden PTA,CLT  12/16/2020

## 2021-06-13 NOTE — PROGRESS NOTES
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 Lymphedema  I89.0    2. Decreased range of motion of left shoulder  M25.612    3. Localized swelling of both lower legs  R22.43    4. Post-mastectomy lymphedema syndrome  I97.2    5. Malignant neoplasm of central portion of left breast (H)  C50.112          Assessment:     Pt demonstrates a decrease in bing LEs by 7% on L LE and 3% on R LE. Pt reporting her legs no longer feel heavy or tight and is doing well. Has started radiation and has her 3rd treatment today.    HEP/POC compliance is  good .  Therapy interventions being performed are medically necessary, are being delivered according to accepted standards of medical practice, and require the skills of a therapist to perform the services.      Goal Status:  Patient will reach / maintain arm movement: forward;overhead;with less pain;with less difficulty;in 12 weeks;Comment  Comment: be able to reach into overhead position to get chemo treatment with pain rating <2/10 (progressing towards)    Patient will have a decreased volume in : bilateral;LE;by 5%;to decrease risk of infection;for better fit of clothing;for improved body image;for ease of movement;in 12 weeks (Met on left, progressing toward on R)  Patient will have decreased fibrosis, scar tissue for improved lymphatic mobilitiy : in 12 weeks;Comment  Comment: improved mastectomy fascial movement before/during/after radiation  Patient will perform, verbalize  self-management of: skin care;self-monitoring;exercise;massage;self-compression;compression wear;compression care;infection prevention;in 12 weeks;Progressing toward      Plan / Patient Education:     Continue with initial plan of care.  Progress with home program as tolerated. Continue with MLD and compression wrapping for bing LEs, progress L UE stretching if needed, treatment of L UE as needed  Pt to schedule follow up with [facility] for compression fitting of LEs and adjust fit of UEs    Subjective:     Pain Ratin  Pt reports her legs are generally feeling better. She does need to get back in with Etelvina for her legs and to re-evaluate her compression sleeves for her arm.     Objective:     Caregiver present: No    Observation of swelling: bing LEs is better.     Volume measurements taken:  yes  Lymphedema Assessment 2020   Right Upper Extremity Total Estimated Volume (cm3) 3712.44 - -   Left Upper Extremity Total Estimated Volume (cm3) 3688.02 - -   Swelling Description Right>Left - -   Upper Extremity Swelling Comparison (%) 0.66 - -   Right Lower Extremity Total Estimated Volume (cm3) - 6728.47 6530.61   Right LE change of volume from last visit (%) - - -2.94   Right LE change of volume from initial (%) - - -2.94   Left Lower Extremity Total Estimated Volume (cm3) - 7431.64 6891.43   Left LE change of volume from last visit (%) - - -7.27   Left LE change of volume from initial (%) - - -7.27   Swelling Description - Left>Right Left>Right   Lower Extremity Swelling Comparison (%) - 10.45 5.53       Compression:compression sleeves on B UE  Tubigrip on LEs      Medication for infection:  No    Treatment Today     TREATMENT MINUTES COMMENTS   Evaluation     Self-care/ Home management     Manual therapy 50 -Manual therapy: manual lymph drainage for bilateral lower extremities lymphedema  Deep abdominal breath, neck effleurage, short neck,  bilateral inguinal, left lower extremity  evacuation, right lower extremity evacuation, abdomen, neck effleurage.    -educated on skin care and self-management of symptoms    -Wrapping bing LEs as follows:  -Lotion  -Cotton stockinette  -Artiflex ankle to knee  -8 cm x 5 m foot to ankle with fig 8   -10 cm x 5 m, ankle to knee spiral   Reviewed  with pt  to take compression off if signs of pain, numbness, tingling or decreased blood flow       Neuromuscular Re-education     Therapeutic Activity     Therapeutic Exercises 8 Exercises:  Reiewed  Exercise #1: Cervical flex, rotation and SB ROM, shoulder shrugs and scap retract lymph exercises- verbal review  Comment #1: B shoulder flexion overhead x5-10 reps 1-2x/day  Exercise #2: LE lymph exercises: starting with trunk and bing LEs in standing - verbal review  Comment #2: standing heel raises 10x B  Exercise #3: wand AB and ER supine- can do abd in standing  Comment #3: x10 reps  Exercise #4: supine wand flexion  Comment #4: x10 reps  Exercise #5: standing gastroc stretch 2 x 30 sec hold      -see flow sheet for date completed   Gait training     Modality__________________                Total 58    Blank areas are intentional and mean the treatment did not include these items.       Chantel Harvey PT, DPT, CLT  12/7/2020

## 2021-06-13 NOTE — PROGRESS NOTES
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 Lymphedema  I89.0    2. Decreased range of motion of left shoulder  M25.612    3. Localized swelling of both lower legs  R22.43    4. Post-mastectomy lymphedema syndrome  I97.2    5. Malignant neoplasm of central portion of left breast (H)  C50.112          Assessment:     Pt demo's L medial ankle slightly swollen with tetragrip only but bandages went well.    HEP/POC compliance is  good .  Therapy interventions being performed are medically necessary, are being delivered according to accepted standards of medical practice, and require the skills of a therapist to perform the services.      Goal Status:  Patient will reach / maintain arm movement: forward;overhead;with less pain;with less difficulty;in 12 weeks;Comment  Comment: be able to reach into overhead position to get chemo treatment with pain rating <2/10 (progressing towards)    Patient will have a decreased volume in : bilateral;LE;by 5%;to decrease risk of infection;for better fit of clothing;for improved body image;for ease of movement;in 12 weeks (Met on left, progressing toward on R)  Patient will have decreased fibrosis, scar tissue for improved lymphatic mobilitiy : in 12 weeks;Comment  Comment: improved mastectomy fascial movement before/during/after radiation  Patient will perform, verbalize self-management of: skin care;self-monitoring;exercise;massage;self-compression;compression wear;compression  care;infection prevention;in 12 weeks;Progressing toward      Plan / Patient Education:     Continue with initial plan of care.  Progress with home program as tolerated. Continue with MLD and compression wrapping for bing LEs, progress L UE stretching if needed, treatment of L UE as needed  Pt to see [facility] for compression fitting of LEs and adjust fit of UEs on .    Subjective:     Pain Ratin   Pt was able to talk with LIVIA and will go back to see Etelvina on  to check on arm sleeves and then will get measured for LE stockings too.  Pt concerned about the lines that form in the arms from where the sleeves bundle a little - will discuss with Etelvina.  Pt reports going to treatment #5 for radiation today. Pt is supposed to have 33 treatments and be done mid January.    Objective:     Caregiver present: No    Observation of swelling: bing LEs is better. Except L medial malleolus    Volume measurements taken:  yes  Lymphedema Assessment 2020   Right Upper Extremity Total Estimated Volume (cm3) 3712.44 - -   Left Upper Extremity Total Estimated Volume (cm3) 3688.02 - -   Swelling Description Right>Left - -   Upper Extremity Swelling Comparison (%) 0.66 - -   Right Lower Extremity Total Estimated Volume (cm3) - 6728.47 6530.61   Right LE change of volume from last visit (%) - - -2.94   Right LE change of volume from initial (%) - - -2.94   Left Lower Extremity Total Estimated Volume (cm3) - 7431.64 6891.43   Left LE change of volume from last visit (%) - - -7.27   Left LE change of volume from initial (%) - - -7.27   Swelling Description - Left>Right Left>Right   Lower Extremity Swelling Comparison (%) - 10.45 5.53       Compression:compression sleeves on B UE  Tubigrip on LE - took bandages off last night to wash and dry them for today      Medication for infection:  No    Treatment Today     TREATMENT MINUTES COMMENTS   Evaluation     Self-care/ Home management 10 Discussed fit  "of B UE sleeves and attempted with gloves to remove \"lines\" in the forearm - rolling at the top of the sleeve. Pt advised to attempt wearing inside out to decrease how much the top of the sleeves are rolling.     Manual therapy 50 -Manual therapy: manual lymph drainage for bilateral lower extremities lymphedema  Deep abdominal breath, neck effleurage, short neck,  bilateral inguinal, left lower extremity evacuation, right lower extremity evacuation, abdomen, neck effleurage.    -educated on skin care and self-management of symptoms    -Wrapping bing LEs as follows:  -Lotion  -Cotton stockinette  -Artiflex ankle to knee  -8 cm x 5 m foot to ankle with fig 8   -10 cm x 5 m, ankle to knee spiral   Reviewed  with pt  to take compression off if signs of pain, numbness, tingling or decreased blood flow       Neuromuscular Re-education     Therapeutic Activity     Therapeutic Exercises  Exercises:  Reviewed  Exercise #1: Cervical flex, rotation and SB ROM, shoulder shrugs and scap retract lymph exercises- verbal review  Comment #1: B shoulder flexion overhead x5-10 reps 1-2x/day  Exercise #2: LE lymph exercises: starting with trunk and bing LEs in standing - verbal review  Comment #2: standing heel raises 10x B  Exercise #3: wand AB and ER supine- can do abd in standing  Comment #3: x10 reps  Exercise #4: supine wand flexion  Comment #4: x10 reps  Exercise #5: standing gastroc stretch 2 x 30 sec hold      -see flow sheet for date completed   Gait training     Modality__________________                Total 60    Blank areas are intentional and mean the treatment did not include these items.       Betzy Rainey PT, DPT, CLT  12/9/2020  "

## 2021-06-13 NOTE — PROGRESS NOTES
RADIATION ONCOLOGY WEEKLY TREATMENT VISIT NOTE      Assessment / Impression       1. Malignant neoplasm of central portion of left breast in female, estrogen receptor positive (H)       Tolerating radiation therapy well.  All questions and concerns addressed.    Plan:     Continue radiation treatment as prescribed.    Discussed with the patient about skin care.    Subjective:      HPI: Camilla Wilson is a 70 y.o. female with    1. Malignant neoplasm of central portion of left breast in female, estrogen receptor positive (H)         The following portions of the patient's history were reviewed and updated as appropriate: allergies, current medications, past family history, past medical history, past social history, past surgical history and problem list.    Assessment                  Body Site: Breast                           Site: L CW  Stereotactic Radiosurgery: No  Concurrent Therapy: No  Today's Dose: 2160  Total Dose for Breast: 6040  Today's Fraction/Total Fraction Breast:   Drainage: 0: Absent                                            Sexuality Alteration                 Emotional Alteration Copin: Effective  Comfort Alteration KPS: 90% Can perform normal activity, minor signs of disease  Fatigue (ONS scale) : 3: Mild Fatigue  Pain Location: left lateral CW  Pain Intensity. Rate degree of pain ranging from 0 (no pain) to 10 (severe pain) : 3  Pain Description: Ache - Muscular type ache  Pain Intervention: 0: None  Hot Flashes and/or Flushes: 1: Mild or no more than 1 per day   Nutrition Alteration Anorexia: 0: None  Nausea: 0: None  Vomitin: None  Skin Alteration Skin Sensation: 1:Pruitis  Skin Reaction: 2: Bright erythema  AUA Assessment                                  Accompanied by       Objective:     Exam: mild Erythema.    Vitals:    20 1209   Weight: (!) 256 lb (116.1 kg)       Wt Readings from Last 8 Encounters:   20 (!) 256 lb (116.1 kg)   20 (!) 256  lb 3.2 oz (116.2 kg)   11/19/20 (!) 256 lb (116.1 kg)   10/28/20 (!) 259 lb (117.5 kg)   10/22/20 (!) 259 lb 11.2 oz (117.8 kg)   10/15/20 (!) 263 lb (119.3 kg)   09/15/20 (!) 262 lb (118.8 kg)   09/10/20 (!) 262 lb (118.8 kg)       General: Alert and oriented, in no acute distress  Camilla has mild Erythema.  [facility] chart and setup information reviewed    Anna Beckman MD

## 2021-06-13 NOTE — PROGRESS NOTES
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 Lymphedema  I89.0    2. Decreased range of motion of left shoulder  M25.612    3. Localized swelling of both lower legs  R22.43    4. Post-mastectomy lymphedema syndrome  I97.2    5. Malignant neoplasm of central portion of left breast (H)  C50.112          Assessment:     Pt has been compliant with her home program of bandages and exercise.   Pt with a slight decrease in swelling visibly.  HEP/POC compliance is  good .  Therapy interventions being performed are medically necessary, are being delivered according to accepted standards of medical practice, and require the skills of a therapist to perform the services.      Goal Status:  Patient will reach / maintain arm movement: forward;overhead;with less pain;with less difficulty;in 12 weeks;Comment  Comment: be able to reach into overhead position to get chemo treatment with pain rating <2/10    Patient will have a decreased volume in : bilateral;LE;by 5%;to decrease risk of infection;for better fit of clothing;for improved body image;for ease of movement;in 12 weeks  Patient will have decreased fibrosis, scar tissue for improved lymphatic mobilitiy : in 12 weeks;Comment  Comment: improved mastectomy fascial movement before/during/after radiation  Patient will perform, verbalize self-management of: skin care;self-monitoring;exercise;massage;self-compression;compression wear;compression care;infection prevention;in 12  weeks      Plan / Patient Education:     Continue with initial plan of care.  Progress with home program as tolerated. Continue with MLD and compression wrapping for bing LEs, progress L UE stretching, treatment of L UE as needed  Consider re measuring volumes next session.     Subjective:     Pain Ratin  Pt just completed her 2nd radiation treatment.   Pt did leave Etelvina a message regarding her sleeves.  Pt states the wraps fell down by the end of the day. Pt took her bandages off before bed. Typically she wears the wraps to bed.    Objective:     Caregiver present: No    Observation of swelling: bing LEs is better.     Volume measurements taken:  No  Lymphedema Assessment 2020   Right Upper Extremity Total Estimated Volume (cm3) 3712.44 -   Left Upper Extremity Total Estimated Volume (cm3) 3688.02 -   Swelling Description Right>Left -   Upper Extremity Swelling Comparison (%) 0.66 -   Right Lower Extremity Total Estimated Volume (cm3) - 6728.47   Left Lower Extremity Total Estimated Volume (cm3) - 7431.64   Swelling Description - Left>Right   Lower Extremity Swelling Comparison (%) - 10.45       Compression:compression sleeves on B UE  Tubigrip on LEs        Medication for infection:  No    Treatment Today     TREATMENT MINUTES COMMENTS   Evaluation     Self-care/ Home management     Manual therapy 53 -Manual therapy: manual lymph drainage for bilateral lower extremities lymphedema  Deep abdominal breath, neck effleurage, short neck,  bilateral inguinal, left lower extremity evacuation, right lower extremity evacuation, abdomen, neck effleurage.        -Wrapping bing LEs as follows:  -Cotton stockinette  -Artiflex ankle to knee  -8 cm x 5 m foot to ankle with fig 8   -10 cm x 5 m, ankle to knee spiral   Reviewed  with pt  to take compression off if signs of pain, numbness, tingling or decreased blood flow       Neuromuscular Re-education     Therapeutic Activity     Therapeutic Exercises 5  Exercises:  Reiewed  Exercise #1: Cervical flex, rotation and SB ROM, shoulder shrugs and scap retract lymph exercises- verbal review  Comment #1: B shoulder flexion overhead x5-10 reps 1-2x/day  Exercise #2: LE lymph exercises: starting with trunk and bing LEs in standing - verbal review  Exercise #3: wand AB and ER supine- can do abd in standing  Comment #3: x10 reps  Exercise #4: supine wand flexion  Comment #4: x10 reps      -see flow sheet for date completed   Gait training     Modality__________________                Total 58    Blank areas are intentional and mean the treatment did not include these items.       Vidhya Hayden PTA,CLT  12/4/2020

## 2021-06-13 NOTE — PROGRESS NOTES
[facility] Radiation Oncology Follow Up Note    Patient: Camilla Wilson  MRN: [id number]  Date of Service: 11/20/2020        Ms. Wilson is a 70 y.o. female who is well-known to radiation oncology.  The patient had a history of right breast cancer diagnosed initially in 2003.  The patient is status post lumpectomy sentinel resection followed by adjuvant radiation therapy and hormonal therapy.  The patient had a 6 weeks of radiation therapy to the right breast region only.  I do not have copy of the radiation record at the time of evaluation.  Patient has been doing well with no evidence of cancer recurrence.     Patient presented with a recent history of abnormal finding by routine screening mammogram for which she was a seeking further evaluation.  The mammogram followed by ultrasound reviewed a 1.3 x 1.1 x 1.1 cm irregular mass of the left breast at 3 o'clock position 6 cm from nipple suspicious for malignancy.  She underwent ultrasound-guided core biopsy on 7/22/2020 with pathology showed invasive lobular carcinoma.  ER/KS positive and HER-2 negative.  After discussed with the patient about various treatment options, patient had to proceed with breast preservation protocol as her treatment choice and underwent left breast lumpectomy and sentinel resection on 8/21/2020.  The pathology reviewed 65 x 28 x 25 mm invasive lobular carcinoma, Amy grade 2 with multiple area of positive margin.  Four removed left sentinel node showed no evidence of metastatic disease.  Given the pathological finding, the patient proceeded with bilateral simple mastectomy and left axillary lymph node resection on 9/14/2020.  The pathology showed 12 mm residual lobular carcinoma with negative margin.  The closest margin was measured 3 mm deep.  There was also associated lobular carcinoma in situ.  2/5 removed axillary lymph nodes showed evidence of metastatic disease with the largest metastatic deposit measured 2.5 mm and no  evidence of extra capsular extension.  Her Oncotype DX score is 13.  Postoperatively she has been recovering well.  The patient was initially seen and evaluated on 10/22/2020 and adjuvant radiation therapy was recommended.  Since then patient has been seen and evaluated by physical therapy and the lymphedema clinic.  She can continue to improve after surgery.  The patient is here today for evaluation and consideration of radiation therapy.     CHEMOTHERAPY HISTORY: Concurrent Chemotherapy: No     RADIATION THERAPY HISTORY: Prior Radiation: Yes     IMPLANTED CARDIAC DEVICE: none      PREGNANCY: The patient is informed not to be pregnant during the radiation therapy.  She is post menopausal.    Assessment / Impression     A prior history of right breast cancer, early stage, status post lumpectomy sentinel resection followed by postop radiation therapy and 5 years hormone therapy.  The patient was find to have new left breast cancer, pathologic stage T3 N1 aM0, ER/OK positive, HER-2 negative, status post lumpectomy and sentinel resection followed by mastectomy and lymph node dissection with negative margin.  The Oncotype DX score is 13.     Plan:     I have personally reviewed her upcoming medical record today.  I have also reviewed her most recent radiology study including mammogram.  The possible treatment options for breast cancer including surgery, systemic therapy, and the ration therapy has been discussed with the patient in detail and at the great lengths.  The possible risks and side effects of radiation therapy including, but not limited to, lymphedema, damage to the heart, lung, bone and soft tissue has also been explained to the patient.  Patient is also informed of potential increased risks of radiation induced normal tissue damage given the prior history of radiation therapy to the chest region. Questions are answered to patient's satisfaction. Given the pathological evidence of T3 disease and positive  lymph node involvement, I agree the patient is at significant increased risk of cancer recurrence.  Therefore I think the patient is a good candidate to consider postoperation therapy.  I believe the patient can be potentially benefited by radiation therapy for local control and possibly better survival.  The pros and the cons of different treatment options has also been discussed with the patient.  After long discussion, the patient elected to proceed with postop radiation therapy as her treatment of choice being aware of potential risks and side effects involved.  She is scheduled to return to radiation oncology later on today for simulation.  I plan to give her radiation therapy at 180 cGy each fraction to a total of 5040 cGy in 28 treatments targeted to the left chest wall and regional lymph nodes using a 4 field.  An additional 1000 Gy in 5 fraction is planned to give to the primary tumor bed using electron.    I spent approximately 25 minutes today with the patient and 80% time was used for counseling.       Anna Beckman MD, PhD  Department of Radiation Oncology   [facility]  Tel: [phone]  Page: [phone]    [facility]  [address] [address]      CC:  Patient Care Team:  Berna Morel MD as PCP - General (Internal Medicine)  Margarita Cook MD as Physician (General Surgery)  Anna Beckman MD as Physician (Radiation Oncology)  Princess Laguna MD as Physician (Hematology and Oncology)  Keisha Manjarrez RN as Oncology Nurse Navigator (Hematology and Oncology)  Dyana Quiles CNP as Nurse Practitioner (Hematology and Oncology)  Mina Jalloh MD as Assigned PCP  Gemini Rosario MD as Assigned Pulmonology Provider  Annette Cantor MD as Physician (Physical Medicine and Rehabilitation)

## 2021-06-13 NOTE — TELEPHONE ENCOUNTER
Per patient's recent [email or web address] message information, Dr. Cook asked me to call patient and help her schedule an appointment.  She will see Dr. Cook on 12-1-20 at 1540 at the [facility].

## 2021-06-13 NOTE — PROGRESS NOTES
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 Lymphedema  I89.0    2. Decreased range of motion of left shoulder  M25.612    3. Localized swelling of both lower legs  R22.43    4. Post-mastectomy lymphedema syndrome  I97.2    5. Malignant neoplasm of central portion of left breast (H)  C50.112          Assessment:     Provider spent time reviewing HEP to educate on purpose/reps of each exercise. Pt reporting improvements in LE swelling especially around the ankles.  HEP/POC compliance is  good .  Therapy interventions being performed are medically necessary, are being delivered according to accepted standards of medical practice, and require the skills of a therapist to perform the services.      Goal Status:  Patient will reach / maintain arm movement: forward;overhead;with less pain;with less difficulty;in 12 weeks;Comment  Comment: be able to reach into overhead position to get chemo treatment with pain rating <2/10    Patient will have a decreased volume in : bilateral;LE;by 5%;to decrease risk of infection;for better fit of clothing;for improved body image;for ease of movement;in 12 weeks  Patient will have decreased fibrosis, scar tissue for improved lymphatic mobilitiy : in 12 weeks;Comment  Comment: improved mastectomy fascial movement before/during/after radiation  Patient will perform, verbalize self-management of: skin care;self-monitoring;exercise;massage;self-compression;compression wear;compression  "care;infection prevention;in 12 weeks      Plan / Patient Education:     Continue with initial plan of care.  Progress with home program as tolerated. Continue with MLD and compression wrapping for bing LEs, progress L UE stretching, treatment of L UE as needed  Pt to start radiation on Thursday.     Subjective:     Pain Ratin  Pt is not sure about her compression sleeves. She feels they kind of \"dig in\" and hurt at the top of her shoulder. She is doing her arm exercises daily- but sometimes she forgets. Pt wore tubigrip to appointment.     Objective:     Caregiver present: No    Observation of swelling: bing LEs is better.     Volume measurements taken:  No  Lymphedema Assessment 2020   Right Upper Extremity Total Estimated Volume (cm3) 3712.44 -   Left Upper Extremity Total Estimated Volume (cm3) 3688.02 -   Swelling Description Right>Left -   Upper Extremity Swelling Comparison (%) 0.66 -   Right Lower Extremity Total Estimated Volume (cm3) - 6728.47   Left Lower Extremity Total Estimated Volume (cm3) - 7431.64   Swelling Description - Left>Right   Lower Extremity Swelling Comparison (%) - 10.45       Compression:compression sleeves on B UE  Tubigrip on LEs        Medication for infection:  No    Treatment Today     TREATMENT MINUTES COMMENTS   Evaluation     Self-care/ Home management     Manual therapy 50 -Manual therapy: manual lymph drainage for bilateral lower extremities lymphedema  Deep abdominal breath, neck effleurage, short neck,  bilateral inguinal, left lower extremity evacuation, right lower extremity evacuation, abdomen, neck effleurage.        -Wrapping bing LEs as follows:  -Cotton stockinette  -Artiflex ankle to knee  -8 cm x 5 m foot to ankle with fig 8   -10 cm x 5 m, ankle to knee spiral   Reviewed  with pt  to take compression off if signs of pain, numbness, tingling or decreased blood flow    Evaluated UE compression garments they are folding over at the top and wrinkle at " the wrist and sleeve- provider and pt to reach out to Etelvina to see about adjusting the fit   Neuromuscular Re-education     Therapeutic Activity     Therapeutic Exercises 8 Exercises:   Exercise #1: Cervical flex, rotation and SB ROM, shoulder shrugs and scap retract lymph exercises- verbal review  Comment #1: B shoulder flexion overhead x5-10 reps 1-2x/day  Exercise #2: LE lymph exercises: starting with trunk and bing LEs in standing - verbal review  Exercise #3: wand AB and ER supine- can do abd in standing  Comment #3: x10 reps  Exercise #4: supine wand flexion  Comment #4: x10 reps      -see flow sheet for date completed   Gait training     Modality__________________                Total 58    Blank areas are intentional and mean the treatment did not include these items.       Chantel Harvey PT, DPT, CLT-ANGELA  11/30/2020

## 2021-06-13 NOTE — PROGRESS NOTES
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 Lymphedema  I89.0    2. Decreased range of motion of left shoulder  M25.612    3. Localized swelling of both lower legs  R22.43    4. Post-mastectomy lymphedema syndrome  I97.2    5. Malignant neoplasm of central portion of left breast (H)  C50.112          Assessment:     Provider spent time reviewing HEP to educate on purpose/reps of each exercise. Pt reporting improvements in LE swelling especially around the ankles.  HEP/POC compliance is  good .  Therapy interventions being performed are medically necessary, are being delivered according to accepted standards of medical practice, and require the skills of a therapist to perform the services.      Goal Status:  Patient will reach / maintain arm movement: forward;overhead;with less pain;with less difficulty;in 12 weeks;Comment  Comment: be able to reach into overhead position to get chemo treatment with pain rating <2/10    Patient will have a decreased volume in : bilateral;LE;by 5%;to decrease risk of infection;for better fit of clothing;for improved body image;for ease of movement;in 12 weeks  Patient will have decreased fibrosis, scar tissue for improved lymphatic mobilitiy : in 12 weeks;Comment  Comment: improved mastectomy fascial movement before/during/after radiation  Patient will perform, verbalize self-management of: skin care;self-monitoring;exercise;massage;self-compression;compression wear;compression  care;infection prevention;in 12 weeks      Plan / Patient Education:     Continue with initial plan of care.  Progress with home program as tolerated. Continue with MLD and compression wrapping for bing LEs, progress L UE stretching, treatment of L UE as needed  Pt to start radiation on Thursday.     Subjective:   Pt kept the wraps on for 2 days. She washed the bandages.   Pt is still concerned about the fit of the compression sleeves. Pt states they feel that they are cutting into her.  Pt will call Jodi regarding this.    Pain Ratin      Objective:     Caregiver present: No    Observation of swelling: bing LEs is better.     Volume measurements taken:  No  Lymphedema Assessment 2020   Right Upper Extremity Total Estimated Volume (cm3) 3712.44 -   Left Upper Extremity Total Estimated Volume (cm3) 3688.02 -   Swelling Description Right>Left -   Upper Extremity Swelling Comparison (%) 0.66 -   Right Lower Extremity Total Estimated Volume (cm3) - 6728.47   Left Lower Extremity Total Estimated Volume (cm3) - 7431.64   Swelling Description - Left>Right   Lower Extremity Swelling Comparison (%) - 10.45       Compression:compression sleeves on B UE  Tubigrip on LEs        Medication for infection:  No    Treatment Today     TREATMENT MINUTES COMMENTS   Evaluation     Self-care/ Home management     Manual therapy 50 -Manual therapy: manual lymph drainage for bilateral lower extremities lymphedema  Deep abdominal breath, neck effleurage, short neck,  bilateral inguinal, left lower extremity evacuation, right lower extremity evacuation, abdomen, neck effleurage.        -Wrapping bing LEs as follows:  -Cotton stockinette  -Artiflex ankle to knee  -8 cm x 5 m foot to ankle with fig 8   -10 cm x 5 m, ankle to knee spiral   Reviewed  with pt  to take compression off if signs of pain, numbness, tingling or decreased blood flow    Evaluated UE compression garments they are folding over at the top and wrinkle at  the wrist and sleeve- provider and pt to reach out to Etelvina to see about adjusting the fit   Neuromuscular Re-education     Therapeutic Activity     Therapeutic Exercises 8 Exercises:  Reiewed  Exercise #1: Cervical flex, rotation and SB ROM, shoulder shrugs and scap retract lymph exercises- verbal review  Comment #1: B shoulder flexion overhead x5-10 reps 1-2x/day  Exercise #2: LE lymph exercises: starting with trunk and bing LEs in standing - verbal review  Exercise #3: wand AB and ER supine- can do abd in standing  Comment #3: x10 reps  Exercise #4: supine wand flexion  Comment #4: x10 reps      -see flow sheet for date completed   Gait training     Modality__________________                Total 58    Blank areas are intentional and mean the treatment did not include these items.       Vidhya Hayden PTA,CLT  12/2/2020

## 2021-06-13 NOTE — PROGRESS NOTES
RADIATION ONCOLOGY WEEKLY TREATMENT VISIT NOTE      Assessment / Impression       1. Malignant neoplasm of central portion of left breast in female, estrogen receptor positive (H)       Tolerating radiation therapy well.  All questions and concerns addressed.    Plan:     Continue radiation treatment as prescribed.    The patient did have significant anxiety at the time of evaluation.  She has multiple questions regarding her radiation therapy.  I have personally reviewed her radiation therapy set up and dosimetry plan with the patient today.  Patient is informed the area we treat is in the left breast and regional lymph nodes.  There is a potential small area of overlap from the previous radiation therapy field due to the prior history of radiation therapy.  All questions are answered to patient satisfaction.    Discussed with the patient about appropriate skin care during the therapy.    Patient is also informed she can contact me or our staff anytime during the therapy if there is any more questions related to her radiation therapy.    Subjective:      HPI: Camilla Wilson is a 70 y.o. female with    1. Malignant neoplasm of central portion of left breast in female, estrogen receptor positive (H)         The following portions of the patient's history were reviewed and updated as appropriate: allergies, current medications, past family history, past medical history, past social history, past surgical history and problem list.    Assessment                  Body Site: Breast                           Site: L CW  Stereotactic Radiosurgery: No  Concurrent Therapy: No  Today's Dose:   Total Dose for Breast: 6040  Today's Fraction/Total Fraction Breast:   Drainage: 0: Absent                                            Sexuality Alteration                 Emotional Alteration Copin: Effective  Comfort Alteration KPS: 90% Can perform normal activity, minor signs of disease  Fatigue (ONS scale)  : 3: Mild Fatigue  Pain Location: left lateral CW  Pain Intensity. Rate degree of pain ranging from 0 (no pain) to 10 (severe pain) : 3  Pain Description: Ache - Muscular type ache  Pain Intervention: 0: None  Hot Flashes and/or Flushes: 1: Mild or no more than 1 per day   Nutrition Alteration Anorexia: 0: None  Nausea: 0: None  Vomitin: None  Skin Alteration Skin Sensation: 1:Pruitis  Skin Reaction: 1: Faint erythema or dry desquamation  AUA Assessment                                  Accompanied by       Objective:     Exam: mild Erythema.    There were no vitals filed for this visit.    Wt Readings from Last 8 Encounters:   20 (!) 256 lb 3.2 oz (116.2 kg)   20 (!) 256 lb (116.1 kg)   10/28/20 (!) 259 lb (117.5 kg)   10/22/20 (!) 259 lb 11.2 oz (117.8 kg)   10/15/20 (!) 263 lb (119.3 kg)   09/15/20 (!) 262 lb (118.8 kg)   09/10/20 (!) 262 lb (118.8 kg)   20 (!) 268 lb (121.6 kg)       General: Alert and oriented, in no acute distress  Camilla has mild Erythema.  Aria chart and setup information reviewed    Anna Beckman MD

## 2021-06-13 NOTE — PROGRESS NOTES
RADIATION ONCOLOGY WEEKLY TREATMENT VISIT NOTE      Assessment / Impression       1. Malignant neoplasm of central portion of left breast in female, estrogen receptor positive (H)       Cancer Staging  No matching staging information was found for the patient.   Tolerating radiation therapy well.  All questions and concerns addressed.    No acute side effects  Reviewed skin care    Plan:     Continue radiation treatment as prescribed.  Radiation: Site: L breast (Simultaneous filing. User may not have seen previous data.)  Stereotactic Radiosurgery: No (Simultaneous filing. User may not have seen previous data.)  Stereotactic Radiosurgery: No (Simultaneous filing. User may not have seen previous data.)  Concurrent Therapy: No  Today's Dose: 540 (Simultaneous filing. User may not have seen previous data.)  Total Dose for Breast: 6040 (Simultaneous filing. User may not have seen previous data.)  Today's Fraction/Total Fraction Breast: 3/33 (Simultaneous filing. User may not have seen previous data.)       Has radiaplex for skin care    Subjective:      HPI: Camilla Wilson is a 70 y.o. female with    1. Malignant neoplasm of central portion of left breast in female, estrogen receptor positive (H)       Today prior to treatment as she was early.  She is tolerating radiation well.  No skin irritation.  Using lymphedema compression sleeves bilateral upper extremities.  Questions about seromas which she has previously had drained.       The following portions of the patient's history were reviewed and updated as appropriate: allergies, current medications, past family history, past medical history, past social history, past surgical history and problem list.    Assessment                  Body Site: Breast                           Site: L breast(Simultaneous filing. User may not have seen previous data.)  Stereotactic Radiosurgery: No(Simultaneous filing. User may not have seen previous data.)  Concurrent Therapy:  No  Today's Dose: 540(Simultaneous filing. User may not have seen previous data.)  Total Dose for Breast: 6040(Simultaneous filing. User may not have seen previous data.)  Today's Fraction/Total Fraction Breast: 3/(Simultaneous filing. User may not have seen previous data.)  Drainage: 0: Absent                                            Sexuality Alteration                 Emotional Alteration Copin: Effective  Comfort Alteration KPS: 90% Can perform normal activity, minor signs of disease  Fatigue (ONS scale) : 0: No Fatigue  Pain Location: denies  Hot Flashes and/or Flushes: 0: None   Nutrition Alteration Anorexia: 0: None  Nausea: 0: None  Vomitin: None  Skin Alteration Skin Sensation: 0: No problem  Skin Reaction: 0: None(Radiaplex given with written/verbal instructions)  AUA Assessment                                  Accompanied by       Objective:     Exam:     Vitals:    20 1457   Weight: (!) 256 lb 3.2 oz (116.2 kg)       Wt Readings from Last 8 Encounters:   20 (!) 256 lb 3.2 oz (116.2 kg)   20 (!) 256 lb (116.1 kg)   10/28/20 (!) 259 lb (117.5 kg)   10/22/20 (!) 259 lb 11.2 oz (117.8 kg)   10/15/20 (!) 263 lb (119.3 kg)   09/15/20 (!) 262 lb (118.8 kg)   09/10/20 (!) 262 lb (118.8 kg)   20 (!) 268 lb (121.6 kg)       General: Alert and oriented, in no acute distress  Camilla has no Erythema.  Bilateral upper extremity lymphedema sleeves      Treatment Summary to Date    Aria chart and setup information reviewed    Shayna Hutson MD

## 2021-06-13 NOTE — PROCEDURES
Procedures  Clinical Treatment Planning Note    The complex radiotherapy planning will be completed for the patient to plan the treatment for her breast cancer.  The patient had a planning CT earlier today for planning.  The treatment aids were used for planning, including headrest and Wing Board to help keep the same position during the daily radiation therapy.  The therapy planning is necessary to reduce radiotherapy dose to the normal critical organs which are not possible with simple treatment.  In addition, dose to the target and the critical structures requires three-dimensional analysis of the isodose distribution.  The planning will be done to reduce dose to the lungs, spinal cord, liver, and heart.     I will contour the clinical tumor volume  CTV , with expanded volume of planning treatment volume  PTV  on the treatment planning system.  The critical structures will be outlined, including spinal cord, lungs, heart, liver, bone and soft tissue.     Treatment planning will be done on the computer treatment planning system.  The 4-field will be used to achieve optimal coverage of the target volume.  Dose distribution to the above critical structures will be reviewed.  Isodose distribution along with the X, Y, Z plan will also be reviewed.  Custom blocking will be used to shield normal structures.  The beam s eye views will be reviewed and the digital reconstructed image will be reviewed on the planning software.      The patient will receive 5040 cGy in 28 treatments targeted to the left chest/breast and the regional lymph nodes using 6-MV or 10-MV photons.  An additional 1000 cGy in 5 fractions will be planned to give to the primary tumor bed using electron.      Anna Beckman MD, PhD  Department of Radiation Oncology   [facility]  Tel: [phone]  Page: [phone]    [facility]  [address] [place] [address]     [place] [id number] [place] [address]

## 2021-06-13 NOTE — PROGRESS NOTES
RADIATION ONCOLOGY WEEKLY TREATMENT VISIT NOTE      Assessment / Impression       1. Malignant neoplasm of central portion of left breast in female, estrogen receptor positive (H)       Tolerating radiation therapy well.  All questions and concerns addressed.    Plan:     Continue radiation treatment as prescribed.    Subjective:      HPI: Camilla Wilson is a 70 y.o. female with    1. Malignant neoplasm of central portion of left breast in female, estrogen receptor positive (H)         The following portions of the patient's history were reviewed and updated as appropriate: allergies, current medications, past family history, past medical history, past social history, past surgical history and problem list.    Assessment                  Body Site: Breast                           Site: L CW  Stereotactic Radiosurgery: No  Concurrent Therapy: No  Today's Dose: 1260  Total Dose for Breast: 6040  Today's Fraction/Total Fraction Breast:   Drainage: 0: Absent                                            Sexuality Alteration                 Emotional Alteration Copin: Effective  Comfort Alteration KPS: 90% Can perform normal activity, minor signs of disease  Fatigue (ONS scale) : 2: Mild Fatigue  Pain Location: left lateral CW  Pain Intensity. Rate degree of pain ranging from 0 (no pain) to 10 (severe pain) : 3  Pain Description: Ache - Muscular type ache(mild pressure)  Pain Intervention: 0: None  Hot Flashes and/or Flushes: 1: Mild or no more than 1 per day   Nutrition Alteration Anorexia: 0: None  Nausea: 0: None  Vomitin: None  Skin Alteration Skin Sensation: 0: No problem  Skin Reaction: 1: Faint erythema or dry desquamation  AUA Assessment                                  Accompanied by       Objective:     Exam: Examination reviewed no significant changes.    There were no vitals filed for this visit.    Wt Readings from Last 8 Encounters:   20 (!) 256 lb 3.2 oz (116.2 kg)   20  (!) 256 lb (116.1 kg)   10/28/20 (!) 259 lb (117.5 kg)   10/22/20 (!) 259 lb 11.2 oz (117.8 kg)   10/15/20 (!) 263 lb (119.3 kg)   09/15/20 (!) 262 lb (118.8 kg)   09/10/20 (!) 262 lb (118.8 kg)   08/21/20 (!) 268 lb (121.6 kg)       General: Alert and oriented, in no acute distress  Camilla has mild Erythema.  Aria chart and setup information reviewed    Anna Beckman MD

## 2021-06-13 NOTE — PROGRESS NOTES
S: Camilla came in on 12/22/2020 to be re-measured for knee highs 20-30 mmHg since she just completed lymphedema therapy at [facility]. She also wanted to show me her arm sleeves. RX on-file is current for everything from Dr. Cantor.    A: Camilla showed her Jobst Kathryn Lite arm sleeves on her arms and they are binding in multiple places and rolling down at the silicone topbands. It was discussed how she really needs a flat knit material, likely even custom. She was shown the lower cost option of L&R Solaris Exostrong arm sleeve with the separate gauntlet. She would like to try this first if Dr. Cantor is okay with it, otherwise she will go for the Medi [employer] Espirit arm sleeve. A message was sent to Dr. Cantor and her nurse. Once receiving her input, the L&R sleeves/gauntlets will be ordered to try. Her legs were re-measured today as well for [employer] Activewear knee highs 20-30 mmHg and she is down a size from last time (LFC, Std). She was assisted in donning one pair and once on, they appeared to be fitting well and she stated they were comfortable. She wore one pair home and took a second pair home as well.    P: She is to call with any further needs. She is scheduled for next Wednesday, 12/30 to receive the L&R items since they ship from [place] and have a chance of arriving before the end of the year.  Goal is to maintain a home program.

## 2021-06-14 NOTE — PROGRESS NOTES
Progress Notes by Betzy Rainey PT at 1/13/2021  1:30 PM     Author: Betzy Rainey PT Service: -- Author Type: Physical Therapist    Filed: 8/3/2021 10:45 PM Encounter Date: 1/13/2021 Status: Addendum    : Betzy Rainey PT (Physical Therapist)    Related Notes: Original Note by Betzy Rainey PT (Physical Therapist) filed at 1/13/2021  3:41 PM       [facility] Discharge Summary    Pt was trialing I with HEP x60 days with goals mostly met - see note below. Pt did not return to PT. Will discharge this episode of therapy at this time.  Thank you for this referral!  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 Lymphedema  I89.0    2. Decreased range of motion of left shoulder  M25.612    3. Post-mastectomy lymphedema syndrome  I97.2    4. Localized swelling of both lower legs  R22.43    5. Malignant neoplasm of central portion of left breast (H)  C50.112          Assessment:     Pt has had B UE and LE volume reduction and demo's good ability to be I with don/doffing garments and good compliance with wearing compression.  Pt demo's understanding and importance of continued with lymph HEP long term.    HEP/POC compliance is  good .  Patient demonstrates understanding/independence with home program.  Response to Intervention Great!  Therapy interventions being performed are medically necessary, are being delivered  according to accepted standards of medical practice, and require the skills of a therapist to perform the services.      Goal Status:  Patient will reach / maintain arm movement: Progressing toward  Comment: be able to reach into overhead position to get chemo treatment with pain rating <2/10 (MET)    Patient will have a decreased volume in : bilateral;LE;by 5%;to decrease risk of infection;for better fit of clothing;for improved body image;for ease of movement;in 12 weeks (Met on left, progressing toward on R)    Patient will have decreased fibrosis, scar tissue for improved lymphatic mobilitiy : in 12 weeks;Comment  Comment: improved mastectomy fascial movement before/during/after radiation  Patient will perform, verbalize self-management of: skin care;self-monitoring;exercise;massage;self-compression;compression wear;compression care;infection prevention;in 12 weeks;Progressing toward      Plan / Patient Education:     Pt to attempt I with lymph HEP with exercise programming and B UE and LE compression stockings and sleeves x30 days. If no need to return will discharge to I with home program in 30 days.    Pt to see MD for f/u in 4 weeks. Thank you for this referral!     Authorization / Certification Start Date: 12/14/20  Authorization / Certification End Date: 01/13/21  Authorization / Certification Number of Visits: 4  Communication with: Referral Source  Patient Related Instruction: Nature of Condition;Body mechanics;Posture;Treatment plan and rationale;Precautions;Next steps;Expected outcome;Basis of treatment;Self Care instruction  Times per Week: 1-2  Number of Weeks: 4  Number of Visits: 4  Discharge Planning: when goals are met or pt has reached a plateau in progress  Therapeutic Exercise: ROM;Stretching;Strengthening;Lymphedema  Neuromuscular Reeducation: kinesio tape;posture;balance/proprioception;core  Manual Therapy: soft tissue mobilization;myofascial release;joint mobilization;muscle  "energy;lymphatic drainage massage  Modalities: cold pack;hot pack  Equipment: compression bandages      Subjective:     Pt reports she only has 1 visit of radiation left and then she will just have \"boost\" treatments - pt is not sure what that entails.  The \"boost\" treatment is supposed to be an electron beam which is more focused on the scar versus photon beam which goes all the way through the body.    Pt reports she has been working on her shoulder raises and exercises and feeling pretty good.    Pt has started to have some increased sensitivity and fragileness to skin so she has been using cream to help with this.     Pt did see Susan at [facility] and got new compression sleeves and gauntlets for B UE. Pt is bothered by gauntlets and how often she has to take them off for cleaning her hands so she has been wearing mostly just the sleeve and hands are actually doing fine.    Compression stockings have been doing pretty good - mid shin feels like its a little more swollen but she doesn't know if that is just a better fit with her stockings versus before.    Pain Ratin/10 feeling good      Objective:     Caregiver present: No    Observation of swelling: bing LEs is better. Stable.  Volume measurements taken:  YES  Lymphedema Assessment 2020   Right Upper Extremity Total Estimated Volume (cm3) 3712.44 - - 3364.61   Right UE change of volume from last visit (%) - - - -9.37   Right UE change of volume from initial (%) - - - -9.37   Left Upper Extremity Total Estimated Volume (cm3) 3688.02 - - 3456.33   Left UE change of volume from last visit (%) - - - -6.28   Left UE change of volume from initial (%) - - - -6.28   Swelling Description Right>Left - - Left>Right   Upper Extremity Swelling Comparison (%) 0.66 - - 2.65   Right Lower Extremity Total Estimated Volume (cm3) - 6728.47 6530.61 6354.97   Right LE change of volume from last visit (%) - - -2.94 -2.69   Right LE change of volume " from initial (%) - - -2.94 -5.55   Left Lower Extremity Total Estimated Volume (cm3) - 7431.64 6891.43 6740.68   Left LE change of volume from last visit (%) - - -7.27 -2.19   Left LE change of volume from initial (%) - - -7.27 -9.3   Swelling Description - Left>Right Left>Right Left>Right   Lower Extremity Swelling Comparison (%) - 10.45 5.53 6.07       Compression: no compression on B UE  Tubigrip on LE - took bandages off after  2 days     Medication for infection:  No     MMT: Shoulder ER: R 4/5 L 4-/5    Treatment Today     TREATMENT MINUTES COMMENTS   Evaluation     Self-care/ Home management 42 Significant education and discussion on appropriate fit for possible new stockings with JOBST or medi for 20-30 closed-toe silicone band knee highs and discussed fit of regular versus wide calf. Advised pt to reach out to Susan at [facility] when pt is ready for lighter fabric stocking (currently with thicker, athletic sock that she prefers for winter) to ask professional opinion on fit of the 2 different brands.   Pt advised to continue with B UE sleeves for 1 month after radiation is over and then PRN with heavier activity, flying and potentially during/after higher humidity weather days. Pt will continue with B LE compression stocking wearing long term.  Attempted a nylon assistive device with donning arm sleeves but did not significant improve the ease of donning so decided against it. Pt to continue with use of garden gloves.   Manual therapy 38 Remeasured B UE and LE volumes and discussed progress with changes since initial and last visits. Discussed reasoning for the improvements noted and how bandaging/compression improves lymphatic and venous flow.     Neuromuscular Re-education     Therapeutic Activity     Therapeutic Exercises 6 Emphasized importance of long-term performance with lymph HEP.    Exercises:  Reviewed  Exercise #1: Cervical flex, rotation and SB ROM, shoulder shrugs and scap retract lymph exercises-  verbal review  Comment #1: B shoulder flexion overhead x5-10 reps 1-2x/day  Exercise #2: LE lymph exercises: starting with trunk and bing LEs in standing - verbal review  Comment #2: standing heel raises 10x B  Exercise #3: wand AB and ER supine- can do abd in standing  Comment #3: x10 reps  Exercise #4: supine wand flexion  Comment #4: x10 reps  Exercise #5: standing gastroc stretch 2 x 30 sec hold  Comment #5: shoulder bing ER: 10-30 reps with L1 band  Exercise #6: mini squat with bing UE support 10x 2-3 ec hold         Gait training     Modality__________________                Total 84    Blank areas are intentional and mean the treatment did not include these items.       Betzy Rainey PT, DPT, CLT, OCS  1/13/2021   3:30 PM

## 2021-06-14 NOTE — PROGRESS NOTES
Camilla sent an email asking about an increase in swelling to her calves when she wears her [employer]wear knee highs. She says that her swelling is staying down at her ankles but at her calves are having an increase in swelling during the day that goes away over night. She reported she has an appnt with Dr. Cantor this weekend on Thursday so she will address the problem then. I confirmed she should wear her socks in to her appnt to show Dr. Cantor and ask her take on the issue. Also, she asked about getting thinner stockings this summer and didn't know if she would need to set up an appnt or not. I informed her she can go into [facility] in [place] [address] to get those off the shelf stockings whenever she wants to look at thinner options since I am transitioning out of doing OTS knee highs.

## 2021-06-14 NOTE — PROGRESS NOTES
RADIATION ONCOLOGY WEEKLY TREATMENT VISIT NOTE      Assessment / Impression       1. Malignant neoplasm of central portion of left breast in female, estrogen receptor positive (H)       Tolerating radiation therapy well.  All questions and concerns addressed.    Plan:     Continue radiation treatment as prescribed.    Discussed with patient about appropriate skin care.    Subjective:      HPI: Camilla Wilson is a 70 y.o. female with    1. Malignant neoplasm of central portion of left breast in female, estrogen receptor positive (H)         The following portions of the patient's history were reviewed and updated as appropriate: allergies, current medications, past family history, past medical history, past social history, past surgical history and problem list.    Assessment                  Body Site: Breast                           Site: L CW  Stereotactic Radiosurgery: No  Concurrent Therapy: No  Today's Dose: 3600  Total Dose for Breast: 6040  Today's Fraction/Total Fraction Breast:   Drainage: 0: Absent                                            Sexuality Alteration                 Emotional Alteration Copin: Effective  Comfort Alteration KPS: 80% Can perform normal activity with effort, some signs of disease  Fatigue (ONS scale) : 5: Moderate Fatigue  Pain Location: left lateral CW  Pain Intensity. Rate degree of pain ranging from 0 (no pain) to 10 (severe pain) : 3  Pain Description: Ache - Muscular type ache  Pain Intervention: 0: None  Hot Flashes and/or Flushes: 1: Mild or no more than 1 per day   Nutrition Alteration Anorexia: 0: None  Nausea: 0: None  Vomitin: None  Skin Alteration Skin Sensation: 1:Pruitis  Skin Reaction: 2: Bright erythema  AUA Assessment                                  Accompanied by       Objective:     Exam: moderate Erythema.    Vitals:    21 1154   BP: 154/55   Weight: (!) 256 lb 4.8 oz (116.3 kg)       Wt Readings from Last 8 Encounters:    01/04/21 (!) 256 lb 4.8 oz (116.3 kg)   12/28/20 (!) 257 lb 3.2 oz (116.7 kg)   12/28/20 (!) 257 lb 11.2 oz (116.9 kg)   12/21/20 (!) 256 lb (116.1 kg)   12/07/20 (!) 256 lb 3.2 oz (116.2 kg)   11/19/20 (!) 256 lb (116.1 kg)   10/28/20 (!) 259 lb (117.5 kg)   10/22/20 (!) 259 lb 11.2 oz (117.8 kg)       General: Alert and oriented, in no acute distress  Camilla has moderate Erythema.  Aria chart and setup information reviewed    Anna Beckman MD

## 2021-06-14 NOTE — PROGRESS NOTES
Radiation Treatment Summary    Patient: Camilla Wilson   MRN: [id number]  : 1950  Care Provider: Anna Beckman    Date of Service: 2021        Margarita Cook MD  [facility] [address] [address]            Dear Dr. Cook:     Your patient Mrs. Camilla Wilson completed her radiation therapy on 2021. As you know Ms. Wilson is a 70 y.o. female with a prior history of right breast cancer, early stage, status post lumpectomy sentinel resection followed by postop radiation therapy and 5 years hormone therapy.  The patient was find to have new left breast cancer, pathologic stage T3 N1 aM0, ER/TN positive, HER-2 negative, status post lumpectomy and sentinel resection followed by mastectomy and lymph node dissection with negative margin.  The Oncotype DX score is 13.  The patient received postop radiation therapy for her left breast cancer with a total dose of 5040 cGy in 28 treatments targeted to the left breast/chest wall and regional lymph nodes followed by additional 1000 cGy in 5 fractions to the primary tumor bed using electron.  Her radiation therapy was given from 12/3/2020-2021.  The patient tolerated ration therapy reasonably well with expected acute side effect.  She is scheduled to return to radiation oncology in 4 weeks for routine post therapy office follow-up.    Again, thank you very much for the referral and allowing me to participate in the care of this patient.  If you have any questions or concerns about this consultation, please do not hesitate to call.            Sincerely,      Anna Beckman MD, PhD  Department of Radiation Oncology   [facility]  Tel: [phone]  Page: [phone]    [facility]  [address] [place] [address]     [place] [id number] [facility]   [place] [address]    CC:  Patient Care Team:  Berna Morel MD as PCP - General (Internal Medicine)  Margarita Cook MD as Physician (General Surgery)  Anna Beckman,  MD as Physician (Radiation Oncology)  Princess Laguna MD as Physician (Hematology and Oncology)  Keisha Manjarrez RN as Oncology Nurse Navigator (Hematology and Oncology)  Dyana Quiles CNP as Nurse Practitioner (Hematology and Oncology)  Annette Cantor MD as Physician (Physical Medicine and Rehabilitation)  Berna Morel MD as Assigned PCP  Gemini Rosario MD as Assigned Pulmonology Provider  Anna Beckman MD as Assigned Cancer Care Provider

## 2021-06-14 NOTE — PROGRESS NOTES
RADIATION ONCOLOGY WEEKLY TREATMENT VISIT NOTE      Assessment / Impression       1. Malignant neoplasm of central portion of left breast in female, estrogen receptor positive (H)       Tolerating radiation therapy well with significant skin reaction from radiation therapy.  All questions and concerns addressed.    Plan:     Continue radiation treatment as prescribed.    Discussed with the patient about appropriate skin care.    Subjective:      HPI: Camilla Wilson is a 70 y.o. female with    1. Malignant neoplasm of central portion of left breast in female, estrogen receptor positive (H)         The following portions of the patient's history were reviewed and updated as appropriate: allergies, current medications, past family history, past medical history, past social history, past surgical history and problem list.    Assessment                  Body Site: Breast                           Site: L CW  Stereotactic Radiosurgery: No  Concurrent Therapy: No  Today's Dose: 5440  Total Dose for Breast: 6040  Today's Fraction/Total Fraction Breast: 30/33  Drainage: 0: Absent                                            Sexuality Alteration                 Emotional Alteration Copin: Effective  Comfort Alteration KPS: 80% Can perform normal activity with effort, some signs of disease  Fatigue (ONS scale) : 6: Moderate Fatigue  Pain Location: left lateral CW  Pain Intensity. Rate degree of pain ranging from 0 (no pain) to 10 (severe pain) : 3  Pain Description: Burning - Burning, hot, fire type pain  Pain Intervention: 0: None  Hot Flashes and/or Flushes: 1: Mild or no more than 1 per day   Nutrition Alteration Anorexia: 0: None  Nausea: 0: None  Vomitin: None  Skin Alteration Skin Sensation: 2: Burning  Skin Reaction: 3: Dry desquamation with or without erythema  AUA Assessment                                  Accompanied by       Objective:     Exam: moderate, severe Erythema.    Vitals:    21  1202   Weight: (!) 259 lb 6.4 oz (117.7 kg)       Wt Readings from Last 8 Encounters:   01/18/21 (!) 259 lb 6.4 oz (117.7 kg)   01/11/21 (!) 259 lb 4.8 oz (117.6 kg)   01/04/21 (!) 256 lb 4.8 oz (116.3 kg)   12/28/20 (!) 257 lb 3.2 oz (116.7 kg)   12/28/20 (!) 257 lb 11.2 oz (116.9 kg)   12/21/20 (!) 256 lb (116.1 kg)   12/07/20 (!) 256 lb 3.2 oz (116.2 kg)   11/19/20 (!) 256 lb (116.1 kg)       General: Alert and oriented, in no acute distress  Camilla has moderate, severe Erythema.  Aria chart and setup information reviewed    Anna Beckman MD

## 2021-06-14 NOTE — PROGRESS NOTES
RADIATION ONCOLOGY WEEKLY TREATMENT VISIT NOTE      Assessment / Impression       1. Malignant neoplasm of central portion of left breast in female, estrogen receptor positive (H)       Tolerating radiation therapy well.  All questions and concerns addressed.    Plan:     Continue radiation treatment as prescribed.    Reviewed her dosimetry treatment plan again with the patient.  We also had Richard, our physicist to review her treatment plan with patient.  All questions are answered to patient satisfaction.    Discussed with patient about appropriate skin care.    Subjective:      HPI: Camilla Wilson is a 70 y.o. female with    1. Malignant neoplasm of central portion of left breast in female, estrogen receptor positive (H)         The following portions of the patient's history were reviewed and updated as appropriate: allergies, current medications, past family history, past medical history, past social history, past surgical history and problem list.    Assessment                  Body Site: Breast                           Site: L CW  Stereotactic Radiosurgery: No  Concurrent Therapy: No  Today's Dose: 2880  Total Dose for Breast: 6040  Today's Fraction/Total Fraction Breast: 16/33  Drainage: 0: Absent                                            Sexuality Alteration                 Emotional Alteration Copin: Effective  Comfort Alteration KPS: 90% Can perform normal activity, minor signs of disease  Fatigue (ONS scale) : 3: Mild Fatigue  Pain Location: left lateral CW/axilla  Pain Intensity. Rate degree of pain ranging from 0 (no pain) to 10 (severe pain) : 3  Pain Description: Ache - Muscular type ache  Pain Intervention: 0: None  Hot Flashes and/or Flushes: 1: Mild or no more than 1 per day   Nutrition Alteration Anorexia: 0: None  Nausea: 0: None  Vomitin: None  Skin Alteration Skin Sensation: 1:Pruitis  Skin Reaction: 2: Bright erythema  AUA Assessment                                   Accompanied by       Objective:     Exam: moderate Erythema.    Vitals:    12/28/20 1150   Weight: (!) 257 lb 3.2 oz (116.7 kg)       Wt Readings from Last 8 Encounters:   12/28/20 (!) 257 lb 3.2 oz (116.7 kg)   12/28/20 (!) 257 lb 11.2 oz (116.9 kg)   12/21/20 (!) 256 lb (116.1 kg)   12/07/20 (!) 256 lb 3.2 oz (116.2 kg)   11/19/20 (!) 256 lb (116.1 kg)   10/28/20 (!) 259 lb (117.5 kg)   10/22/20 (!) 259 lb 11.2 oz (117.8 kg)   10/15/20 (!) 263 lb (119.3 kg)       General: Alert and oriented, in no acute distress  Camilla has moderate Erythema.  Aria chart and setup information reviewed    Anna Beckman MD

## 2021-06-14 NOTE — PROGRESS NOTES
S: Camilla came in on 12/31/2020 to try on and take delivery of her L&R Exostrong arm sleeve and separate gauntlets 20-30 mmHg. RX on-file is current from Dr. Cantor.    A:  Camilla was assisted in donning her compression garments. Once on, the sleeves and gauntlets appeared to be fitting well and she stated they were comfortable. She wore them home and received the same instructions for use/care as her last arm sleeve garments.     P: She is to call with any further needs. She knows to call within one week for any issues with the garments.  Goal is to maintain a home program.

## 2021-06-14 NOTE — PROGRESS NOTES
RADIATION ONCOLOGY WEEKLY TREATMENT VISIT NOTE      Assessment / Impression       1. Malignant neoplasm of central portion of left breast in female, estrogen receptor positive (H)       Cancer Staging  No matching staging information was found for the patient.   Tolerating radiation therapy well.  All questions and concerns addressed.  Grade 2 radiation dermatitis  Grade 2 fatigue  Difficulty sleeping    Plan:     Continue radiation treatment as prescribed.  Radiation: Site: L CW  Stereotactic Radiosurgery: No  Stereotactic Radiosurgery: No  Concurrent Therapy: No  Today's Dose: 4500  Total Dose for Breast: 6040  Today's Fraction/Total Fraction Breast: 25/33    Continue current skin care, Aquaphor given  Fatigue challenging at this time, related to treatment as well as difficulty sleeping  Discussed consideration of use of calm or headspace (or other free meditations that can be found online) to assist with meditation/calming of the mind and thoughts, both are available online and as apps to use when difficulty falling asleep at night secondary to racing thoughts    Subjective:      HPI: Camilla Wilson is a 70 y.o. female with    1. Malignant neoplasm of central portion of left breast in female, estrogen receptor positive (H)       She reports increasing skin irritation.  She reports soreness rated 3 out of 10 in intensity primarily in the axilla.  She has not required any treatment for the soreness.  She continues radiaplex for skin care.  No areas of peeling.  She notes some slight pruritus.  She has bilateral lymphedema sleeves which she wears.  She reports fatigue moderate to severe.  She reports sleeping up to 9 to 10 hours a day.  She takes a nap as needed (about an hour).  She at times has difficulty falling asleep at night due to racing thoughts.      The following portions of the patient's history were reviewed and updated as appropriate: allergies, current medications, past family history, past medical  history, past social history, past surgical history and problem list.    Assessment                  Body Site: Breast                           Site: L CW  Stereotactic Radiosurgery: No  Concurrent Therapy: No  Today's Dose: 4500  Total Dose for Breast: 6040  Today's Fraction/Total Fraction Breast: 25/  Drainage: 0: Absent                                            Sexuality Alteration                 Emotional Alteration Copin: Effective  Comfort Alteration KPS: 80% Can perform normal activity with effort, some signs of disease  Fatigue (ONS scale) : 6: Moderate Fatigue  Pain Location: left lateral CW  Pain Intensity. Rate degree of pain ranging from 0 (no pain) to 10 (severe pain) : 3  Pain Description: Burning - Burning, hot, fire type pain  Pain Intervention: 0: None  Hot Flashes and/or Flushes: 1: Mild or no more than 1 per day   Nutrition Alteration Anorexia: 0: None  Nausea: 0: None  Vomitin: None  Skin Alteration Skin Sensation: 2: Burning  Skin Reaction: 2: Bright erythema(Gave Aquaphor today)  AUA Assessment                                  Accompanied by       Objective:     Exam:     Vitals:    21 1212   Weight: (!) 259 lb 4.8 oz (117.6 kg)       Wt Readings from Last 8 Encounters:   21 (!) 259 lb 4.8 oz (117.6 kg)   21 (!) 256 lb 4.8 oz (116.3 kg)   20 (!) 257 lb 3.2 oz (116.7 kg)   20 (!) 257 lb 11.2 oz (116.9 kg)   20 (!) 256 lb (116.1 kg)   20 (!) 256 lb 3.2 oz (116.2 kg)   20 (!) 256 lb (116.1 kg)   10/28/20 (!) 259 lb (117.5 kg)       General: Alert and oriented, in no acute distress  Camilla has moderate Erythema.  No desquamation.  Well-healed surgical incision.  No chest wall lesions.  Bilateral lymphedema sleeves.     Treatment Summary to Date    Aria chart and setup information reviewed    Shayna Hutson MD

## 2021-06-14 NOTE — PROGRESS NOTES
[facility] Rehabilitation Re-Certification Request    December 28, 2020      Patient: Camilla Wilson  MR Number: [id number]  YOB: 1950  Date of Visit: 12/28/2020  Onset Date:  9/14/2020  Date of Eval: 11/5/2020    Dear Dr. Cantor:    As you may recall, we have been seeing Camilla Wilson for Physical Therapy of her lymphedema.    For therapy to continue, Medicare and/or Medicaid requires periodic physician review of the treatment plan. Please review the summary of the patient's progress and our plan for continued therapy, and verify  that you agree therapy should continue by entering certification dates at the bottom of this note and co-signing this note.    Plan of Care  Authorization / Certification Start Date: 12/14/20  Authorization / Certification End Date: 01/13/21  Authorization / Certification Number of Visits: 4  Communication with: Referral Source  Patient Related Instruction: Nature of Condition;Body mechanics;Posture;Treatment plan and rationale;Precautions;Next steps;Expected outcome;Basis of treatment;Self Care instruction  Times per Week: 1-2  Number of Weeks: 4  Number of Visits: 4  Discharge Planning: when goals are met or pt has reached a plateau in progress  Therapeutic Exercise: ROM;Stretching;Strengthening;Lymphedema  Neuromuscular Reeducation: kinesio tape;posture;balance/proprioception;core  Manual Therapy: soft tissue mobilization;myofascial release;joint mobilization;muscle energy;lymphatic drainage massage  Modalities: cold pack;hot pack  Equipment: compression bandages      Goal  Patient will reach / maintain arm movement: Progressing toward  Comment: be able to reach into overhead position to get chemo treatment with pain rating <2/10 (MET)    Patient will have a decreased volume in : bilateral;LE;by 5%;to decrease risk of infection;for better fit of clothing;for improved body image;for ease of movement;in 12 weeks (Met on left, progressing toward on R)  Patient  will have decreased fibrosis, scar tissue for improved lymphatic mobilitiy : in 12 weeks;Comment  Comment: improved mastectomy fascial movement before/during/after radiation  Patient will perform, verbalize self-management of: skin care;self-monitoring;exercise;massage;self-compression;compression wear;compression care;infection prevention;in 12 weeks;Progressing toward        If you have any questions or concerns, please don't hesitate to call.    Sincerely,      Vidhya Hayden, PTA        Physician recommendation:     __X_ Follow therapist's recommendation        ___ Modify therapy      *Physician co-signature indicates they certify the need for these services furnished within this plan and while under their care.         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 Lymphedema  I89.0    2. Decreased range of motion of left shoulder  M25.612    3. Post-mastectomy lymphedema syndrome  I97.2    4. Localized swelling of both lower legs  R22.43    5. Malignant neoplasm of central portion of left breast (H)  C50.112          Assessment:       Pt with visibly decreased swelling today especially her ankles.  Pt continues to  inconsistent with her exercise program.  Pt with new compression stockings. Pt feeling that they are helpful with maintaining the volumes.    HEP/POC compliance is  good .  Therapy interventions being performed are medically necessary, are being delivered according  to accepted standards of medical practice, and require the skills of a therapist to perform the services.      Goal Status:  Patient will reach / maintain arm movement: Progressing toward  Comment: be able to reach into overhead position to get chemo treatment with pain rating <2/10 (MET)    Patient will have a decreased volume in : bilateral;LE;by 5%;to decrease risk of infection;for better fit of clothing;for improved body image;for ease of movement;in 12 weeks (Met on left, progressing toward on R)  Patient will have decreased fibrosis, scar tissue for improved lymphatic mobilitiy : in 12 weeks;Comment  Comment: improved mastectomy fascial movement before/during/after radiation  Patient will perform, verbalize self-management of: skin care;self-monitoring;exercise;massage;self-compression;compression wear;compression care;infection prevention;in 12 weeks;Progressing toward      Plan / Patient Education:     Updated POC to extend visits.     Authorization / Certification Start Date: 12/14/20  Authorization / Certification End Date: 01/13/21  Authorization / Certification Number of Visits: 4  Communication with: Referral Source  Patient Related Instruction: Nature of Condition;Body mechanics;Posture;Treatment plan and rationale;Precautions;Next steps;Expected outcome;Basis of treatment;Self Care instruction  Times per Week: 1-2  Number of Weeks: 4  Number of Visits: 4  Discharge Planning: when goals are met or pt has reached a plateau in progress  Therapeutic Exercise: ROM;Stretching;Strengthening;Lymphedema  Neuromuscular Reeducation: kinesio tape;posture;balance/proprioception;core  Manual Therapy: soft tissue mobilization;myofascial release;joint mobilization;muscle energy;lymphatic drainage massage  Modalities: cold pack;hot pack  Equipment: compression bandages     Continue with MLD and compression wrapping for bing LEs, progress L UE strengthening if needed, treatment of L UE as needed  Will see pt one more  visit. Re measure B LEs at that visit.   Pt will see Etelvina at [facility] on  for her new compression sleeves.    Subjective:    Pt with new compression stockings. (20-30 mmHg) Pt received them last Tuesday.   Pt states her legs are feeling good.   Pt states she has tried to do the exercises.  Pt states she is fatiqued form the radiation.       Pain Ratin       Objective:     Caregiver present: No    Observation of swelling: bing LEs is better. Stable.  Volume measurements taken:  no  Lymphedema Assessment 2020   Right Upper Extremity Total Estimated Volume (cm3) 3712.44 - -   Left Upper Extremity Total Estimated Volume (cm3) 3688.02 - -   Swelling Description Right>Left - -   Upper Extremity Swelling Comparison (%) 0.66 - -   Right Lower Extremity Total Estimated Volume (cm3) - 6728.47 6530.61   Right LE change of volume from last visit (%) - - -2.94   Right LE change of volume from initial (%) - - -2.94   Left Lower Extremity Total Estimated Volume (cm3) - 7431.64 6891.43   Left LE change of volume from last visit (%) - - -7.27   Left LE change of volume from initial (%) - - -7.27   Swelling Description - Left>Right Left>Right   Lower Extremity Swelling Comparison (%) - 10.45 5.53       Compression: no compression on B UE  Tubigrip on LE - took bandages off after  2 days     Medication for infection:  No     MMT: Shoulder ER: R 4/5 L 4-/5    Treatment Today     TREATMENT MINUTES COMMENTS   Evaluation     Self-care/ Home management     Manual therapy 45 -Manual therapy: manual lymph drainage for bilateral lower extremities and left UE  lymphedema  Deep abdominal breath, neck effleurage, short neck, bilateral axilla, left chest, left upper extremity, bilateral inguinal, left lower extremity evacuation, right lower extremity evacuation, abdomen, neck effleurage.    -educated on skin care and self-management of symptoms      Helped don/doff pt's new stockings.       Neuromuscular  Re-education     Therapeutic Activity     Therapeutic Exercises      Exercises:  Reviewed  Exercise #1: Cervical flex, rotation and SB ROM, shoulder shrugs and scap retract lymph exercises- verbal review  Comment #1: B shoulder flexion overhead x5-10 reps 1-2x/day  Exercise #2: LE lymph exercises: starting with trunk and bing LEs in standing - verbal review  Comment #2: standing heel raises 10x B  Exercise #3: wand AB and ER supine- can do abd in standing  Comment #3: x10 reps  Exercise #4: supine wand flexion  Comment #4: x10 reps  Exercise #5: standing gastroc stretch 2 x 30 sec hold  Comment #5: shoulder bing ER: 10-30 reps with L1 band  Exercise #6: mini squat with bing UE support 10x 2-3 ec hold         Gait training     Modality__________________                Total 45    Blank areas are intentional and mean the treatment did not include these items.       Vidhya Hayden PTA,CLT  12/28/2020

## 2021-06-14 NOTE — PROGRESS NOTES
[facility] Progress Note    Patient: Camilla Wilson  MRN: [id number]  Date of Service: 12/28/2020        Reason for Visit    Chief Complaint   Patient presents with     HE Cancer     Malignant neoplasm of nipple of both breasts       Assessment and Plan    T3 N1 M0, stage IIIa left breast cancer status post bilateral mastectomies September 15, 2020  Tumor greater than 5 cm, 2 lymph nodes positive, ER/NC positive, HER-2 negative, Oncotype DX recurrence score of 13  Previous history of right breast cancer status post lumpectomy, radiation and 5 years of exemestane  Family history of cancer    Tolerating exemestane well.  Will continue this and consider 10 years of treatment.    Continue radiation, will complete around 1/20/21.    PET scan negative.    Dexa scan shows low fracture risk.  Continue Calcium/Vit D, will repeat in 2 years.    Will follow-up for genetic testing.    PLAN:  Follow-up 3 months    Measurable Disease:  3 months    Current therapy: Exemestane started around 10/23/2020  Adjuvant radiation started 12/4/2020    Treatment History:    Bilateral mastectomy      ECOG Performance   ECOG Performance Status: 1    Distress Assessment  Distress Assessment Score: No distress    Pain           Problem List    1. Malignant neoplasm of nipple of right breast in female, estrogen receptor positive (H)          CC: Berna Morel MD    ______________________________________________________________________________    History of Present Illness    Ms. Camilla Wilson returns for follow-up. Had PET scan which was negative.  Getting radiation now and is half way through.  Did speak with  and will get testing done soon.  Had bone scan.    Tolerating exemestane with mild hot flashes.    Pain Status  Currently in Pain: No/denies    Review of Systems    Constitutional  Constitutional (WDL): Exceptions to WDL  Fatigue: Fatigue relieved by rest  Neurosensory  Neurosensory (WDL): All  neurosensory elements are within defined limits  Cardiovascular  Cardiovascular (WDL): All cardiovascular elements are within defined limits  Pulmonary  Respiratory (WDL): Within Defined Limits  Gastrointestinal  Gastrointestinal (WDL): All gastrointestinal elements are within defined limits  Genitourinary  Genitourinary (WDL): All genitourinary elements are within defined limits  Integumentary  Integumentary (WDL): All integumentary elements are within defined limits  Patient Coping  Patient Coping: Accepting  Distress Assessment  Distress Assessment Score: No distress  Accompanied by  Accompanied by: Alone    Past History  Past Medical History:   Diagnosis Date     Breast cancer (H) 2000     Breast cyst      GERD (gastroesophageal reflux disease)      Hx of radiation therapy      Moderate persistent asthma without complication 8/14/2020     PONV (postoperative nausea and vomiting)      Uterine fibroid     embolized         Past Surgical History:   Procedure Laterality Date     BREAST BIOPSY Left      BREAST LUMPECTOMY Right 2007     BREAST LUMPECTOMY Left 8/21/2020    Procedure: Left Lumpectomy after Wire Localization: [place] Lymph Node Biopsy;  Surgeon: Margarita Cook MD;  Location: [facility];  Service: General     MASTECTOMY Bilateral 09/14/2020    Dr. Cook     AR EMBOLIZATION UTERINE FIBROID      Description: Uterine Fibroid Embolization;  Proc Date: 09/06/2000;     AR EXCISE BREAST CYST      Description: Breast Surgery Lumpectomy;  Recorded: 12/18/2007;     AR MASTECTOMY, SIMPLE, COMPLETE Bilateral 9/14/2020    Procedure: Bilateral Mastectomies;  Surgeon: Margarita Cook MD;  Location: [facility];  Service: General     AR SHLDR ARTHROSCOP,SURG,W/REMOVAL,LOOSE/FB      Description: Shoulder Arthroscopy;  Recorded: 02/25/2009;  Comments: right 2004; left 2007     US BREAST CORE BIOPSY LEFT Left 7/22/2020     US BREAST LOC W SENT NODE INJ LEFT Left 8/21/2020       Physical Exam    Recent  Vitals 12/28/2020   Height -   Weight 257 lbs 11 oz   BSA (m2) 2.31 m2   /56   Pulse 66   Temp 97.7   Temp src 1   SpO2 100   Some recent data might be hidden       GENERAL: Alert and oriented to time place and person. Seated comfortably. In no distress.    HEAD: Atraumatic and normocephalic.    EYES: MONIQUE, EOMI.  No pallor.  No icterus.    Oral cavity: no mucosal lesion or tonsillar enlargement.    NECK: supple. JVP normal.  No thyroid enlargement.    LYMPH NODES: No palpable, cervical, axillary or inguinal lymphadenopathy.    CHEST: clear to auscultation bilaterally.  Resonant to percussion throughout bilaterally.  Symmetrical breath movements bilaterally.    CVS: S1 and S2 are heard. Regular rate and rhythm.  No murmur or gallop or rub heard.  No peripheral edema.    ABDOMEN: Soft. Not tender. Not distended.  No palpable hepatomegaly or splenomegaly.  No other mass palpable.  Bowel sounds heard.    EXTREMITIES: Warm.    SKIN: no rash, or bruising or purpura.  Has a full head of hair.      Lab Results    No results found for this or any previous visit (from the past 168 hour(s)).    Imaging    Dxa Bone Density Scan    Result Date: 12/12/2020 12/7/2020 RE: Camilla Wilson YOB: 1950 Dear Princess Laguna, Patient Profile: 70 y.o. female, postmenopausal, is here for the first bone density test. History of fractures - None. Family history of osteoporosis - None.  Family history of hip fracture: None. Smoking history - Past. Osteoporosis treatment past -  Yes;  Bisphosphonates. Osteoporosis treatment current - No.  Chronic medical problems - Breast cancer, Chronic low back problems and Radiation treatment. High risk medications -  Aromatase Inhibitor;  Yes, Currently. Assessment: 1. The spine bone density L1-L3 with T-score -1.6. 2. Femoral bone densities show left femoral neck T- score -0.6 and right femoral neck T-score -1.2. 3. Trabecular bone score indicates good trabecular bone  architecture. 70 y.o. female with LOW BONE DENSITY (OSTEOPENIA) and LOW fracture risk, adjusted for the TBS, with major osteoporotic fracture risk 8.0% and hip fracture risk 1.1%. Previous scan was done on a different machine and is not directly comparable with the current study. Recommendations: Appropriate calcium, vitamin D supplements, along with balance and weight bearing exercise recommended with follow up bone density scan in 1 year. Bone densitometry was performed on your patient using our [employer] To Get Approved For densitometer. The results are summarized and a copy of the actual scans are included for your review. In conformity with the International Society of Clinical Densitometry's most recent position statement for DXA interpretation (2015), the diagnosis will be made on the lowest measured T-score of the lumbar spine, femoral neck, total proximal femur or 33% radius. Note the change in terminology for diagnostic classification from OSTEOPENIA to LOW BONE MASS. All trending for sequential exams will be done using multiple vertebrae or the total proximal femur. Fracture risk is based on the WHO Fracture Risk Assessment Tool (FRAX). If additional information is needed or if you would like to discuss the results, please do not hesitate to call me. Thank you for referring this patient to [facility]. We are happy to be of service in support of you and your practice. If you have any questions or suggestions to improve our service, please call me at [phone]. Sincerely, Alpa Ayoub M.D. C.C.D. Osteoporosis Services, [facility]         Signed by: Princess Laguna MD

## 2021-06-14 NOTE — TELEPHONE ENCOUNTER
Called pt for a routine post-radiation follow up call. She states the fatigue is improving, skin is healing as well, ROM okay but a little sore with arm circles. I encouraged her to moisturize well prior to doing ROM exercises but to keep up with them. Confirmed her f/u apt and encouraged calls as needed. She was appreciative of the call.

## 2021-06-15 ENCOUNTER — COMMUNICATION - HEALTHEAST (OUTPATIENT)
Dept: PULMONOLOGY | Facility: OTHER | Age: 71
End: 2021-06-15

## 2021-06-15 DIAGNOSIS — J45.909 UNCOMPLICATED ASTHMA, UNSPECIFIED ASTHMA SEVERITY, UNSPECIFIED WHETHER PERSISTENT: ICD-10-CM

## 2021-06-15 RX ORDER — CETIRIZINE HYDROCHLORIDE 10 MG/1
10 TABLET ORAL DAILY
Qty: 30 TABLET | Refills: 0 | Status: SHIPPED | OUTPATIENT
Start: 2021-06-15 | End: 2021-07-19

## 2021-06-15 NOTE — TELEPHONE ENCOUNTER
Telephoned and spoke with Camilla to check and inquire about her needs.  She is aware of her follow up appointments with Dr Beckman and Dr Laguna and verbalized understanding of those upcoming appointments.    Prior to her diagnosis she had been lap swimming two to three time per week for fitness.  This past Sunday she did return to the pool and found her self deconditioned to a certain extent.  The muscles on her left side were sore during the extend and draw element of the pull through the water.  She is concerned about enforcement of physical distancing and masking at her gym so may not go back regularly until after she has had COVID-19 vaccine.    She had been doing the range of motion exercises but now has returned to daily activities she feels replicate those.  She has continued to perform the lymphedema practices Dr. Cantor shared with her.  We did talk about [employer].    Other than hot flashes she continues to tolerate hormonal therapy.  Support and encouragement provided and calls welcomed. Keisha Manjarrez RN

## 2021-06-15 NOTE — PATIENT INSTRUCTIONS - HE
Pulmonary concerns: nasal congestion, chest congestion, shortness of breath    Plan:    Continue with budesonide inhaler for now -- we will readdress this in 6 months.    Keep working on better nasal congestion control:    Nasal washes (neti pot or similar) at least once daily    Flonase (fluticasone nasal spray) 2 sprays into each nostril once a day every day (AFTER the nasal rinse)    Cetirizine allergy pill once a day (try taking it in the morning)    You should follow up in 6 months -- at that time we can reassess whether we should try a different inhaler for your breathing.     If you have any questions or concerns, please, call our clinic at [phone].     Our clinic does not have and does not distribute the COVID-19 vaccine. For information on eligibility and to schedule an appointment, please, go to: [email or web address]    It is very important to use good technique when using inhalers. To review correct inhaler technique, consider using YouTube to look for demonstrations. You should exercise common sense when looking for videos -- best and most informative inhaler videos come from health care organizations and/or healthcare providers. Make sure you watch the video for you specific type of inhaler.

## 2021-06-15 NOTE — PROGRESS NOTES
Camilla Wilson is a 70 y.o. female who is being evaluated via a billable telephone visit.      What phone number would you like to be contacted at? [phone]  How would you like to obtain your AVS? AVS Preference: Lizy.

## 2021-06-15 NOTE — PROGRESS NOTES
"Pulmonary Clinic Visit -- TELEPHONE  2/18/2021     Camilla Wilson is a 70 y.o. non-smoker w/ most pertinent h/o breast cancer, asthma, environmental allergies, & PND, being seen for follow up of asthma. Most recently seen on 8/14/20 for discussion of the same. At that time we switched from budesonide neb to inhaler, started daily cetirizine.     Reports that she is doing well -- cold has not been an issue since she is staying at home. Things seem to be going well w/ her breathing. Has not had much change in her lungs -- feels the same. She got some hypoallergenic mattress topper/sheets & pillows. She saw an allergist in 1991 -- apparently had a negative scratch test at the time. She continues to have a chronic stuffy nose -- does not use Flonase regularly. She takes cetirizine every evening. Her breathing feels \"fine\". Lungs do not feel \"totally clear\". Budesonide inhaler has not been more effective than others, but it has been the easiest one to use (we tried Symbicort & Flovent).     REVIEW OF SYSTEMS: 10-point ROS was negative with exceptions as detailed above.  MEDICAL HISTORY:  has a past medical history of Breast cancer (H) (2000), Breast cyst, GERD (gastroesophageal reflux disease), radiation therapy, Moderate persistent asthma without complication (8/14/2020), PONV (postoperative nausea and vomiting), and Uterine fibroid. She also has no past medical history of Diabetes mellitus (H), Family history of malignant hyperthermia, Hard to intubate, History of anesthesia complications, History of transfusion, Malignant hyperthermia due to anesthesia, or Sleep apnea.  SURGICAL HISTORY:  has a past surgical history that includes pr shldr arthroscop,surg,w/removal,loose/fb; pr embolization uterine fibroid; Breast biopsy (Left); pr excise breast cyst; Breast lumpectomy (Right, 2007); US Breast Core Biopsy Left (Left, 7/22/2020); US Breast Localization With [place] Node Inj Left (Left, 8/21/2020); Breast lumpectomy " (Left, 8/21/2020); pr mastectomy, simple, complete (Bilateral, 9/14/2020); and Mastectomy (Bilateral, 09/14/2020).  SOCIAL HISTORY:  reports that she quit smoking about 38 years ago. She has a 7.50 pack-year smoking history. She has never used smokeless tobacco. She reports current alcohol use of about 5.0 standard drinks of alcohol per week. She reports that she does not use drugs.  FAMILY HISTORY: family history includes Breast cancer in her maternal aunt; Cancer in her cousin and cousin; Colon cancer in her maternal aunt; Hyperlipidemia in her father; Kidney disease in her mother; Pneumonia in her father.  MEDICATIONS: personally reviewed, including EMR/Care Everywhere. Pertinent information noted & updated.     ALLERGIES:   Allergies   Allergen Reactions     Lipitor [Atorvastatin]      Memory  Impairment         Labs: personally reviewed & interpreted in EMR.   Imaging & Procedures: personally reviewed & interpreted, including formal Radiology reports in the EMR.    Assessment/Plan:  #. Asthma, currently on budesonide inhaler.   #. PND, on Flonase, cetirizine, nasal rinses.       Plan to get her nasal congestion under better control at this time -- will use Flonase, nasal rinses more regularly    In 6 months, we will re-start focusing on her inhaler regimen    RTC in 6 months    Telephone call duration -- 19 minutes    Gemini Rosario MD  Pulmonary and Critical Care

## 2021-06-15 NOTE — PATIENT INSTRUCTIONS - HE
"To order compression socks over the counter such as through [email or web address] or other online site:     Compression needed:  20-30 mmHg    Length:  knee high    Toe Piece:  open toe or closed toe    Measures:        Inches (in)  Centimeters (cm)    Ankle  R - 10.5   L - 10.8  R - 26.7   L - 27.5   Calf (Widest Part)  R - 19.5   L - 20.7  R - 49.5   L - 52.5             Swelling in the legs can be caused by many reasons. No matter what the reason, treatment usually includes some type of compression. You should wear your compression socks as much as you can. Your compression should be put on first thing in the morning. Take the compression off at night. It is especially important to wear them with long periods of sitting/standing, long car rides or if you will be flying. Going without compression for even brief periods of time can be damaging to your legs and your health.  Compression socks should get replaced usually every 4-6 months. They do not need to be worn at night while in bed. If we have seen you in the last year, we can refill your sock prescription, otherwise your primary care provider is able to refill them for you. Call us with any problems or questions.   Compression Velcro may need to be replaced every 9-12 months.  Often the liners and socks need to be replaced every three or four months.  The neoprene velcro may last up to 2 years.  If the velcro becomes worn a tailor may be able to repair it for you inexpensively.    If you do a lot of standing, it is good to do calf raises to help keep the blood pumping. If you sit a lot at work, it is good to get up periodically to walk around. Elevation of the foot of your bed 4-6\" helps the blood return back to where it is needed.   Please call us if you have any questions [phone]    Thank you for choosing [facility].  "

## 2021-06-15 NOTE — PROGRESS NOTES
[facility] Radiation Oncology Follow Up     Patient: Camilla Wilson  MRN: [id number]  Date of Service: 02/26/2021       DISEASE TREATED:  A prior history of right breast cancer, early stage, status post lumpectomy sentinel resection followed by postop radiation therapy and 5 years hormone therapy.  The patient was find to have new left breast cancer, pathologic stage T3 N1 aM0, ER/AK positive, HER-2 negative, status post lumpectomy and sentinel resection followed by mastectomy and lymph node dissection with negative margin.  The Oncotype DX score is 13.       TYPE OF TO THE RIGHT BREAST ADMINISTERED:    1.  Post operative radiation therapy to the right breast in 2003.    2. Postop radiation therapy for her left breast cancer with a total dose of 5040 cGy in 28 treatments targeted to the left breast/chest wall and regional lymph nodes followed by additional 1000 cGy in 5 fractions to the primary tumor bed using electron.  Her radiation therapy was given from 12/3/2020-1/21/2021.      INTERVAL SINCE COMPLETION OF RADIATION THERAPY: 1 month.      SUBJECTIVE:  Ms. Wilson is a 70 y.o. female who is well-known to radiation oncology.  The patient had a history of right breast cancer diagnosed initially in 2003.  The patient is status post lumpectomy sentinel resection followed by adjuvant radiation therapy and hormonal therapy.  The patient had a 6 weeks of radiation therapy to the right breast region only.  I do not have copy of the radiation record at the time of evaluation.  Patient has been doing well with no evidence of cancer recurrence.     Patient presented with a recent history of abnormal finding by routine screening mammogram for which she was a seeking further evaluation.  The mammogram followed by ultrasound reviewed a 1.3 x 1.1 x 1.1 cm irregular mass of the left breast at 3 o'clock position 6 cm from nipple suspicious for malignancy.  She underwent ultrasound-guided core biopsy on 7/22/2020 with pathology  showed invasive lobular carcinoma.  ER/MO positive and HER-2 negative.  After discussed with the patient about various treatment options, patient had to proceed with breast preservation protocol as her treatment choice and underwent left breast lumpectomy and sentinel resection on 8/21/2020.  The pathology reviewed 65 x 28 x 25 mm invasive lobular carcinoma, [place] grade 2 with multiple area of positive margin.  Four removed left sentinel node showed no evidence of metastatic disease.  Given the pathological finding, the patient proceeded with bilateral simple mastectomy and left axillary lymph node resection on 9/14/2020.  The pathology showed 12 mm residual lobular carcinoma with negative margin.  The closest margin was measured 3 mm deep.  There was also associated lobular carcinoma in situ.  2/5 removed axillary lymph nodes showed evidence of metastatic disease with the largest metastatic deposit measured 2.5 mm and no evidence of extra capsular extension.  Her Oncotype DX score is 13.  Postoperatively she has been recovering well. The patient received postop radiation therapy for her left breast cancer with a total dose of 5040 cGy in 28 treatments targeted to the left breast/chest wall and regional lymph nodes followed by additional 1000 cGy in 5 fractions to the primary tumor bed using electron.  Her radiation therapy was given from 12/3/2020-1/21/2021.  The patient tolerated ration therapy reasonably well with expected acute side effect.     The patient has been gradually recovering well since completion of radiation therapy.  She still has some fatigue and axillary swelling at the time of evaluation.  Patient is otherwise denies any other significant discomfort.  She is here for routine postradiation therapy telephone office follow-up.    Medications were reviewed and are up to date on EPIC.    The following portions of the patient's history were reviewed and updated as appropriate: allergies, current  medications, past family history, past medical history, past social history, past surgical history and problem list.    Review of Systems:      General  Constitutional (WDL): Exceptions to WDL  Fatigue: Fatigue relieved by rest  Hot flashes/Night Sweats: Moderate symptoms, interferes with ADL  EENT  Eye Disorder (WDL): All eye disorder elements are within defined limits  Ear Disorder (WDL): All ear disorder elements are within defined limits  Respiratory   Respiratory (WDL): Within Defined Limits  Cardiovascular  Cardiovascular (WDL): All cardiovascular elements are within defined limits  Endocrine     Gastrointestinal  Gastrointestinal (WDL): All gastrointestinal elements are within defined limits  Musculoskeletal  Musculoskeletal and Connetive Tissue Disorders (WDL): Exceptions to WDL(low back pain, sciatica as well L)  Arthralgia: Mild pain  Muscle Weakness : Symptomatic, perceived by patient but not evident on physical exam(LLE weakness, chronic, with sciatica)  Integumentary               Integumentary (WDL): Exceptions to WDL(chest skin still pink but improving)  Neurological  Neurosensory (WDL): All neurosensory elements are within defined limits  Psychological/Emotional   Patient Coping: Accepting  Hematological/Lymphatic  Lymph (WDL): Exceptions to WDL  Lymphedema: Trace thickening or faint discoloration  Dermatologic     Genitourinary/Reproductive  Genitourinary (WDL): All genitourinary elements are within defined limits  Reproductive     Pain              Currently in Pain: Yes  Pain Frequency: Intermittent  Accompanied by  Accompanied by: Alone   Impression     The patient is a 70 y.o. female with a prior history of right breast cancer, early stage, status post lumpectomy sentinel resection followed by postop radiation therapy and 5 years hormone therapy.  The patient was find to have new left breast cancer, pathologic stage T3 N1 aM0, ER/ND positive, HER-2 negative, status post lumpectomy and sentinel  resection followed by mastectomy and lymph node dissection with negative margin.  The Oncotype DX score is 13. The patient received postop radiation therapy for her left breast cancer with a total dose of 5040 cGy in 28 treatments targeted to the left breast/chest wall and regional lymph nodes followed by additional 1000 cGy in 5 fractions to the primary tumor bed using electron.  Her radiation therapy was given from 12/3/2020-1/21/2021.  The patient has been recovering well.    Assessment & Plan:     1.  The patient will continue her long-term follow-up and ongoing care for her breast cancer with Dr. Laguna as planned.    2.  Follow-up with radiation oncology as needed.      Face to face time  15 minutes with > 100% spent on consultation, education and coordination of care.        Anna Beckman MD, PhD  Department of Radiation Oncology   [facility]  Tel: [phone]  Page: [phone]    [facility]  [address] [place] [address]     [facility]   [address]   [place] [address]    CC:  Patient Care Team:  Berna Morel MD as PCP - General (Internal Medicine)  Margarita Cook MD as Physician (General Surgery)  Anna Beckman MD as Physician (Radiation Oncology)  Princess Laguna MD as Physician (Hematology and Oncology)  Keisha Manjarrez RN as Oncology Nurse Navigator (Hematology and Oncology)  Dyana Quiles CNP as Nurse Practitioner (Hematology and Oncology)  Gemini Rosario MD as Assigned Pulmonology Provider  Annette Cantor MD as Physician (Physical Medicine and Rehabilitation)  Anna Beckman MD as Assigned Cancer Care Provider  Berna Morel MD as Assigned PCP

## 2021-06-15 NOTE — PROGRESS NOTES
Assessment and Plan:       1. Routine general medical examination at a health care facility  Camilla Wilson is a 67-year-old woman here for annual wellness visit.  She is obese with BMI of 44, has chronic right hamstring origin pain, especially when going upstairs.  I recommended swimming 3 times a week (currently only once a week), eating more fruits and vegetables per day.  I also mentioned that the lower extremity swelling is likely related to the obesity.  I did offer compression stockings which would go above the knee and not cut into her skin.  She is not ready for this, she does have some below-knee compression stockings which work okay.    2. Encounter for screening mammogram for malignant neoplasm of breast  Mammogram due in March.  She has history of breast cancer.  She does get a breast exam through OB/GYN, so [facility] or the other clinic she establishes care with.  - Mammo Screening Bilateral; Future    3. Encounter for screening for diabetes mellitus  She has history of prediabetes, recheck today  - Glucose; Future    4. Encounter for screening for lipoid disorders  LDL cholesterol is about 160 last check, she did have some memory problems which were blamed on atorvastatin previously.  I sent a message to the pharmacist asking about this, also I will give her my best recommendation on whether or not cholesterol-lowering medication is indicated, whether or not we can try a statin or other such as Zetia.  - Lipid [place], FASTING; Future    5. Postmenopausal  She has noticed a decrease in her height by about 1.5 inches, she had a lowest T score of -1.3 in the spine in 2014, plan to recheck.  - DXA Bone Density Scan; Future    6. Hamstring tendonitis of right thigh  Chronic issue, right gluteal pain with going upstairs.  She has been to physical therapy with marginal benefit.  The Feldenkrais seems to help the best, though sometimes this is transient.    7. Cat bite,  initial encounter  She has been in the left hand by a cat, no signs of cellulitis, this is been 3 days already since the initial bite.  I do not think she needs to be treated.  Counseled on signs/symptoms of cellulitis.    8. Malignant neoplasm of female breast  See #2, history of breast cancer not treated.    The patient's current medical problems were reviewed.    I have had an Advance Directives discussion with the patient.  The following high BMI interventions were performed this visit: encouragement to exercise and prescribed dietary intake  The following health maintenance schedule was reviewed with the patient and provided in printed form in the after visit summary:   Health Maintenance   Topic Date Due     DXA SCAN  09/26/2016     COLONOSCOPY  12/17/2018     FALL RISK ASSESSMENT  01/31/2019     MAMMOGRAM  03/22/2019     ADVANCE DIRECTIVES DISCUSSED WITH PATIENT  05/06/2020     TD 18+ HE  08/15/2021     PNEUMOCOCCAL POLYSACCHARIDE VACCINE AGE 65 AND OVER  Completed     INFLUENZA VACCINE RULE BASED  Completed     PNEUMOCOCCAL CONJUGATE VACCINE FOR ADULTS (PCV13 OR PREVNAR)  Completed     ZOSTER VACCINE  Completed        Subjective:   Chief Complaint: Camilla Wilson is an 67 y.o. female here for an Annual Wellness visit.   HPI:  Camilla Wilson is here for annual wellness visit.  He tells me that she has a history of breast cancer, she is looking for a new OB/GYN.  She was at [facility], but plans on moving to [facility], so the commute would be too far.    Intermittently, she has gas pains and belching, had negative workup.  She wanted to just make sure there is nothing dangerous.    She has a history of uterine fibroids and embolization, residual abnormality which can be seen in scans.  This has been noted in the chart.    She still has intermittent issues with the right hamstring, pain when she goes upstairs.  She also mentions that she has some pedal edema.  She intermittently uses  below-knee compression stockings but they cut into the skin.    She had a cat bite 3 days ago from a foster cat.  She has been watching this For 9 months, the cat has not been behaving abnormally.  She does not think the cat has had rabies vaccination, but is certain the cat does not have rabies.  I did  her on watching for any abnormal behavior, at which time she would need to initiate rabies vaccine series and have the cat checked for rabies.    I counseled her on fruit and vegetable intake, since she is not taking in at least 4 servings a day.  She also has been lax on exercise.  Ideally, she would like to swim 3 times a week.  She is only swimming once a week at present.    She will be due for colon cancer screening at the end of this year.  She does mention that her height decreased 1.5 inches, she is interested in bone density scan.    She mentioned that she had some memory loss with atorvastatin, seemed to resolve after stopping it.  She is not certain if this was the culprit.    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    Review of Systems:  Please see above.  The rest of the review of systems are negative for all systems.    Patient Care Team:  Willie Austin DO as PCP - General (Internal Medicine)     Patient Active Problem List   Diagnosis     Breast Cancer     Dyslipidemia     Asymptomatic Postmenopausal Status     Spinal Stenosis     Obesity     Osteopenia     GERD (gastroesophageal reflux disease)     Skin cancer     Past Medical History:   Diagnosis Date     Acute streptococcal pharyngitis      Breast cancer      Hx of radiation therapy       Past Surgical History:   Procedure Laterality Date     BREAST BIOPSY Left      OK EMBOLIZATION UTERINE FIBROID      Description: Uterine Fibroid Embolization;  Proc Date: 09/06/2000;     OK EXCISE BREAST CYST      Description: Breast Surgery Lumpectomy;  Recorded: 12/18/2007;     OK SHLDR  ARTHROSCOP,SURG,W/REMOVAL,LOOSE/FB      Description: Shoulder Arthroscopy;  Recorded: 02/25/2009;  Comments: right 2004; left 2007      Family History   Problem Relation Age of Onset     Colon cancer Maternal Aunt      Breast cancer Maternal Aunt      40s     Kidney disease Mother      Pneumonia Father      Hyperlipidemia Father       Social History     Social History     Marital status: Single     Spouse name: N/A     Number of children: N/A     Years of education: N/A     Occupational History     Not on file.     Social History Main Topics     Smoking status: Former Smoker     Quit date: 5/6/1982     Smokeless tobacco: Never Used     Alcohol use Not on file     Drug use: Not on file     Sexual activity: Not on file     Other Topics Concern     Not on file     Social History Narrative      Current Outpatient Prescriptions   Medication Sig Dispense Refill     5-hydroxytryptophan (5-HTP) 100 mg cap Take 2 capsules by mouth daily. 1000mg       calcium citrate 250 mg calcium Tab Take 500 mg by mouth 2 (two) times a day.        cholecalciferol, vitamin D3, (VITAMIN D3) 2,000 unit cap Take 1 capsule by mouth daily.        melatonin 5 mg cap 1 cap nightly  0     niacin (SLO-NIACIN) 500 mg ER tablet Take 2,000 mg by mouth.       OMEGA-3/DHA/EPA/FISH OIL (FISH OIL-OMEGA-3 FATTY ACIDS) 300-1,000 mg capsule Take 2 g by mouth daily.       omeprazole (PRILOSEC) 20 MG capsule Take 1 capsule (20 mg total) by mouth daily. 180 capsule 1     peg 400-propylene glycol (SYSTANE) 0.4-0.3 % Drop Administer 1 drop to both eyes 4 (four) times a day as needed.       pyridoxine (VITAMIN B-6) 100 MG tablet Take 100 mg by mouth daily.        s-adenosylmethionine (AISHA-E, ENTERIC COATED,) 200 mg TbEC Take 2 tablets by mouth daily.       sodium fluoride-pot nitrate (PREVIDENT 5000 SENSITIVE) 1.1-5 % Pste Take by mouth daily.        vit C,Z-Ef-zzmvz-lutein-zeaxan (OCUVITE LUTEIN & ZEAXANTHIN) 60 mg-30 unit- 15 mg-2 mg-6 mg cap Take by mouth.    "    No current facility-administered medications for this visit.       Objective:   Vital Signs:   Visit Vitals     /72 (Patient Site: Left Arm, Patient Position: Sitting, Cuff Size: Adult Large)     Pulse 68     Ht 5' 4.5\" (1.638 m)     Wt (!) 264 lb (119.7 kg)     BMI 44.62 kg/m2        VisionScreening:  No exam data present     PHYSICAL EXAM  General: alert, no distress  HEENT: sclerae anicteric, moist oral mucosa  Heart: Regular rate and rhythm, no murmurs.  No pretibial edema.  Warm extremities  Lungs: Clear to auscultation bilat  Gastrointestinal: abdomen is soft, non-tender, non-distended.    Skin: warm/dry, no rashes  Neuro: no gross abnormalities  Breast exam: Deferred to OB/GYN       Assessment Results 1/31/2018   Activities of Daily Living No help needed   Instrumental Activities of Daily Living No help needed   Get Up and Go Score -   Mini Cog Total Score 5   Some recent data might be hidden     A Mini-Cog score of 0-2 suggests the possibility of dementia, score of 3-5 suggests no dementia    Identified Health Risks:     She is at risk for lack of exercise and has been provided with information to increase physical activity for the benefit of her well-being.  The patient was counseled and encouraged to consider modifying their diet and eating habits. She was provided with information on recommended healthy diet options.  Information regarding advance directives (living sosa), including where she can download the appropriate form, was provided to the patient via the AVS.       "

## 2021-06-16 PROBLEM — C50.911: Status: ACTIVE | Noted: 2020-10-15

## 2021-06-16 PROBLEM — M79.89 LEG SWELLING: Status: ACTIVE | Noted: 2021-02-11

## 2021-06-16 PROBLEM — D25.1 INTRAMURAL, SUBMUCOUS, AND SUBSEROUS LEIOMYOMA OF UTERUS: Status: ACTIVE | Noted: 2020-09-09

## 2021-06-16 PROBLEM — I97.2 POST-MASTECTOMY LYMPHEDEMA SYNDROME: Status: ACTIVE | Noted: 2021-02-11

## 2021-06-16 PROBLEM — M24.512: Status: ACTIVE | Noted: 2021-02-11

## 2021-06-16 PROBLEM — D25.2 INTRAMURAL, SUBMUCOUS, AND SUBSEROUS LEIOMYOMA OF UTERUS: Status: ACTIVE | Noted: 2020-09-09

## 2021-06-16 PROBLEM — M24.511: Status: ACTIVE | Noted: 2021-02-11

## 2021-06-16 PROBLEM — C50.112 MALIGNANT NEOPLASM OF CENTRAL PORTION OF LEFT BREAST IN FEMALE, ESTROGEN RECEPTOR POSITIVE (H): Status: ACTIVE | Noted: 2020-08-10

## 2021-06-16 PROBLEM — Z85.3 PERSONAL HISTORY OF BREAST CANCER: Status: ACTIVE | Noted: 2020-09-04

## 2021-06-16 PROBLEM — Z00.00 HEALTH MAINTENANCE EXAMINATION: Status: ACTIVE | Noted: 2020-09-09

## 2021-06-16 PROBLEM — J45.40 MODERATE PERSISTENT ASTHMA WITHOUT COMPLICATION: Status: ACTIVE | Noted: 2020-08-14

## 2021-06-16 PROBLEM — D25.0 INTRAMURAL, SUBMUCOUS, AND SUBSEROUS LEIOMYOMA OF UTERUS: Status: ACTIVE | Noted: 2020-09-09

## 2021-06-16 PROBLEM — I87.8 VENOUS STASIS OF BOTH LOWER EXTREMITIES: Status: ACTIVE | Noted: 2021-02-11

## 2021-06-16 PROBLEM — Z17.0 MALIGNANT NEOPLASM OF CENTRAL PORTION OF LEFT BREAST IN FEMALE, ESTROGEN RECEPTOR POSITIVE (H): Status: ACTIVE | Noted: 2020-08-10

## 2021-06-16 PROBLEM — E66.01 MORBID OBESITY (H): Status: ACTIVE | Noted: 2019-11-14

## 2021-06-16 NOTE — PROGRESS NOTES
"S: Patient was seen in [place] to be measured for a \"breast prosthesis\". However, a modified RX will be requested from Dr. Laguna because the patient had a bilateral mastectomy and also needs post-mastectomy bras.    O: Goal is to evaluate and measure patient for a mastectomy garment that will provide her comfort and support. In addition, we will be getting her breast forms to provide shape and symmetry.    A: Camilla liked the  Leisure bra and 132 Active bra so both of these will be ordered in the appropriate size for her to be a C cup. Additionally, a variety of breast forms and swim forms will be ordered in for Camilla to try out. Order submitted for fabrication to [facility].  "

## 2021-06-16 NOTE — PROGRESS NOTES
[facility] Hematology and Oncology Progress Note    Patient: Camilla Wilson  MRN: [id number]  Date of Service: 03/29/2021        Reason for Visit    Chief Complaint   Patient presents with     HE Cancer     Malignant neoplasm of nipple of right breast       Assessment and Plan    T3 N1 M0, stage IIIa left breast cancer status post bilateral mastectomies September 15, 2020  Tumor greater than 5 cm, 2 lymph nodes positive, ER/AZ positive, HER-2 negative, Oncotype DX recurrence score of 13  Previous history of right breast cancer status post lumpectomy, radiation and 5 years of exemestane  Family history of cancer, met with but declined genetic testing    Doing well with no recurrence of breast cancer.  We will continue exemestane.  We will do follow-up in 3 months.    Bone density shows low fracture risk.  Continue calcium with vitamin D.    Prescription sent for prosthesis.  Blood pressure rechecked.    PLAN:  Follow-up 3 months    Measurable Disease:  3 months    Current therapy: Exemestane started around 10/23/2020  Adjuvant radiation started 12/4/2020    Treatment History:    Bilateral mastectomy      ECOG Performance   ECOG Performance Status: 1    Distress Assessment  Distress Assessment Score: No distress    Pain           Problem List    1. Malignant neoplasm of nipple of right breast in female, unspecified estrogen receptor status (H)          CC: Berna Morel MD    ______________________________________________________________________________    History of Present Illness    Ms. Camilla Wilson returns for follow-up.  Seen 3 months ago.  Continues exemestane with some hot flashes.  Hiccups related to hot liquids may be due to hiatal hernia.  No headaches, shortness of breath or cough.  ECOG status is 0.      Pain Status  Currently in Pain: No/denies    Review of Systems    Constitutional  Constitutional (WDL): Exceptions to WDL  Fatigue: Fatigue relieved by rest  Neurosensory  Neurosensory (WDL):  All neurosensory elements are within defined limits  Cardiovascular  Cardiovascular (WDL): All cardiovascular elements are within defined limits  Pulmonary  Respiratory (WDL): Within Defined Limits  Gastrointestinal  Gastrointestinal (WDL): All gastrointestinal elements are within defined limits(Hot liquids create spasms (hiccups))  Genitourinary  Genitourinary (WDL): All genitourinary elements are within defined limits  Integumentary  Integumentary (WDL): All integumentary elements are within defined limits  Patient Coping  Patient Coping: Accepting  Distress Assessment  Distress Assessment Score: No distress  Accompanied by  Accompanied by: Alone    Past History  Past Medical History:   Diagnosis Date     Breast cancer (H) 2000     Breast cyst      GERD (gastroesophageal reflux disease)      Hx of radiation therapy      Moderate persistent asthma without complication 8/14/2020     PONV (postoperative nausea and vomiting)      Uterine fibroid     embolized         Past Surgical History:   Procedure Laterality Date     BREAST BIOPSY Left      BREAST LUMPECTOMY Right 2007     BREAST LUMPECTOMY Left 8/21/2020    Procedure: Left Lumpectomy after Wire Localization: [place] Lymph Node Biopsy;  Surgeon: Margarita Cook MD;  Location: [facility];  Service: General     MASTECTOMY Bilateral 09/14/2020    Dr. Cook     RI EMBOLIZATION UTERINE FIBROID      Description: Uterine Fibroid Embolization;  Proc Date: 09/06/2000;     RI EXCISE BREAST CYST      Description: Breast Surgery Lumpectomy;  Recorded: 12/18/2007;     RI MASTECTOMY, SIMPLE, COMPLETE Bilateral 9/14/2020    Procedure: Bilateral Mastectomies;  Surgeon: Margarita Cook MD;  [place] [facility] [place]: [place]     [place] SHLDR ARTHROSCOP,SURG,W/REMOVAL,LOOSE/FB      Description: Shoulder Arthroscopy;  Recorded: 02/25/2009;  Comments: right 2004; left 2007     US BREAST CORE BIOPSY LEFT Left 7/22/2020     US BREAST LOC W SENT NODE INJ LEFT Left  8/21/2020       Physical Exam    Recent Vitals 3/29/2021   Height -   Weight 267 lbs 5 oz   BSA (m2) 2.36 m2   /90   Pulse 80   Temp 98.4   Temp src 1   SpO2 96   Some recent data might be hidden       GENERAL: Alert and oriented to time place and person. Seated comfortably. In no distress.    HEAD: Atraumatic and normocephalic.    EYES: MONIQUE, EOMI.  No pallor.  No icterus.    Oral cavity: no mucosal lesion or tonsillar enlargement.    NECK: supple. JVP normal.  No thyroid enlargement.    LYMPH NODES: No palpable, cervical, axillary or inguinal lymphadenopathy.    CHEST: clear to auscultation bilaterally.  Resonant to percussion throughout bilaterally.  Symmetrical breath movements bilaterally.    CVS: S1 and S2 are heard. Regular rate and rhythm.  No murmur or gallop or rub heard.  No peripheral edema.    Breast: Status post bilateral mastectomies with no recurrence.  No palpable adenopathy.    ABDOMEN: Soft. Not tender. Not distended.  No palpable hepatomegaly or splenomegaly.  No other mass palpable.  Bowel sounds heard.    EXTREMITIES: Warm.    SKIN: no rash, or bruising or purpura.  Has a full head of hair.      Lab Results    No results found for this or any previous visit (from the past 168 hour(s)).    Imaging    No results found.      Signed by: Princess Laguna MD

## 2021-06-16 NOTE — PROGRESS NOTES
Patient seen in clinic today for follow-up of breast cancer.    Deaj Odom RN, Oncology Clinic   [facility]

## 2021-06-17 NOTE — PROGRESS NOTES
S: Camilla's [employer] bras and breast forms are in MW. She came in for delivery on 4/27/2021. RX on-file is current from Dr. Princess Laguna.    A: Camilla was assisted in trying on the mastectomy bras and breast forms. She found two bras that she really liked. They fit well and she stated they were comfortable. She went home with the  Leisure Bra (size large, C/D) in beige and pink, as well as the [employer] breast form style 1031in a size 9. Camilla was instructed on the uses of the garments and how to care for the items.    P: She is to call with any further needs. Camilla wants me to call her and ship out the [employer] Active Bra when it is in in June since it is on backorder still.  Goal is to maintain a home program.

## 2021-06-17 NOTE — PATIENT INSTRUCTIONS - HE
Patient Instructions by Willie Austin DO at 10/16/2019 11:05 AM     Author: Willie Austin DO Service: -- Author Type: Physician    Filed: 10/16/2019 11:31 AM Encounter Date: 10/16/2019 Status: Addendum    : Willie Austin DO (Physician)    Related Notes: Original Note by Willie Austin DO (Physician) filed at 10/16/2019 11:15 AM       Recommend x-ray chest, lung function tests    Labs today    Consider seeing foot doctor      Patient Education     Exercise for a Healthier Heart  You may wonder how you can improve the health of your heart. If youre thinking about exercise, youre on the right track. You dont need to become an athlete, but you do need a certain amount of brisk exercise to help strengthen your heart. If you have been diagnosed with a heart condition, your doctor may recommend exercise to help stabilize your condition. To help make exercise a habit, choose safe, fun activities.       Be sure to check with your health care provider before starting an exercise program.    Why exercise?  Exercising regularly offers many healthy rewards. It can help you do all of the following:    Improve your blood cholesterol levels to help prevent further heart trouble    Lower your blood pressure to help prevent a stroke or heart attack    Control diabetes, or reduce your risk of getting this disease    Improve your heart and lung function    Reach and maintain a healthy weight    Make your muscles stronger and more limber so you can stay active    Prevent falls and fractures by slowing the loss of bone mass (osteoporosis)    Manage stress better  Exercise tips  Ease into your routine. Set small goals. Then build on them.  Exercise on most days. Aim for a total of 150 or more minutes of moderate to  vigorous intensity activity each week. Consider 40 minutes, 3 to 4 times a week. For best results, activity should last for 40 minutes on average. It is OK to work up to the 40 minute  period over time. Examples of moderate-intensity activity is walking one mile in 15 minutes or 30 to 45 minutes of yard work.  Step up your daily activity level. Along with your exercise program, try being more active throughout the day. Walk instead of drive. Do more household tasks or yard work.  Choose one or more activities you enjoy. Walking is one of the easiest things you can do. You can also try swimming, riding a bike, or taking an exercise class.  Stop exercising and call your doctor if you:    Have chest pain or feel dizzy or lightheaded    Feel burning, tightness, pressure, or heaviness in your chest, neck, shoulders, back, or arms    Have unusual shortness of breath    Have increased joint or muscle pain    Have palpitations or an irregular heartbeat      4159-6820 [employer]. [address] [place] [address]. All rights reserved. This information is not intended as a substitute for professional medical care. Always follow your healthcare professional's instructions.         Patient Education   Understanding [employer] MyPlate  The USDA (US Department of Agriculture) has guidelines to help you make healthy food choices. These are called MyPlate. MyPlate shows the food groups that make up healthy meals using the image of a place setting. Before you eat, think about the healthiest choices for what to put onto your plate or into your cup or bowl. To learn more about building a healthy plate, visit www.choosemyplate.gov.       The Food Groups    Fruits: Any fruit or 100% fruit juice counts as part of the Fruit Group. Fruits may be fresh, canned, frozen, or dried, and may be whole, cut-up, or pureed. Make half your plate fruits and vegetables.    Vegetables: Any vegetable or 100% vegetable juice counts as a member of the Vegetable Group. Vegetables may be fresh, frozen, canned, or dried. They can be served raw or cooked and may be whole, cut-up, or mashed. Make half your plate fruits and  vegetables.     Grains: All foods made from grains are part of the Grains Group. These include wheat, rice, oats, cornmeal, and barley such as bread, pasta, oatmeal, cereal, tortillas, and grits. Grains should be no more than a quarter of your plate. At least half of your grains should be whole grains.    Protein: This group includes meat, poultry, seafood, beans and peas, eggs, processed soy products (like tofu), nuts (including nut butters), and seeds. Make protein choices no more than a quarter of your plate. Meat and poultry choices should be lean or low fat.    Dairy: All fluid milk products and foods made from milk that contain calcium, like yogurt and cheese are part of the Dairy Group. (Foods that have little calcium, such as cream, butter, and cream cheese, are not part of the group.) Most dairy choices should be low-fat or fat-free.    Oils: These are fats that are liquid at room temperature. They include canola, corn, olive, soybean, and sunflower oil. Foods that are mainly oil include mayonnaise, certain salad dressings, and soft margarines. You should have only 5 to 7 teaspoons of oils a day. You probably already get this much from the food you eat.  Use [employer] to Help Build Your Meals  The [employer] can help you plan and track your meals and activity. You can look up individual foods to see or compare their nutritional value. You can get guidelines for what and how much you should eat. You can compare your food choices. And you can assess personal physical activities and see ways you can improve. Go to www.Adskomplate.gov/supertracker/.    3834-7799 [employer]. [address] [place] [address], [place] [address]. All rights reserved. This information is not intended as a substitute for professional medical care. Always follow your healthcare professional's instructions.           Patient Education   Urinary Incontinence, Female (Adult)  Urinary incontinence means loss of control of the  bladder. This problem affects many women, especially as they get older. If you have incontinence, you may be embarrassed to ask for help. But know that this problem can be treated.  Types of Incontinence  There are different types of incontinence. Two of the main types are described here. You can have more than one type.    Stress incontinence. With this type, urine leaks when pressure (stress) is put on the bladder. This may happen when you cough, sneeze, or laugh. Stress incontinence most often occurs because the pelvic floor muscles that support the bladder and urethra are weak. This can happen after pregnancy and vaginal childbirth or a hysterectomy. It can also be due to excess body weight or hormone changes.    Urge incontinence (also called overactive bladder). With this type, a sudden urge to urinate is felt often. This may happen even though there may not be much urine in the bladder. The need to urinate often during the night is common. Urge incontinence most often occurs because of bladder spasms. This may be due to bladder irritation or infection. Damage to bladder nerves or pelvic muscles, constipation, and certain medicines can also lead to urge incontinence.  Treatment of urinary incontinence depends on the cause. Further evaluation is needed to find the type you have. This will likely include an exam and certain tests. Based on the results, you and your healthcare provider can then plan treatment. Until a diagnosis is made, the home care tips below can help relieve symptoms.  Home care    Do pelvic floor muscle exercises, if they are prescribed. The pelvic floor muscles help support the bladder and urethra. Many women find that their symptoms improve when doing special exercises that strengthen these muscles. To do the exercises contract the muscles you would use to stop your stream of urine, but do this when youre not urinating. Hold for 10 seconds, then relax. Repeat 10 to 20 times in a row, at  least 3 times a day. Your provider may give you other instructions for how to do the exercises and how often.    Keep a bladder diary. This helps track how often and how much you urinate over a set period of time. Bring this diary with you to your next visit with the provider. The information can help your provider learn more about your bladder problem.    Lose weight, if advised to by your provider. Excess weight puts pressure on the bladder. Your provider can help you create a weight-loss plan thats right for you. This may include exercising more and making certain diet changes.    Don't consume foods and drinks that may irritate the bladder. These can include alcohol and caffeinated drinks.    Quit smoking. Smoking and other tobacco use can lead to chronic cough that strains the pelvic floor muscles. Smoking may also damage the bladder and urethra. Talk with your provider about treatments or methods you can use to quit smoking.    If drinking large amounts of fluid causes you to have symptoms, you may be advised to limit your fluid intake. You may also be advised to drink most of your fluids during the day and to limit fluids at night.    If youre worried about urine leakage or accidents, you may wear absorbent pads to catch urine. Change the pads often. This helps reduce discomfort. It may also reduce the risk of skin or bladder infections.  Follow-up care  Follow up with your healthcare provider, or as directed. It may take some to find the right treatment for your problem. Your treatment plan may include special therapies or medicines. Certain procedures or surgery may also be options. Be sure to discuss any questions you have with your provider.  When to seek medical advice  Call the healthcare provider right away if any of these occur:    Fever of 100.4 F (38 C) or higher, or as directed by your provider    Bladder pain or fullness    Abdominal swelling    Nausea or vomiting    Back pain    Weakness,  dizziness or fainting  Date Last Reviewed: 10/1/2017    3159-8887 [employer]. [address] [place] [address]. All rights reserved. This information is not intended as a substitute for professional medical care. Always follow your healthcare professional's instructions.     Patient Education   Preventing Falls in the Home  As you get older, falls are more likely. Thats because your reaction time slows. Your muscles and joints may also get stiffer, making them less flexible. Illness, medications, and vision changes can also affect your balance. A fall could leave you unable to live on your own. To make your home safer, follow these tips:    Floors    Put nonskid pads under area rugs.    Remove throw rugs.    Replace worn floor coverings.    Tack carpets firmly to each step on carpeted stairs. Put nonskid strips on the edges of uncarpeted stairs.    Keep floors and stairs free of clutter and cords.    Arrange furniture so there are clear pathways.    Clean up any spills right away.    Bathrooms    Install grab bars in the tub or shower.    Apply nonskid strips or put a nonskid rubber mat in the tub or shower.    Sit on a bath chair to bathe.    Use bathmats with nonskid backing.    Lighting    Keep a flashlight in each room.    Put a nightlight along the pathway between the bedroom and the bathroom.    3609-5181 [employer]. [address] [place] [address]. All rights reserved. This information is not intended as a substitute for professional medical care. Always follow your healthcare professional's instructions.         Patient Education   Understanding Advance Care Planning  Advance care planning is the process of deciding ones own future medical care. It helps ensure that if you cant speak for yourself, your wishes can still be carried out. The plan is a series of legal documents that note a persons wishes. The documents vary by state. Advance care planning may be  done when a person has a serious illness that is expected to get worse. It may be done before major surgery. And it can help you and your family be prepared in case of a major illness or injury. Advance care planning helps with making decisions at these times.       A health care proxy is a person who acts as the voice of a patient when the patient cant speak for himself or herself. The name of this role varies by state. It may be called a Durable Medical Power of  or Durable Power of  for Healthcare. It may be called an agent, surrogate, or advocate. Or it may be called a representative or decision maker. It is an official duty that is identified by a legal document. The document also varies by state.    Why Is Advance Care Planning Important?  If a person communicates their healthcare wishes:    They will be given medical care that matches their values and goals.    Their family members will not be forced to make decisions in a crisis with no guidance.  Creating a Plan  Making an advance care plan is often done in 3 steps:    Thinking about ones wishes. To create an advance care plan, you should think about what kind of medical treatment you would want if you lose the ability to communicate. Are there any situations in which you would refuse or stop treatment? Are there therapies you would want or not want? And whom do you want to make decisions for you? There are many places to learn more about how to plan for your care. Ask your doctor or  for resources.    Picking a health care proxy. This means choosing a trusted person to speak for you only when you cant speak for yourself. When you cannot make medical decisions, your proxy makes sure the instructions in your advance care plan are followed. A proxy does not make decisions based on his or her own opinions. They must put aside those opinions and values if needed, and carry out your wishes.    Filling out the legal documents. There  are several kinds of legal documents for advance care planning. Each one tells health care providers your wishes. The documents may vary by state. They must be signed and may need to be witnessed or notarized. You can cancel or change them whenever you wish. Depending on your state, the documents may include a Healthcare Proxy form, Living Will, Durable Medical Power of , Advance Directive, or others.  The Familys Role  The best help a family can give is to support their loved ones wishes. Open and honest communication is vital. Family should express any concerns they have about the patients choices while the patient can still make decisions.    8660-2894 [employer]. [address] [place] [address]. All rights reserved. This information is not intended as a substitute for professional medical care. Always follow your healthcare professional's instructions.         Also, [facility] [place] offers a free, downloadable health care directive that allows you to share your treatment choices and personal preferences if you cannot communicate your wishes. It also allows you to appoint another person (called a health care agent) to make health care decisions if you are unable to do so. You can download an advance directive by going here: [email or web address]     Patient Education   Personalized Prevention Plan  You are due for the preventive services outlined below.  Your care team is available to assist you in scheduling these services.  If you have already completed any of these items, please share that information with your care team to update in your medical record.  Health Maintenance   Topic Date Due   ? ZOSTER VACCINES (2 of 3) 03/02/2012   ? MEDICARE ANNUAL WELLNESS VISIT  01/31/2019   ? INFLUENZA VACCINE RULE BASED (1) 08/01/2019   ? FALL RISK ASSESSMENT  02/28/2020   ? DXA SCAN  03/26/2020   ? MAMMOGRAM  05/03/2021   ? TD 18+ HE  08/15/2021   ?  COLONOSCOPY  04/04/2024   ? ADVANCE CARE PLANNING  07/10/2024   ? HEPATITIS C SCREENING  Completed   ? PNEUMOCOCCAL IMMUNIZATION 65+ LOW/MEDIUM RISK  Completed

## 2021-06-18 NOTE — PATIENT INSTRUCTIONS - HE
Patient Instructions by Betzy Rainey PT at 11/4/2020  2:30 PM     Author: Betzy Rainey PT Service: -- Author Type: Physical Therapist    Filed: 11/4/2020  3:38 PM Encounter Date: 11/4/2020 Status: Signed    : Betzy Rainey PT (Physical Therapist)       LYMPHEDEMA LEG EXERCISE PROGRAM  Perform all exercises in order 5-10 reps 1-2x/day    NECK                    ARMS

## 2021-06-18 NOTE — PATIENT INSTRUCTIONS - HE
Patient Instructions by Chantel Harvey PT at 12/11/2020  1:30 PM     Author: Chantel Harvey PT Service: -- Author Type: Physical Therapist    Filed: 12/11/2020  1:47 PM Encounter Date: 12/11/2020 Status: Addendum    : Chantel Harvey PT (Physical Therapist)    Related Notes: Original Note by Chantel Harvey PT (Physical Therapist) filed at 12/11/2020  1:42 PM        ELASTIC BAND BILATERAL EXTERNAL ROTATION    While holding an elastic band with your elbows bent, pull your hands away from your stomach area. Keep  your elbows near the side of your body.    10-30 reps, 1-2 times     Start with the yellow band and can work up to the orange band        SQUATS - MINI SQUAT - TABLE    While standing with feet shoulder width apart and in front of a stable support for balance assist if needed, bend your knees and lower your body towards the floor. Your body weight should mostly be directed through the heels of your feet. Return to a standing position.     Knees should bend in line with the 2nd toe and not pass the front of the foot.    Hold 3-5 sec, 5-10 reps, 1-2 times

## 2021-06-18 NOTE — PATIENT INSTRUCTIONS - HE
Patient Instructions by Berna Morel MD at 11/19/2020  9:00 AM     Author: Berna Morel MD Service: -- Author Type: Physician    Filed: 11/19/2020  9:24 AM Encounter Date: 11/19/2020 Status: Addendum    : Berna Morel MD (Physician)    Related Notes: Original Note by Berna Morel MD (Physician) filed at 11/19/2020  9:07 AM         Patient Education     Exercise for a Healthier Heart  You may wonder how you can improve the health of your heart. If youre thinking about exercise, youre on the right track. You dont need to become an athlete, but you do need a certain amount of brisk exercise to help strengthen your heart. If you have been diagnosed with a heart condition, your doctor may recommend exercise to help stabilize your condition. To help make exercise a habit, choose safe, fun activities.       Be sure to check with your health care provider before starting an exercise program.    Why exercise?  Exercising regularly offers many healthy rewards. It can help you do all of the following:    Improve your blood cholesterol levels to help prevent further heart trouble    Lower your blood pressure to help prevent a stroke or heart attack    Control diabetes, or reduce your risk of getting this disease    Improve your heart and lung function    Reach and maintain a healthy weight    Make your muscles stronger and more limber so you can stay active    Prevent falls and fractures by slowing the loss of bone mass (osteoporosis)    Manage stress better  Exercise tips  Ease into your routine. Set small goals. Then build on them.  Exercise on most days. Aim for a total of 150 or more minutes of moderate to  vigorous intensity activity each week. Consider 40 minutes, 3 to 4 times a week. For best results, activity should last for 40 minutes on average. It is OK to work up to the 40 minute period over time. Examples of moderate-intensity activity is walking one mile in 15 minutes or 30 to  45 minutes of yard work.  Step up your daily activity level. Along with your exercise program, try being more active throughout the day. Walk instead of drive. Do more household tasks or yard work.  Choose one or more activities you enjoy. Walking is one of the easiest things you can do. You can also try swimming, riding a bike, or taking an exercise class.  Stop exercising and call your doctor if you:    Have chest pain or feel dizzy or lightheaded    Feel burning, tightness, pressure, or heaviness in your chest, neck, shoulders, back, or arms    Have unusual shortness of breath    Have increased joint or muscle pain    Have palpitations or an irregular heartbeat      4549-5817 [employer]. [address] [place] [address], [place] [address]. All rights reserved. This information is not intended as a substitute for professional medical care. Always follow your healthcare professional's instructions.         Patient Education   Understanding wripl MyPlate  The USDA (US Department of Agriculture) has guidelines to help you make healthy food choices. These are called MyPlate. MyPlate shows the food groups that make up healthy meals using the image of a place setting. Before you eat, think about the healthiest choices for what to put onto your plate or into your cup or bowl. To learn more about building a healthy plate, visit www.choosemyplate.gov.       The Food Groups    Fruits: Any fruit or 100% fruit juice counts as part of the Fruit Group. Fruits may be fresh, canned, frozen, or dried, and may be whole, cut-up, or pureed. Make half your plate fruits and vegetables.    Vegetables: Any vegetable or 100% vegetable juice counts as a member of the Vegetable Group. Vegetables may be fresh, frozen, canned, or dried. They can be served raw or cooked and may be whole, cut-up, or mashed. Make half your plate fruits and vegetables.     Grains: All foods made from grains are part of the Grains Group. These include wheat,  rice, oats, cornmeal, and barley such as bread, pasta, oatmeal, cereal, tortillas, and grits. Grains should be no more than a quarter of your plate. At least half of your grains should be whole grains.    Protein: This group includes meat, poultry, seafood, beans and peas, eggs, processed soy products (like tofu), nuts (including nut butters), and seeds. Make protein choices no more than a quarter of your plate. Meat and poultry choices should be lean or low fat.    Dairy: All fluid milk products and foods made from milk that contain calcium, like yogurt and cheese are part of the Dairy Group. (Foods that have little calcium, such as cream, butter, and cream cheese, are not part of the group.) Most dairy choices should be low-fat or fat-free.    Oils: These are fats that are liquid at room temperature. They include canola, corn, olive, soybean, and sunflower oil. Foods that are mainly oil include mayonnaise, certain salad dressings, and soft margarines. You should have only 5 to 7 teaspoons of oils a day. You probably already get this much from the food you eat.  Use [employer] to Help Build Your Meals  The [employer] can help you plan and track your meals and activity. You can look up individual foods to see or compare their nutritional value. You can get guidelines for what and how much you should eat. You can compare your food choices. And you can assess personal physical activities and see ways you can improve. Go to www.Panayaplate.gov/Kiipcker/.    2626-8481 [employer]. [address] [place] [address], [place] [address]. All rights reserved. This information is not intended as a substitute for professional medical care. Always follow your healthcare professional's instructions.           Patient Education   Urinary Incontinence, Female (Adult)  Urinary incontinence means loss of control of the bladder. This problem affects many women, especially as they get older. If you have incontinence, you  may be embarrassed to ask for help. But know that this problem can be treated.  Types of Incontinence  There are different types of incontinence. Two of the main types are described here. You can have more than one type.    Stress incontinence. With this type, urine leaks when pressure (stress) is put on the bladder. This may happen when you cough, sneeze, or laugh. Stress incontinence most often occurs because the pelvic floor muscles that support the bladder and urethra are weak. This can happen after pregnancy and vaginal childbirth or a hysterectomy. It can also be due to excess body weight or hormone changes.    Urge incontinence (also called overactive bladder). With this type, a sudden urge to urinate is felt often. This may happen even though there may not be much urine in the bladder. The need to urinate often during the night is common. Urge incontinence most often occurs because of bladder spasms. This may be due to bladder irritation or infection. Damage to bladder nerves or pelvic muscles, constipation, and certain medicines can also lead to urge incontinence.  Treatment of urinary incontinence depends on the cause. Further evaluation is needed to find the type you have. This will likely include an exam and certain tests. Based on the results, you and your healthcare provider can then plan treatment. Until a diagnosis is made, the home care tips below can help relieve symptoms.  Home care    Do pelvic floor muscle exercises, if they are prescribed. The pelvic floor muscles help support the bladder and urethra. Many women find that their symptoms improve when doing special exercises that strengthen these muscles. To do the exercises contract the muscles you would use to stop your stream of urine, but do this when youre not urinating. Hold for 10 seconds, then relax. Repeat 10 to 20 times in a row, at least 3 times a day. Your provider may give you other instructions for how to do the exercises and how  often.    Keep a bladder diary. This helps track how often and how much you urinate over a set period of time. Bring this diary with you to your next visit with the provider. The information can help your provider learn more about your bladder problem.    Lose weight, if advised to by your provider. Excess weight puts pressure on the bladder. Your provider can help you create a weight-loss plan thats right for you. This may include exercising more and making certain diet changes.    Don't consume foods and drinks that may irritate the bladder. These can include alcohol and caffeinated drinks.    Quit smoking. Smoking and other tobacco use can lead to chronic cough that strains the pelvic floor muscles. Smoking may also damage the bladder and urethra. Talk with your provider about treatments or methods you can use to quit smoking.    If drinking large amounts of fluid causes you to have symptoms, you may be advised to limit your fluid intake. You may also be advised to drink most of your fluids during the day and to limit fluids at night.    If youre worried about urine leakage or accidents, you may wear absorbent pads to catch urine. Change the pads often. This helps reduce discomfort. It may also reduce the risk of skin or bladder infections.  Follow-up care  Follow up with your healthcare provider, or as directed. It may take some to find the right treatment for your problem. Your treatment plan may include special therapies or medicines. Certain procedures or surgery may also be options. Be sure to discuss any questions you have with your provider.  When to seek medical advice  Call the healthcare provider right away if any of these occur:    Fever of 100.4 F (38 C) or higher, or as directed by your provider    Bladder pain or fullness    Abdominal swelling    Nausea or vomiting    Back pain    Weakness, dizziness or fainting  Date Last Reviewed: 10/1/2017    7735-5196 [employer]. [address] [place] [address]  Road, [place] [address]. All rights reserved. This information is not intended as a substitute for professional medical care. Always follow your healthcare professional's instructions.     Patient Education   Understanding Advance Care Planning  Advance care planning is the process of deciding ones own future medical care. It helps ensure that if you cant speak for yourself, your wishes can still be carried out. The plan is a series of legal documents that note a persons wishes. The documents vary by state. Advance care planning may be done when a person has a serious illness that is expected to get worse. It may be done before major surgery. And it can help you and your family be prepared in case of a major illness or injury. Advance care planning helps with making decisions at these times.       A health care proxy is a person who acts as the voice of a patient when the patient cant speak for himself or herself. The name of this role varies by state. It may be called a Durable Medical Power of  or Durable Power of  for Healthcare. It may be called an agent, surrogate, or advocate. Or it may be called a representative or decision maker. It is an official duty that is identified by a legal document. The document also varies by state.    Why Is Advance Care Planning Important?  If a person communicates their healthcare wishes:    They will be given medical care that matches their values and goals.    Their family members will not be forced to make decisions in a crisis with no guidance.  Creating a Plan  Making an advance care plan is often done in 3 steps:    Thinking about ones wishes. To create an advance care plan, you should think about what kind of medical treatment you would want if you lose the ability to communicate. Are there any situations in which you would refuse or stop treatment? Are there therapies you would want or not want? And whom do you want to make decisions for you? There are  many places to learn more about how to plan for your care. Ask your doctor or  for resources.    Picking a health care proxy. This means choosing a trusted person to speak for you only when you cant speak for yourself. When you cannot make medical decisions, your proxy makes sure the instructions in your advance care plan are followed. A proxy does not make decisions based on his or her own opinions. They must put aside those opinions and values if needed, and carry out your wishes.    Filling out the legal documents. There are several kinds of legal documents for advance care planning. Each one tells health care providers your wishes. The documents may vary by state. They must be signed and may need to be witnessed or notarized. You can cancel or change them whenever you wish. Depending on your state, the documents may include a Healthcare Proxy form, Living Will, Durable Medical Power of , Advance Directive, or others.  The Familys Role  The best help a family can give is to support their loved ones wishes. Open and honest communication is vital. Family should express any concerns they have about the patients choices while the patient can still make decisions.    6886-4238 [employer]. [address] [place] [address]. All rights reserved. This information is not intended as a substitute for professional medical care. Always follow your healthcare professional's instructions.         Also, [facility] offers a free, downloadable health care directive that allows you to share your treatment choices and personal preferences if you cannot communicate your wishes. It also allows you to appoint another person (called a health care agent) to make health care decisions if you are unable to do so. You can download an advance directive by going here: [email or web address]     Patient Education   Personalized Prevention Plan  You are due  for the preventive services outlined below.  Your care team is available to assist you in scheduling these services.  If you have already completed any of these items, please share that information with your care team to update in your medical record.  Health Maintenance   Topic Date Due   ? ZOSTER VACCINES (2 of 3) 03/02/2012   ? DXA SCAN  03/26/2020   ? TD 18+ HE  08/15/2021   ? ASTHMA ACTION PLAN  05/08/2021   ? Asthma Control Test  11/19/2021   ? MEDICARE ANNUAL WELLNESS VISIT  11/19/2021   ? FALL RISK ASSESSMENT  11/19/2021   ? MAMMOGRAM  07/15/2022   ? COLORECTAL CANCER SCREENING  04/04/2024   ? LIPID  10/16/2024   ? ADVANCE CARE PLANNING  10/16/2024   ? HEPATITIS C SCREENING  Completed   ? Pneumococcal Vaccine: Pediatrics (0 to 5 Years) and At-Risk Patients (6 to 64 Years)  Completed   ? Pneumococcal Vaccine: 65+ Years  Completed   ? INFLUENZA VACCINE RULE BASED  Completed   ? HEPATITIS B VACCINES  Aged Out        Patient Education     Back Care Tips    Caring for your back  These are things you can do to prevent a recurrence of acute back pain and to reduce symptoms from chronic back pain:    Maintain a healthy weight. If you are overweight, losing weight will help most types of back pain.    Exercise is an important part of recovery from most types of back pain. The muscles behind and in front of the spine support the back. This means strengthening both the back muscles and the abdominal muscles will provide better support for your spine.     Swimming and brisk walking are good overall exercises to improve your fitness level.    Practice safe lifting methods (below).    Practice good posture when sitting, standing and walking. Avoid prolonged sitting. This puts more stress on the lower back than standing or walking.    Wear quality shoes with sufficient arch support. Foot and ankle alignment can affect back symptoms. Women should avoid wearing high heels.    Therapeutic massage can help relax the back  muscles without stretching them.    During the first 24 to 72 hours after an acute injury or flare-up of chronic back pain, apply an ice pack to the painful area for 20 minutes and then remove it for 20 minutes, over a period of 60 to 90 minutes, or several times a day. As a safety precaution, do not use a heating pad at bedtime. Sleeping on a heating pad can lead to skin burns or tissue damage.    You can alternate ice and heat therapies.  Medicines  Talk to your healthcare provider before using medicines, especially if you have other medical problems or are taking other medicines.    You may use acetaminophen or ibuprofen to control pain, unless your healthcare provider prescribed other pain medicine. If you have chronic conditions like diabetes, liver or kidney disease, stomach ulcers, or gastrointestinal bleeding, or are taking blood thinners, talk with your healthcare provider before taking any medicines.    Be careful if you are given prescription pain medicines, narcotics, or medicine for muscle spasm. They can cause drowsiness, affect your coordination, reflexes, and judgment. Do not drive or operate heavy machinery while taking these types of medicines. Take prescription pain medicine only as prescribed by your healthcare provider.  Lumbar stretch  Here is a simple stretching exercise that will help relax muscle spasm and keep your back more limber. If exercise makes your back pain worse, dont do it.    Lie on your back with your knees bent and both feet on the ground.    Slowly raise your left knee to your chest as you flatten your lower back against the floor. Hold for 5 seconds.    Relax and repeat the exercise with your right knee.    Do 10 of these exercises for each leg.  Safe lifting method    Dont bend over at the waist to lift an object off the floor.  Instead, bend your knees and hips in a squat.     Keep your back and head upright    Hold the object close to your body, directly in front of  you.    Straighten your legs to lift the object.     Lower the object to the floor in the reverse fashion.    If you must slide something across the floor, push it.  Posture tips  Sitting  Sit in chairs with straight backs or low-back support. Keep your knees lower than your hips, with your feet flat on the floor.  When driving, sit up straight. Adjust the seat forward so you are not leaning toward the steering wheel.  A small pillow or rolled towel behind your lower back may help if you are driving long distances.   Standing  When standing for long periods, shift most of your weight to one leg at a time. Alternate legs every few minutes.   Sleeping  The best way to sleep is on your side with your knees bent. Put a low pillow under your head to support your neck in a neutral spine position. Avoid thick pillows that bend your neck to one side. Put a pillow between your legs to further relax your lower back. If you sleep on your back, put pillows under your knees to support your legs in a slightly flexed position. Use a firm mattress. If your mattress sags, replace it, or use a 1/2-inch plywood board under the mattress to add support.  Follow-up care  Follow up with your healthcare provider, or as advised.  If X-rays, a CT scan or an MRI scan were taken, they will be reviewed by a radiologist. You will be notified of any new findings that may affect your care.  Call 911  Call 911 if any of the following occur:    Trouble breathing    Confusion    Very drowsy    Fainting or loss of consciousness    Rapid or very slow heart rate    Loss of  bowel or bladder control  When to seek medical advice  Call your healthcare provider right away if any of the following occur:    Pain becomes worse or spreads to your arms or legs    Weakness or numbness in one or both arms or legs    Numbness in the groin area  Date Last Reviewed: 6/1/2016 2000-2017 [employer]. [address] [facility], [place] [address]. All rights  reserved. This information is not intended as a substitute for professional medical care. Always follow your healthcare professional's instructions.           Patient Education     Back Pain (Acute or Chronic)    Back pain is one of the most common problems. The good news is that most people feel better in 1 to 2 weeks, and most of the rest in 1 to 2 months. Most people can remain active.  People who have pain describe it differently--not everyone is the same.    The pain can be sharp, stabbing, shooting, aching, cramping or burning.    Movement, standing, bending, lifting, sitting, or walking may worsen pain.    It can be localized to one spot or area, or it can be more generalized.    It can spread or radiate upwards, to the front, or go down your arms or legs (sciatica).    It can cause muscle spasm.  Most of the time, mechanical problems with the muscles or spine cause the pain. Mechanical problems are usually caused by an injury to the muscles or ligaments. While illness can cause back pain, it is usually not caused by a serious illness. Mechanical problems include:     Physical activity such as sports, exercise, work, or normal activity    Overexertion, lifting, pushing, pulling incorrectly or too aggressively    Sudden twisting, bending, or stretching from an accident, or accidental movement    Poor posture    Stretching or moving wrong, without noticing pain at the time    Poor coordination, lack of regular exercise (check with your doctor about this)    Spinal disc disease or arthritis    Stress  Pain can also be related to pregnancy, or illness like appendicitis, bladder or kidney infections, pelvic infections, and many other things.  Acute back pain usually gets better in 1 to 2 weeks. Back pain related to disk disease, arthritis in the spinal joints or spinal stenosis (narrowing of the spinal canal) can become chronic and last for months or years.  Unless you had a physical injury (for example, a car  accident or fall) X-rays are usually not needed for the initial evaluation of back pain. If pain continues and does not respond to medical treatment, X-rays and other tests may be needed.  Home care  Try these home care recommendations:    When in bed, try to find a position of comfort. A firm mattress is best. Try lying flat on your back with pillows under your knees. You can also try lying on your side with your knees bent up towards your chest and a pillow between your knees.    At first, do not try to stretch out the sore spots. If there is a strain, it is not like the good soreness you get after exercising without an injury. In this case, stretching may make it worse.    Don't sit for long periods, as in a long car ride or during other travel. This puts more stress on the lower back than standing or walking.    During the first 24 to 72 hours after an acute injury or flare up of chronic back pain, apply an ice pack to the painful area for 20 minutes and then remove it for 20 minutes. Do this over a period of 60 to 90 minutes or several times a day. This will reduce swelling and pain. Wrap the ice pack in a thin towel or plastic to protect your skin.    You can start with ice, then switch to heat. Heat (hot shower, hot bath, or heating pad) reduces pain and works well for muscle spasms. Heat can be applied to the painful area for 20 minutes then remove it for 20 minutes. Do this over a period of 60 to 90 minutes or several times a day. Do not sleep on a heating pad. It can lead to skin burns or tissue damage.    You can alternate ice and heat therapy. Talk with your doctor about the best treatment for your back pain.    Therapeutic massage can help relax the back muscles without stretching them.    Be aware of safe lifting methods and do not lift anything without stretching first.  Medicines  Talk to your doctor before using medicine, especially if you have other medical problems or are taking other  medicines.    You may use over-the-counter medicine as directed on the bottle to control pain, unless another pain medicine was prescribed. If you have chronic conditions like diabetes, liver or kidney disease, stomach ulcers, or gastrointestinal bleeding, or are taking blood thinners, talk to your doctor before taking any medicine.    Be careful if you are given a prescription medicines, narcotics, or medicine for muscle spasms. They can cause drowsiness, affect your coordination, reflexes, and judgement. Do not drive or operate heavy machinery.  Follow-up care  Follow up with your healthcare provider, or as advised.   A radiologist will review any X-rays that were taken. Your provide will notify you of any new findings that may affect your care.  Call 911  Call 911 if any of the following occur:    Trouble breathing    Confusion    Very drowsy or trouble awakening    Fainting or loss of consciousness    Rapid or very slow heart rate    Loss of bowel or bladder control  When to seek medical advice  Call your healthcare provider right away if any of these occur:     Pain becomes worse or spreads to your legs    Weakness or numbness in one or both legs    Numbness in the groin or genital area  Date Last Reviewed: 7/1/2016 2000-2017 [employer]. [address] [place] [address]. All rights reserved. This information is not intended as a substitute for professional medical care. Always follow your healthcare professional's instructions.           Patient Education     Back Exercises: Hip Lift        To start, lie on your back with your knees bent and feet flat on the floor. Dont press your neck or lower back to the floor. Breathe deeply. You should feel comfortable and relaxed in this position:    Tighten your abdomen and buttocks.    Slowly raise your hips upward. Be careful not to arch your back.    Hold for 5 seconds. Lower your hips to the floor.    Repeat 10 times.  For your safety, check  with your healthcare provider before starting an exercise program.   Date Last Reviewed: 8/16/2015 2000-2016 [employer]. [address] [place] [address]. All rights reserved. This information is not intended as a substitute for professional medical care. Always follow your healthcare professional's instructions.           Patient Education     Back Exercises: Knee Lift         To start, lie on your back with your knees bent and feet flat on the floor. Dont press your neck or lower back to the floor. Breathe deeply. You should feel comfortable and relaxed in this position:    Start by tightening your abdominal muscles.    Lift one bent knee off the floor 2 to 4 inches.    Hold for 10 seconds. Return to start position.    Repeat 3 times.    Switch legs.  Date Last Reviewed: 3/1/2018    6388-8533 [employer]. [address] [place] [address]. All rights reserved. This information is not intended as a substitute for professional medical care. Always follow your healthcare professional's instructions.           Patient Education     Back Exercises: Leg Pull    To start, lie on your back with your knees bent and feet flat on the floor. Dont press your neck or lower back to the floor. Breathe deeply. You should feel comfortable and relaxed in this position.    Pull one knee to your chest.    Hold for 30 to 60 seconds. Return to starting position.    Repeat 2 times.    Switch legs.    For a double leg pull, pull both legs to your chest at the same time. Repeat 2 times.  For your safety, check with your healthcare provider before starting an exercise program.  Date Last Reviewed: 3/1/2018    1655-1945 [employer]. [address] [place] [address]. All rights reserved. This information is not intended as a substitute for professional medical care. Always follow your healthcare professional's instructions.           Patient Education     Back Exercises:  Partial Curl-Ups    To start, lie on your back with your knees bent and feet flat on the floor. Dont press your neck or lower back to the floor. Breathe deeply. You should feel comfortable and relaxed in this position:    Cross your arms loosely.    Tighten your abdomen and curl custodial up, keeping your head in line with your shoulders.    Hold for 5 seconds. Uncurl to lie down.    Repeat 2 sets of 10.   Date Last Reviewed: 3/1/2018    1694-3100 [employer]. [address] [place] [address], [place] [address]. All rights reserved. This information is not intended as a substitute for professional medical care. Always follow your healthcare professional's instructions.

## 2021-06-18 NOTE — PATIENT INSTRUCTIONS - HE
Patient Instructions by Chantel Harvey PT at 11/19/2020 11:30 AM     Author: Chantel Harvey PT Service: -- Author Type: Physical Therapist    Filed: 11/19/2020 12:31 PM Encounter Date: 11/19/2020 Status: Signed    : Chantel Harvey PT (Physical Therapist)       LOWER EXTREMITY LYMPHEDEMA EXERCISES

## 2021-06-18 NOTE — PATIENT INSTRUCTIONS - HE
Patient Instructions by Chantel Harvey PT at 12/7/2020  1:30 PM     Author: Chantel Harvey PT Service: -- Author Type: Physical Therapist    Filed: 12/7/2020  2:26 PM Encounter Date: 12/7/2020 Status: Signed    : Chantel Harvey PT (Physical Therapist)           STANDING HEEL RAISES    While standing, raise up on your toes as you lift your heels off the ground.    10-30 reps, 3x per day      GASTROCNEMIUS STRETCH    While standing and leaning against a wall, place one foot back behind you and bend the front knee until a gentle stretch is felt on the back of the lower leg.     Your back knee should be straight the entire time.    Hold 30 sec, 2-3 times on each leg

## 2021-06-18 NOTE — PATIENT INSTRUCTIONS - HE
Patient Instructions by Betsy Austin RN at 10/22/2020 10:30 AM     Author: Betsy Austin RN Service: -- Author Type: Registered Nurse    Filed: 10/22/2020 11:20 AM Encounter Date: 10/22/2020 Status: Signed    : Betsy Austin RN (Registered Nurse)       Patient Education     Radiation Therapy Treatment  Radiation therapy uses high-energy X-rays to kill cancer cells. Radiation therapy can help you in your fight against cancer. It begins with a session to discuss treatment with your healthcare provider. If you and your provider decide on radiation, you will return for a treatment planning visit called a simulation. Then you will start treatment.  The simulation is a planning session that helps your healthcare provider target your cancer. He or she will design a radiation plan to protect your healthy tissues from radiation. Your radiation therapy team uses a special machine called a simulator to map out your treatment. The simulator is usually an X-ray machine (fluoroscopy), CT scanner, MRI scanner, or PET-CT scanner machine. Laser lights act as guides to help position your body accurately. During this visit:    The team figures out the best position for your body. They make notes in your chart so youll be placed the same way each time.    They may use special devices to keep your body correctly positioned and still during treatment. These may include molds, masks, rests, and blocks.    The team makes ink marks on your skin. These will help you get in the same position for each treatment. Tiny permanent tattoos may also be used. These tattoos may be removed later with laser treatments.    Markers such as metal balls or wires may be put on or in your body. Sometime these are taped to the skin to help with the imaging process. These work with the X-rays to position your body. The markers are removed when the visit is over.  After the team has the imaging and data, the information is sent into the computer  planning system. Your doctor and the team of physicists and dosimetrists design a treatment field. The field will best target your cancer and how it might spread. It will also help limit radiation to nearby normal tissues.  Your treatments  When the simulation and plan are completed, you will begin your daily treatments. Treatment is usually once daily, Monday through Friday, for 5 to 7 weeks. It takes less than 30 minutes. Sometimes you may need radiation twice a day, with about 6 hours between treatments.  You may need to change into a hospital gown. The radiation therapist puts you in the correct position on the treatment table, then leaves the room. Sometimes you may need more imaging before each treatment. The machine may take digital X-rays or a CT scan to help make sure you are lined up correctly. During treatment, lie as still as you can and breathe normally. You will hear noises coming from the machine. You can talk to the radiation therapist, who watches you from the control room on a TV monitor. After treatment, the therapist will help you off the table. You can then get dressed and go back to your normal activities.  After treatment  After your radiation treatments are done, you will have follow-up appointments. These are to make sure the cancer is under control. Tell your healthcare team about any side effects from the treatment. The team will help you manage them.  Date Last Reviewed: 6/1/2018 2000-2019 [employer]. [address] [place] [address], [place] [address]. All rights reserved. This information is not intended as a substitute for professional medical care. Always follow your healthcare professional's instructions.

## 2021-06-19 NOTE — LETTER
Letter by Gemini Rosario MD at      Author: Gemini Rosario MD Service: -- Author Type: --    Filed:  Encounter Date: 11/14/2019 Status: Signed         Willie Austin DO  [address] [facility] [address]                                  November 14, 2019    Patient: Camilla Wilson   MR Number: [id number]   YOB: 1950   Date of Visit: 11/14/2019     Dear Dr. Austin, DO:    Thank you for referring Camilla Wilson to me for evaluation. Below are the relevant portions of my assessment and plan of care.    If you have questions, please do not hesitate to call me. I look forward to following Camilla along with you.    Sincerely,        Gemini Rosario MD          CC  No Recipients  Gemini Rosario MD  11/14/2019  4:54 PM  Sign when Signing Visit  Pulmonary Clinic Outpatient Consultation  11/14/2019     Assessment and Plan:   69 year old former smoker with the most pertinent medical history of breast cancer, GERD, & obesity, presenting for evaluation of subacute-on-chronic cough.     #. Cough, intermittent, subacute-on-chronic.  Associated with respiratory symptoms suggestive of asthma (particularly bronchial congestion).  She would benefit from maintenance therapy considering her persistent symptoms.  #.  Postnasal drip, chronic.  Possible environmental allergies versus vasomotor rhinitis.  No clear seasonal triggers.  Would benefit from treatment considering her system cough.  #.  Acid reflux, currently not on any maintenance therapy.  Discussed importance of GERD treatment to minimize any associated airway inflammation.  #.  Obesity, discussed that it can have an effect on asthma.      Start twice daily Flovent inhaler; patient provided with hands-on instruction on how to use the inhaler with a spacer    Patient provided with instructions to rinse her mouth and gargle after Flovent use    Start PRN albuterol inhaler; patient  "provided with hands-on instruction on how to use the inhaler with a spacer    Start nasal fluticasone spray for her chronic rhinitis symptoms    Recommend patient start routine nasal irrigation (neti pot); patient provided with written instructions on nasal irrigation and Flovent use    Recommend the patient restart taking her acid reflux medications    Patient provided with a written Asthma Action Plan    Patient is up-to-date on her influenza vaccination    Recommended that patient not have her cat sleep in her bed in case this may be triggering some of her rhinitis symptoms are aggravating any of her airway inflammation    Patient should return to clinic in 3 months for a follow up.    I appreciate the opportunity to participate in the care of Camilla Wilson.     Gemini Rosario MD  Pulmonary and Critical Care   ______________________________________________________________________________    CC: Cough    HPI:   Camilla Wilson is a 69 y.o. former smoker with history of GERD, breath cancer (s/p lumpectomy, radiation, & chemotherapy), & obesity, presenting today for evaluation of subacute-on-chronic cough.     Cough started in May following a viral URI.  At the time patient had productive cough, runny nose, and sensation of chest congestion.  She was sick for 1 to 2 weeks with her acute symptoms. Over time her cough has been \"coming and going\" but has been more consistent since August, only waxing and waning in intensity. Cough is primarily nonproductive, but she does feel that there is significant chest congestion present.  She has never coughed up in the phlegm to tell me what it looks like. Cough is most prominent during the day; she had nocturnal symptoms when she had an acute URI, however nowadays she sleeps through the night without any issues.  She thinks that humidity may be 1 of the aggravating factors for her cough, but has not noticed anything else.  States that she had a similar presentation a few years " ago and was treated with a course of prednisone that helped significantly.    She endorses chronic nasal congestion venous.  This is associated with some postnasal drainage that tends to be year-round.  She has not noticed any aggravating factors or seasonal changes.  She has chronic acid reflux, and has been on a PPI in the past.  Currently she uses her PPI and Tums as needed rather than regularly.  No recent changes in her weight.  No chest pain.    She is a former smoker with a 10 pack smoking history.  She quit smoking in 1983.  No childhood respiratory symptoms or recurrent illnesses.  She has always fostered or kept cats. Has a cat named Kasey Murillo.  The cat sleeps in her bed.  The animal is healthy.    ROS:  Review of Systems - 10 point ROS reviewed and noted to be negative w/ exceptions as detailed in the HPI.    PMH:  Patient Active Problem List    Diagnosis Date Noted   ? Left ankle tendonitis 02/28/2019   ? Hamstring tendonitis of right thigh 01/31/2018   ? GERD (gastroesophageal reflux disease) 02/04/2016   ? Skin cancer 02/04/2016   ? Osteopenia 12/07/2015   ? Obesity    ? Breast Cancer    ? Dyslipidemia    ? Spinal Stenosis      PSH:  Past Surgical History:   Procedure Laterality Date   ? BREAST BIOPSY Left    ? BREAST LUMPECTOMY Right 2007   ? NM EMBOLIZATION UTERINE FIBROID      Description: Uterine Fibroid Embolization;  Proc Date: 09/06/2000;   ? NM EXCISE BREAST CYST      Description: Breast Surgery Lumpectomy;  Recorded: 12/18/2007;   ? NM SHLDR ARTHROSCOP,SURG,W/REMOVAL,LOOSE/FB      Description: Shoulder Arthroscopy;  Recorded: 02/25/2009;  Comments: right 2004; left 2007     Allergies:  Allergies   Allergen Reactions   ? Lipitor [Atorvastatin]      Memory  Impairment       Family HX:  Family History   Problem Relation Age of Onset   ? Colon cancer Maternal Aunt    ? Breast cancer Maternal Aunt         40s   ? Kidney disease Mother    ? Pneumonia Father    ? Hyperlipidemia  Father      Social Hx:  Social History     Socioeconomic History   ? Marital status: Single     Spouse name: Not on file   ? Number of children: Not on file   ? Years of education: Not on file   ? Highest education level: Not on file   Occupational History   ? Not on file   Social Needs   ? Financial resource strain: Not on file   ? Food insecurity:     Worry: Not on file     Inability: Not on file   ? Transportation needs:     Medical: Not on file     Non-medical: Not on file   Tobacco Use   ? Smoking status: Former Smoker     Packs/day: 0.50     Years: 15.00     Pack years: 7.50     Last attempt to quit: 1982     Years since quittin.5   ? Smokeless tobacco: Never Used   Substance and Sexual Activity   ? Alcohol use: Not on file   ? Drug use: Not on file   ? Sexual activity: Not on file   Lifestyle   ? Physical activity:     Days per week: Not on file     Minutes per session: Not on file   ? Stress: Not on file   Relationships   ? Social connections:     Talks on phone: Not on file     Gets together: Not on file     Attends Gnosticism service: Not on file     Active member of club or organization: Not on file     Attends meetings of clubs or organizations: Not on file     Relationship status: Not on file   ? Intimate partner violence:     Fear of current or ex partner: Not on file     Emotionally abused: Not on file     Physically abused: Not on file     Forced sexual activity: Not on file   Other Topics Concern   ? Not on file   Social History Narrative   ? Not on file     Current Meds:  Current Outpatient Medications   Medication Sig Dispense Refill   ? 5-hydroxytryptophan (5-HTP) 100 mg cap Take 2 capsules by mouth daily. 1000mg     ? calcium citrate 250 mg calcium Tab Take 500 mg by mouth 2 (two) times a day.      ? cholecalciferol, vitamin D3, (VITAMIN D3) 2,000 unit cap Take 1 capsule by mouth daily.      ? melatonin 5 mg cap 1 cap nightly  0   ? niacin (SLO-NIACIN) 500 mg ER tablet Take 2,000 mg by  "mouth.     ? peg 400-propylene glycol (SYSTANE) 0.4-0.3 % Drop Administer 1 drop to both eyes 4 (four) times a day as needed.     ? s-adenosylmethionine (AISHA-E, ENTERIC COATED,) 200 mg TbEC Take 2 tablets by mouth daily.     ? sodium fluoride-pot nitrate (PREVIDENT 5000 SENSITIVE) 1.1-5 % Pste Take by mouth daily.      ? vit C,F-Vi-ipgjw-lutein-zeaxan (OCUVITE LUTEIN & ZEAXANTHIN) 60 mg-30 unit- 15 mg-2 mg-6 mg cap Take by mouth.       No current facility-administered medications for this visit.      Physical Exam:  /62   Pulse 68   Resp 12   Ht 5' 5\" (1.651 m)   Wt (!) 267 lb (121.1 kg)   SpO2 97%   Breastfeeding No   BMI 44.43 kg/m     Gen: obese elderly  woman, alert, oriented, no distress  HEENT: nasal turbinates are erythematous, no oropharyngeal lesions, no cervical or supraclavicular lymphadenopathy; no stridor  CV: RRR, no M/G/R  Resp: equal bilateral air entry, breath sounds clear throughout, no focal crackles or wheezes; able to converse in full sentences w/ no respiratory distress  Abd: soft, nontender, no palpable organomegaly  Skin: no apparent rashes  Ext: no cyanosis, clubbing or edema  Neuro: alert, nonfocal    Labs: personally reviewed in the EMR.    Imaging studies: personally reviewed and interpreted. Below are the Radiology interpretations.  CXR, 10/16/19: Negative chest.      PFT's (11/01/19): normal spirometry without clinically significant bronchodilator response, normal lung volumes, and supranormal diffusion capacity.   FEV1/FVC is 0.80 and is normal. FEV1 is 2.62 L (116% predicted) and is normal. FVC is 3.27 L (112% predicted) and normal. There was no improvement in spirometry after a single inhaled dose of bronchodilator. TLC is 5.29 L (103% predicted) and is normal. DLCO is 126% predicted and is increased when it is corrected for hemoglobin.             "

## 2021-06-20 NOTE — LETTER
Letter by Keisha Manjarrez RN at      Author: Keisha Manjarrez RN Service: -- Author Type: --    Filed:  Encounter Date: 9/18/2020 Status: (Other)       Dear Camilla:   RADIATION ONCOLOGY CONSULT    Thank you for choosing [facility] for your care.  We are committed to providing you with the highest quality and compassionate healthcare services.  The following information pertains to your first radiation oncology appointment with our clinic.    Date/Time of appointment: Thursday, October 22, 2020--please arrive no later than 10:30am for your 11:00am consult with Dr. Beckman     Name of your Physician: Anna Beckman MD (Radiation Oncology, 1st floor)    What to bring to your appointment:    Completed Patient History/Initial Nursing Assessment     Your current insurance card(s).    Parking:    Please refer to the map included to direct you.  The [facility] is located at the [place] end of [facility] in [place], [place].      After turning onto [facility] from [facility], take a right turn at the first stop sign.  We have designated parking on the left, identified as parking for Cancer Care patients (Lot D).     The Code to Enter Lot D is: 1001. This code changes monthly and will always coincide with the current month followed by 01. For example August will be 0801.  The month will continue to change but the 01 will remain constant.  If lot D is full please use Parking Lot A, directly across the street.    Please enter the [facility] on the north end of the [facility].  You will see a sign on the building.        For Radiation Oncology appointments, please go straight through the double doors and check in.    We hope these instructions are helpful to you.  If you have any questions or concerns, please call us at [phone].  It is our pleasure to assist you.    Warm Regards,      Keisha Manjarrez RN, BSN, OCN, CBCN  Cancer  Care Navigator  [phone]

## 2021-06-20 NOTE — LETTER
Letter by Keisha Manjarrez RN at      Author: Keisha Manjarrez RN Service: -- Author Type: --    Filed:  Encounter Date: 9/18/2020 Status: (Other)         Camilla Wilson  [address] [place] [address]                 September 18, 2020      Thank you for speaking with me today.  I am attaching an Authorization for Release of Protected Health Information form.  Please sign, date, and return this to us at your very earliest convenience.  Please leave the rest of it blank so we may use the same form for multiple providers.      I have enclosed a self-addressed-stamped envelope for your use in returning this form to our clinic.  As soon as this is received our medical records team will be working diligently to have information ready for your appointment with the provider.    Warm regards,      Keisha Manjarrez RN, BSN, OCN, CBCN  Cancer Care Navigator  [phone]

## 2021-06-20 NOTE — LETTER
Letter by Keisha Manjarrez RN at      Author: Keisha Manjarrez RN Service: -- Author Type: --    Filed:  Encounter Date: 7/24/2020 Status: (Other)         July 24, 2020          Dear Camilla:    Thank you for taking the time to talk with me today. Please do not hesitate to call me with future questions.  I will be out of the office from 7/27-31, but my colleague Idania Evans, RN, OCN, CBCN is available at [phone].    Warm Regards,        Keisha Manjarrez RN, BSN, OCN  Cancer Care Nurse Navigator  [phone]

## 2021-06-20 NOTE — LETTER
Letter by Keisha Manjarrez RN at      Author: Keisha Manjarrez RN Service: -- Author Type: --    Filed:  Encounter Date: 9/18/2020 Status: (Other)       Dear Camilla:   MEDICAL ONCOLOGY CONSULT    Thank you for choosing [facility] for your care.  We are committed to providing you with the highest quality and compassionate healthcare services.  The following information pertains to your first medical oncology appointment with our clinic.    Date/Time of appointment: Thursday, October 22, 2020--please arrive no later than 12:40 for your 1:00pm consult with Dr. Laguna (12:45 nurse)     Name of your Physician: Princess Laguna MD (Medical Oncology, 2nd floor)    What to bring to your appointment:    Your current insurance card(s).    Parking:    Please refer to the map included to direct you.  The [facility] [place] is located at the [place] end of [facility] in [place], [place].      After turning onto [facility] from [facility], take a right turn at the first stop sign.  We have designated parking on the left, identified as parking for Cancer Care patients (Lot D).     The Code to Enter Lot D is: 1001. This code changes monthly and will always coincide with the current month followed by 01. For example August will be 0801.  The month will continue to change but the 01 will remain constant.  If lot D is full please use Parking Lot A, directly across the street.    Please enter the [facility] on the north end of the [facility].  You will see a sign on the building.        For Medical Oncology appointments please take the elevator to the second floor to check in.   We hope these instructions are helpful to you.  If you have any questions or concerns, please call us at [phone].  It is our pleasure to assist you.    Warm Regards,      Keisha Manjarrez RN, BSN, OCN, CBCN  Cancer Care  Navigator  [phone]

## 2021-06-21 NOTE — LETTER
Letter by Stephanie Littlejohn, Genetic Counselor at      Author: Stephanie Littlejohn, Genetic Counselor Service: -- Author Type: --    Filed:  Encounter Date: 10/12/2020 Status: (Other)       [facility]  Hereditary Breast and Gynecologic Cancers  Assessing Cancer Risk  Only about 5-10% of cancers are thought to be due to an inherited cancer susceptibility gene.    These families often have:    Several people with the same or related types of cancer    Cancers diagnosed at a young age (before age 50)    Individuals with more than one primary cancer    Multiple generations of the family affected with cancer    Some people may be candidates for genetic testing of more than one gene.  For these families, genetic testing using a cancer panel may be offered.  These panels will test different genes known to increase the risk for breast, ovarian, uterine, and/or other cancers. All of the genes discussed below have published clinical management guidelines for individuals who are found to carry a mutation. The purpose of this handout is to serve as a brief summary of the genes analyzed by the panels used to inquire about hereditary breast and gynecologic cancer:  CYNTHIA, BRCA1, BRCA2, BRIP1, CDH1, CHEK2, MLH1, MSH2, MSH6, PMS2, EPCAM, PTEN, PALB2, RAD51C, RAD51D, and TP53.  ______________________________________________________________________________  Hereditary Breast and Ovarian Cancer Syndrome   (BRCA1 and BRCA2)  A single mutation in one of the copies of BRCA1 or BRCA2 increases the risk for breast and ovarian cancer, among others.  The risk for pancreatic cancer and melanoma may also be slightly increased in some families.  The chart below shows the chance that someone with a BRCA mutation would develop cancer in his or her lifetime1,2,3,4.      Lifetime Cancer Risks    General Population BRCA   Breast 12% ~80%   Ovarian 1-2% 11-40%   Male Breast <1% 7-8%   Prostate 16% 20%       A persons ethnic background  is also important to consider, as individuals of Ashkenazi Episcopal ancestry have a higher chance of having a BRCA gene mutation.  There are three BRCA mutations that occur more frequently in this population.    Schmidt Syndrome   (MLH1, MSH2, MSH6, PMS2, and EPCAM)  Currently five genes are known to cause Schmidt Syndrome: MLH1, MSH2, MSH6, PMS2, and EPCAM.  A single mutation in one of the Schmidt Syndrome genes increases the risk for colon, endometrial, ovarian, and stomach cancers.  Other cancers that occur less commonly in Schmidt Syndrome include urinary tract, skin, and brain cancers.  The chart below shows the chance that a person with Schmidt syndrome would develop cancer in his or her lifetime5.      Lifetime Cancer Risks    General Population Schmidt Syndrome   Colon 5.5% ~80%   Endometrial 2.7% 15-60%   Stomach <1% 1-13%   Ovarian 1-2% 4-24%       Cowden Syndrome   (PTEN)  Cowden syndrome is a hereditary condition that increases the risk for breast, thyroid, endometrial, colon, and kidney cancer.  Cowden syndrome is caused by a mutation in the PTEN gene.  A single mutation in one of the copies of PTEN causes Cowden syndrome and increases cancer risk.  The chart below shows the chance that someone with a PTEN mutation would develop cancer in their lifetime6,7.  Other benign features seen in some individuals with Cowden syndrome include benign skin lesions (facial papules, keratoses, lipomas), learning disability, autism, thyroid nodules, colon polyps, and larger head size.      Lifetime Cancer Risks    General Population Cowden Synrome   Breast 12% 25-50%   Thyroid 1% Up to 35%   Renal 1-2% Up to 35%   Endometrial 2.7% Up to 28%   Colon  5.5% 9%   Melanoma 2-3% 6%   ** One recent study found breast cancer risk to be increased to 85%     Li-Fraumeni Syndrome   (TP53)  Li-Fraumeni Syndrome (LFS) is a cancer predisposition syndrome caused by a mutation in the TP53 gene. A single mutation in one of the copies of TP53  increases the risk for multiple cancers. Individuals with LFS are at an increased risk for developing cancer at a young age. The lifetime risk for development of a LFS-associated cancer is 50% by age 30 and 90% by age 60.   Core Cancers: Sarcomas, Breast, Brain, Lung, Leukemias/Lymphomas, Adrenocortical carcinomas  Other Cancers: Gastrointestinal, Thyroid, Skin, Genitourinary    Hereditary Diffuse Gastric Cancer   (CDH1)  Currently, one gene is known to cause hereditary diffuse gastric cancer (HDGC): CDH1.  Individuals with HDGC are at increased risk for diffuse gastric cancer and lobular breast cancer. Of people diagnosed with HDGC, 30-50% have a mutation in the CDH1 gene.  This suggests there are likely other genes that may cause HDGC that have not been identified yet.      Lifetime Cancer Risks    General Population HDGC    Diffuse Gastric  <1% ~80%   Breast 12% 39-52%         Additional Genes  CYNTHIA  CYNTHIA is a moderate-risk breast cancer gene. Women who have a mutation in CYNTHIA can have between a 2-4 fold increased risk for breast cancer compared to the general population8. CYNTHIA mutations have also been associated with increased risk for pancreatic cancer, however an estimate of this cancer risk is not well understood9. Individuals who inherit two CYNTHIA mutations have a condition called ataxia-telangiectasia (AT).  This rare autosomal recessive condition affects the nervous system and immune system, and is associated with progressive cerebellar ataxia beginning in childhood.  Individuals with ataxia-telangiectasia often have a weakened immune system and have an increased risk for childhood cancers.    PALB2  Mutations in PALB2 have been shown to increase the risk of breast cancer up to 33-58% in some families; where individuals fall within this risk range is dependent upon family [id number]. PALB2 mutations have also been associated with increased risk for pancreatic cancer, although this risk has not been quantified  yet.  Individuals who inherit two PALB2 mutations--one from their mother and one from their father--have a condition called Fanconi Anemia.  This rare autosomal recessive condition is associated with short stature, developmental delay, bone marrow failure, and increased risk for childhood cancers.    CHEK2   CHEK2 is a moderate-risk breast cancer gene.  Women who have a mutation in CHEK2 have around a 2-fold increased risk for breast cancer compared to the general population, and this risk may be higher depending upon family history.11,12,13 Mutations in CHEK2 have also been shown to increase the risk of a number of other cancers, including colon and prostate, however these cancer risks are currently not well understood.    BRIP1, RAD51C and RAD51D  Mutations in BRIP1, RAD51C, and RAD51D have been shown to increase the risk of ovarian cancer and possibly female breast cancer as well14,15 .       Lifetime Cancer Risk    General Population BRIP1 RAD51C RAD51D   Ovarian 1-2% ~5-8% ~5-9% ~7-15%         Inheritance  All of the cancer syndromes reviewed above are inherited in an autosomal dominant pattern.  This means that if a parent has a mutation, each of his or her children will have a 50% chance of inheriting that same mutation.  Therefore, each child--male or female--would have a 50% chance of being at increased risk for developing cancer.    Mutations in some genes can occur de eula, which means that a persons mutation occurred for the first time in them and was not inherited from a parent.  Now that they have the mutation, however, it can be passed on to future generations.    Genetic Testing  Genetic testing involves a blood test and will look at the genetic information in the CYNTHIA, BRCA1, BRCA2, BRIP1, CDH1, CHEK2, MLH1, MSH2, MSH6, PMS2, EPCAM, PTEN, PALB2, RAD51C, RAD51D, and TP53 genes for any harmful mutations that are associated with increased cancer risk.  If possible, it is recommended that the person(s)  who has had cancer be tested before other family members.  That person will give us the most useful information about whether or not a specific gene is associated with the cancer in the family.    Results  There are three possible results of genetic testing:    Positive--a harmful mutation was identified in one or more of the genes    Negative--no mutation was identified in any of the genes on this panel    Variant of unknown significance--a variation in one of the genes was identified, but it is unclear how this impacts cancer risk in the family    Advantages and Disadvantages   There are advantages and disadvantages to genetic testing.    Advantages    May clarify your cancer risk    Can help you make medical decisions    May explain the cancers in your family    May give useful information to your family members (if you share your results)    Disadvantages    Possible negative emotional impact of learning about inherited cancer risk    Uncertainty in interpreting a negative test result in some situations    Possible genetic discrimination concerns (see below)    Genetic Information Nondiscrimination Act (REYES)  REYES is a federal law that protects individuals from health insurance or employment discrimination based on a genetic test result alone.  Although rare, there are currently no legal discrimination protections in terms of life insurance, long term care, or disability insurances.  Visit the National Human Genome Research [place] website to learn more.    Reducing Cancer Risk  All of the genes described above have nationally recognized cancer screening guidelines that would be recommended for individuals who test positive.  In addition to increased cancer screening, surgeries may be offered or recommended to reduce cancer risk.  Recommendations are based upon an individuals genetic test result as well as their personal and family history of cancer.    Questions to Think About Regarding Genetic  Testing:    What effect will the test result have on me and my relationship with my family members if I have an inherited gene mutation?  If I dont have a gene mutation?    Should I share my test results, and how will my family react to this news, which may also affect them?    Are my children ready to learn new information that may one day affect their own health?    Hereditary Cancer Resources    FORCE: Facing Our Risk of Cancer Empowered facingourrisk.org   Bright Pink bebrightpink.org   Li-Fraumeni Syndrome Association lfsassociation.org   PTEN World PTENworld.com   No stomach for cancer, Inc. nostomachforcancer.org   Stomach cancer relief network Scrnet.org   Collaborative Group of the Americas on Inherited Colorectal Cancer (CGA) cgaicc.com    Cancer Care cancercare.org   American Cancer Society (ACS) cancer.org   National Cancer Collins (NCI) cancer.gov     Please call us if you have any questions or concerns.   Cancer Risk Management Program  q eDnis Araujo, MS, [facility] [phone]  q Hilary Morrison, MS, [facility] [phone]  q Lupe Godinez, MS, [facility] [phone]  q Neva Carmona, MS, [facility] [phone]  q Stephanie Littlejohn, MS, [facility] [phone]  q Tae Orozco, MS, [facility] [phone]  q Aleta Barillas, MS, [facility] [phone]      References  1. Arnold Ceballos PDP, Gage S, Phyllis SONG, Jeniffer JE, Jamarcus JL, Ang N, Lopez H, Araceli O, Ibeth A, Alfredo B, Leni P, Tatyana S, Kita DM, Kearns N, Dina E, Enrike H, Yemi E, Bobby J, Cornell J, Karla B, Jesus H, John S, Tr H, Rose H, Heron K, Tito OP. Average risks of breast and ovarian cancer associated with BRCA1 or BRCA2 mutations detected in case series unselected for family history: a combined analysis of 222 studies. Am J Hum Shruthi. 2003;72:1117-30.  2. Janae ROACH, Ashley SUTTON, Amberly CANSECO.  BRCA1 and BRCA2 Hereditary Breast and Ovarian Cancer. Gene Reviews online. 2013.  3. De YC, Urmila S, Ketty G, Yoselin S. Breast cancer risk  among male BRCA1 and BRCA2 mutation carriers. J Natl Cancer Inst. 2007;99:1811-4.  4. Khang PANDYA, Jose Angel I, Iftikhar J, Benji E, Tatiana ER, Nehemias F. Risk of breast cancer in male BRCA2 carriers. J Med Shruthi. 2010;47:710-1.  5. National Comprehensive Cancer Network. Clinical practice guidelines in oncology, colorectal cancer screening. Available online (registration required). 2015.  6. Kobi MH, Josephine J, Lety J, Berna RAMESH, Janie MS, Marin C. Lifetime cancer risks in individuals with germline PTEN mutations. Clin Cancer Res. 2012;18:400-7.  7. Pilniaki R. Cowden Syndrome: A Critical Review of the Clinical Literature. J Shruthi . 2009:18:13-27.  8. Susan BECKHAM, Jimbo D, Doroteo S, Arabella P, Kesha T, Noe M, Toribio B, Sangeeta H, Axel R, Stacey K, Uche L, Khang PANDYA, Kita D, Logan DF, Lalo MR, The Breast Cancer Susceptibility Collaboration (UK) & Mary ROACH. CYNTHIA mutations that cause ataxia-telangiectasia are breast cancer susceptibility alleles. Nature Genetics. 2006;38:873-875  9. Victor M N , Eder Y, Cyn J, Jerry L, Zheng GM , Robel ML, Gallinger S, Hillman AG, Syngal S, Gopi ML, Obdulio J , Sahil R, Estephania SZ, Georgette JR, Chavo VE, Odalys M, Vogelstein B, Chino N, Davey RH, Pascual KW, and Dang AP. CYNTHIA mutations in patients with hereditary pancreatic cancer. Cancer Discover. 2012;2:41-46  10. Valeriy FERRARO, et al. Breast-Cancer Risk in Families with Mutations in PALB2. NEJM. 2014; 371(6):497-506.  11. CHEK2 Breast Cancer Case-Control Consortium. CHEK2*1100delC and susceptibility to breast cancer: A collaborative analysis involving 10,860 breast cancer cases and 9,065 controls from 10 studies. Am J Hum Shruthi, 74 (2004), pp. 4245-1293  12. Flo T, Gary MONGE, Jus K, et al. Spectrum of Mutations in BRCA1, BRCA2, CHEK2, and TP53 in Families at High Risk of Breast Cancer. YUE. 2006;295(12):5740-9428.   13. Naheed SAHNI, Aditya WINKLER, Masoud BECKHAM, et al. Risk of breast cancer  in women with a CHEK2 mutation with and without a family history of breast cancer. J Clin Oncol. 2011;29:2725-0573.  14. Last H, Gonzalo E, Kris SJ, et al. Contribution of germline mutations in the RAD51B, RAD51C, and RAD51D genes to ovarian cancer in the population. J Clin Oncol. 2015;33(26):2241-9522. Doi:10.1200/JCO.2015.61.2408.  15. Precious T, Elva RAMÍREZ, Gauri P, et al. Mutations in BRIP1 confer high risk of ovarian cancer. Erna Shruthi. 2011;43(11):1942-9582. doi:10.1038/ng.955.

## 2021-06-21 NOTE — LETTER
Letter by Stephanie Littlejohn, Genetic Counselor at      Author: Stephanie Littlejohn, Genetic Counselor Service: -- Author Type: --    Filed:  Encounter Date: 10/12/2020 Status: (Other)         Camilla Wilson  [address] [place] [address]      October 12, 2020      Dear Ms. Wilson,    It was a pleasure speaking with you on the phone on 10/12/2020.  Here is a copy of the progress note from our discussion.  If you have any additional questions, please feel free to call.    Referring Provider: Margarita Cook MD    Present for Today's Visit: Camilla    Presenting Information:   I spoke with Camilla Wilson over the phone today for genetic counseling to discuss her personal and family history of breast cancer.  She is here today to review this history, cancer screening recommendations, and available genetic testing options.    Personal History:  Camilla is a 70 y.o. female. She was diagnosed with a left breast cancer in July 2020 off of a screening mammogram. Biopsy completed on 7/22/2020 revealed an invasive lobular carcinoma; ER positive, OK positive, and HER2 negative. She underwent a lumpectomy on 08/21/2020 that unfortunately showed positive margins and subsequently underwent bilateral mastectomies on 09/14/2020; 2 of 5 lymph nodes positive. She has appointments coming up with radiation oncology (10/22 Dr. Beckman) and medical oncology (10/22 Dr. Laguna).    Camilla also has a history of breast cancer in her right breast diagnosed in 2002 at age 52. She reports that she underwent a lumpectomy, radiation, and hormone therapy.     She also reports a history of a basal cell carcinoma on her lower back around 2010.       She had her first menstrual period at age 10, she does not have any biological children, and reports that she went through menopause between [id number].  Camilla has her ovaries, fallopian tubes and uterus in place, and she has had no ovarian cancer screening to date.  She reports a short history of  oral contraceptive use (1 year in the 1970's) and that she has never been on hormone replacement therapy.      Her most recent OB-GYN exam and Pap smear in 2013 were normal. She began having colonoscopies at the age of 53. His most recent colonoscopy in April of 2019 resulted in the removal of one 3mm tubular adenoma from her cecum and one 3mm sessile serrated adenoma from her ascending colon and follow-up was recommended in 5 years. She had a colonoscopy in 2013 in which one 3mm tubular adenoma was removed from her rectum and one 4mm sessile serrated adenoma was removed from her cecum. She does not regularly do any other cancer screening at this time.  Camilla reported that she quit smoking about 38 years ago, and about 3-4 drinks per week for alcohol use.    Family History: Camilla's family history is significant for the following (Please see scanned pedigree for detailed family history information)    Camilla has one brother, age 77, with a history of an unknown type of skin cancer removed from his ear in his mid 70's.     Camilla's mother passed away at age 98 from old age with no reported history of cancer.     Camilla has a maternal aunt who has passed away with a history of colon cancer diagnosed at an unknown age, but Camilla believes she was over 50. This aunt has a daughter, in her late 60's, with a history of an unknown type of cancer (Camilla reports that she believes it may have been a kidney cancer.)    Camilla has a maternal aunt who passed in her early 90's with a history of breast cancer diagnosed in her 50's and contralateral breast cancer diagnosed in her 60's or 70's.     Camilla has a maternal first-cousin, in her late 60's/70's, with a history of bladder cancer diagnosed at an unknown age.     Camilla has a maternal uncle who passed at age 60 from lung cancer diagnosed at age 60. He was a smoker.     Camilla's father passed away at age 81 with a history of a non-melanoma skin cancer  on his nose diagnosed a few years before he passed.     Her maternal ethnicity is Maltese. Her paternal ethnicity is English and Maltese.  There is no known Ashkenazi Lutheran ancestry on either side of her family. There is no reported consanguinity.    Discussion:    Camilla's personal and family history of breast cancer is suggestive of a hereditary cancer syndrome such as hereditary breast and ovarian cancer syndrome.    We reviewed the features of sporadic, familial, and hereditary cancers. In looking at Camilla's family history, it is possible that a cancer susceptibility gene is present due to her bilateral breast cancer history and her maternal aunt's bilateral breast cancer history.    We discussed the natural history and genetics of hereditary breast cancer. A detailed handout regarding the information we discussed will be sent to Camilla via [employer]. Topics included: inheritance pattern, cancer risks, cancer screening recommendations, and also risks, benefits and limitations of testing.    We reviewed that the most common cause of hereditary breast cancer is Hereditary Breast and Ovarian Cancer (HBOC) syndrome, which is caused by mutations in the genes BRCA1 and BRCA2. BRCA1 and BRCA2 are two genes that increase the risk for breast and ovarian cancers, among others. Women who inherit a BRCA mutation have a 50 to 85% lifetime risk of breast cancer and up to a 40% lifetime risk of ovarian cancer. This is higher than the general population lifetime risks of 12% for breast cancer and less than 2% for ovarian cancer. Men with BRCA gene mutations have up to a 7% risk of breast cancer and 20% risk of prostate cancer. Other cancers, such as pancreatic cancer and melanoma, have also been associated with BRCA mutations.    Based on her personal and family history, Camilla meets current National Comprehensive Cancer Network (NCCN) criteria for genetic testing of high-penetrance breast and/or ovarian cancer  susceptibility genes.      We discussed that there are additional genes that could cause increased risk for breast cancers. As many of these genes present with overlapping features in a family and accurate cancer risk cannot always be established based upon the pedigree analysis alone, it would be reasonable for Camilla to consider panel genetic testing to analyze multiple genes at once.    We discussed genetic testing options for Camilla given her personal and family history including breast-focused genetic testing (BRCANext pr BRCANext-Expanded) and including genes related to increased risk for colon cancer given her family history (CancerNext).     Medical Management: For Camilla, we reviewed that the information from genetic testing may determine:    additional cancer screening for which Camilla may qualify (i.e.  more frequent colonoscopies, more frequent dermatologic exams, etc.),    options for risk reducing surgeries Camilla could consider (i.e. surgery to remove her ovaries and/or uterus, etc.),      and targeted chemotherapies for Camilla's active cancer, or if she were to develop certain cancers in the future (i.e. immunotherapy for individuals with Schmidt syndrome, PARP inhibitors, etc.).     These recommendations and possible targeted chemotherapies will be discussed in detail once genetic testing is completed.     Camilla expressed wanting to hold off on genetic testing at this time and think about her options first. I shared that she should reach out to me if she should decide that she would like to proceed with testing.     Plan:  1) Camilla opted to proceed with no genetic testing today.   2) Camilla should reach out to me if she should decide that she wants to move forward with genetic testing.     Stephanie Littlejohn MS, [facility]  Licensed Genetic Counselor  [facility]  [phone]

## 2021-06-24 NOTE — PROGRESS NOTES
[facility] Clinic Note    Camilla Wilson   68 y.o. female    Date of Visit: 2/28/2019  Chief Complaint   Patient presents with     Knee Pain     Right knee and left foot pain,, feels sharp pain      Routine Health Maintenance     Due for colonoscopy, pend the order       ASSESSMENT/PLAN  1. Left ankle tendonitis     2. Screen for colon cancer  Ambulatory referral for Colonoscopy   3. Gastroesophageal reflux disease without esophagitis  omeprazole (PRILOSEC) 20 MG capsule     ---------------------------------------------    1.  Left medial ankle pain, exam and history is consistent with tendinitis.  I think that her risk factors are obesity, low arches, prolonged standing.  She has noticed improvement already when using her custom orthotics.  I recommended wearing those exclusively, also provided some physical therapy exercises (calf stretches, towel toe grab exercise, and general range of motion exercises for rotation of ankle, should spend about 5 minutes a day on these)    2.  She is due for colonoscopy follow-up, this was last done 5 years ago.  We can discuss 5 years from now if she needs an additional 1, partly depending on the results of this colonoscopy coming up    3.  She has GERD, uses omeprazole 20 mg daily, down from twice a day.  She would like to come off of it, I recommended using Pepcid twice a day as needed for breakthrough GERD instead of going back on the omeprazole if she has rebound heartburn.    Return in about 6 months (around 8/28/2019) for Annual physical.      SUBJECTIVE    Camilla Wilson is a 68-year-old woman who presents for left ankle pain.    This started about 3 months ago, was insidious in its onset, no injury or swelling noted.  She points to the pain as the left medial arch and near medial malleolus.  It is worse when she is up standing or walking.  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  This is not necessarily new or unusual, but  she suspects it may have been stemming from that.  A couple months before the pain started, she took a 2-week trip to [place].  She did not have pain during or after the trip until 2 months after it.    She still has the right gluteal pain which is attributed to gluteal tendinitis, better with Feldenkrais, when she does it.  She usually does not keep up with it, however.    She is taking omeprazole for GERD, down from twice a day.  She wonders if she should get off this given the bad press of this medication.      ROS:   Per HPI, all other systems negative     Medications, allergies, and problem list were reviewed and updated    Patient Active Problem List   Diagnosis     Breast Cancer     Dyslipidemia     Spinal Stenosis     Obesity     Osteopenia     GERD (gastroesophageal reflux disease)     Skin cancer     Hamstring tendonitis of right thigh     Left ankle tendonitis     Past Medical History:   Diagnosis Date     Acute streptococcal pharyngitis      Breast cancer (H)      Hx of radiation therapy      Uterine fibroid     embolized     Current Outpatient Medications   Medication Sig Dispense Refill     5-hydroxytryptophan (5-HTP) 100 mg cap Take 2 capsules by mouth daily. 1000mg       calcium citrate 250 mg calcium Tab Take 500 mg by mouth 2 (two) times a day.        cholecalciferol, vitamin D3, (VITAMIN D3) 2,000 unit cap Take 1 capsule by mouth daily.        melatonin 5 mg cap 1 cap nightly  0     niacin (SLO-NIACIN) 500 mg ER tablet Take 2,000 mg by mouth.       omeprazole (PRILOSEC) 20 MG capsule Take 1 capsule (20 mg total) by mouth daily. 90 capsule 1     peg 400-propylene glycol (SYSTANE) 0.4-0.3 % Drop Administer 1 drop to both eyes 4 (four) times a day as needed.       s-adenosylmethionine (AISHA-E, ENTERIC COATED,) 200 mg TbEC Take 2 tablets by mouth daily.       sodium fluoride-pot nitrate (PREVIDENT 5000 SENSITIVE) 1.1-5 % Pste Take by mouth daily.        vit C,D-Ov-phclk-lutein-zeaxan (OCUVITE LUTEIN &  ZEAXANTHIN) 60 mg-30 unit- 15 mg-2 mg-6 mg cap Take by mouth.       OMEGA-3/DHA/EPA/FISH OIL (FISH OIL-OMEGA-3 FATTY ACIDS) 300-1,000 mg capsule Take 2 g by mouth daily.       No current facility-administered medications for this visit.      Allergies   Allergen Reactions     Lipitor [Atorvastatin]      Memory  Impairment         EXAM  Vitals:    02/28/19 1143   BP: 120/70   Patient Site: Left Arm   Patient Position: Sitting   Cuff Size: Adult Large   Pulse: 70   SpO2: 98%   Weight: (!) 268 lb (121.6 kg)         General: Alert, no distress  Musculoskeletal: (Left ankle) tenderness over the medial aspect of the tarsal bones, pain with resisted inversion along the same area, seeming to radiate up to the medial malleolus.  Full passive and active range of motion with dorsiflexion/plantarflexion, inversion and eversion.      Results reviewed:     MRI RIGHT HIP/PELVIS  4/15/2016, 3:44 PM     INDICATION: Right hip and pelvic pain. Suspected hamstring tendon tear.  TECHNIQUE: Routine.  COMPARISON: Plain films 4/11/2016. MRI lumbar spine 10/24/2012.     FINDINGS:  RIGHT HIP: There is intermediate increased signal throughout much of the right acetabular labrum suggesting labral degeneration. Possible tearing of the labrum anterosuperiorly. Mild irregularity and thinning articular cartilage. No significant joint   effusion. No evidence for fracture or AVN.      PELVIS: There is mild tendinopathy involving the origin of the hamstring tendons bilaterally. No fluid-filled tear. Mild distal gluteal tendinopathy bilaterally. Small sclerotic focus in the right ilium stable and likely a bone island. Degenerative   changes in the spine, advanced at L4-L5.     A 6-cm heterogeneous signal intensity mass within the uterus centrally with likely some hemosiderin could represent chronic blood products in the endometrial cavity or a fibroid. There is a 1.5-cm probable fibroid peripherally in the uterus on the left.     IMPRESSION:    CONCLUSION:  1.  Mild degenerative change of the right hip joint with possible tearing of the right acetabular labrum anterosuperiorly.  2.  Mild tendinopathy involving the hamstring tendon origins bilaterally and the distal gluteal tendons bilaterally. No tendon tearing.  3.  Distended endometrial cavity with chronic blood products or degenerated uterine fibroid centrally in the uterus measuring 6 cm.    This visit lasted a total of 25 minutes.  Over 50% of the time was spent counseling regarding he management of ankle pain.            Willie Austin DO  Internal Medicine  [facility]

## 2021-06-26 ENCOUNTER — HEALTH MAINTENANCE LETTER (OUTPATIENT)
Age: 71
End: 2021-06-26

## 2021-06-28 NOTE — PROGRESS NOTES
Progress Notes by Gemini Rosario MD at 11/14/2019  4:00 PM     Author: Gemini Rosario MD Service: -- Author Type: Physician    Filed: 11/14/2019  4:57 PM Encounter Date: 11/14/2019 Status: Signed    : Gemini Rosario MD (Physician)       Pulmonary Clinic Outpatient Consultation  11/14/2019     Assessment and Plan:   69 year old former smoker with the most pertinent medical history of breast cancer, GERD, & obesity, presenting for evaluation of subacute-on-chronic cough.     #. Cough, intermittent, subacute-on-chronic.  Associated with respiratory symptoms suggestive of asthma (particularly bronchial congestion).  She would benefit from maintenance therapy considering her persistent symptoms.  #.  Postnasal drip, chronic.  Possible environmental allergies versus vasomotor rhinitis.  No clear seasonal triggers.  Would benefit from treatment considering her system cough.  #.  Acid reflux, currently not on any maintenance therapy.  Discussed importance of GERD treatment to minimize any associated airway inflammation.  #.  Obesity, discussed that it can have an effect on asthma.      Start twice daily Flovent inhaler; patient provided with hands-on instruction on how to use the inhaler with a spacer    Patient provided with instructions to rinse her mouth and gargle after Flovent use    Start PRN albuterol inhaler; patient provided with hands-on instruction on how to use the inhaler with a spacer    Start nasal fluticasone spray for her chronic rhinitis symptoms    Recommend patient start routine nasal irrigation (neti pot); patient provided with written instructions on nasal irrigation and Flovent use    Recommend the patient restart taking her acid reflux medications    Patient provided with a written Asthma Action Plan    Patient is up-to-date on her influenza vaccination    Recommended that patient not have her cat sleep in her bed in case this may be triggering  "some of her rhinitis symptoms are aggravating any of her airway inflammation    Patient should return to clinic in 3 months for a follow up.    I appreciate the opportunity to participate in the care of Camilla Wilson.     Gemini Rosario MD  Pulmonary and Critical Care   ______________________________________________________________________________    CC: Cough    HPI:   Camilla Wilson is a 69 y.o. former smoker with history of GERD, breath cancer (s/p lumpectomy, radiation, & chemotherapy), & obesity, presenting today for evaluation of subacute-on-chronic cough.     Cough started in May following a viral URI.  At the time patient had productive cough, runny nose, and sensation of chest congestion.  She was sick for 1 to 2 weeks with her acute symptoms. Over time her cough has been \"coming and going\" but has been more consistent since August, only waxing and waning in intensity. Cough is primarily nonproductive, but she does feel that there is significant chest congestion present.  She has never coughed up in the phlegm to tell me what it looks like. Cough is most prominent during the day; she had nocturnal symptoms when she had an acute URI, however nowadays she sleeps through the night without any issues.  She thinks that humidity may be 1 of the aggravating factors for her cough, but has not noticed anything else.  States that she had a similar presentation a few years ago and was treated with a course of prednisone that helped significantly.    She endorses chronic nasal congestion venous.  This is associated with some postnasal drainage that tends to be year-round.  She has not noticed any aggravating factors or seasonal changes.  She has chronic acid reflux, and has been on a PPI in the past.  Currently she uses her PPI and Tums as needed rather than regularly.  No recent changes in her weight.  No chest pain.    She is a former smoker with a 10 pack smoking history.  She quit smoking in 1983.  No childhood " respiratory symptoms or recurrent illnesses.  She has always fostered or kept cats. Has a cat named Kasey Murillo.  The cat sleeps in her bed.  The animal is healthy.    ROS:  Review of Systems - 10 point ROS reviewed and noted to be negative w/ exceptions as detailed in the HPI.    PMH:  Patient Active Problem List    Diagnosis Date Noted   ? Left ankle tendonitis 02/28/2019   ? Hamstring tendonitis of right thigh 01/31/2018   ? GERD (gastroesophageal reflux disease) 02/04/2016   ? Skin cancer 02/04/2016   ? Osteopenia 12/07/2015   ? Obesity    ? Breast Cancer    ? Dyslipidemia    ? Spinal Stenosis      PSH:  Past Surgical History:   Procedure Laterality Date   ? BREAST BIOPSY Left    ? BREAST LUMPECTOMY Right 2007   ? [place] EMBOLIZATION UTERINE FIBROID      Description: Uterine Fibroid Embolization;  Proc Date: 09/06/2000;   ? [place] EXCISE BREAST CYST      Description: Breast Surgery Lumpectomy;  Recorded: 12/18/2007;   ? [place] SHLDR ARTHROSCOP,SURG,W/REMOVAL,LOOSE/FB      Description: Shoulder Arthroscopy;  Recorded: 02/25/2009;  Comments: right 2004; left 2007     Allergies:  Allergies   Allergen Reactions   ? Lipitor [Atorvastatin]      Memory  Impairment       Family HX:  Family History   Problem Relation Age of Onset   ? Colon cancer Maternal Aunt    ? Breast cancer Maternal Aunt         40s   ? Kidney disease Mother    ? Pneumonia Father    ? Hyperlipidemia Father      Social Hx:  Social History     Socioeconomic History   ? Marital status: Single     Spouse name: Not on file   ? Number of children: Not on file   ? Years of education: Not on file   ? Highest education level: Not on file   Occupational History   ? Not on file   Social Needs   ? Financial resource strain: Not on file   ? Food insecurity:     Worry: Not on file     Inability: Not on file   ? Transportation needs:     Medical: Not on file     Non-medical: Not on file   Tobacco Use   ? Smoking status: Former Smoker     Packs/day: 0.50      Years: 15.00     Pack years: 7.50     Last attempt to quit: 1982     Years since quittin.5   ? Smokeless tobacco: Never Used   Substance and Sexual Activity   ? Alcohol use: Not on file   ? Drug use: Not on file   ? Sexual activity: Not on file   Lifestyle   ? Physical activity:     Days per week: Not on file     Minutes per session: Not on file   ? Stress: Not on file   Relationships   ? Social connections:     Talks on phone: Not on file     Gets together: Not on file     Attends Yarsanism service: Not on file     Active member of club or organization: Not on file     Attends meetings of clubs or organizations: Not on file     Relationship status: Not on file   ? Intimate partner violence:     Fear of current or ex partner: Not on file     Emotionally abused: Not on file     Physically abused: Not on file     Forced sexual activity: Not on file   Other Topics Concern   ? Not on file   Social History Narrative   ? Not on file     Current Meds:  Current Outpatient Medications   Medication Sig Dispense Refill   ? 5-hydroxytryptophan (5-HTP) 100 mg cap Take 2 capsules by mouth daily. 1000mg     ? calcium citrate 250 mg calcium Tab Take 500 mg by mouth 2 (two) times a day.      ? cholecalciferol, vitamin D3, (VITAMIN D3) 2,000 unit cap Take 1 capsule by mouth daily.      ? melatonin 5 mg cap 1 cap nightly  0   ? niacin (SLO-NIACIN) 500 mg ER tablet Take 2,000 mg by mouth.     ? peg 400-propylene glycol (SYSTANE) 0.4-0.3 % Drop Administer 1 drop to both eyes 4 (four) times a day as needed.     ? s-adenosylmethionine (AISHA-E, ENTERIC COATED,) 200 mg TbEC Take 2 tablets by mouth daily.     ? sodium fluoride-pot nitrate (PREVIDENT 5000 SENSITIVE) 1.1-5 % Pste Take by mouth daily.      ? vit C,F-Lf-fkvkl-lutein-zeaxan (OCUVITE LUTEIN & ZEAXANTHIN) 60 mg-30 unit- 15 mg-2 mg-6 mg cap Take by mouth.       No current facility-administered medications for this visit.      Physical Exam:  /62   Pulse 68   Resp 12   " Ht 5' 5\" (1.651 m)   Wt (!) 267 lb (121.1 kg)   SpO2 97%   Breastfeeding No   BMI 44.43 kg/m    Gen: obese elderly  woman, alert, oriented, no distress  HEENT: nasal turbinates are erythematous, no oropharyngeal lesions, no cervical or supraclavicular lymphadenopathy; no stridor  CV: RRR, no M/G/R  Resp: equal bilateral air entry, breath sounds clear throughout, no focal crackles or wheezes; able to converse in full sentences w/ no respiratory distress  Abd: soft, nontender, no palpable organomegaly  Skin: no apparent rashes  Ext: no cyanosis, clubbing or edema  Neuro: alert, nonfocal    Labs: personally reviewed in the EMR.    Imaging studies: personally reviewed and interpreted. Below are the Radiology interpretations.  CXR, 10/16/19: Negative chest.      PFT's (11/01/19): normal spirometry without clinically significant bronchodilator response, normal lung volumes, and supranormal diffusion capacity.   FEV1/FVC is 0.80 and is normal. FEV1 is 2.62 L (116% predicted) and is normal. FVC is 3.27 L (112% predicted) and normal. There was no improvement in spirometry after a single inhaled dose of bronchodilator. TLC is 5.29 L (103% predicted) and is normal. DLCO is 126% predicted and is increased when it is corrected for hemoglobin.             "

## 2021-06-29 NOTE — PROGRESS NOTES
Progress Notes by Annette Cantro MD at 10/15/2020 10:45 AM     Author: Annette Cantor MD Service: -- Author Type: Physician    Filed: 10/15/2020  1:25 PM Encounter Date: 10/15/2020 Status: Signed    : Annette Cantor MD (Physician)               [facility] Lymphedema/Swelling Consult    I have been asked to see Camilla Wilson referred by Margarita Cook MD     Date of Service: 10/15/20     Chief Complaint:  Left arm swelling    History of Present Illness:  This 70 y.o. female presents to the [facility] as a new consult to evaluate left arm swelling.  Her problems began 7/22/20 when she was diagnosed with left breast lobular carcinoma, ER/SD +.  She had been treated for right breast mucinous cancer many years ago with lumpectomy, radiation and hormones.   On 9/14/20 she had bilateral mastectomies, (R simple and L mod rad with +2 positive LN).  She will be seeing Dr. Beckman (radiation) and Dr. Laguna (oncology) to decide on further treatment. The patient began to notice swelling of the left arm in the last 1-2 weeks.  The left hand became more swollen and painful.  Her drain then came out on the left and the swelling decreased.  She still has some burning on the anterior left forearm.  She also has swelling in her legs that she has had for years with many members of her family having similar problems.  She gets restless legs also.   There has been no new numbness, tingling or weakness.  There have been no new masses, rashes, or swellings of any other joints. There have been no infections.  There has been no new unexplained weight loss, loss of appetite, nausea and/or vomiting, shortness of breath, weight loss and chest pain. The swelling in the legs decreases with elevation.  Her swelling in the legs has worsened in the last couple of years.  Previous treatment has included compression stockings with some benefit.  Her stockings are 10  years old and [facility] doesn't wear the,m regularly.    Past Medical History:    Past Medical History:   Diagnosis Date   ? Breast cancer (H) 2000   ? Breast cyst    ? Hx of radiation therapy    ? Moderate persistent asthma without complication 8/14/2020   ? PONV (postoperative nausea and vomiting)    ? Uterine fibroid     embolized       Surgical History:   Past Surgical History:   Procedure Laterality Date   ? BREAST BIOPSY Left    ? BREAST LUMPECTOMY Right 2007   ? BREAST LUMPECTOMY Left 8/21/2020    Procedure: Left Lumpectomy after Wire Localization: [place] Lymph Node Biopsy;  Surgeon: Margarita Cook MD;  [facility]: [facility];  Service: General   ? VA EMBOLIZATION UTERINE FIBROID      Description: Uterine Fibroid Embolization;  Proc Date: 09/06/2000;   ? VA EXCISE BREAST CYST      Description: Breast Surgery Lumpectomy;  Recorded: 12/18/2007;   ? VA MASTECTOMY, SIMPLE, COMPLETE Bilateral 9/14/2020    Procedure: Bilateral Mastectomies;  Surgeon: Margarita Cook MD;  [place] [facility] [place]  Service: General   ? VA SHLDR ARTHROSCOP,SURG,W/REMOVAL,LOOSE/FB      Description: Shoulder Arthroscopy;  Recorded: 02/25/2009;  Comments: right 2004; left 2007   ? US BREAST CORE BIOPSY LEFT Left 7/22/2020   ? US BREAST LOC W SENT NODE INJ LEFT Left 8/21/2020       Medications:    Current Outpatient Medications:   ?  5-hydroxytryptophan (5-HTP) 100 mg cap, Take 2 capsules by mouth daily. 1000mg, Disp: , Rfl:   ?  albuterol (PROAIR HFA;PROVENTIL HFA;VENTOLIN HFA) 90 mcg/actuation inhaler, Inhale 2 puffs every 4 (four) hours as needed for wheezing or shortness of breath (cough)., Disp: 1 Inhaler, Rfl: 12  ?  budesonide (PULMICORT) 180 mcg/actuation inhaler, Inhale 2 puffs 2 (two) times a day., Disp: 1 Inhaler, Rfl: 6  ?  calcium citrate 250 mg calcium Tab, Take 500 mg by mouth daily. , Disp: , Rfl:   ?  cetirizine (ZYRTEC) 10 MG tablet, Take 10 mg by mouth daily., Disp: , Rfl:   ?  cholecalciferol, vitamin D3,  (VITAMIN D3) 2,000 unit cap, Take 1 capsule by mouth daily. , Disp: , Rfl:   ?  famotidine (FOR PEPCID) 10 MG tablet, Take 10 mg by mouth daily., Disp: , Rfl:   ?  melatonin 5 mg Tab tablet, Take 5 mg by mouth at bedtime., Disp: , Rfl:   ?  niacin (SLO-NIACIN) 500 mg ER tablet, Take 2,000 mg by mouth daily. , Disp: , Rfl:   ?  peg 400-propylene glycol (SYSTANE) 0.4-0.3 % Drop, Administer 1 drop to both eyes 4 (four) times a day as needed., Disp: , Rfl:   ?  s-adenosylmethionine (AISHA-E, ENTERIC COATED,) 200 mg TbEC, Take 2 tablets by mouth daily., Disp: , Rfl:   ?  sodium fluoride-pot nitrate (PREVIDENT 5000 SENSITIVE) 1.1-5 % Pste, Take by mouth daily. , Disp: , Rfl:   ?  vit C,K-Ib-yypjy-lutein-zeaxan (OCUVITE LUTEIN & ZEAXANTHIN) 60 mg-30 unit- 15 mg-2 mg-6 mg cap, Take 1 tablet by mouth daily. , Disp: , Rfl:     Allergies:   Allergies   Allergen Reactions   ? Lipitor [Atorvastatin]      Memory  Impairment         Family history:   Family History   Problem Relation Age of Onset   ? Colon cancer Maternal Aunt    ? Breast cancer Maternal Aunt         40s   ? Kidney disease Mother    ? Pneumonia Father    ? Hyperlipidemia Father        Social History:   Social History     Socioeconomic History   ? Marital status: Single     Spouse name: Not on file   ? Number of children: Not on file   ? Years of education: Not on file   ? Highest education level: Not on file   Occupational History   ? Not on file   Social Needs   ? Financial resource strain: Not on file   ? Food insecurity     Worry: Not on file     Inability: Not on file   ? Transportation needs     Medical: Not on file     Non-medical: Not on file   Tobacco Use   ? Smoking status: Former Smoker     Packs/day: 0.50     Years: 15.00     Pack years: 7.50     Quit date: 1982     Years since quittin.4   ? Smokeless tobacco: Never Used   Substance and Sexual Activity   ? Alcohol use: Yes     Alcohol/week: 7.0 standard drinks     Types: 7 Glasses of wine per  week   ? Drug use: Never   ? Sexual activity: Not on file   Lifestyle   ? Physical activity     Days per week: Not on file     Minutes per session: Not on file   ? Stress: Not on file   Relationships   ? Social connections     Talks on phone: Not on file     Gets together: Not on file     Attends Hoahaoism service: Not on file     Active member of club or organization: Not on file     Attends meetings of clubs or organizations: Not on file     Relationship status: Not on file   ? Intimate partner violence     Fear of current or ex partner: Not on file     Emotionally abused: Not on file     Physically abused: Not on file     Forced sexual activity: Not on file   Other Topics Concern   ? Not on file   Social History Narrative   ? Not on file         Labs:   I personally reviewed the following lab results today and those on care everywhere, if indicated    Lab Results   Component Value Date    SEDRATE 7 04/06/2010         No results found for: CRP        Lab Results   Component Value Date    CREATININE 1.00 10/16/2019      Lab Results   Component Value Date    HGBA1C 5.6 03/16/2016           Lab Results   Component Value Date    BUN 16 10/16/2019              Lab Results   Component Value Date    ALBUMIN 3.9 10/16/2019       Vitamin D, Total (25-Hydroxy)   Date Value Ref Range Status   10/17/2012 36.6 30.0 - 80.0 ng/mL Final     Comment:     Deficiency              <10.0 ng/mL               Insufficiency       10.0-29.9 ng/mL               Sufficiency         30.0-80.0 ng/mL               Toxicity (possible)    >100.0 ng/mL       Lab Results   Component Value Date    TSH 1.45 03/16/2016     Lab Results   Component Value Date    WBC 5.9 08/14/2020    HGB 12.9 08/14/2020    HCT 39.3 08/14/2020    MCV 98 08/14/2020     08/14/2020     Surgical Pathology Exam: [id number]  Order: [id number]  Collected:  9/14/2020 09:49 Status:  Final result   Visible to patient:  Yes (Lizy)  Component    Final Diagnosis   A) BREAST  AND AXILLARY LYMPH NODES, LEFT, MASTECTOMY:        1) RESIDUAL INVASIVE LOBULAR CARCINOMA:            a) GRADE: JAYSON GRADE II (of III)            b) SIZE: 12 X 8 X 8-MM TUMOR NODULE, LATERAL ASPECT OF LUMPECTOMY CAVITY;                 0.2-MM TUMOR NODULE SUBAREOLAR BREAST TISSUE            c) MARGINS: NEGATIVE; LUMPECTOMY CAVITY 3 MM FROM DEEP MARGIN        2) LOBULAR CARCINOMA IN SITU        3) METASTATIC LOBULAR CARCINOMA PRESENT IN 2 OF 5 AXILLARY LYMPH NODES (2 OF 5)            a) 2.5-MM MACROMETASTASES            b) RARE CYTOKERATIN-POSITIVE ISOLATED TUMOR CELLS PRESENT IN ONE LYMPH NODE        4) BACKGROUND BREAST SHOWS:            a) 8.9 X 7.6 X 4.3-CM LUMPECTOMY CAVITY WITH SURROUNDING FIBROSIS, FAT                NECROSIS, AND FOREIGN BODY GRANULOMATOUS INFLAMMATION, LOWER OUTER                QUADRANT            b) PROLIFERATIVE FIBROCYSTIC CHANGES WITH RADIAL SCAR, FLORID USUAL DUCTAL                HYPERPLASIA, DUCT ECTASIA WITH APOCRINE METAPLASIA, COLUMNAR CELL CHANGES,                FIBROADENOMATOSIS AND SCLEROSING ADENOSIS, WITH ASSOCIATED                MICROCALCIFICATIONS        5) MULTIPLE BENIGN SEBORRHEIC KERATOSES     PATHOLOGIC STAGE: pT3 pN1a (SEE COMMENT)     See staging parameters below     B) RIGHT BREAST AND AXILLARY LYMPH NODES, SIMPLE MASTECTOMY:        1) 1.4 X 1.1 X 0.9 CM FIBROTIC SCAR WITH FAT NECROSIS AND DYSTROPHIC CALCIFICATION,            CONSISTENT WITH PREVIOUS LUMPECTOMY SITE, UPPER INNER QUADRANT        2) NO EVIDENCE OF IN SITU OR INVASIVE MALIGNANCY        3) PROLIFERATIVE FIBROCYSTIC CHANGES WITH USUAL DUCTAL HYPERPLASIA, COLUMNAR            CELL CHANGES AND ATYPICAL LOBULAR HYPERPLASIA        4) LATERAL LYMPH NODES WITH NO EVIDENCE OF METASTATIC CARCINOMA (0 OF 5)        5) MULTIPLE BENIGN SEBORRHEIC KERATOSES        6) NO EVIDENCE OF PAGET'S DISEASE OR DERMAL LYMPHATIC TUMOR SPREAD     MCRS           Contains abnormal data Surgical Pathology Exam:  [id number]  Order: [id number]  Collected:  7/22/2020 14:45    Ref Range & Units    FISH HER2/vicki Report, Addendum   [employer] FISH/HER2 ANALYSIS:     RESULTS: NEGATIVE     See scanned addendum to [employer] report (case [id number]).    Addendum electronically signed by Alayna Shaikh MD on 8/4/2020 at 1125   Final Diagnosis   BREAST, LEFT, 3:00, 6 CM FROM NIPPLE, ULTRASOUND-GUIDED CORE BIOPSY OF MASS:     1) INVASIVE LOBULAR CARCINOMA:         a) GRADE: JAYSON GRADE II (of III)         b) SCORE: 6 (OF 9)     2) LOBULAR CARCINOMA IN SITU (DCIS):     3) ADDITIONAL FINDINGS: STROMAL FIBROSIS     4)  a) ESTROGEN RECEPTOR: STRONGLY POSITIVE (GREATER THAN 95%; 3+)         b) PROGESTERONE RECEPTOR: STRONGLY POSITIVE (GREATER THAN 95%; 3+)         c) HER-2/VICKI: INDETERMINATE (2+); PARAFFIN EMBEDDED TISSUE WILL BE SUBMITTED FOR               REFLEX HER-2/VICKI ANALYSIS BY FISH (SEE SUPPLEMENTAL REPORT)         d) E-CADHERIN:  NEGATIVE; CONSISTENT WITH LOBULAR CARCINOMA     MCSS   Electronically signed by Alayna Shaikh MD on 7/24/2020 at 1237             Imaging:    I personally reviewed the following imaging results today and those on care everywhere, if indicated    MAMMO DIAGNOSTIC 3D LEFT, US BREAST LEFT LIMITED 1-3 QUADRANTS  7/15/2020 2:55 PM     INDICATION: Abnormal screening mammogram. 70-year-old female presents for imaging followup for a left breast focal asymmetry demonstrated on recent screening mammography. Reported history of prior right breast cancer status post lumpectomy many years ago.  COMPARISON: 7/6/2020, 5/3/2019, 3/26/2018, 3/22/2017, 3/4/2016, 3/2/2015, 2/26/2014, 2/25/2013, 10/24/2012, 1/3/2012, 12/29/2010.     MAMMOGRAPHIC FINDINGS: Left breast full field and spot compression digital diagnostic mammograms performed. There are scattered areas of fibroglandular density. Images evaluated with the assistance of CAD. Breast tomosynthesis was used in interpretation.     There is an irregular  "mass with associated architectural distortion within the left breast at the 3:00 position, posterior depth. This mass measures approximately 2.6 cm mammographically. Recommend targeted left breast ultrasound for further evaluation.     ULTRASOUND FINDINGS:     Sonographic evaluation of the left breast was performed by both the technologist and radiologist     Targeted left breast ultrasound at the 3:00 position, 6 cm from the nipple demonstrates a 1.3 x 1.1 x 1.1 cm irregular, hypoechoic mass with indistinct and spiculated margins. There is no associated increased internal vascularity. This finding correlates with the mass demonstrated on mammography.     Sonographic evaluation of the left axilla was performed. A few morphologically-normal lymph nodes with thin cortices and retained fatty darren are demonstrated.     IMPRESSION:   1. Suspicious mass within the left breast at the 3:00 position correlates with the mammographic finding of concern. Recommend ultrasound-guided biopsy of this mass.  2. Sonographic evaluation of the left axilla demonstrates morphologically-normal lymph nodes.     ACR BI-RADS Category 5: Highly Suggestive of Malignancy.     I personally scanned and discussed the findings and recommendations with the patient at the conclusion of the examination.    Review of Symptoms:  Full 12 point review of systems is negative, except as noted above.    Physical Exam:      Vital Signs: /60 (Patient Site: Right Arm, Patient Position: Sitting, Cuff Size: Adult Regular)   Pulse 72   Temp 97.6  F (36.4  C) (Tympanic)   Resp 16   Ht 5' 5\" (1.651 m)   Wt (!) 263 lb (119.3 kg)   BMI 43.77 kg/m       Circumferential Volume Measures:    Vasc Edema 10/15/2020   Right-just above MCP 19.3   Right Wrist 19   Right Up 10cm 28.3   Right Up 10cm From Elbow 43.8   Left-just above MCP 18.6   Left Wrist 19   Left Up 10cm 28.5   Left Up 10cm From Elbow 44.3   Right just above MTP 22.4   Right Ankle 32   Right Widest " Calf 48.8   Right Thigh Up 10cm 64   Left - just above MTP 22.2   Left Ankle 31.5   Left Widest Calf 51.3   Left Thigh Up 10cm 64.5       General: In no apparent distress.     Psych: Alert and oriented X 3. Affect normal.    HEENT: Atraumatic, normocephalic    Range of Motion:  Range of motion of elbows, wrists, knees and ankles is normal bilaterally without active joint synovitis, erythema, joint swelling, crepitus or joint laxity.  Range of motion of shoulders is to 165 degrees on the right and left in forward flexion and abduction.  It starts to get uncomfortable and tight.  Range of motion of the neck is full with Spurling's maneuver negative.    Sensation: Intact to pinprick and light touch in the upper and lower extremities bilaterally.      Strength:  Normal strength testing in shoulder abduction, elbow flexion, elbow extension, wrist extension, forearm supination, forearm pronation, hand intrinsics, internal rotation and external rotation of the shoulders bilaterally.  Normal strength testing in hip flexion, knee flexion, knee extension, ankle dorsiflexion and great toe extension bilaterally.    Deep Tendon Reflexes:  Normal biceps, triceps and brachioradialis bilaterally.  Normal knee jerks and ankle jerks bilaterally.    Lungs: Clear to auscultation throughout with full inspiration.    CV: Normal S1 and S2, without murmurs, gallops, or rubs.  No audible carotid bruits. Regular rhythm.  Exam slightly compromised by morbid obesity.    Abdominal:  Normal bowel sounds.  No masses, tenderness, guarding or rigidity.  No inguinal lymphadenopathy or fullness palpated.      Lymph nodes: No cervical, supraclavicular, infraclavicular, or axillary lymphadenopathy palpated.    Vascular: No unusual venous distention.  Radial arterial pulses strong and equal at the wrists bilaterally.  Dorsalis pedis and posterior tib pulses strong and equal in the feet bilaterally and heard well with biphasic pulses with hand-held  Doppler.  Numerous telangiectasias, ankle flaring and spider veins noted in both legs from the thighs on down.    Skin: No unusual rubor, calor, masses or rashes along the skin in either arm or in either leg.  No significant fibrosity or scarring.  No pain to palpation.             Impression:   1. L arm swelling  2. Bilateral leg swelling  3. Secondary post mastectomy lymphedema  4. Bilateral shoulder contractures  5. At risk for seroma formation left lateral chest wall  6. Venous stasis with hypertension in the legs bilaterally  7. Bilateral breast carcinoma (bilateral: R lumpectomy-mucinous carcinoma treated with radiation,  R simple 9/20 and L mod rad +2 LN ER/CT + lobular)  8. Morbid obesity    Plan:             1. Swelling was discussed in detail with the patient.  Questions were answered.  Her arm is in early postmastectomy lymphedema on the left which may or may not cause problems down the line.  It is not clear what further therapy she will need and how much higher risk she will be.  The legs appears to be longstanding venous stasis with possible insufficiency and definite venous hypertension of the legs.  This was all explained in detail.  2. Physical/Occupational Therapy for swelling.  At this time it therapy will be gentle exercises for right and left arms to decrease swelling risk and also to try to loosen up the seroma area in the left lateral chest wall.  I will have him be a little more aggressive on getting the swelling down with bandaging and massage of the legs and then will get her measured for compression socks.  We discussed the importance of compression.  We will try to get her over-the-counter type compression which is less expensive for her.  We also discussed the importance of updating her compression regularly and wearing it daily.  3. Bilateral compression sleeves were also ordered for her.  We discussed wearing of them.  The right side she only needs to wear for heavier activities or  when flying in airplane.  The left will be the same unless she starts radiation, whereupon she will need to wear it as much she can tolerate while she is getting radiation.  4. Follow up with me 3-4 months, or when needed.  If any questions or concerns she will contact the clinic.     Thank you very much for referring Camilla Wilson.  If you have any questions please feel free to contact me at [phone].    Time spent with patient 60 minutes with greater than 50% time in education counseling and coordination of cares, excluding procedures.      Annette Cantor MD, Founding Diplomate ABWMS, FACCWS, FAAPMR  Medical Director Wound Care and Lymphedema  [facility]  [phone]      This note was dictated using a voice recognition software.  Any grammatical or context distortion are unintentional and inherent to the software.

## 2021-06-30 NOTE — PROGRESS NOTES
Progress Notes by Vidhya Hayden PTA at 12/21/2020  8:00 AM     Author: Vidhya Hayden PTA Service: -- Author Type: Physical Therapist Assistant    Filed: 12/21/2020  8:56 AM Encounter Date: 12/21/2020 Status: Attested    : Vidhya Hayden PTA (Physical Therapist Assistant) Cosigner: Chantel Harvey PT at 12/21/2020  9:09 AM    Attestation signed by Chantel Harvey PT at 12/21/2020  9:09 AM    Physical Therapist observed treatment and reviewed patient's subjective and objective progress, progress towards goals and plan of care with the PTA.  It is appropriate to continue per Plan of Care.                  [facility] Re-Certification Request    December 21, 2020      Patient: Camilla Wilson  MR Number: [id number]  YOB: 1950  Date of Visit: 12/21/2020  Onset Date:  9/14/2020  Date of Eval: 11/5/2020    Dear Dr. Cantor:    As you may recall, we have been seeing Camilla Wilson for Physical Therapy of her lymphedema.    For therapy to continue, Medicare and/or Medicaid requires periodic physician review of the treatment plan. Please review the summary of the patient's progress and our plan for continued therapy, and verify  that you agree therapy should continue by entering certification dates at the bottom of this note and co-signing this note.    Plan of Care  Authorization / Certification Start Date: 12/14/20  Authorization / Certification End Date: 01/13/21  Authorization / Certification Number of Visits: 4  Communication with: Referral Source  Patient Related Instruction: Nature of Condition;Body mechanics;Posture;Treatment plan and rationale;Precautions;Next steps;Expected outcome;Basis of treatment;Self Care instruction  Times per Week: 1-2  Number of Weeks: 4  Number of Visits: 4  Discharge Planning: when goals are met or pt has reached a plateau in progress  Therapeutic Exercise: ROM;Stretching;Strengthening;Lymphedema  Neuromuscular Reeducation: kinesio  tape;posture;balance/proprioception;core  Manual Therapy: soft tissue mobilization;myofascial release;joint mobilization;muscle energy;lymphatic drainage massage  Modalities: cold pack;hot pack  Equipment: compression bandages      Goal  Patient will reach / maintain arm movement: Progressing toward  Comment: be able to reach into overhead position to get chemo treatment with pain rating <2/10 (MET)    Patient will have a decreased volume in : bilateral;LE;by 5%;to decrease risk of infection;for better fit of clothing;for improved body image;for ease of movement;in 12 weeks (Met on left, progressing toward on R)  Patient will have decreased fibrosis, scar tissue for improved lymphatic mobilitiy : in 12 weeks;Comment  Comment: improved mastectomy fascial movement before/during/after radiation  Patient will perform, verbalize self-management of: skin care;self-monitoring;exercise;massage;self-compression;compression wear;compression care;infection prevention;in 12 weeks;Progressing toward        If you have any questions or concerns, please don't hesitate to call.    Sincerely,      Vidhya Hayden, [facility]        Physician recommendation:     __X_ Follow therapist's recommendation        ___ Modify therapy      *Physician co-signature indicates they certify the need for these services furnished within this plan and while under their care.         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 Lymphedema  I89.0    2. Decreased range of motion of left shoulder  M25.612    3. Localized swelling of both lower legs  R22.43    4. Post-mastectomy lymphedema syndrome  I97.2    5. Malignant neoplasm of central portion of left breast (H)  C50.112          Assessment:       Pt with visibly decreased swelling today especially her ankles.  Pt has been inconsistent with her exercise program.    HEP/POC compliance is  good .  Therapy interventions being performed are medically necessary, are being delivered according to accepted standards of medical practice, and require the skills of a therapist to perform the services.      Goal Status:  Patient will reach / maintain arm movement: Progressing toward  Comment: be able to reach into overhead position to get chemo treatment with pain rating <2/10 (MET)    Patient will have a decreased volume in : bilateral;LE;by 5%;to decrease risk of infection;for better fit of clothing;for improved body image;for ease of movement;in 12 weeks (Met on left, progressing toward on R)  Patient will have decreased fibrosis, scar tissue for improved lymphatic mobilitiy : in 12 weeks;Comment  Comment: improved mastectomy fascial movement before/during/after radiation  Patient will perform, verbalize self-management of: skin care;self-monitoring;exercise;massage;self-compression;compression wear;compression care;infection prevention;in 12 weeks;Progressing toward      Plan / Patient Education:     Updated POC to extend visits.     Authorization / Certification Start Date: 12/14/20  Authorization / Certification End Date: 01/13/21  Authorization / Certification Number of Visits: 4  Communication with: Referral Source  Patient Related Instruction: Nature of Condition;Body mechanics;Posture;Treatment plan and rationale;Precautions;Next steps;Expected outcome;Basis of treatment;Self Care instruction  Times per Week: 1-2  Number of Weeks: 4  Number of Visits: 4  Discharge Planning: when goals  "are met or pt has reached a plateau in progress  Therapeutic Exercise: ROM;Stretching;Strengthening;Lymphedema  Neuromuscular Reeducation: kinesio tape;posture;balance/proprioception;core  Manual Therapy: soft tissue mobilization;myofascial release;joint mobilization;muscle energy;lymphatic drainage massage  Modalities: cold pack;hot pack  Equipment: compression bandages     Continue with MLD and compression wrapping for bing LEs, progress L UE strengthening if needed, treatment of L UE as needed  Pt to see [facility] for compression fitting of LEs and adjust fit of UEs on .  Encouraged daily exercises.    Subjective:        Pain Ratin   Pt states her legs are feeling good.  Pt states her arms are doing\"fine.\" Pt states she did not wear compression sleeves over the weekend. \" I gave myself a break. \" Pt states she did not feel there was more swelling in her UEs.  Pt states she is trying to get her exercises in. \" I haven't figured out a routine yet.\"    Objective:     Caregiver present: No    Observation of swelling: bing LEs is better. Stable.  Volume measurements taken:  no  Lymphedema Assessment 2020   Right Upper Extremity Total Estimated Volume (cm3) 3712.44 - -   Left Upper Extremity Total Estimated Volume (cm3) 3688.02 - -   Swelling Description Right>Left - -   Upper Extremity Swelling Comparison (%) 0.66 - -   Right Lower Extremity Total Estimated Volume (cm3) - 6728.47 6530.61   Right LE change of volume from last visit (%) - - -2.94   Right LE change of volume from initial (%) - - -2.94   Left Lower Extremity Total Estimated Volume (cm3) - 7431.64 6891.43   Left LE change of volume from last visit (%) - - -7.27   Left LE change of volume from initial (%) - - -7.27   Swelling Description - Left>Right Left>Right   Lower Extremity Swelling Comparison (%) - 10.45 5.53       Compression: no compression on B UE  Tubigrip on LE - took bandages off after  2 days     Medication " for infection:  No     MMT: Shoulder ER: R 4/5 L 4-/5    Treatment Today     TREATMENT MINUTES COMMENTS   Evaluation     Self-care/ Home management     Manual therapy 55 -Manual therapy: manual lymph drainage for bilateral lower extremities and left UE  lymphedema  Deep abdominal breath, neck effleurage, short neck, bilateral axilla, left chest, left upper extremity, bilateral inguinal, left lower extremity evacuation, right lower extremity evacuation, abdomen, neck effleurage.    -educated on skin care and self-management of symptoms      -Wrapping bing LEs as follows:  -Lotion  -Cotton stockinette  -Artiflex ankle to knee  -8 cm x 5 m foot to ankle with fig 8   -10 cm x 5 m, ankle to knee spiral   Reviewed with pt to take compression off if signs of pain, numbness, tingling or decreased blood flow       Neuromuscular Re-education     Therapeutic Activity     Therapeutic Exercises      Exercises:  Reviewed  Exercise #1: Cervical flex, rotation and SB ROM, shoulder shrugs and scap retract lymph exercises- verbal review  Comment #1: B shoulder flexion overhead x5-10 reps 1-2x/day  Exercise #2: LE lymph exercises: starting with trunk and bing LEs in standing - verbal review  Comment #2: standing heel raises 10x B  Exercise #3: wand AB and ER supine- can do abd in standing  Comment #3: x10 reps  Exercise #4: supine wand flexion  Comment #4: x10 reps  Exercise #5: standing gastroc stretch 2 x 30 sec hold  Comment #5: shoulder bing ER: 10-30 reps with L1 band  Exercise #6: mini squat with bing UE support 10x 2-3 ec hold         Gait training     Modality__________________                Total 55    Blank areas are intentional and mean the treatment did not include these items.       Vidhya Hayden PTA,CLT  12/21/2020

## 2021-06-30 NOTE — PROGRESS NOTES
Progress Notes by Annette Cantor MD at 2/11/2021 10:00 AM     Author: Annette Cantor MD Service: -- Author Type: Physician    Filed: 2/11/2021  4:08 PM Encounter Date: 2/11/2021 Status: Signed    : Annette Cantor MD (Physician)           Date Of Service: 02/11/2021    Date last Seen:  10/15/2020    Impression:   1. L arm swelling-decreased  2. Bilateral leg swelling-improved  3. Secondary post mastectomy lymphedema  4. Bilateral shoulder contractures-near resolved  5. Venous stasis with hypertension in the legs bilaterally  6. Bilateral breast carcinoma (bilateral: R lumpectomy-mucinous carcinoma treated with radiation,  R simple 9/20 and L mod rad +2 LN ER/IA + lobular, radiation and hormone)  7. Morbid obesity    Plan:             1. Questions were answered.  2. Continue exercise and compression.  To wear compression in the arms on an as-needed basis.  Indications reviewed.  To wear leg compression daily and update regularly.  3. Discussed importance of and how to exercise on a regular basis.  Modifications reviewed with recommendations on using the internet and cable for additional options.  4. Follow up with me 3-4 months, or when needed.  If any questions or concerns she will contact the clinic.          On day of encounter time spent in chart review and with patient in consultation, exam, education and coordination of care:  43 minutes   _____________________________________________________________________    Chief Complaint:  Left arm swelling    History of Present Illness:  Camilla Wilson returns to the [facility] for left arm swelling due to post mastectomy lymphedema after being diagnosed 7/22/20 with left breast lobular carcinoma ER/IA + Her2 - stage IIIa,  treated with bilateral mastectomies 9/15/20, (R simple and L mod rad with +2 positive LN) complicated by previous  right breast mucinous cancer many years ago treated with lumpectomy,  radiation and hormones.  When I last saw her treatment plans per Dr. Beckman (radiation) and Dr. Laguna (oncology) were being developed.  She also had long standing leg swelling with restless legs.   I felt she was at risk for seroma worsening in the left lateral chest wall and her leg swelling was bothering her.  I sent her to therapy for work on the chest wall fibrosis and on the leg swelling.  Bilateral compression sleeves were ordered with review on wearing indications. She is now on Exemestane and she completed radiation 3 weeks ago.  Therapy reports significant improvement in legs and arms.   Today at follow up Camilla reports she went to therapy and her legs are much better.  She is wearing her compression daily.  Her arm is doing well.  The swelling is gone down.  She knows when to wear the sleeves appropriately.  There has been no new numbness, tingling or weakness.  There have been no new masses, rashes, or swellings of any other joints. There have been no infections.  There has been no new unexplained weight loss, loss of appetite, nausea and/or vomiting, shortness of breath, weight loss and chest pain.  She has had no evidence of recurrent cancer.  She tolerated the cancer treatments well.  She had some burn from the left chest wall radiation but this has been healing very well.      Past Medical History:   Diagnosis Date   ? Breast cancer (H) 2000   ? Breast cyst    ? GERD (gastroesophageal reflux disease)    ? Hx of radiation therapy    ? Moderate persistent asthma without complication 8/14/2020   ? PONV (postoperative nausea and vomiting)    ? Uterine fibroid     embolized       Past Surgical History:   Procedure Laterality Date   ? BREAST BIOPSY Left    ? BREAST LUMPECTOMY Right 2007   ? BREAST LUMPECTOMY Left 8/21/2020    Procedure: Left Lumpectomy after Wire Localization: [place] Lymph Node Biopsy;  Surgeon: Margarita Cook MD;  Location: [facility];  Service: General   ? MASTECTOMY  Bilateral 09/14/2020    Dr. Cook   ? [place] EMBOLIZATION UTERINE FIBROID      Description: Uterine Fibroid Embolization;  Proc Date: 09/06/2000;   ? [place] EXCISE BREAST CYST      Description: Breast Surgery Lumpectomy;  Recorded: 12/18/2007;   ? [place] MASTECTOMY, SIMPLE, COMPLETE Bilateral 9/14/2020    Procedure: Bilateral Mastectomies;  Surgeon: Margarita Cook MD;  Location: [facility];  Service: General   ? [place] SHLDR ARTHROSCOP,SURG,W/REMOVAL,LOOSE/FB      Description: Shoulder Arthroscopy;  Recorded: 02/25/2009;  Comments: right 2004; left 2007   ? US BREAST CORE BIOPSY LEFT Left 7/22/2020   ? US BREAST LOC W SENT NODE INJ LEFT Left 8/21/2020         Current Outpatient Medications:   ?  5-hydroxytryptophan (5-HTP) 100 mg cap, Take 2 capsules by mouth daily. 1000mg, Disp: , Rfl:   ?  albuterol (PROAIR HFA;PROVENTIL HFA;VENTOLIN HFA) 90 mcg/actuation inhaler, Inhale 2 puffs every 4 (four) hours as needed for wheezing or shortness of breath (cough)., Disp: 1 Inhaler, Rfl: 12  ?  budesonide (PULMICORT) 180 mcg/actuation inhaler, Inhale 2 puffs 2 (two) times a day., Disp: 1 Inhaler, Rfl: 6  ?  calcium citrate 250 mg calcium Tab, Take 500 mg by mouth daily. , Disp: , Rfl:   ?  cetirizine (ZYRTEC) 10 MG tablet, Take 10 mg by mouth daily., Disp: , Rfl:   ?  cholecalciferol, vitamin D3, (VITAMIN D3) 2,000 unit cap, Take 1 capsule by mouth daily. , Disp: , Rfl:   ?  exemestane (AROMASIN) 25 mg tablet, Take 1 tablet (25 mg total) by mouth daily., Disp: 90 tablet, Rfl: 3  ?  famotidine (FOR PEPCID) 10 MG tablet, Take 10 mg by mouth daily., Disp: , Rfl:   ?  melatonin 5 mg Tab tablet, Take 5 mg by mouth at bedtime., Disp: , Rfl:   ?  peg 400-propylene glycol (SYSTANE) 0.4-0.3 % Drop, Administer 1 drop to both eyes 4 (four) times a day as needed., Disp: , Rfl:   ?  s-adenosylmethionine (AISHA-E, ENTERIC COATED,) 200 mg TbEC, Take 2 tablets by mouth daily., Disp: , Rfl:   ?  sodium fluoride-pot nitrate (PREVIDENT 5000 SENSITIVE)  1.1-5 % Pste, Take by mouth daily. , Disp: , Rfl:   ?  vit C,L-Ec-dlcgk-lutein-zeaxan (OCUVITE LUTEIN & ZEAXANTHIN) 60 mg-30 unit- 15 mg-2 mg-6 mg cap, Take 1 tablet by mouth daily. , Disp: , Rfl:     Allergies   Allergen Reactions   ? Lipitor [Atorvastatin]      Memory  Impairment         Social History     Socioeconomic History   ? Marital status: Single     Spouse name: Not on file   ? Number of children: Not on file   ? Years of education: Not on file   ? Highest education level: Not on file   Occupational History   ? Not on file   Social Needs   ? Financial resource strain: Not on file   ? Food insecurity     Worry: Not on file     Inability: Not on file   ? Transportation needs     Medical: Not on file     Non-medical: Not on file   Tobacco Use   ? Smoking status: Former Smoker     Packs/day: 0.50     Years: 15.00     Pack years: 7.50     Quit date: 1982     Years since quittin.7   ? Smokeless tobacco: Never Used   Substance and Sexual Activity   ? Alcohol use: Yes     Alcohol/week: 5.0 standard drinks     Types: 5 Glasses of wine per week   ? Drug use: Never   ? Sexual activity: Not on file   Lifestyle   ? Physical activity     Days per week: Not on file     Minutes per session: Not on file   ? Stress: Not on file   Relationships   ? Social connections     Talks on phone: Not on file     Gets together: Not on file     Attends Mu-ism service: Not on file     Active member of club or organization: Not on file     Attends meetings of clubs or organizations: Not on file     Relationship status: Not on file   ? Intimate partner violence     Fear of current or ex partner: Not on file     Emotionally abused: Not on file     Physically abused: Not on file     Forced sexual activity: Not on file   Other Topics Concern   ? Not on file   Social History Narrative   ? Not on file       Family History   Problem Relation Age of Onset   ? Colon cancer Maternal Aunt    ? Breast cancer Maternal Aunt         40s   ?  Kidney disease Mother    ? Pneumonia Father    ? Hyperlipidemia Father    ? Cancer Cousin    ? Cancer Cousin        Review of Systems:    Review of systems is otherwise negative, except as noted above.  Full 12 point review of systems was completed.    Imaging:    I personally reviewed the following imaging results today and those on care everywhere, if indicated    10/22/20 Bone Density Scan    70 y.o. female, postmenopausal, is here for the first bone density test.   History of fractures - None. Family history of osteoporosis - None.  Family history of hip fracture: None. Smoking history - Past. Osteoporosis treatment past -  Yes;  Bisphosphonates. Osteoporosis treatment current - No.  Chronic medical problems - Breast cancer, Chronic low back problems and Radiation treatment. High risk medications -  Aromatase Inhibitor;  Yes, Currently.     Assessment:     1. The spine bone density L1-L3 with T-score -1.6.  2. Femoral bone densities show left femoral neck T- score -0.6 and right femoral neck T-score -1.2.  3. Trabecular bone score indicates good trabecular bone architecture.     70 y.o. female with LOW BONE DENSITY (OSTEOPENIA) and LOW fracture risk, adjusted for the TBS, with major osteoporotic fracture risk 8.0% and hip fracture risk 1.1%.      Previous scan was done on a different machine and is not directly comparable with the current study.     Recommendations:  Appropriate calcium, vitamin D supplements, along with balance and weight bearing exercise recommended with follow up bone density scan in 1 year.       EXAM: NM PET CT SKULL TO MID THIGH  LOCATION: [facility]  DATE/TIME: 10/28/2020 2:58 PM     INDICATION: Subsequent treatment planning and restaging for malignant neoplasm of the upper outer quadrant of left female breast. Status post bilateral mastectomies in September 2020. Monitor treatment response.  COMPARISON: Mammogram and breast ultrasound dated  08/21/2020.  TECHNIQUE: Serum glucose level 99 mg/dL. One hour post intravenous administration of 8.5 mCi F-18 FDG, PET imaging was performed from the skull base to the mid thighs utilizing attenuation correction with concurrent axial CT and PET/CT image fusion. Dose   reduction techniques were used.     FINDINGS: Mildly FDG avid inflammatory change in the soft tissues at the mastectomy sites with in situ small seromas (max SUV 4.4) and mildly FDG avid left greater than right axillary lymph nodes (Max SUV 2.6) likely representing post therapeutic   inflammatory change without convincing evidence of active neoplasm. The remaining FDG uptake is physiologic from the skull base to mid thigh.     Mild senescent intracranial changes. 1 small sliding-type hiatal hernia. Non-FDG avid 1.3 x 1.3 cm nodule in the superior pole of the left kidney favored to represent cyst. Left upper quadrant splenule. Sigmoid diverticulosis. Small fat-containing   umbilical hernia. Calcified fibroid uterus. Bone island/mineralized hemangioma in the T8 vertebral body. Multilevel degenerative changes of the spine including grade 1 anterolisthesis of L4 over L5 due to chronic bilateral pars interarticularis defects.      IMPRESSION:      No metabolic evidence of active neoplasm.      Labs:    I personally reviewed the following lab results today and those on care everywhere, if indicated    Lab Results   Component Value Date    SEDRATE 7 04/06/2010         No results found for: CRP        Lab Results   Component Value Date    CREATININE 0.84 11/19/2020      Lab Results   Component Value Date    HGBA1C 5.4 11/19/2020           Lab Results   Component Value Date    BUN 11 11/19/2020              Lab Results   Component Value Date    ALBUMIN 4.0 11/19/2020       Vitamin D, Total (25-Hydroxy)   Date Value Ref Range Status   11/19/2020 49.7 30.0 - 80.0 ng/mL Final       Lab Results   Component Value Date    TSH 1.45 11/19/2020     No components found  for: APNV3AU    Surgical Pathology Exam: [id number]  Order: [id number]  Collected:  9/14/2020 09:49 Status:  Final result   Visible to patient:  Yes (MyChart)  Component    Final Diagnosis   A) BREAST AND AXILLARY LYMPH NODES, LEFT, MASTECTOMY:        1) RESIDUAL INVASIVE LOBULAR CARCINOMA:            a) GRADE: JAYSON GRADE II (of III)            b) SIZE: 12 X 8 X 8-MM TUMOR NODULE, LATERAL ASPECT OF LUMPECTOMY CAVITY;                 0.2-MM TUMOR NODULE SUBAREOLAR BREAST TISSUE            c) MARGINS: NEGATIVE; LUMPECTOMY CAVITY 3 MM FROM DEEP MARGIN        2) LOBULAR CARCINOMA IN SITU        3) METASTATIC LOBULAR CARCINOMA PRESENT IN 2 OF 5 AXILLARY LYMPH NODES (2 OF 5)            a) 2.5-MM MACROMETASTASES            b) RARE CYTOKERATIN-POSITIVE ISOLATED TUMOR CELLS PRESENT IN ONE LYMPH NODE        4) BACKGROUND BREAST SHOWS:            a) 8.9 X 7.6 X 4.3-CM LUMPECTOMY CAVITY WITH SURROUNDING FIBROSIS, FAT                NECROSIS, AND FOREIGN BODY GRANULOMATOUS INFLAMMATION, LOWER OUTER                QUADRANT            b) PROLIFERATIVE FIBROCYSTIC CHANGES WITH RADIAL SCAR, FLORID USUAL DUCTAL                HYPERPLASIA, DUCT ECTASIA WITH APOCRINE METAPLASIA, COLUMNAR CELL CHANGES,                FIBROADENOMATOSIS AND SCLEROSING ADENOSIS, WITH ASSOCIATED                MICROCALCIFICATIONS        5) MULTIPLE BENIGN SEBORRHEIC KERATOSES     PATHOLOGIC STAGE: pT3 pN1a (SEE COMMENT)     See staging parameters below     B) RIGHT BREAST AND AXILLARY LYMPH NODES, SIMPLE MASTECTOMY:        1) 1.4 X 1.1 X 0.9 CM FIBROTIC SCAR WITH FAT NECROSIS AND DYSTROPHIC CALCIFICATION,            CONSISTENT WITH PREVIOUS LUMPECTOMY SITE, UPPER INNER QUADRANT        2) NO EVIDENCE OF IN SITU OR INVASIVE MALIGNANCY        3) PROLIFERATIVE FIBROCYSTIC CHANGES WITH USUAL DUCTAL HYPERPLASIA, COLUMNAR            CELL CHANGES AND ATYPICAL LOBULAR HYPERPLASIA        4) LATERAL LYMPH NODES WITH NO EVIDENCE OF METASTATIC CARCINOMA (0 OF 5)         5) MULTIPLE BENIGN SEBORRHEIC KERATOSES        6) NO EVIDENCE OF PAGET'S DISEASE OR DERMAL LYMPHATIC TUMOR SPREAD     MCRS           Contains abnormal data Surgical Pathology Exam: [id number]  Order: [id number]  Collected:  7/22/2020 14:45    Ref Range & Units    FISH HER2/vicki Report, Addendum   [employer] FISH/HER2 ANALYSIS:     RESULTS: NEGATIVE     See scanned addendum to [employer] report (case [id number]).    Addendum electronically signed by Alayna Shaikh MD on 8/4/2020 at 1125   Final Diagnosis   BREAST, LEFT, 3:00, 6 CM FROM NIPPLE, ULTRASOUND-GUIDED CORE BIOPSY OF MASS:     1) INVASIVE LOBULAR CARCINOMA:         a) GRADE: JAYSON GRADE II (of III)         b) SCORE: 6 (OF 9)     2) LOBULAR CARCINOMA IN SITU (DCIS):     3) ADDITIONAL FINDINGS: STROMAL FIBROSIS     4)  a) ESTROGEN RECEPTOR: STRONGLY POSITIVE (GREATER THAN 95%; 3+)         b) PROGESTERONE RECEPTOR: STRONGLY POSITIVE (GREATER THAN 95%; 3+)         c) HER-2/VICKI: INDETERMINATE (2+); PARAFFIN EMBEDDED TISSUE WILL BE SUBMITTED FOR               REFLEX HER-2/VICKI ANALYSIS BY FISH (SEE SUPPLEMENTAL REPORT)         d) E-CADHERIN:  NEGATIVE; CONSISTENT WITH LOBULAR CARCINOMA     MCSS   Electronically signed by Alayna Shaikh MD on 7/24/2020 at 1237             Physical Exam:    Vitals:    02/11/21 1016   BP: 116/78   Pulse: 72   Resp: 20   Temp: 98.6  F (37  C)      BMI 43.27   Weight 260 pounds    Circumferential measures:    Vasc Edema 10/15/2020 2/11/2021   Right-just above MCP 19.3 19.3   Right Wrist 19 18.2   Right Up 10cm 28.3 27.5   Right Up 10cm From Elbow 43.8 44   Left-just above MCP 18.6 19.3   Left Wrist 19 18.8   Left Up 10cm 28.5 28   Left Up 10cm From Elbow 44.3 41.5   Right just above MTP 22.4 22.5   Right Ankle 32 26.7   Right Widest Calf 48.8 49.5   Right Thigh Up 10cm 64 -   Left - just above MTP 22.2 23   Left Ankle 31.5 27.5   Left Widest Calf 51.3 52.5   Left Thigh Up 10cm 64.5 -     Measures overall  improved.    General:  70 y.o. female in no apparent distress.      Psych: Alert and oriented x 3.  Cooperative. Affect normal.    HEENT: Atraumatic, normocephalic    Range of Motion:  Range of motion of shoulders, elbows, wrists, knees and ankles is normal bilaterally without active joint synovitis, erythema, joint swelling, crepitus or joint laxity. .    Sensation: Intact to pinprick and light touch in the upper and lower extremities bilaterally.      Strength:  Normal strength testing in shoulder abduction, elbow flexion, elbow extension, wrist extension, forearm supination, forearm pronation, hand intrinsics  bilaterally.  Normal strength testing in hip flexion, knee flexion, knee extension, ankle dorsiflexion and great toe extension bilaterally.    Lymph nodes: No cervical, supraclavicular, infraclavicular, or axillary lymphadenopathy palpated.    Vascular: No unusual venous distention.  Radial arterial pulses strong and equal at the wrists bilaterally.  Dorsalis pedis and posterior tib pulses strong and equal in the feet bilaterally.  Numerous telangiectasias, ankle flaring and spider veins noted in both legs from the thighs on down.    Skin: No unusual rubor, calor, masses or rashes along the skin in either arm or in either leg.  No significant fibrosity or scarring.  No pain to palpation.  Slight darkening of the left axillary anterior chest wall area without open ulcerations or unusual lesions.    Annette Cantor MD, Founding Diplomate ABWMS, FACCWS, FAAPMR  Medical Director Wound Care and Lymphedema  [facility]  [phone]    This note was dictated using a voice recognition software.  Any grammatical or context distortion are unintentional and inherent to the software.

## 2021-07-03 NOTE — ADDENDUM NOTE
Addendum Note by Leroy Aguilar, RN at 8/10/2020 11:58 AM     Author: Leroy Aguilar RN Service: -- Author Type: Registered Nurse    Filed: 8/11/2020 10:51 AM Encounter Date: 8/10/2020 Status: Signed    : Leroy Aguilar RN (Registered Nurse)    Addended by: LEROY AGUILAR on: 8/11/2020 10:51 AM        Modules accepted: Orders

## 2021-07-03 NOTE — ADDENDUM NOTE
Addendum Note by Lombardi, Susan L, RN at 10/6/2020 11:00 AM     Author: Lombardi, Susan L, RN Service: -- Author Type: RN, Care Manager    Filed: 10/6/2020  1:36 PM Date of Service: 10/6/2020 11:00 AM Status: Signed    : Lombardi, Susan L, RN (RN, Care Manager)    Encounter addended by: Lombardi, Susan L, RN on: 10/6/2020  1:36 PM      Actions taken: Clinical Note Signed, Charge Capture section accepted

## 2021-07-03 NOTE — ADDENDUM NOTE
Addendum Note by Lombardi, Susan L, RN at 10/20/2020 12:20 PM     Author: Lombardi, Susan L, RN Service: -- Author Type: RN, Care Manager    Filed: 10/20/2020  4:03 PM Date of Service: 10/20/2020 12:20 PM Status: Signed    : Lombardi, Susan L, RN (RN, Care Manager)    Encounter addended by: Lombardi, Susan L, RN on: 10/20/2020  4:03 PM      Actions taken: Chief Complaint modified, Visit Navigator SmartForm   Flowsheet section accepted, Allergies reviewed, Order Reconciliation   Section accessed, Medication List reviewed, Clinical Note Signed, Charge   Capture section accepted

## 2021-07-03 NOTE — ADDENDUM NOTE
Addendum Note by Lombardi, Susan L, RN at 7/30/2020 10:00 AM     Author: Lombardi, Susan L, RN Service: -- Author Type: RN, Care Manager    Filed: 7/30/2020  3:43 PM Date of Service: 7/30/2020 10:00 AM Status: Signed    : Lombardi, Susan L, RN (RN, Care Manager)    Encounter addended by: Lombardi, Susan L, RN on: 7/30/2020  3:43 PM      Actions taken: Clinical Note Signed, Charge Capture section accepted

## 2021-07-03 NOTE — ADDENDUM NOTE
Addendum Note by Mina Lopez MD at 9/10/2020  2:40 PM     Author: Mina Lopez MD Service: -- Author Type: Physician    Filed: 9/10/2020  5:21 PM Encounter Date: 9/10/2020 Status: Signed    : Mina Lopez MD (Physician)    Addended by: MINA LOPEZ on: 9/10/2020 05:21 PM        Modules accepted: Level of Service

## 2021-07-03 NOTE — ADDENDUM NOTE
Addendum Note by Amina Allan RN at 2/7/2020  2:00 PM     Author: Amina Allan RN Service: -- Author Type: Registered Nurse    Filed: 2/7/2020  4:50 PM Encounter Date: 2/7/2020 Status: Signed    : Amina Allan RN (Registered Nurse)    Addended by: AMINA ALLAN on: 2/7/2020 04:50 PM        Modules accepted: Orders

## 2021-07-06 ENCOUNTER — COMMUNICATION - HEALTHEAST (OUTPATIENT)
Dept: ONCOLOGY | Facility: HOSPITAL | Age: 71
End: 2021-07-06

## 2021-07-06 NOTE — TELEPHONE ENCOUNTER
Telephone Encounter by Belinda Rowley RN at 7/6/2021  1:04 PM     Author: Mace, Belinda, RN Service: -- Author Type: Registered Nurse    Filed: 7/6/2021  1:06 PM Encounter Date: 6/28/2021 Status: Signed    : Belinda Rowley RN (Registered Nurse)       Spoke with patient.   Camilla did review Survivorship Care Plan.   Reviewed contents of summary.   Reviewed survivorship resources. Discussed [employer] website.  Patient has writer's contact information if need arise.   No other questions or needs at this time.   Belinda SUTTON RN Survivorship Coordinator 7/6/2021 1:06 PM

## 2021-07-07 NOTE — TELEPHONE ENCOUNTER
SURV care plan sent to [facility] for patient to review.   Will follow up with any questions and provide any resources needed.    Belinda SUTTON RN 6/28/2021 2:36 PM

## 2021-07-13 ENCOUNTER — RECORDS - HEALTHEAST (OUTPATIENT)
Dept: ADMINISTRATIVE | Facility: CLINIC | Age: 71
End: 2021-07-13

## 2021-07-18 DIAGNOSIS — J45.909 UNCOMPLICATED ASTHMA, UNSPECIFIED ASTHMA SEVERITY, UNSPECIFIED WHETHER PERSISTENT: ICD-10-CM

## 2021-07-19 RX ORDER — CETIRIZINE HYDROCHLORIDE 10 MG/1
TABLET ORAL
Qty: 30 TABLET | Refills: 0 | Status: SHIPPED | OUTPATIENT
Start: 2021-07-19 | End: 2021-08-16

## 2021-07-21 ENCOUNTER — RECORDS - HEALTHEAST (OUTPATIENT)
Dept: ADMINISTRATIVE | Facility: CLINIC | Age: 71
End: 2021-07-21

## 2021-08-15 DIAGNOSIS — J45.909 UNCOMPLICATED ASTHMA, UNSPECIFIED ASTHMA SEVERITY, UNSPECIFIED WHETHER PERSISTENT: ICD-10-CM

## 2021-08-16 RX ORDER — CETIRIZINE HYDROCHLORIDE 10 MG/1
TABLET ORAL
Qty: 30 TABLET | Refills: 0 | Status: SHIPPED | OUTPATIENT
Start: 2021-08-16 | End: 2021-09-13

## 2021-08-18 ENCOUNTER — VIRTUAL VISIT (OUTPATIENT)
Dept: VASCULAR SURGERY | Facility: CLINIC | Age: 71
End: 2021-08-18
Attending: PHYSICAL MEDICINE & REHABILITATION
Payer: COMMERCIAL

## 2021-08-18 DIAGNOSIS — I97.2 POST-MASTECTOMY LYMPHEDEMA SYNDROME: Primary | ICD-10-CM

## 2021-08-18 DIAGNOSIS — M79.89 LEG SWELLING: ICD-10-CM

## 2021-08-18 DIAGNOSIS — M24.512 CONTRACTURE OF SHOULDER, LEFT: ICD-10-CM

## 2021-08-18 DIAGNOSIS — C50.112 MALIGNANT NEOPLASM OF CENTRAL PORTION OF LEFT BREAST IN FEMALE, ESTROGEN RECEPTOR POSITIVE (H): ICD-10-CM

## 2021-08-18 DIAGNOSIS — Z17.0 MALIGNANT NEOPLASM OF CENTRAL PORTION OF LEFT BREAST IN FEMALE, ESTROGEN RECEPTOR POSITIVE (H): ICD-10-CM

## 2021-08-18 PROCEDURE — 99213 OFFICE O/P EST LOW 20 MIN: CPT | Mod: GT | Performed by: PHYSICAL MEDICINE & REHABILITATION

## 2021-08-18 ASSESSMENT — PAIN SCALES - GENERAL: PAINLEVEL: NO PAIN (0)

## 2021-08-18 NOTE — PROGRESS NOTES
Angy is a 71 year old who is being evaluated via a billable video visit.      How would you like to obtain your AVS? MyChart  If the video visit is dropped, the invitation should be resent by: Text to cell phone: y [phone]    [email or web address]    Will anyone else be joining your video visit? No         No updates, arm is a little bit more swollen. Leg swelling ok. Everything seems to be the same. Wears compression stockings daily. Last ordered compression over the counter in February.     Reviewed General Consent form:     This clinic visit note has been dictated [id number]

## 2021-08-18 NOTE — PATIENT INSTRUCTIONS
Continue exercise and compression. Continue wearing leg compression daily and update regularly.     Start wearing left arm compression at night .  Increase exercise and range of motion/stretching.    Follow up in 4 months, or when needed.  If any questions or concerns, contact the clinic.   
no

## 2021-08-18 NOTE — PROGRESS NOTES
Arm Swelling Follow Up     Type of service:  Video Visit - Telehealth    Originating Location (pt. Location):  Home       Distant Location (provider location):  [facility] [place]         Mode of Communication:  Amwell/My chart    Date of Service: August 18, 2021     Date last seen by Dr. Cantor:   February 11, 2021         PCP: Berna Morel MD     Impression:   1. L arm swelling- stable,slight increase in aching proximally  2. Bilateral leg swelling-improved  3. Secondary post mastectomy lymphedema  4. Bilateral shoulder contractures-left continues  5. Venous stasis with hypertension in the legs bilaterally  6. Bilateral breast carcinoma (bilateral: R lumpectomy-mucinous carcinoma treated with radiation,  R simple 9/20 and L mod rad +2 LN ER/AZ + lobular, radiation and hormone)  7. Morbid obesity     Plan:             1. Questions were answered.  2. Continue exercise and compression. Continue wearing leg compression daily and update regularly.  Discussed.   3. Start wearing left arm compression at night .  Increase exercise and range of motion/stretching.  4. Follow up with me 4 months, or when needed.  If any questions or concerns she will contact the clinic.     On day of encounter time spent in chart review and with patient in consultation, exam, education and coordination of care, excluding procedures:  20 minutes          ---------------------------------------------------------------------------------------------------------------------     Chief Complaint:  Left arm swelling     History of Present Illness:  Camilla Wilson returns to the [facility] for left arm swelling due to post mastectomy lymphedema after being diagnosed 7/22/20 with left breast lobular carcinoma ER/AZ + Her2 - stage IIIa,  treated with bilateral mastectomies 9/15/20, (R simple and L mod rad with +2 positive LN) complicated by previous right breast  mucinous cancer many years ago treated with lumpectomy, radiation and hormones. She sees Dr. Beckman (radiation) and Dr. Laguna (oncology).  She also had long standing leg swelling with restless legs.  I sent her to therapy for work on the chest wall fibrosis and on the leg swelling which helped.  Bilateral compression sleeves were ordered with review on wearing indications.  She was to continue wearing her compression daily. Most recent follow up with oncology was 6/29/21 and looked good.  She continues on Exemestane.  At follow up today she reports over the last couple of weeks she has had increased tightness in the left upper arm.  There has been no new numbness, tingling or weakness.  There have been no new masses, rashes, or swellings of any other joints. There have been no infections.  There has been no new unexplained weight loss, loss of appetite, nausea and/or vomiting, shortness of breath, weight loss and chest pain.     Past Medical History:    Past Medical History:   Diagnosis Date     Breast cancer (H) 2000     Breast cyst      GERD (gastroesophageal reflux disease)      Hx of radiation therapy      Moderate persistent asthma without complication 8/14/2020     PONV (postoperative nausea and vomiting)      Uterine fibroid     embolized        Surgical History:   Past Surgical History:   Procedure Laterality Date     BIOPSY BREAST Left      EXCISE BREAST CYST/FIBROADENOMA/TUMOR/DUCT LESION/NIPPLE LESION/AREOLAR LESION      Description: Breast Surgery Lumpectomy;  Recorded: 12/18/2007;      SHLDR ARTHROSCOP,SURG,W/REMOVAL,LOOSE/FB      Description: Shoulder Arthroscopy;  Recorded: 02/25/2009;  Comments: right 2004; left 2007     IR MISCELLANEOUS PROCEDURE  9/26/2000     IR MISCELLANEOUS PROCEDURE  9/26/2000     IR MISCELLANEOUS PROCEDURE  9/26/2000     IR PELVIC ANGIOGRAM  9/26/2000     IR PELVIC ANGIOGRAM  9/26/2000     LUMPECTOMY BREAST Right 2007     LUMPECTOMY BREAST Left 8/21/2020    Procedure: Left  Lumpectomy after Wire Localization: [place]ol[place] Lymph Node Biopsy;  Surgeon: Margarita Cook MD;  Location: [facility];  Service: General     MASTECTOMY Bilateral 09/14/2020    Dr. Cook     AK EMBOLIZATION UTERINE FIBROID      Description: Uterine Fibroid Embolization;  Proc Date: 09/06/2000;     AK MASTECTOMY, SIMPLE, COMPLETE Bilateral 9/14/2020    Procedure: Bilateral Mastectomies;  Surgeon: Margarita Cook MD;  Location: [facility];  Service: General     US BREAST CORE BIOPSY LEFT Left 7/22/2020     US BREAST LOC W SENT NODE INJ LEFT Left 8/21/2020        Medications:    Current Outpatient Medications   Medication     5-hydroxytryptophan (5-HTP) 100 mg cap     albuterol (PROAIR HFA;PROVENTIL HFA;VENTOLIN HFA) 90 mcg/actuation inhaler     calcium citrate 250 mg calcium Tab     cetirizine (ZYRTEC) 10 MG tablet     cholecalciferol, vitamin D3, (VITAMIN D3) 2,000 unit cap     COQ10, UBIQUINOL, ORAL     exemestane (AROMASIN) 25 mg tablet     famotidine (FOR PEPCID) 10 MG tablet     melatonin 5 mg Tab tablet     niacin 500 MG tablet     peg 400-propylene glycol (SYSTANE) 0.4-0.3 % Drop     PULMICORT FLEXHALER 180 mcg/actuation inhaler     s-adenosylmethionine (AISHA-E, ENTERIC COATED,) 200 mg TbEC     sodium fluoride-pot nitrate (PREVIDENT 5000 SENSITIVE) 1.1-5 % Pste     vit C,J-Od-iwphk-lutein-zeaxan (OCUVITE LUTEIN & ZEAXANTHIN) 60 mg-30 unit- 15 mg-2 mg-6 mg cap     No current facility-administered medications for this visit.        Allergies:    Allergies   Allergen Reactions     Atorvastatin Unknown     Memory  Impairment        Family history:   Family History   Problem Relation Age of Onset     Colon Cancer Maternal Aunt      Breast Cancer Maternal Aunt         40s     Kidney Disease Mother      Pneumonia Father      Hyperlipidemia Father      Cancer Cousin      Cancer Cousin         Social History:   Social History     Tobacco Use     Smoking status: Former Smoker     Packs/day: 0.50     Years:  15.00     Pack years: 7.50     Quit date: 1982     Years since quittin.3     Smokeless tobacco: Never Used   Substance Use Topics     Alcohol use: Yes     Alcohol/week: 5.0 standard drinks     Drug use: Never          Review of Systems:     ROS negative except as noted in HPI.     Labs:      I personally reviewed the following lab results today and those on care everywhere, if indicated     No results found for: CRP   No results found for: SED   Last Renal Panel:  Sodium   Date Value Ref Range Status   2020 141 136 - 145 mmol/L Final     Potassium   Date Value Ref Range Status   2020 4.3 3.5 - 5.0 mmol/L Final     Chloride   Date Value Ref Range Status   2020 106 98 - 107 mmol/L Final     Carbon Dioxide (CO2)   Date Value Ref Range Status   2020 27 22 - 31 mmol/L Final     Anion Gap   Date Value Ref Range Status   2020 8 5 - 18 mmol/L Final     Glucose   Date Value Ref Range Status   2020 96 70 - 125 mg/dL Final     Urea Nitrogen   Date Value Ref Range Status   2020 11 8 - 28 mg/dL Final     Creatinine   Date Value Ref Range Status   2020 0.84 0.60 - 1.10 mg/dL Final     GFR Estimate   Date Value Ref Range Status   2020 >60 >60 mL/min/1.73m2 Final     Calcium   Date Value Ref Range Status   2020 9.5 8.5 - 10.5 mg/dL Final     Albumin   Date Value Ref Range Status   2020 4.0 3.5 - 5.0 g/dL Final      Lab Results   Component Value Date    WBC 5.9 2020     Lab Results   Component Value Date    RBC 4.03 2020     Lab Results   Component Value Date    HGB 12.9 2020     Lab Results   Component Value Date    HCT 39.3 2020     No components found for: MCT  Lab Results   Component Value Date    MCV 98 2020     Lab Results   Component Value Date    MCH 32.0 2020     Lab Results   Component Value Date    MCHC 32.8 2020     Lab Results   Component Value Date    RDW 13.3 2020     Lab Results   Component Value  Date     08/14/2020      Lab Results   Component Value Date    A1C 5.4 11/19/2020    A1C 5.6 03/16/2016      TSH   Date Value Ref Range Status   11/19/2020 1.45 0.30 - 5.00 uIU/mL Final      No results found for: OLEGARIO     @LABNT[cult:*@      Imaging:     I personally reviewed the following imaging results today and those on care everywhere, if indicated     No results found for this visit on 08/18/21.            Physical Exam:     Vital Signs: There were no vitals taken for this visit. There is no height or weight on file to calculate BMI.   Wt Readings from Last 2 Encounters:   06/29/21 267 lb 6.4 oz (121.3 kg)   03/29/21 267 lb 4.8 oz (121.2 kg)      Per patient no temp.    Circumferential volume measures:    Circumferential Measures 10/15/2020 2/11/2021   Right-just above MCP 19.3 19.3   Right Wrist 19 18.2   Right Up 10cm 28.3 27.5   Right Up 10cm From Elbow 43.8 44   Left-just above MCP 18.6 19.3   Left Wrist 19 18.8   Left Up 10cm 28.5 28   Left Up 10cm From Elbow 44.3 41.5   Right just above MTP 22.4 22.5   Right Ankle 32 26.7   Right Widest Calf 48.8 49.5   Right Thigh Up 10cm 64 -   Right Knee to Ankle 33 -   Left - just above MTP 22.2 23   Left Ankle 31.5 27.5   Left Widest Calf 51.3 52.5   Left Thigh Up 10cm 64.5 -   Left Knee to Ankle 32 -     Previous measures.    General: In no apparent distress per video.     Psych: Alert and oriented X 3. Affect normal per video.    HEENT: Atraumatic, normocephalic per video.    Range of Motion:  Range of motion of shoulders shows slight tightness on the left shoulder per patient demonstration.    Annette Cantor MD, Founding Diplomate ABWMS, FACCWS, FAAPMR   Medical Director Wound Care and Lymphedema   [facility]   [phone]       This note was dictated using a voice recognition software.  Any grammatical or context distortion are unintentional and inherent to the software.

## 2021-08-24 ENCOUNTER — DOCUMENTATION ONLY (OUTPATIENT)
Dept: ORTHOPEDICS | Facility: CLINIC | Age: 71
End: 2021-08-24

## 2021-08-24 NOTE — PROGRESS NOTES
S: Camilla's  bra is finally in MW after being on backorder. RX on-file is current from Dr. Princess Laguna.    A: No assessment was made. The bra will be shipped out to her home address, which was verified over the phone today.    P: Camilla is to call with any further needs.  Goal is to maintain a home program.

## 2021-08-25 PROBLEM — E78.5 DYSLIPIDEMIA: Status: ACTIVE | Noted: 2021-08-25

## 2021-08-26 RX ORDER — AZELAIC ACID 0.15 G/G
GEL TOPICAL
COMMUNITY
Start: 2021-08-26 | End: 2022-11-28

## 2021-08-28 ENCOUNTER — MYC MEDICAL ADVICE (OUTPATIENT)
Dept: INTERNAL MEDICINE | Facility: CLINIC | Age: 71
End: 2021-08-28

## 2021-08-28 DIAGNOSIS — Z20.822 ENCOUNTER FOR LABORATORY TESTING FOR COVID-19 VIRUS: Primary | ICD-10-CM

## 2021-08-31 ENCOUNTER — VIRTUAL VISIT (OUTPATIENT)
Dept: PULMONOLOGY | Facility: OTHER | Age: 71
End: 2021-08-31
Payer: COMMERCIAL

## 2021-08-31 DIAGNOSIS — J45.20 MILD INTERMITTENT ASTHMA WITHOUT COMPLICATION: Primary | ICD-10-CM

## 2021-08-31 PROCEDURE — 99213 OFFICE O/P EST LOW 20 MIN: CPT | Mod: GT | Performed by: INTERNAL MEDICINE

## 2021-08-31 NOTE — PROGRESS NOTES
Angy is a 71 year old who is being evaluated via a billable video visit.      How would you like to obtain your AVS? MyChart  If the video visit is dropped, the invitation should be resent by: Text to cell phone: [phone]  Will anyone else be joining your video visit? No

## 2021-08-31 NOTE — PROGRESS NOTES
Pulmonary Clinic Visit -- Video  8/31/2021     Camilla Wilson is a 70 y.o. non-smoker w/ most pertinent h/o breast cancer, asthma, environmental allergies, & PND, being seen for follow up of asthma. Most recently seen on 2/18/21 for discussion of the same -- on that date w/ dealt w/ her nasal congestion.     Reports doing well. She is working at the [employer] at the [employer]. The poor air quality month did not bother her too much. Had some issues w/ congestion, cough, & sneezing a few weeks ago; never lost her sense of smell; perhaps had a spate of environmental allergies (new to her). Still has a sensation of congestion in her chest & sensation that stuff tends to settle in her airways. Breathing has been fine overall. She took a trip to [place] in June w/o her budesonide & Flonase -- did not notice a huge difference in her breathing.     REVIEW OF SYSTEMS: 10-point ROS was negative with exceptions as detailed above.  MEDICAL HISTORY:  has a past medical history of Breast cancer (H) (2000), Breast cyst, GERD (gastroesophageal reflux disease), radiation therapy, Moderate persistent asthma without complication (8/14/2020), PONV (postoperative nausea and vomiting), and Uterine fibroid. She also has no past medical history of Diabetes mellitus (H), Family history of malignant hyperthermia, Hard to intubate, History of anesthesia complications, History of transfusion, Malignant hyperthermia due to anesthesia, or Sleep apnea.  SURGICAL HISTORY:  has a past surgical history that includes pr shldr arthroscop,surg,w/removal,loose/fb; pr embolization uterine fibroid; Breast biopsy (Left); pr excise breast cyst; Breast lumpectomy (Right, 2007); US Breast Core Biopsy Left (Left, 7/22/2020); US Breast Localization With Nunda Node Inj Left (Left, 8/21/2020); Breast lumpectomy (Left, 8/21/2020); pr mastectomy, simple, complete (Bilateral, 9/14/2020); and Mastectomy (Bilateral, 09/14/2020).  SOCIAL HISTORY:  reports that  she quit smoking about 38 years ago. She has a 7.50 pack-year smoking history. She has never used smokeless tobacco. She reports current alcohol use of about 5.0 standard drinks of alcohol per week. She reports that she does not use drugs.  FAMILY HISTORY: family history includes Breast cancer in her maternal aunt; Cancer in her cousin and cousin; Colon cancer in her maternal aunt; Hyperlipidemia in her father; Kidney disease in her mother; Pneumonia in her father.  MEDICATIONS: personally reviewed, including EMR/Care Everywhere. Pertinent information noted & updated.     ALLERGIES:   Allergies   Allergen Reactions     Lipitor [Atorvastatin]      Memory  Impairment         Labs: personally reviewed & interpreted in EMR.   Imaging & Procedures: personally reviewed & interpreted, including formal Radiology reports in the EMR.    Assessment/Plan:  #. Asthma, currently on budesonide inhaler.   #. PND, on Flonase, cetirizine, nasal rinses.       Option for DuoNeb nebs to be Rx'd when/if she needs it     Option to hold off on daily Pulmocort use unless she has allergies &/or URI symptoms    Start call: 8:55 AM  End call: 9:14 AM  Location: Home  Carrier: Aisha Rosario MD  Pulmonary and Critical Care

## 2021-09-09 ENCOUNTER — LAB (OUTPATIENT)
Dept: FAMILY MEDICINE | Facility: CLINIC | Age: 71
End: 2021-09-09
Attending: INTERNAL MEDICINE
Payer: COMMERCIAL

## 2021-09-09 DIAGNOSIS — Z20.822 ENCOUNTER FOR LABORATORY TESTING FOR COVID-19 VIRUS: ICD-10-CM

## 2021-09-09 PROCEDURE — U0003 INFECTIOUS AGENT DETECTION BY NUCLEIC ACID (DNA OR RNA); SEVERE ACUTE RESPIRATORY SYNDROME CORONAVIRUS 2 (SARS-COV-2) (CORONAVIRUS DISEASE [COVID-19]), AMPLIFIED PROBE TECHNIQUE, MAKING USE OF HIGH THROUGHPUT TECHNOLOGIES AS DESCRIBED BY CMS-2020-01-R: HCPCS

## 2021-09-09 PROCEDURE — U0005 INFEC AGEN DETEC AMPLI PROBE: HCPCS

## 2021-09-10 LAB — SARS-COV-2 RNA RESP QL NAA+PROBE: NEGATIVE

## 2021-09-13 DIAGNOSIS — J45.909 UNCOMPLICATED ASTHMA, UNSPECIFIED ASTHMA SEVERITY, UNSPECIFIED WHETHER PERSISTENT: ICD-10-CM

## 2021-09-13 RX ORDER — CETIRIZINE HYDROCHLORIDE 10 MG/1
TABLET ORAL
Qty: 30 TABLET | Refills: 0 | Status: SHIPPED | OUTPATIENT
Start: 2021-09-13 | End: 2021-10-18

## 2021-09-27 ENCOUNTER — ONCOLOGY VISIT (OUTPATIENT)
Dept: ONCOLOGY | Facility: HOSPITAL | Age: 71
End: 2021-09-27
Attending: INTERNAL MEDICINE
Payer: COMMERCIAL

## 2021-09-27 VITALS
HEART RATE: 81 BPM | TEMPERATURE: 98.2 F | WEIGHT: 261 LBS | DIASTOLIC BLOOD PRESSURE: 77 MMHG | SYSTOLIC BLOOD PRESSURE: 143 MMHG | OXYGEN SATURATION: 94 % | RESPIRATION RATE: 19 BRPM | BODY MASS INDEX: 43.43 KG/M2

## 2021-09-27 DIAGNOSIS — C50.112 MALIGNANT NEOPLASM OF CENTRAL PORTION OF LEFT BREAST IN FEMALE, ESTROGEN RECEPTOR POSITIVE (H): Primary | ICD-10-CM

## 2021-09-27 DIAGNOSIS — Z17.0 MALIGNANT NEOPLASM OF CENTRAL PORTION OF LEFT BREAST IN FEMALE, ESTROGEN RECEPTOR POSITIVE (H): Primary | ICD-10-CM

## 2021-09-27 PROCEDURE — G0463 HOSPITAL OUTPT CLINIC VISIT: HCPCS

## 2021-09-27 PROCEDURE — 99213 OFFICE O/P EST LOW 20 MIN: CPT | Performed by: NURSE PRACTITIONER

## 2021-09-27 RX ORDER — ANASTROZOLE 1 MG/1
1 TABLET ORAL DAILY
Qty: 30 TABLET | Refills: 5 | Status: SHIPPED | OUTPATIENT
Start: 2021-09-27 | End: 2022-03-08

## 2021-09-27 ASSESSMENT — PAIN SCALES - GENERAL: PAINLEVEL: SEVERE PAIN (6)

## 2021-09-27 NOTE — PROGRESS NOTES
[facility] Hematology and Oncology Progress Note    Patient: Camilla Wilson  MRN: [id number]  Date of Service: 09/27/2021        Reason for Visit    Chief Complaint   Patient presents with     Oncology Clinic Visit     Mucinous carcinoma of breast, right (H)       Assessment and Plan    Cancer Staging  No matching staging information was found for the patient.    1. T3 N1 M0, stage IIIa left breast cancer status post bilateral mastectomies September 15, 2020. Tumor 6.5 cm, 2 lymph nodes positive, Grade 2, ER/UT positive, HER-2 negative, Oncotype DX recurrence score of 13. Pt had radiation which completed in January 2021. She is on exemestane. She has been holding for about 2 weeks due to arthralgias. No significnatly better. Will have her try anastrozole instead of exemestane. I will have a nurse call her in 2-4 weeks to see how she is doing. If she is still having a lot of side effects, we could try tamoxifen, but prefer AI. Will return in 3-4 months.      2. Osteopenia: risk for worsening bone density with the aromatase inhibitor.  She will continue calcium and vitamin D supplement. We will repeat her DEXA scan in December 2022.  Encourage also to participate in weightbearing exercises. Good calcium in diet.      3. Previous breast cancer in right breast in 2000. S/p 5 years of exemestane.    ECOG Performance    0 - Independent    Distress Screening (within last 30 days)    No data recorded     Pain  Pain Score: Severe Pain (6)  Pain Loc: Upper Leg    Problem List    Patient Active Problem List   Diagnosis     Breast cancer (H)     Dyslipidemia     Spinal Stenosis     Obesity     Osteopenia     GERD (gastroesophageal reflux disease)     Skin cancer     Morbid obesity (H)     Pain in joint involving lower leg     Malignant neoplasm of central portion of left breast in female, estrogen receptor positive (H)     Moderate persistent asthma without complication     Personal history of breast cancer     Health  maintenance examination     Intramural, submucous, and subserous leiomyoma of uterus     Mucinous carcinoma of breast, right (H)     Post-mastectomy lymphedema syndrome     Leg swelling     Venous stasis of both lower extremities     Contracture of shoulder, right     Contracture of shoulder, left     Dyslipidemia        ______________________________________________________________________________    History of Present Illness    Measurable Disease:  3 months     Current therapy: Exemestane started around 10/23/2020  Adjuvant radiation started 12/4/2020     Treatment History:   Bilateral mastectomy 9/14/2020     Interim History: pt is here today for follow up visit. She is doing ok. Started having worsening joint pain/stiffness. Not sure if is was from exemestane. Stopped for the last 2-3 weeks. Feels a little better, but not much. Willing to restart AI. No other changes. Still sensitive chest wall. Hasn't been able to consistently do PT exercises in left arm/massage. No fevers/infectious complaints.     Review of Systems    Pertinent items are noted in HPI.    Past History    Past Medical History:   Diagnosis Date     Breast cancer (H) 2000     Breast cyst      GERD (gastroesophageal reflux disease)      Hx of radiation therapy      Moderate persistent asthma without complication 8/14/2020     PONV (postoperative nausea and vomiting)      Uterine fibroid     embolized       PHYSICAL EXAM  BP (!) 143/77   Pulse 81   Temp 98.2  F (36.8  C)   Resp 19   Wt 118.4 kg (261 lb)   SpO2 94%   BMI 43.43 kg/m      GENERAL: no acute distress. Cooperative in conversation. Here alone. Mask on  RESP: Regular respiratory rate. No expiratory wheezes   MUSCULOSKELETAL: no bilateral leg swelling  NEURO: non focal. Alert and oriented x3.   PSYCH: within normal limits. No depression or anxiety.  SKIN: exposed skin is dry intact.     Lab Results    No results found for this or any previous visit (from the past 168  hour(s)).    Imaging    No results found.      Signed by: JOEY Saenz CNP

## 2021-09-27 NOTE — PROGRESS NOTES
"Oncology Rooming Note    September 27, 2021 1:45 PM   Camilla Wilson is a 71 year old female who presents for:    Chief Complaint   Patient presents with     Oncology Clinic Visit     Mucinous carcinoma of breast, right (H)     Initial Vitals: BP (!) 143/77   Pulse 81   Temp 98.2  F (36.8  C)   Resp 19   Wt 118.4 kg (261 lb)   SpO2 94%   BMI 43.43 kg/m   Estimated body mass index is 43.43 kg/m  as calculated from the following:    Height as of 2/11/21: 1.651 m (5' 5\").    Weight as of this encounter: 118.4 kg (261 lb). Body surface area is 2.33 meters squared.  Severe Pain (6) Comment: Data Unavailable   No LMP recorded.  Allergies reviewed: Yes  Medications reviewed: Yes    Medications: Medication refills not needed today.  Pharmacy name entered into [employer]: [id number] - [place], [place] - [address]    Clinical concerns: None       Irina Howard"

## 2021-09-27 NOTE — LETTER
9/27/2021         RE: Camilla Wilson  [address] [place] [address]        Dear Colleague,    Thank you for referring your patient, Camilla Wilson, to the [facility] [place]. Please see a copy of my visit note below.    [facility] Hematology and Oncology Progress Note    Patient: Camilla Wilson  MRN: [id number]  Date of Service: 09/27/2021        Reason for Visit    Chief Complaint   Patient presents with     Oncology Clinic Visit     Mucinous carcinoma of breast, right (H)       Assessment and Plan    Cancer Staging  No matching staging information was found for the patient.    1. T3 N1 M0, stage IIIa left breast cancer status post bilateral mastectomies September 15, 2020. Tumor 6.5 cm, 2 lymph nodes positive, Grade 2, ER/OH positive, HER-2 negative, Oncotype DX recurrence score of 13. Pt had radiation which completed in January 2021. She is on exemestane. She has been holding for about 2 weeks due to arthralgias. No significnatly better. Will have her try anastrozole instead of exemestane. I will have a nurse call her in 2-4 weeks to see how she is doing. If she is still having a lot of side effects, we could try tamoxifen, but prefer AI. Will return in 3-4 months.      2. Osteopenia: risk for worsening bone density with the aromatase inhibitor.  She will continue calcium and vitamin D supplement. We will repeat her DEXA scan in December 2022.  Encourage also to participate in weightbearing exercises. Good calcium in diet.      3. Previous breast cancer in right breast in 2000. S/p 5 years of exemestane.    ECOG Performance    0 - Independent    Distress Screening (within last 30 days)    No data recorded     Pain  Pain Score: Severe Pain (6)  Pain Loc: Upper Leg    Problem List    Patient Active Problem List   Diagnosis     Breast cancer (H)     Dyslipidemia     Spinal Stenosis     Obesity     Osteopenia     GERD (gastroesophageal reflux disease)     Skin cancer      Morbid obesity (H)     Pain in joint involving lower leg     Malignant neoplasm of central portion of left breast in female, estrogen receptor positive (H)     Moderate persistent asthma without complication     Personal history of breast cancer     Health maintenance examination     Intramural, submucous, and subserous leiomyoma of uterus     Mucinous carcinoma of breast, right (H)     Post-mastectomy lymphedema syndrome     Leg swelling     Venous stasis of both lower extremities     Contracture of shoulder, right     Contracture of shoulder, left     Dyslipidemia        ______________________________________________________________________________    History of Present Illness    Measurable Disease:  3 months     Current therapy: Exemestane started around 10/23/2020  Adjuvant radiation started 12/4/2020     Treatment History:   Bilateral mastectomy 9/14/2020     Interim History: pt is here today for follow up visit. She is doing ok. Started having worsening joint pain/stiffness. Not sure if is was from exemestane. Stopped for the last 2-3 weeks. Feels a little better, but not much. Willing to restart AI. No other changes. Still sensitive chest wall. Hasn't been able to consistently do PT exercises in left arm/massage. No fevers/infectious complaints.     Review of Systems    Pertinent items are noted in HPI.    Past History    Past Medical History:   Diagnosis Date     Breast cancer (H) 2000     Breast cyst      GERD (gastroesophageal reflux disease)      Hx of radiation therapy      Moderate persistent asthma without complication 8/14/2020     PONV (postoperative nausea and vomiting)      Uterine fibroid     embolized       PHYSICAL EXAM  BP (!) 143/77   Pulse 81   Temp 98.2  F (36.8  C)   Resp 19   Wt 118.4 kg (261 lb)   SpO2 94%   BMI 43.43 kg/m      GENERAL: no acute distress. Cooperative in conversation. Here alone. Mask on  RESP: Regular respiratory rate. No expiratory wheezes   MUSCULOSKELETAL: no  "bilateral leg swelling  NEURO: non focal. Alert and oriented x3.   PSYCH: within normal limits. No depression or anxiety.  SKIN: exposed skin is dry intact.     Lab Results    No results found for this or any previous visit (from the past 168 hour(s)).    Imaging    No results found.      Signed by: JOEY Saenz CNP    Oncology Rooming Note    September 27, 2021 1:45 PM   Camilla Wilson is a 71 year old female who presents for:    Chief Complaint   Patient presents with     Oncology Clinic Visit     Mucinous carcinoma of breast, right (H)     Initial Vitals: BP (!) 143/77   Pulse 81   Temp 98.2  F (36.8  C)   Resp 19   Wt 118.4 kg (261 lb)   SpO2 94%   BMI 43.43 kg/m   Estimated body mass index is 43.43 kg/m  as calculated from the following:    Height as of 2/11/21: 1.651 m (5' 5\").    Weight as of this encounter: 118.4 kg (261 lb). Body surface area is 2.33 meters squared.  Severe Pain (6) Comment: Data Unavailable   No LMP recorded.  Allergies reviewed: Yes  Medications reviewed: Yes    Medications: Medication refills not needed today.  Pharmacy name entered into [employer]): [id number] [place], [place] - [address]    Clinical concerns: None       Irina Howard                Again, thank you for allowing me to participate in the care of your patient.        Sincerely,        JOEY Saenz CNP    "

## 2021-10-11 ENCOUNTER — TELEPHONE (OUTPATIENT)
Dept: ONCOLOGY | Facility: HOSPITAL | Age: 71
End: 2021-10-11

## 2021-10-11 NOTE — TELEPHONE ENCOUNTER
I call Camilla to assess how she is feeling since starting anastrozole. She was having issues with exemestane and therefore switched to this new medication.     She reports feeling fine. No arthralgias in comparison to how she was while taking exemestane. She will continue with the anastrozole for now. I advised her to call us if anything changes.     Camilla verbalized understanding and appreciation of our conversation.     Deja Odom  RN Care Coordinator  [facility]

## 2021-10-16 ENCOUNTER — HEALTH MAINTENANCE LETTER (OUTPATIENT)
Age: 71
End: 2021-10-16

## 2021-10-18 ENCOUNTER — IMMUNIZATION (OUTPATIENT)
Dept: FAMILY MEDICINE | Facility: CLINIC | Age: 71
End: 2021-10-18
Payer: COMMERCIAL

## 2021-10-18 PROCEDURE — 90662 IIV NO PRSV INCREASED AG IM: CPT

## 2021-10-18 PROCEDURE — G0008 ADMIN INFLUENZA VIRUS VAC: HCPCS

## 2021-10-25 ENCOUNTER — IMMUNIZATION (OUTPATIENT)
Dept: NURSING | Facility: CLINIC | Age: 71
End: 2021-10-25
Payer: COMMERCIAL

## 2021-10-25 PROCEDURE — 0004A PR COVID VAC PFIZER DIL RECON 30 MCG/0.3 ML IM: CPT

## 2021-10-25 PROCEDURE — 91300 PR COVID VAC PFIZER DIL RECON 30 MCG/0.3 ML IM: CPT

## 2021-11-21 ASSESSMENT — ACTIVITIES OF DAILY LIVING (ADL): CURRENT_FUNCTION: NO ASSISTANCE NEEDED

## 2021-11-22 ENCOUNTER — OFFICE VISIT (OUTPATIENT)
Dept: INTERNAL MEDICINE | Facility: CLINIC | Age: 71
End: 2021-11-22
Payer: COMMERCIAL

## 2021-11-22 VITALS
WEIGHT: 259.3 LBS | OXYGEN SATURATION: 99 % | SYSTOLIC BLOOD PRESSURE: 136 MMHG | RESPIRATION RATE: 18 BRPM | DIASTOLIC BLOOD PRESSURE: 74 MMHG | BODY MASS INDEX: 43.2 KG/M2 | HEART RATE: 60 BPM | HEIGHT: 65 IN

## 2021-11-22 DIAGNOSIS — Z00.00 HEALTH MAINTENANCE EXAMINATION: ICD-10-CM

## 2021-11-22 DIAGNOSIS — R01.1 HEART MURMUR: ICD-10-CM

## 2021-11-22 DIAGNOSIS — C50.819: ICD-10-CM

## 2021-11-22 DIAGNOSIS — R73.03 PREDIABETES: ICD-10-CM

## 2021-11-22 DIAGNOSIS — E78.5 DYSLIPIDEMIA: ICD-10-CM

## 2021-11-22 DIAGNOSIS — E78.2 MIXED HYPERLIPIDEMIA: ICD-10-CM

## 2021-11-22 DIAGNOSIS — Z00.00 ENCOUNTER FOR MEDICARE ANNUAL WELLNESS EXAM: Primary | ICD-10-CM

## 2021-11-22 DIAGNOSIS — E55.9 VITAMIN D INSUFFICIENCY: ICD-10-CM

## 2021-11-22 DIAGNOSIS — R09.82 POST-NASAL DRIP: ICD-10-CM

## 2021-11-22 LAB
ALBUMIN SERPL-MCNC: 3.7 G/DL (ref 3.5–5)
ALP SERPL-CCNC: 95 U/L (ref 45–120)
ALT SERPL W P-5'-P-CCNC: 25 U/L (ref 0–45)
ANION GAP SERPL CALCULATED.3IONS-SCNC: 9 MMOL/L (ref 5–18)
AST SERPL W P-5'-P-CCNC: 19 U/L (ref 0–40)
BILIRUB SERPL-MCNC: 0.7 MG/DL (ref 0–1)
BUN SERPL-MCNC: 16 MG/DL (ref 8–28)
CALCIUM SERPL-MCNC: 9.7 MG/DL (ref 8.5–10.5)
CHLORIDE BLD-SCNC: 106 MMOL/L (ref 98–107)
CHOLEST SERPL-MCNC: 241 MG/DL
CO2 SERPL-SCNC: 26 MMOL/L (ref 22–31)
CREAT SERPL-MCNC: 0.85 MG/DL (ref 0.6–1.1)
CRP SERPL HS-MCNC: 0.8 MG/L (ref 0–3)
FASTING STATUS PATIENT QL REPORTED: NO
GFR SERPL CREATININE-BSD FRML MDRD: 68 ML/MIN/1.73M2
GLUCOSE BLD-MCNC: 99 MG/DL (ref 70–125)
HBA1C MFR BLD: 5.8 % (ref 0–5.6)
HDLC SERPL-MCNC: 79 MG/DL
LDLC SERPL CALC-MCNC: 142 MG/DL
POTASSIUM BLD-SCNC: 4.2 MMOL/L (ref 3.5–5)
PROT SERPL-MCNC: 6.5 G/DL (ref 6–8)
SODIUM SERPL-SCNC: 141 MMOL/L (ref 136–145)
TRIGL SERPL-MCNC: 98 MG/DL
TSH SERPL DL<=0.005 MIU/L-ACNC: 1.75 UIU/ML (ref 0.3–5)

## 2021-11-22 PROCEDURE — 80053 COMPREHEN METABOLIC PANEL: CPT | Performed by: INTERNAL MEDICINE

## 2021-11-22 PROCEDURE — 82306 VITAMIN D 25 HYDROXY: CPT | Performed by: INTERNAL MEDICINE

## 2021-11-22 PROCEDURE — 84443 ASSAY THYROID STIM HORMONE: CPT | Performed by: INTERNAL MEDICINE

## 2021-11-22 PROCEDURE — 80061 LIPID PANEL: CPT | Performed by: INTERNAL MEDICINE

## 2021-11-22 PROCEDURE — 36415 COLL VENOUS BLD VENIPUNCTURE: CPT | Performed by: INTERNAL MEDICINE

## 2021-11-22 PROCEDURE — 99397 PER PM REEVAL EST PAT 65+ YR: CPT | Performed by: INTERNAL MEDICINE

## 2021-11-22 PROCEDURE — 99214 OFFICE O/P EST MOD 30 MIN: CPT | Mod: 25 | Performed by: INTERNAL MEDICINE

## 2021-11-22 PROCEDURE — 86141 C-REACTIVE PROTEIN HS: CPT | Performed by: INTERNAL MEDICINE

## 2021-11-22 PROCEDURE — 83036 HEMOGLOBIN GLYCOSYLATED A1C: CPT | Performed by: INTERNAL MEDICINE

## 2021-11-22 ASSESSMENT — ACTIVITIES OF DAILY LIVING (ADL): CURRENT_FUNCTION: NO ASSISTANCE NEEDED

## 2021-11-22 ASSESSMENT — MIFFLIN-ST. JEOR: SCORE: 1682.06

## 2021-11-22 NOTE — PROGRESS NOTES
"SUBJECTIVE:   Camilla Wilson is a 71 year old female who presents for Preventive Visit.  Has had issues with cough and was told she may benefit from budesonide inhaler as it may be an asthma variant  but most of her symptoms are related to postnasald rip   Last pulomnary visit   #. Asthma, currently on budesonide inhaler.   #. PND, on Flonase, cetirizine, nasal rinses.        Option for DuoNeb nebs to be Rx'd when/if she needs it     Option to hold off on daily Pulmocort use unless she has allergies &/or URI symptoms    PFTS was normal with no improvement in the inhaler .    health maintenance : Colon 2019   mammo none postmastectomy   dexa 2018, 2020       Patient has been advised of split billing requirements and indicates understanding: Yes   Are you in the first 12 months of your Medicare coverage?  No    Healthy Habits:     In general, how would you rate your overall health?  Good    Frequency of exercise:  1 day/week    Duration of exercise:  15-30 minutes    Do you usually eat at least 4 servings of fruit and vegetables a day, include whole grains    & fiber and avoid regularly eating high fat or \"junk\" foods?  Yes    Taking medications regularly:  Yes    Medication side effects:  None    Ability to successfully perform activities of daily living:  No assistance needed    Home Safety:  Lack of grab bars in the bathroom    Hearing Impairment:  No hearing concerns    In the past 6 months, have you been bothered by leaking of urine?  No    In general, how would you rate your overall mental or emotional health?  Good      PHQ-2 Total Score: 0    Additional concerns today:  Yes    Do you feel safe in your environment? Yes    Have you ever done Advance Care Planning? (For example, a Health Directive, POLST, or a discussion with a medical provider or your loved ones about your wishes): No, advance care planning information given to patient to review.  Patient plans to discuss their wishes with loved ones or " provider.         Fall risk  Fallen 2 or more times in the past year?: No  Any fall with injury in the past year?: No  click delete button to remove this line now  Cognitive Screening   1) Repeat 3 items (Leader, Season, Table)    2) Clock draw: NORMAL  3) 3 item recall: Recalls 2 objects   Results: NORMAL clock, 1-2 items recalled: COGNITIVE IMPAIRMENT LESS LIKELY    Mini-CogTM Copyright SALEEM Hernandez. Licensed by the author for use in [facility]; reprinted with permission (cristal@[facility]). All rights reserved.      Do you have sleep apnea, excessive snoring or daytime drowsiness?: no    Reviewed and updated as needed this visit by clinical staff  Tobacco  Allergies  Meds             Reviewed and updated as needed this visit by Provider               Social History     Tobacco Use     Smoking status: Former Smoker     Packs/day: 0.50     Years: 15.00     Pack years: 7.50     Quit date: 1982     Years since quittin.5     Smokeless tobacco: Never Used   Substance Use Topics     Alcohol use: Yes     Alcohol/week: 5.0 standard drinks     If you drink alcohol do you typically have >3 drinks per day or >7 drinks per week? No    Alcohol Use 2021   Prescreen: >3 drinks/day or >7 drinks/week? No   No flowsheet data found.        Patient Active Problem List   Diagnosis     Breast cancer (H)     Dyslipidemia     Spinal Stenosis     Obesity     Osteopenia     GERD (gastroesophageal reflux disease)     Skin cancer     Morbid obesity (H)     Pain in joint involving lower leg     Malignant neoplasm of central portion of left breast in female, estrogen receptor positive (H)     Moderate persistent asthma without complication     Personal history of breast cancer     Health maintenance examination     Intramural, submucous, and subserous leiomyoma of uterus     Mucinous carcinoma of breast, right (H)     Post-mastectomy lymphedema syndrome     Leg swelling     Venous stasis of both lower extremities      Contracture of shoulder, right     Contracture of shoulder, left     Dyslipidemia     Current Outpatient Medications   Medication     albuterol (PROAIR HFA;PROVENTIL HFA;VENTOLIN HFA) 90 mcg/actuation inhaler     anastrozole (ARIMIDEX) 1 MG tablet     azelaic acid (FINACIA) 15 % external gel     calcium citrate 250 mg calcium Tab     cetirizine (ZYRTEC) 10 MG tablet     cholecalciferol, vitamin D3, (VITAMIN D3) 2,000 unit cap     COQ10, UBIQUINOL, ORAL     famotidine (FOR PEPCID) 10 MG tablet     melatonin 5 mg Tab tablet     peg 400-propylene glycol (SYSTANE) 0.4-0.3 % Drop     s-adenosylmethionine (AISHA-E, ENTERIC COATED,) 200 mg TbEC     sodium fluoride-pot nitrate (PREVIDENT 5000 SENSITIVE) 1.1-5 % Pste     vit C,V-Vg-czvaq-lutein-zeaxan (OCUVITE LUTEIN & ZEAXANTHIN) 60 mg-30 unit- 15 mg-2 mg-6 mg cap     PULMICORT FLEXHALER 180 mcg/actuation inhaler     No current facility-administered medications for this visit.         Current providers sharing in care for this patient include:     Patient Care Team:  Berna Morel MD as PCP - General  Anna Beckman MD as MD (Hematology & Oncology)  Dyana Quiles APRN CNP as Nurse Practitioner (Hematology & Oncology)  Princess Laguna MD as MD (Hematology & Oncology)  Berna Morel MD as Assigned PCP  Anna Beckman MD as Assigned Cancer Care Provider  Annette Cantor MD as Assigned Neuroscience Provider    The following health maintenance items are reviewed in Epic and correct as of today:  Health Maintenance Due   Topic Date Due     ANNUAL REVIEW OF  ORDERS  Never done     ASTHMA ACTION PLAN  Never done     ZOSTER IMMUNIZATION (2 of 3) 03/02/2012     ASTHMA CONTROL TEST  05/19/2021     DTAP/TDAP/TD IMMUNIZATION (2 - Td or Tdap) 08/15/2021     MEDICARE ANNUAL WELLNESS VISIT  11/19/2021               Review of Systems  Constitutional, HEENT, cardiovascular, pulmonary, gi and gu systems are negative, except as otherwise noted.  no shortness of breath ,no   "chest pain ,no  Falls, no urinary incontinence , no weight changes , sleep and moodhave been good . Independent in ADLS and IADLS     OBJECTIVE:   /74 (BP Location: Left arm, Patient Position: Sitting, Cuff Size: Adult Large)   Pulse 60   Resp 18   Ht 1.651 m (5' 5\")   Wt 117.6 kg (259 lb 4.8 oz)   SpO2 99%   BMI 43.15 kg/m   Estimated body mass index is 43.15 kg/m  as calculated from the following:    Height as of this encounter: 1.651 m (5' 5\").    Weight as of this encounter: 117.6 kg (259 lb 4.8 oz).  Physical Exam  GENERAL APPEARANCE: healthy, alert and no distress  EYES: Eyes grossly normal to inspection, PERRL and conjunctivae and sclerae normal  HENT: ear canals and TM's normal, nose and mouth without ulcers or lesions, oropharynx clear and oral mucous membranes moist  NECK: no adenopathy, no asymmetry, masses, or scars and thyroid normal to palpation  RESP: lungs clear to auscultation - no rales, rhonchi or wheezes  BREAST: normal without masses, tenderness or nipple discharge and no palpable axillary masses or adenopathy  CV: regular rate and rhythm, normal S1 S2, no S3 or S4, no murmur, click or rub, no peripheral edema and peripheral pulses strong  ABDOMEN: soft, nontender, no hepatosplenomegaly, no masses and bowel sounds normal  MS: no musculoskeletal defects are noted and gait is age appropriate without ataxia  SKIN: no suspicious lesions or rashes  NEURO: Normal strength and tone, sensory exam grossly normal, mentation intact and speech normal  PSYCH: mentation appears normal and affect normal/bright    Diagnostic Test Results:  Labs reviewed in Epic    ASSESSMENT / PLAN:   (Z00.00) Encounter for Medicare annual wellness exam  (primary encounter diagnosis)  Comment:   Plan:     (C50.819) Overlapping malignant neoplasm of female breast, unspecified estrogen receptor status, unspecified laterality (H)  Comment: in remission  Plan: om anastrazole   (R09.82) Post-nasal drip  Comment:   Plan: " "    (E78.5) Dyslipidemia  Comment:   Plan:     (Z00.00) Health maintenance examination  Comment:   Plan: Colon 2019   mammo annual  dexa 2020      (E78.2) Mixed hyperlipidemia  Comment:   Plan: Lipid panel reflex to direct LDL Fasting,         Hemoglobin A1c, TSH, Comprehensive metabolic         panel, CRP cardiac risk            (R73.03) Prediabetes  Comment:   Plan: Hemoglobin A1c            (E55.9) Vitamin D insufficiency  Comment:   Plan: Vitamin D Deficiency            (R01.1) Heart murmur  Comment: not sure it was heard clearly before . She has no symptoms   Plan: Echocardiogram Complete              Patient has been advised of split billing requirements and indicates understanding: Yes  COUNSELING:  Reviewed preventive health counseling, as reflected in patient instructions    Estimated body mass index is 43.15 kg/m  as calculated from the following:    Height as of this encounter: 1.651 m (5' 5\").    Weight as of this encounter: 117.6 kg (259 lb 4.8 oz).    Overall doing well.     She reports that she quit smoking about 39 years ago. She has a 7.50 pack-year smoking history. She has never used smokeless tobacco.      Appropriate preventive services were discussed with this patient, including applicable screening as appropriate for cardiovascular disease, diabetes, osteopenia/osteoporosis, and glaucoma.  As appropriate for age/gender, discussed screening for colorectal cancer, prostate cancer, breast cancer, and cervical cancer. Checklist reviewing preventive services available has been given to the patient.    Reviewed patients plan of care and provided an AVS. The Basic Care Plan (routine screening as documented in Health Maintenance) for Camilla meets the Care Plan requirement. This Care Plan has been established and reviewed with the Patient.    Counseling Resources:  ATP IV Guidelines  Pooled Cohorts Equation Calculator  Breast Cancer Risk Calculator  Breast Cancer: Medication to Reduce Risk  FRAX Risk " Assessment  ICSI Preventive Guidelines  Dietary Guidelines for Americans, 2010  USDA's MyPlate  ASA Prophylaxis  Lung CA Screening    Berna Morel MD  [facility]    Identified Health Risks:

## 2021-11-22 NOTE — PATIENT INSTRUCTIONS
Tetanus booster and a shingles shot are due at pharmacy         Patient Education   Personalized Prevention Plan  You are due for the preventive services outlined below.  Your care team is available to assist you in scheduling these services.  If you have already completed any of these items, please share that information with your care team to update in your medical record.  Health Maintenance Due   Topic Date Due     ANNUAL REVIEW OF HM ORDERS  Never done     Asthma Action Plan - yearly  Never done     Zoster (Shingles) Vaccine (2 of 3) 03/02/2012     Asthma Control Test  05/19/2021     Diptheria Tetanus Pertussis (DTAP/TDAP/TD) Vaccine (2 - Td or Tdap) 08/15/2021       Exercise for a Healthier Heart  You may wonder how you can improve the health of your heart. If you re thinking about exercise, you re on the right track. You don t need to become an athlete. But you do need a certain amount of brisk exercise to help strengthen your heart. If you have been diagnosed with a heart condition, your healthcare provider may advise exercise to help stabilize your condition. To help make exercise a habit, choose safe, fun activities.      Exercise with a friend. When activity is fun, you're more likely to stick with it.   Before you start  Check with your healthcare provider before starting an exercise program. This is especially important if you have not been active for a while. It's also important if you have a long-term (chronic) health problem such as heart disease, diabetes, or obesity. Or if you are at high risk for having these problems.   Why exercise?  Exercising regularly offers many healthy rewards. It can help you do all of the following:     Improve your blood cholesterol level to help prevent further heart trouble    Lower your blood pressure to help prevent a stroke or heart attack    Control diabetes, or reduce your risk of getting this disease    Improve your heart and lung function    Reach and stay at a  healthy weight    Make your muscles stronger so you can stay active    Prevent falls and fractures by slowing the loss of bone mass (osteoporosis)    Manage stress better    Reduce your blood pressure    Improve your sense of self and your body image  Exercise tips      Ease into your routine. Set small goals. Then build on them. If you are not sure what your activity level should be, talk with your healthcare provider first before starting an exercise routine.    Exercise on most days. Aim for a total of 150 minutes (2 hours and 30 minutes) or more of moderate-intensity aerobic activity each week. Or 75 minutes (1 hour and 15 minutes) or more of vigorous-intensity aerobic activity each week. Or try for a combination of both. Moderate activity means that you breathe heavier and your heart rate increases but you can still talk. Think about doing 40 minutes of moderate exercise, 3 to 4 times a week. For best results, activity should last for about 40 minutes to lower blood pressure and cholesterol. It's OK to work up to the 40-minute period over time. Examples of moderate-intensity activity are walking 1 mile in 15 minutes. Or doing 30 to 45 minutes of yard work.    Step up your daily activity level.  Along with your exercise program, try being more active the whole day. Walk instead of drive. Or park further away so that you take more steps each day. Do more household tasks or yard work. You may not be able to meet the advised mount of physical activity. But doing some moderate- or vigorous-intensity aerobic activity can help reduce your risk for heart disease. Your healthcare provider can help you figure out what is best for you.    Choose 1 or more activities you enjoy.  Walking is one of the easiest things you can do. You can also try swimming, riding a bike, dancing, or taking an exercise class.    When to call your healthcare provider  Call your healthcare provider if you have any of these:     Chest pain or  feel dizzy or lightheaded    Burning, tightness, pressure, or heaviness in your chest, neck, shoulders, back, or arms    Abnormal shortness of breath    More joint or muscle pain    A very fast or irregular heartbeat (palpitations)  Artie last reviewed this educational content on 7/1/2019 2000-2021 [employer]. All rights reserved. This information is not intended as a substitute for professional medical care. Always follow your healthcare professional's instructions.

## 2021-11-23 LAB — DEPRECATED CALCIDIOL+CALCIFEROL SERPL-MC: 52 UG/L (ref 30–80)

## 2021-12-20 ENCOUNTER — HOSPITAL ENCOUNTER (OUTPATIENT)
Dept: CARDIOLOGY | Facility: HOSPITAL | Age: 71
Discharge: HOME | End: 2021-12-20
Attending: INTERNAL MEDICINE | Admitting: INTERNAL MEDICINE
Payer: COMMERCIAL

## 2021-12-20 DIAGNOSIS — R01.1 HEART MURMUR: ICD-10-CM

## 2021-12-20 LAB — LVEF ECHO: NORMAL

## 2021-12-20 PROCEDURE — 93306 TTE W/DOPPLER COMPLETE: CPT

## 2021-12-20 PROCEDURE — 93306 TTE W/DOPPLER COMPLETE: CPT | Mod: 26 | Performed by: INTERNAL MEDICINE

## 2022-01-10 ENCOUNTER — ONCOLOGY VISIT (OUTPATIENT)
Dept: ONCOLOGY | Facility: HOSPITAL | Age: 72
End: 2022-01-10
Attending: NURSE PRACTITIONER
Payer: COMMERCIAL

## 2022-01-10 VITALS
DIASTOLIC BLOOD PRESSURE: 74 MMHG | WEIGHT: 255 LBS | SYSTOLIC BLOOD PRESSURE: 151 MMHG | OXYGEN SATURATION: 98 % | RESPIRATION RATE: 18 BRPM | HEIGHT: 64 IN | HEART RATE: 58 BPM | TEMPERATURE: 98.7 F | BODY MASS INDEX: 43.54 KG/M2

## 2022-01-10 DIAGNOSIS — M80.08XA AGE-RELATED OSTEOPOROSIS WITH CURRENT PATHOLOGICAL FRACTURE, VERTEBRA(E), INITIAL ENCOUNTER FOR FRACTURE (H): ICD-10-CM

## 2022-01-10 DIAGNOSIS — C50.911 MUCINOUS CARCINOMA OF BREAST, RIGHT (H): Primary | ICD-10-CM

## 2022-01-10 PROCEDURE — 99214 OFFICE O/P EST MOD 30 MIN: CPT | Performed by: INTERNAL MEDICINE

## 2022-01-10 PROCEDURE — G0463 HOSPITAL OUTPT CLINIC VISIT: HCPCS

## 2022-01-10 RX ORDER — IBUPROFEN 200 MG
200 TABLET ORAL 3 TIMES DAILY
COMMUNITY
End: 2022-03-24

## 2022-01-10 ASSESSMENT — MIFFLIN-ST. JEOR: SCORE: 1650.64

## 2022-01-10 ASSESSMENT — PAIN SCALES - GENERAL: PAINLEVEL: EXTREME PAIN (8)

## 2022-01-10 NOTE — PROGRESS NOTES
"Oncology Rooming Note    January 10, 2022 1:30 PM   Camilla Wilson is a 71 year old female who presents for:    Chief Complaint   Patient presents with     Oncology Clinic Visit     3 month return Malignant neoplasm of central portion of left breast in female, estrogen receptor positive,      Initial Vitals: BP (!) 151/74 (BP Location: Left arm, Patient Position: Sitting, Cuff Size: Adult Large)   Pulse 58   Temp 98.7  F (37.1  C) (Oral)   Resp 18   Ht 1.632 m (5' 4.25\")   Wt 115.7 kg (255 lb)   SpO2 98%   BMI 43.43 kg/m   Estimated body mass index is 43.43 kg/m  as calculated from the following:    Height as of this encounter: 1.632 m (5' 4.25\").    Weight as of this encounter: 115.7 kg (255 lb). Body surface area is 2.29 meters squared.  Extreme Pain (8) Comment: Data Unavailable   No LMP recorded.  Allergies reviewed: Yes  Medications reviewed: Yes    Medications: Medication refills not needed today.  Pharmacy name entered into [facility]: [id number] - [place], [place] - Formerly named [facility]    Clinical concerns: 3 month return Malignant neoplasm of central portion of left breast in female, estrogen receptor positive.       Danisha Spicer CMA              "

## 2022-01-10 NOTE — LETTER
"    1/10/2022         RE: Camilla Wilson  [address] [place] [address]        Dear Colleague,    Thank you for referring your patient, Camilla Wilson, to the [facility]. Please see a copy of my visit note below.    Oncology Rooming Note    January 10, 2022 1:30 PM   Camilla Wilson is a 71 year old female who presents for:    Chief Complaint   Patient presents with     Oncology Clinic Visit     3 month return Malignant neoplasm of central portion of left breast in female, estrogen receptor positive,      Initial Vitals: BP (!) 151/74 (BP Location: Left arm, Patient Position: Sitting, Cuff Size: Adult Large)   Pulse 58   Temp 98.7  F (37.1  C) (Oral)   Resp 18   Ht 1.632 m (5' 4.25\")   Wt 115.7 kg (255 lb)   SpO2 98%   BMI 43.43 kg/m   Estimated body mass index is 43.43 kg/m  as calculated from the following:    Height as of this encounter: 1.632 m (5' 4.25\").    Weight as of this encounter: 115.7 kg (255 lb). Body surface area is 2.29 meters squared.  Extreme Pain (8) Comment: Data Unavailable   No LMP recorded.  Allergies reviewed: Yes  Medications reviewed: Yes    Medications: Medication refills not needed today.  Pharmacy name entered into [employer]: [id number] - [place], [place] - [address]    Clinical concerns: 3 month return Malignant neoplasm of central portion of left breast in female, estrogen receptor positive.       Danisha Spicer, [facility] Hematology and Oncology Progress Note    Patient: Camilla Wilson  MRN: [id number]  Date of Service: 03/29/2021        Reason for Visit    Chief Complaint   Patient presents with     HE Cancer     Malignant neoplasm of nipple of right breast       Assessment and Plan    T3 N1 M0, stage IIIa left breast cancer status post bilateral mastectomies September 15, 2020  Tumor greater than 5 cm, 2 lymph nodes positive, ER/DC positive, HER-2 negative, Oncotype DX recurrence score of 13  Previous " history of right breast cancer status post lumpectomy, radiation and 5 years of exemestane  Family history of cancer, met with but declined genetic testing    Doing well with no recurrence of cancer.    Switched to anastrozole back in September and was initially tolerating well but now having some arthralgias and myalgias.  Okay to use ibuprofen and Tylenol.  For now we will continue the medication.    Discussed options of letrozole or tamoxifen.  We will get DEXA scan before return in 6 months.  Was previously on Fosamax.    Plan: Continue anastrozole  Can use ibuprofen or Tylenol for arthralgias  Follow-up in 6 months with DEXA scan      Measurable Disease:  3 months    Current therapy:   Change anastrozole 1 mg p.o. daily in September 2021  Exemestane started around 10/23/2020  Adjuvant radiation started 12/4/2020    Treatment History:    Bilateral mastectomy      ECOG Performance   ECOG Performance Status: 1    Distress Assessment  Distress Assessment Score: No distress    Pain           Problem List    1. Malignant neoplasm of nipple of right breast in female, unspecified estrogen receptor status (H)          CC: Berna Morel MD    ______________________________________________________________________________    History of Present Illness    Ms. Camilla Wilson returns for follow-up.  Seen 4 months ago.  Now on anastrozole and just recently starting to have some myalgias and arthralgias for which she is using ibuprofen.  No headaches shortness of breath or cough.  No other abdominal bone pain.  ECOG status is 0.    Pain Status  Currently in Pain: No/denies    Review of Systems    Constitutional  Constitutional (WDL): Exceptions to WDL  Fatigue: Fatigue relieved by rest  Neurosensory  Neurosensory (WDL): All neurosensory elements are within defined limits  Cardiovascular  Cardiovascular (WDL): All cardiovascular elements are within defined limits  Pulmonary  Respiratory (WDL): Within Defined  Limits  Gastrointestinal  Gastrointestinal (WDL): All gastrointestinal elements are within defined limits(Hot liquids create spasms (hiccups))  Genitourinary  Genitourinary (WDL): All genitourinary elements are within defined limits  Integumentary  Integumentary (WDL): All integumentary elements are within defined limits  Patient Coping  Patient Coping: Accepting  Distress Assessment  Distress Assessment Score: No distress  Accompanied by  Accompanied by: Alone    Past History  Past Medical History:   Diagnosis Date     Breast cancer (H) 2000     Breast cyst      GERD (gastroesophageal reflux disease)      Hx of radiation therapy      Moderate persistent asthma without complication 8/14/2020     PONV (postoperative nausea and vomiting)      Uterine fibroid     embolized         Past Surgical History:   Procedure Laterality Date     BREAST BIOPSY Left      BREAST LUMPECTOMY Right 2007     BREAST LUMPECTOMY Left 8/21/2020    Procedure: Left Lumpectomy after Wire Localization: [place] Lymph Node Biopsy;  Surgeon: Margarita Cook MD;  [facility]: [facility];  Service: General     MASTECTOMY Bilateral 09/14/2020    Dr. Cook     MI EMBOLIZATION UTERINE FIBROID      Description: Uterine Fibroid Embolization;  Proc Date: 09/06/2000;     MI EXCISE BREAST CYST      Description: Breast Surgery Lumpectomy;  Recorded: 12/18/2007;     MI MASTECTOMY, SIMPLE, COMPLETE Bilateral 9/14/2020    Procedure: Bilateral Mastectomies;  Surgeon: Margarita Cook MD;  Location: [facility];  Service: General     MI SHLDR ARTHROSCOP,SURG,W/REMOVAL,LOOSE/FB      Description: Shoulder Arthroscopy;  Recorded: 02/25/2009;  Comments: right 2004; left 2007     US BREAST CORE BIOPSY LEFT Left 7/22/2020     US BREAST LOC W SENT NODE INJ LEFT Left 8/21/2020       Physical Exam    Recent Vitals 3/29/2021   Height -   Weight 267 lbs 5 oz   BSA (m2) 2.36 m2   /90   Pulse 80   Temp 98.4   Temp src 1   SpO2 96   Some recent data might be  hidden       GENERAL: Alert and oriented to time place and person. Seated comfortably. In no distress.    HEAD: Atraumatic and normocephalic.    EYES: MONIQUE, EOMI.  No pallor.  No icterus.    Oral cavity: no mucosal lesion or tonsillar enlargement.    NECK: supple. JVP normal.  No thyroid enlargement.    LYMPH NODES: No palpable, cervical, axillary or inguinal lymphadenopathy.    CHEST: clear to auscultation bilaterally.  Resonant to percussion throughout bilaterally.  Symmetrical breath movements bilaterally.    CVS: S1 and S2 are heard. Regular rate and rhythm.  No murmur or gallop or rub heard.  No peripheral edema.    Breast: Status post bilateral mastectomies with no recurrence.  No palpable adenopathy.    ABDOMEN: Soft. Not tender. Not distended.  No palpable hepatomegaly or splenomegaly.  No other mass palpable.  Bowel sounds heard.    EXTREMITIES: Warm.    SKIN: no rash, or bruising or purpura.  Has a full head of hair.      Lab Results    No results found for this or any previous visit (from the past 168 hour(s)).    Imaging    No results found.      Signed by: Princess Laguna MD        Again, thank you for allowing me to participate in the care of your patient.        Sincerely,        Princess Laguna MD

## 2022-01-10 NOTE — PROGRESS NOTES
[facility] Hematology and Oncology Progress Note    Patient: Camilla Wilson  MRN: [id number]  Date of Service: 03/29/2021        Reason for Visit    Chief Complaint   Patient presents with     HE Cancer     Malignant neoplasm of nipple of right breast       Assessment and Plan    T3 N1 M0, stage IIIa left breast cancer status post bilateral mastectomies September 15, 2020  Tumor greater than 5 cm, 2 lymph nodes positive, ER/KY positive, HER-2 negative, Oncotype DX recurrence score of 13  Previous history of right breast cancer status post lumpectomy, radiation and 5 years of exemestane  Family history of cancer, met with but declined genetic testing    Doing well with no recurrence of cancer.    Switched to anastrozole back in September and was initially tolerating well but now having some arthralgias and myalgias.  Okay to use ibuprofen and Tylenol.  For now we will continue the medication.    Discussed options of letrozole or tamoxifen.  We will get DEXA scan before return in 6 months.  Was previously on Fosamax.    Plan: Continue anastrozole  Can use ibuprofen or Tylenol for arthralgias  Follow-up in 6 months with DEXA scan      Measurable Disease:  3 months    Current therapy:   Change anastrozole 1 mg p.o. daily in September 2021  Exemestane started around 10/23/2020  Adjuvant radiation started 12/4/2020    Treatment History:    Bilateral mastectomy      ECOG Performance   ECOG Performance Status: 1    Distress Assessment  Distress Assessment Score: No distress    Pain           Problem List    1. Malignant neoplasm of nipple of right breast in female, unspecified estrogen receptor status (H)          CC: Berna Morel MD    ______________________________________________________________________________    History of Present Illness    Ms. Camilla Wilson returns for follow-up.  Seen 4 months ago.  Now on anastrozole and just recently starting to have some myalgias and arthralgias for which she is using  ibuprofen.  No headaches shortness of breath or cough.  No other abdominal bone pain.  ECOG status is 0.    Pain Status  Currently in Pain: No/denies    Review of Systems    Constitutional  Constitutional (WDL): Exceptions to WDL  Fatigue: Fatigue relieved by rest  Neurosensory  Neurosensory (WDL): All neurosensory elements are within defined limits  Cardiovascular  Cardiovascular (WDL): All cardiovascular elements are within defined limits  Pulmonary  Respiratory (WDL): Within Defined Limits  Gastrointestinal  Gastrointestinal (WDL): All gastrointestinal elements are within defined limits(Hot liquids create spasms (hiccups))  Genitourinary  Genitourinary (WDL): All genitourinary elements are within defined limits  Integumentary  Integumentary (WDL): All integumentary elements are within defined limits  Patient Coping  Patient Coping: Accepting  Distress Assessment  Distress Assessment Score: No distress  Accompanied by  Accompanied by: Alone    Past History  Past Medical History:   Diagnosis Date     Breast cancer (H) 2000     Breast cyst      GERD (gastroesophageal reflux disease)      Hx of radiation therapy      Moderate persistent asthma without complication 8/14/2020     PONV (postoperative nausea and vomiting)      Uterine fibroid     embolized         Past Surgical History:   Procedure Laterality Date     BREAST BIOPSY Left      BREAST LUMPECTOMY Right 2007     BREAST LUMPECTOMY Left 8/21/2020    Procedure: Left Lumpectomy after Wire Localization: [place] Lymph Node Biopsy;  Surgeon: Margarita Cook MD;  Location: [facility];  Service: General     MASTECTOMY Bilateral 09/14/2020    Dr. Cook     NC EMBOLIZATION UTERINE FIBROID      Description: Uterine Fibroid Embolization;  Proc Date: 09/06/2000;     NC EXCISE BREAST CYST      Description: Breast Surgery Lumpectomy;  Recorded: 12/18/2007;     NC MASTECTOMY, SIMPLE, COMPLETE Bilateral 9/14/2020    Procedure: Bilateral Mastectomies;  Surgeon: oJey  Margarita SILVESTRE MD;  Location: [facility] OR;  Service: General     MI SHLDR ARTHROSCOP,SURG,W/REMOVAL,LOOSE/FB      Description: Shoulder Arthroscopy;  Recorded: 02/25/2009;  Comments: right 2004; left 2007     US BREAST CORE BIOPSY LEFT Left 7/22/2020     US BREAST LOC W SENT NODE INJ LEFT Left 8/21/2020       Physical Exam    Recent Vitals 3/29/2021   Height -   Weight 267 lbs 5 oz   BSA (m2) 2.36 m2   /90   Pulse 80   Temp 98.4   Temp src 1   SpO2 96   Some recent data might be hidden       GENERAL: Alert and oriented to time place and person. Seated comfortably. In no distress.    HEAD: Atraumatic and normocephalic.    EYES: MONIQUE, EOMI.  No pallor.  No icterus.    Oral cavity: no mucosal lesion or tonsillar enlargement.    NECK: supple. JVP normal.  No thyroid enlargement.    LYMPH NODES: No palpable, cervical, axillary or inguinal lymphadenopathy.    CHEST: clear to auscultation bilaterally.  Resonant to percussion throughout bilaterally.  Symmetrical breath movements bilaterally.    CVS: S1 and S2 are heard. Regular rate and rhythm.  No murmur or gallop or rub heard.  No peripheral edema.    Breast: Status post bilateral mastectomies with no recurrence.  No palpable adenopathy.    ABDOMEN: Soft. Not tender. Not distended.  No palpable hepatomegaly or splenomegaly.  No other mass palpable.  Bowel sounds heard.    EXTREMITIES: Warm.    SKIN: no rash, or bruising or purpura.  Has a full head of hair.      Lab Results    No results found for this or any previous visit (from the past 168 hour(s)).    Imaging    No results found.      Signed by: Princess Laguna MD

## 2022-03-24 ENCOUNTER — OFFICE VISIT (OUTPATIENT)
Dept: VASCULAR SURGERY | Facility: CLINIC | Age: 72
End: 2022-03-24
Attending: STUDENT IN AN ORGANIZED HEALTH CARE EDUCATION/TRAINING PROGRAM
Payer: COMMERCIAL

## 2022-03-24 VITALS
WEIGHT: 263.2 LBS | SYSTOLIC BLOOD PRESSURE: 142 MMHG | HEART RATE: 60 BPM | DIASTOLIC BLOOD PRESSURE: 80 MMHG | TEMPERATURE: 98 F | BODY MASS INDEX: 44.83 KG/M2

## 2022-03-24 DIAGNOSIS — Z17.0 MALIGNANT NEOPLASM OF CENTRAL PORTION OF LEFT BREAST IN FEMALE, ESTROGEN RECEPTOR POSITIVE (H): ICD-10-CM

## 2022-03-24 DIAGNOSIS — I87.8 VENOUS STASIS OF BOTH LOWER EXTREMITIES: ICD-10-CM

## 2022-03-24 DIAGNOSIS — M79.89 LEG SWELLING: ICD-10-CM

## 2022-03-24 DIAGNOSIS — C50.112 MALIGNANT NEOPLASM OF CENTRAL PORTION OF LEFT BREAST IN FEMALE, ESTROGEN RECEPTOR POSITIVE (H): ICD-10-CM

## 2022-03-24 DIAGNOSIS — E66.01 MORBID OBESITY (H): ICD-10-CM

## 2022-03-24 DIAGNOSIS — M24.511 CONTRACTURE OF SHOULDER, RIGHT: ICD-10-CM

## 2022-03-24 DIAGNOSIS — C50.911 MUCINOUS CARCINOMA OF BREAST, RIGHT (H): ICD-10-CM

## 2022-03-24 DIAGNOSIS — M24.512 CONTRACTURE OF SHOULDER, LEFT: ICD-10-CM

## 2022-03-24 DIAGNOSIS — I97.2 POST-MASTECTOMY LYMPHEDEMA SYNDROME: Primary | ICD-10-CM

## 2022-03-24 PROCEDURE — G0463 HOSPITAL OUTPT CLINIC VISIT: HCPCS

## 2022-03-24 PROCEDURE — 99215 OFFICE O/P EST HI 40 MIN: CPT | Performed by: STUDENT IN AN ORGANIZED HEALTH CARE EDUCATION/TRAINING PROGRAM

## 2022-03-24 RX ORDER — COVID-19 ANTIGEN TEST
440 KIT MISCELLANEOUS 2 TIMES DAILY PRN
COMMUNITY
Start: 2022-02-01

## 2022-03-24 RX ORDER — 1.1% SODIUM FLUORIDE PRESCRIPTION DENTAL CREAM 5 MG/G
CREAM DENTAL
COMMUNITY
Start: 2022-03-04 | End: 2023-04-24

## 2022-03-24 ASSESSMENT — PAIN SCALES - GENERAL: PAINLEVEL: NO PAIN (0)

## 2022-03-24 NOTE — PATIENT INSTRUCTIONS
[facility] (Please call to make an appointment)    [facility]  [address], [address]  [place], [place].  Phone: [phone]              A referral was sent to [facility] for lymphedema therapy.   You will receive a phone call in 1-2 business days to schedule your appointment.   If for some reason you do not hear from them or wish to schedule sooner, please call [phone] to schedule.    SWELLING/LYMPHEDEMA THERAPY TREATMENT     - What to expect your first visit     Based on your initial evaluation, you will have an individualized program designed by a certified lymphedema therapist, with specialized training in swelling.     It will consist of discussing your prior activity level, goals and limitations.     The therapist will assess your edema, evaluate for swelling, ,measure your motion and strength.      - Treatment usually includes     Swelling education and prevention strategies.     Swelling/lymphedema massage- A special decompressive massage to help improve the lymphatic drainage.     Swelling/lymphatic stimulation exercises     Bandaging or compression garments- To provide increased tissue pressure in the swollen extremity.     Home exercise program instruction     Stretching exercises     Education in how to independently manage edema.        - Follow up care     ONCE FORMAL THERAPY HAS BEEN COMPLETED, THE PATIENT WILL BE EXPECTED TO WEAR THE APPROPRIATE COMPRESSION BANDAGES, BE CONSISTENT WITH THE SKIN CARE PROGRAM  AND PERFORM THE PRESCRIBED SELF -MASSAGE AND /OR EXERCISES AS PRESCRIBED.

## 2022-04-19 ENCOUNTER — HOSPITAL ENCOUNTER (OUTPATIENT)
Dept: PHYSICAL THERAPY | Facility: CLINIC | Age: 72
Setting detail: THERAPIES SERIES
Discharge: HOME | End: 2022-04-19
Attending: STUDENT IN AN ORGANIZED HEALTH CARE EDUCATION/TRAINING PROGRAM
Payer: COMMERCIAL

## 2022-04-19 DIAGNOSIS — C50.911 MUCINOUS CARCINOMA OF BREAST, RIGHT (H): ICD-10-CM

## 2022-04-19 DIAGNOSIS — M79.89 LEG SWELLING: ICD-10-CM

## 2022-04-19 DIAGNOSIS — I87.8 VENOUS STASIS OF BOTH LOWER EXTREMITIES: ICD-10-CM

## 2022-04-19 DIAGNOSIS — E66.01 MORBID OBESITY (H): ICD-10-CM

## 2022-04-19 DIAGNOSIS — M24.511 CONTRACTURE OF SHOULDER, RIGHT: ICD-10-CM

## 2022-04-19 DIAGNOSIS — Z17.0 MALIGNANT NEOPLASM OF CENTRAL PORTION OF LEFT BREAST IN FEMALE, ESTROGEN RECEPTOR POSITIVE (H): ICD-10-CM

## 2022-04-19 DIAGNOSIS — I97.2 POST-MASTECTOMY LYMPHEDEMA SYNDROME: ICD-10-CM

## 2022-04-19 DIAGNOSIS — M24.512 CONTRACTURE OF SHOULDER, LEFT: ICD-10-CM

## 2022-04-19 DIAGNOSIS — C50.112 MALIGNANT NEOPLASM OF CENTRAL PORTION OF LEFT BREAST IN FEMALE, ESTROGEN RECEPTOR POSITIVE (H): ICD-10-CM

## 2022-04-19 PROCEDURE — 97162 PT EVAL MOD COMPLEX 30 MIN: CPT | Mod: GP | Performed by: PHYSICAL THERAPIST

## 2022-04-19 PROCEDURE — 97140 MANUAL THERAPY 1/> REGIONS: CPT | Mod: GP | Performed by: PHYSICAL THERAPIST

## 2022-04-19 NOTE — PROGRESS NOTES
[facility]        OUTPATIENT PHYSICAL THERAPY EDEMA EVALUATION  PLAN OF TREATMENT FOR OUTPATIENT REHABILITATION  (COMPLETE FOR INITIAL CLAIMS ONLY)  Patient's Last Name, First Name, Camilla Louise                           Provider s Name:   [facility] Medical Record No.  [id number]     Start of Care Date:  04/19/22   Onset Date:   9/15/2020   Type:  PT   Medical Diagnosis:  Post-mastectomy lymphedema syndrome; Leg swelling; Malignant neoplasm of central portion of left breast in female, estrogen receptor positive; Venous stasis of both lower extremities Contracture of shoulder bilateral; Mucinous carcinoma of breast, right, Morbid obesity   Therapy Diagnosis:  LUE lymphedema, BLE edema, RUE edema Visits from SOC:  1                                     __________________________________________________________________________________   Plan of Treatment/Functional Goals:    Manual lymph drainage, Gradient compression bandaging, Fit for compression garment, Exercises, Precautions to prevent infection / exacerbation, Education, Manual therapy, Skin care / precautions, Home management program development        GOALS  1. Goal description: Pt will demonstrate 8% reduction of LUE wrist to 40 cm to improve fit of clothing and reduce limb heaviness       Target date: 06/18/22  2. Goal description: Pt will utilize appropriate BLE and BUE compression garments to maintain gains made during therapy course       Target date: 06/18/22  3. Goal description: Pt will report reduced life impact from lymphedema with LLIS of less than 15       Target date: 07/18/22  4. Goal description: Pt will demonstrate accurate completion of HEP including self massage, exercise and compression for long term maintenance of lymphedema       Target date: 07/18/22      Treatment Frequency:  3x/week   Treatment duration: 90 days    Albertina Bowser, PT                                    I CERTIFY THE NEED FOR THESE SERVICES FURNISHED UNDER        THIS PLAN OF TREATMENT AND WHILE UNDER MY CARE     (Physician co-signature of this document indicates review and certification of the therapy plan).                   Certification date from: 04/19/22       Certification date to: 07/18/22           Referring physician: Susu Espinoza MD   Initial Assessment  See Epic Evaluation- Start of care: 04/19/22

## 2022-04-20 ENCOUNTER — HOSPITAL ENCOUNTER (OUTPATIENT)
Dept: PHYSICAL THERAPY | Facility: CLINIC | Age: 72
Setting detail: THERAPIES SERIES
Discharge: HOME | End: 2022-04-20
Attending: STUDENT IN AN ORGANIZED HEALTH CARE EDUCATION/TRAINING PROGRAM
Payer: COMMERCIAL

## 2022-04-20 PROCEDURE — 97110 THERAPEUTIC EXERCISES: CPT | Mod: GP | Performed by: PHYSICAL THERAPIST

## 2022-04-20 PROCEDURE — 97140 MANUAL THERAPY 1/> REGIONS: CPT | Mod: GP | Performed by: PHYSICAL THERAPIST

## 2022-04-22 ENCOUNTER — HOSPITAL ENCOUNTER (OUTPATIENT)
Dept: PHYSICAL THERAPY | Facility: CLINIC | Age: 72
Setting detail: THERAPIES SERIES
Discharge: HOME | End: 2022-04-22
Attending: STUDENT IN AN ORGANIZED HEALTH CARE EDUCATION/TRAINING PROGRAM
Payer: COMMERCIAL

## 2022-04-22 PROCEDURE — 97140 MANUAL THERAPY 1/> REGIONS: CPT | Mod: GP | Performed by: PHYSICAL THERAPIST

## 2022-04-25 ENCOUNTER — HOSPITAL ENCOUNTER (OUTPATIENT)
Dept: PHYSICAL THERAPY | Facility: CLINIC | Age: 72
Setting detail: THERAPIES SERIES
Discharge: HOME | End: 2022-04-25
Attending: STUDENT IN AN ORGANIZED HEALTH CARE EDUCATION/TRAINING PROGRAM
Payer: COMMERCIAL

## 2022-04-25 PROCEDURE — 97140 MANUAL THERAPY 1/> REGIONS: CPT | Mod: GP | Performed by: PHYSICAL THERAPIST

## 2022-04-25 PROCEDURE — 97535 SELF CARE MNGMENT TRAINING: CPT | Mod: GP | Performed by: PHYSICAL THERAPIST

## 2022-04-27 ENCOUNTER — HOSPITAL ENCOUNTER (OUTPATIENT)
Dept: PHYSICAL THERAPY | Facility: CLINIC | Age: 72
Setting detail: THERAPIES SERIES
Discharge: HOME | End: 2022-04-27
Attending: STUDENT IN AN ORGANIZED HEALTH CARE EDUCATION/TRAINING PROGRAM
Payer: COMMERCIAL

## 2022-04-27 PROCEDURE — 97140 MANUAL THERAPY 1/> REGIONS: CPT | Mod: GP | Performed by: PHYSICAL THERAPIST

## 2022-04-27 PROCEDURE — 97535 SELF CARE MNGMENT TRAINING: CPT | Mod: GP | Performed by: PHYSICAL THERAPIST

## 2022-04-28 ENCOUNTER — HOSPITAL ENCOUNTER (OUTPATIENT)
Dept: PHYSICAL THERAPY | Facility: CLINIC | Age: 72
Setting detail: THERAPIES SERIES
Discharge: HOME | End: 2022-04-28
Attending: STUDENT IN AN ORGANIZED HEALTH CARE EDUCATION/TRAINING PROGRAM
Payer: COMMERCIAL

## 2022-04-28 PROCEDURE — 97140 MANUAL THERAPY 1/> REGIONS: CPT | Mod: GP | Performed by: PHYSICAL THERAPIST

## 2022-04-29 ENCOUNTER — LAB (OUTPATIENT)
Dept: LAB | Facility: CLINIC | Age: 72
End: 2022-04-29
Attending: FAMILY MEDICINE
Payer: COMMERCIAL

## 2022-04-29 DIAGNOSIS — Z20.822 CLOSE EXPOSURE TO 2019 NOVEL CORONAVIRUS: ICD-10-CM

## 2022-04-29 PROCEDURE — U0003 INFECTIOUS AGENT DETECTION BY NUCLEIC ACID (DNA OR RNA); SEVERE ACUTE RESPIRATORY SYNDROME CORONAVIRUS 2 (SARS-COV-2) (CORONAVIRUS DISEASE [COVID-19]), AMPLIFIED PROBE TECHNIQUE, MAKING USE OF HIGH THROUGHPUT TECHNOLOGIES AS DESCRIBED BY CMS-2020-01-R: HCPCS

## 2022-04-29 PROCEDURE — U0005 INFEC AGEN DETEC AMPLI PROBE: HCPCS

## 2022-05-01 LAB — SARS-COV-2 RNA RESP QL NAA+PROBE: NEGATIVE

## 2022-05-02 ENCOUNTER — HOSPITAL ENCOUNTER (OUTPATIENT)
Dept: PHYSICAL THERAPY | Facility: CLINIC | Age: 72
Setting detail: THERAPIES SERIES
Discharge: HOME | End: 2022-05-02
Attending: STUDENT IN AN ORGANIZED HEALTH CARE EDUCATION/TRAINING PROGRAM
Payer: COMMERCIAL

## 2022-05-02 PROCEDURE — 97140 MANUAL THERAPY 1/> REGIONS: CPT | Mod: GP | Performed by: PHYSICAL THERAPIST

## 2022-05-04 ENCOUNTER — HOSPITAL ENCOUNTER (OUTPATIENT)
Dept: PHYSICAL THERAPY | Facility: CLINIC | Age: 72
Setting detail: THERAPIES SERIES
Discharge: HOME | End: 2022-05-04
Attending: STUDENT IN AN ORGANIZED HEALTH CARE EDUCATION/TRAINING PROGRAM
Payer: COMMERCIAL

## 2022-05-04 PROCEDURE — 97140 MANUAL THERAPY 1/> REGIONS: CPT | Mod: GP | Performed by: PHYSICAL THERAPIST

## 2022-05-05 ENCOUNTER — HOSPITAL ENCOUNTER (OUTPATIENT)
Dept: PHYSICAL THERAPY | Facility: CLINIC | Age: 72
Setting detail: THERAPIES SERIES
Discharge: HOME | End: 2022-05-05
Attending: STUDENT IN AN ORGANIZED HEALTH CARE EDUCATION/TRAINING PROGRAM
Payer: COMMERCIAL

## 2022-05-05 PROCEDURE — 97140 MANUAL THERAPY 1/> REGIONS: CPT | Mod: GP | Performed by: PHYSICAL THERAPIST

## 2022-05-09 ENCOUNTER — HOSPITAL ENCOUNTER (OUTPATIENT)
Dept: PHYSICAL THERAPY | Facility: CLINIC | Age: 72
Setting detail: THERAPIES SERIES
Discharge: HOME | End: 2022-05-09
Attending: STUDENT IN AN ORGANIZED HEALTH CARE EDUCATION/TRAINING PROGRAM
Payer: COMMERCIAL

## 2022-05-09 PROCEDURE — 97140 MANUAL THERAPY 1/> REGIONS: CPT | Mod: GP | Performed by: PHYSICAL THERAPIST

## 2022-05-09 NOTE — PROGRESS NOTES
[facility]    Outpatient Physical Therapy Progress Note  Patient: Camilla Wilson  : 1950    Beginning/End Dates of Reporting Period:  22 to 22    Referring Provider: Dr. Susu Espinoza    Therapy Diagnosis: Lymphedema L LUE     Client Self Report: Pt came from fitting. She ordered an off the shelf night garment and custom sleeve and glove (half fingers). She goes back in 2 weeks to meet with fitter.    Objective Measurements:  Objective Measure: Edema  Details: hand swelling not an issue, overall edema stable  Objective Measure: Volume L UE  Details: 3794.94mL     Outcome Measures (most recent score):  Lymphedema Life Impact Scale (score range 0-72). A higher score indicates greater impairment.: 19    Goals:  Goal Identifier LUE volume   Goal Description Pt will demonstrate 8% reduction of LUE wrist to 40 cm to improve fit of clothing and reduce limb heaviness   Target Date 22   Date Met      Progress (detail required for progress note): very limited reduction, will have evaluating therapist measure for increased validity as schedule permits.     Goal Identifier Compression   Goal Description Pt will utilize appropriate BLE and BUE compression garments to maintain gains made during therapy course   Target Date 22   Date Met      Progress (detail required for progress note): pt was just fit today     Goal Identifier LLIS   Goal Description Pt will report reduced life impact from lymphedema with LLIS of less than 15   Target Date 22   Date Met      Progress (detail required for progress note): decreased score to 19  (from 22) at evaluation)     Goal Identifier HEP   Goal Description Pt will demonstrate accurate completion of HEP including self massage, exercise and compression for long term maintenance of lymphedema   Target Date 22   Date Met      Progress (detail required  for progress note): doing well with home program of exercise and MLD, compliant with GCB or use of velcro wrap 1x/week after swimming.     Plan:  Continue therapy per current plan of care.    Discharge:  No

## 2022-05-11 ENCOUNTER — HOSPITAL ENCOUNTER (OUTPATIENT)
Dept: PHYSICAL THERAPY | Facility: CLINIC | Age: 72
Setting detail: THERAPIES SERIES
Discharge: HOME | End: 2022-05-11
Attending: STUDENT IN AN ORGANIZED HEALTH CARE EDUCATION/TRAINING PROGRAM
Payer: COMMERCIAL

## 2022-05-11 PROCEDURE — 97140 MANUAL THERAPY 1/> REGIONS: CPT | Mod: GP | Performed by: PHYSICAL THERAPIST

## 2022-05-13 ENCOUNTER — HOSPITAL ENCOUNTER (OUTPATIENT)
Dept: PHYSICAL THERAPY | Facility: CLINIC | Age: 72
Setting detail: THERAPIES SERIES
Discharge: HOME | End: 2022-05-13
Attending: STUDENT IN AN ORGANIZED HEALTH CARE EDUCATION/TRAINING PROGRAM
Payer: COMMERCIAL

## 2022-05-13 PROCEDURE — 97140 MANUAL THERAPY 1/> REGIONS: CPT | Mod: GP | Performed by: PHYSICAL THERAPIST

## 2022-05-16 ENCOUNTER — HOSPITAL ENCOUNTER (OUTPATIENT)
Dept: PHYSICAL THERAPY | Facility: CLINIC | Age: 72
Setting detail: THERAPIES SERIES
Discharge: HOME | End: 2022-05-16
Attending: STUDENT IN AN ORGANIZED HEALTH CARE EDUCATION/TRAINING PROGRAM
Payer: COMMERCIAL

## 2022-05-16 PROCEDURE — 97140 MANUAL THERAPY 1/> REGIONS: CPT | Mod: GP | Performed by: PHYSICAL THERAPIST

## 2022-05-18 ENCOUNTER — HOSPITAL ENCOUNTER (OUTPATIENT)
Dept: PHYSICAL THERAPY | Facility: CLINIC | Age: 72
Setting detail: THERAPIES SERIES
Discharge: HOME | End: 2022-05-18
Attending: STUDENT IN AN ORGANIZED HEALTH CARE EDUCATION/TRAINING PROGRAM
Payer: COMMERCIAL

## 2022-05-18 PROCEDURE — 97140 MANUAL THERAPY 1/> REGIONS: CPT | Mod: GP | Performed by: PHYSICAL THERAPIST

## 2022-05-19 ENCOUNTER — HOSPITAL ENCOUNTER (OUTPATIENT)
Dept: PHYSICAL THERAPY | Facility: CLINIC | Age: 72
Setting detail: THERAPIES SERIES
Discharge: HOME | End: 2022-05-19
Attending: STUDENT IN AN ORGANIZED HEALTH CARE EDUCATION/TRAINING PROGRAM
Payer: COMMERCIAL

## 2022-05-19 PROCEDURE — 97140 MANUAL THERAPY 1/> REGIONS: CPT | Mod: GP | Performed by: PHYSICAL THERAPIST

## 2022-05-23 ENCOUNTER — HOSPITAL ENCOUNTER (OUTPATIENT)
Dept: PHYSICAL THERAPY | Facility: CLINIC | Age: 72
Setting detail: THERAPIES SERIES
Discharge: HOME | End: 2022-05-23
Attending: STUDENT IN AN ORGANIZED HEALTH CARE EDUCATION/TRAINING PROGRAM
Payer: COMMERCIAL

## 2022-05-23 PROCEDURE — 97535 SELF CARE MNGMENT TRAINING: CPT | Mod: GP | Performed by: PHYSICAL THERAPIST

## 2022-05-23 PROCEDURE — 97140 MANUAL THERAPY 1/> REGIONS: CPT | Mod: GP | Performed by: PHYSICAL THERAPIST

## 2022-05-27 ENCOUNTER — HOSPITAL ENCOUNTER (OUTPATIENT)
Dept: PHYSICAL THERAPY | Facility: CLINIC | Age: 72
Setting detail: THERAPIES SERIES
Discharge: HOME | End: 2022-05-27
Attending: STUDENT IN AN ORGANIZED HEALTH CARE EDUCATION/TRAINING PROGRAM
Payer: COMMERCIAL

## 2022-05-27 PROCEDURE — 97535 SELF CARE MNGMENT TRAINING: CPT | Mod: GP | Performed by: PHYSICAL THERAPIST

## 2022-06-06 ENCOUNTER — HOSPITAL ENCOUNTER (OUTPATIENT)
Dept: PHYSICAL THERAPY | Facility: CLINIC | Age: 72
Setting detail: THERAPIES SERIES
Discharge: HOME | End: 2022-06-06
Attending: STUDENT IN AN ORGANIZED HEALTH CARE EDUCATION/TRAINING PROGRAM
Payer: COMMERCIAL

## 2022-06-06 PROCEDURE — 97140 MANUAL THERAPY 1/> REGIONS: CPT | Mod: GP | Performed by: PHYSICAL THERAPIST

## 2022-06-06 PROCEDURE — 97535 SELF CARE MNGMENT TRAINING: CPT | Mod: GP | Performed by: PHYSICAL THERAPIST

## 2022-06-28 ENCOUNTER — MYC MEDICAL ADVICE (OUTPATIENT)
Dept: PHYSICAL THERAPY | Facility: CLINIC | Age: 72
End: 2022-06-28

## 2022-06-28 ENCOUNTER — HOSPITAL ENCOUNTER (OUTPATIENT)
Dept: PHYSICAL THERAPY | Facility: CLINIC | Age: 72
Setting detail: THERAPIES SERIES
Discharge: HOME | End: 2022-06-28
Attending: STUDENT IN AN ORGANIZED HEALTH CARE EDUCATION/TRAINING PROGRAM
Payer: COMMERCIAL

## 2022-06-28 DIAGNOSIS — M25.572 PAIN IN JOINT INVOLVING ANKLE AND FOOT, LEFT: Primary | ICD-10-CM

## 2022-06-28 PROCEDURE — 97535 SELF CARE MNGMENT TRAINING: CPT | Mod: GP | Performed by: PHYSICAL THERAPIST

## 2022-06-28 PROCEDURE — 97140 MANUAL THERAPY 1/> REGIONS: CPT | Mod: GP | Performed by: PHYSICAL THERAPIST

## 2022-08-04 ENCOUNTER — HOSPITAL ENCOUNTER (OUTPATIENT)
Dept: PHYSICAL THERAPY | Facility: REHABILITATION | Age: 72
Discharge: HOME | End: 2022-08-04
Attending: INTERNAL MEDICINE
Payer: COMMERCIAL

## 2022-08-04 DIAGNOSIS — M25.572 PAIN IN JOINT INVOLVING ANKLE AND FOOT, LEFT: ICD-10-CM

## 2022-08-04 DIAGNOSIS — M76.822 LEFT TIBIALIS POSTERIOR TENDINITIS: Primary | ICD-10-CM

## 2022-08-04 PROCEDURE — 97161 PT EVAL LOW COMPLEX 20 MIN: CPT | Mod: GP

## 2022-08-04 PROCEDURE — 97110 THERAPEUTIC EXERCISES: CPT | Mod: GP

## 2022-08-04 NOTE — PROGRESS NOTES
[facility]    OUTPATIENT PHYSICAL THERAPY ORTHOPEDIC EVALUATION  PLAN OF TREATMENT FOR OUTPATIENT REHABILITATION  (COMPLETE FOR INITIAL CLAIMS ONLY)  Patient's Last Name, First Name, M.I.  YOB: 1950  Camilla Wilson    Provider s Name:  [facility]   Medical Record No.  [id number]   Start of Care Date:  08/04/22   Onset Date:  04/29/22   Type:     _X__PT   ___OT   ___SLP Medical Diagnosis:  (P) pain in joint involving ankle and foot, left     PT Diagnosis:  (P) tibialis posterior tendinitis   Visits from SOC:  1      _________________________________________________________________________________  Plan of Treatment/Functional Goals:  (P) gait training, joint mobilization, manual therapy, neuromuscular re-education, motor coordination training, ROM, strengthening, stretching     (P) Ultrasound, Hot packs, Cryotherapy     Goals  Goal Identifier: (P) HEP  Goal Description: (P) pt will demonstrate independence and report compliance with HEP to facilitate improved independent symptom mgmt.  Target Date: (P) 10/27/22    Goal Identifier: (P) stairs  Goal Description: (P) pt will be able to descend a flight of stairs without left medial ankle pain.  Target Date: (P) 10/27/22    Goal Identifier: (P) strength  Goal Description: (P) pt will demonstrate improved strength via ability to perform greater than or equal to 10 single heel raises on the left.  Target Date: (P) 10/27/22    Goal Identifier: (P) standing/walking  Goal Description: (P) pt will be able to stand and walk for greater than or equal to 30 minutes without onset of left medial ankle pain  Target Date: (P) 10/27/22    Therapy Frequency:  (P) 1 time/week (up to every other week)  Predicted Duration of Therapy Intervention:  (P) 8-10 visits over 12 weeks    Dinora Torrez PT                 I CERTIFY THE NEED FOR  "THESE SERVICES FURNISHED UNDER        THIS PLAN OF TREATMENT AND WHILE UNDER MY CARE     (Physician co-signature of this document indicates review and certification of the therapy plan).                     Certification Date From:  (P) 08/04/22   Certification Date To:  (P) 10/27/22    Referring Provider:  Berna Morel MD    Initial Assessment        See Epic Evaluation Start of Care Date: 08/04/22 08/04/22 1200   General Information   Type of Visit Initial OP Ortho PT Evaluation   Start of Care Date 08/04/22   Referring Physician Berna Morel MD   Patient/Family Goals Statement reduce pain, move freely   Certification Required? Yes   Medical Diagnosis pain in joint involving ankle and foot, left   Surgical/Medical history reviewed Yes   Body Part(s)   Body Part(s) Ankle/Foot   Presentation and Etiology   Pertinent history of current problem (include personal factors and/or comorbidities that impact the POC) Pt presents to therapy with ~3 month onset of left medial ankle/foot pain. Pt sustained injury while performing an exercise on her tip toes. Pt endorses pain inferior to her medial malleolus and into her medial arch that is increased with increased activity (walking, standing, descending stairs. Since onset, pain severity and frequency has fluctuated. Pt notices reduced pain when wearing her shoes with a more substantial medial arch. Pt swims for exercise and was performing an HEP for bone health prior to injury. PMH significant for \"piriformis pain\" on the right as well as occassional right foot drop.   Impairments A. Pain;F. Decreased strength and endurance;G. Impaired balance;H. Impaired gait   Functional Limitations perform desired leisure / sports activities   Onset date of current episode/exacerbation 04/29/22   Chronicity Chronic   Pain rating (0-10 point scale) Best (/10);Worst (/10);Other   Best (/10) 0   Worst (/10) 4   Pain rating comment current: 2   Pain quality A. Sharp "   Frequency of pain/symptoms C. With activity   Pain/symptoms exacerbated by   (walking, standing, descending stairs)   Current Level of Function   Patient role/employment history F. Retired   Fall Risk Screen   Fall screen completed by PT   Have you fallen 2 or more times in the past year? No   Have you fallen and had an injury in the past year? No   Is patient a fall risk? No   Abuse Screen (yes response referral indicated)   Feels Unsafe at Home or Work/School no   Feels Threatened by Someone no   Does Anyone Try to Keep You From Having Contact with Others or Doing Things Outside Your Home? no   Physical Signs of Abuse Present no   Patient needs abuse support services and resources No   System Outcome Measures   Outcome Measures   (LEFS: 34/80)   Ankle/Foot Objective Findings   Windlass Test negative   Posterior Drawer Test negative   Accessory Motion/Joint Mobility WNL   Anterior Drawer Test negative   Talar Tilt Test negative   Side (if bilateral, select both right and left) Left   Observation increased edema bilaterally-wearing compression stockings.   Ankle/Foot ROM Comment ankle AROM all WNL and symmetrical to right (for DF, PF, INV and EV)   Palpation increased tenderness inferior to medial malleoli along posterior tibialis tendon and insertion   Gait/Locomotion genu valgus, increased pronation L>R   Foot Position In Standing pes planus bilaterally, significant pronotation bilaterally L>R   Left Gastroc (in WB) Flexibility WNL   Left Soleus (in WB) Flexibility WNL   Left PF/Inversion Strength 4+/5, slight medial ankle pain   Left PF/Eversion Strength 4+/5, slight medial ankle pain   Left PF Strength able to complete 10 full heel raises bilaterally, unable to control single leg descent on the left and unable to perform single heel raise on the left   Left DF/Inversion Strength 5/5   Left DF/Eversion Strength 5/5   Planned Therapy Interventions   Planned Therapy Interventions gait training;joint  mobilization;manual therapy;neuromuscular re-education;motor coordination training;ROM;strengthening;stretching   Planned Modality Interventions   Planned Modality Interventions Ultrasound;Hot packs;Cryotherapy   Clinical Impression   Criteria for Skilled Therapeutic Interventions Met yes, treatment indicated   PT Diagnosis tibialis posterior tendinitis   Clinical Presentation Stable/Uncomplicated   Clinical Decision Making (Complexity) Low complexity   Therapy Frequency 1 time/week  (up to every other week)   Predicted Duration of Therapy Intervention (days/wks) 8-10 visits over 12 weeks   Risk & Benefits of therapy have been explained Yes   Patient, Family & other staff in agreement with plan of care Yes   Clinical Impression Comments Pt presents to physical therapy with a three month hx of left medial ankle pain secondary to an injury sustained while performing an exercise on her toes. Pt demonstrates signs and symptoms consistent with tibialis posterior tendinitis, and would benefit from skilled physical therapy to address limitations to faciltate maximal return to PLOF.   ORTHO GOALS   PT Ortho Eval Goals 1;2;3;4   Ortho Goal 1   Goal Identifier HEP   Goal Description pt will demonstrate independence and report compliance with HEP to facilitate improved independent symptom mgmt.   Target Date 10/27/22   Ortho Goal 2   Goal Identifier stairs   Goal Description pt will be able to descend a flight of stairs without left medial ankle pain.   Target Date 10/27/22   Ortho Goal 3   Goal Identifier strength   Goal Description pt will demonstrate improved strength via ability to perform greater than or equal to 10 single heel raises on the left.   Target Date 10/27/22   Ortho Goal 4   Goal Identifier standing/walking   Goal Description pt will be able to stand and walk for greater than or equal to 30 minutes without onset of left medial ankle pain   Target Date 10/27/22   Total Evaluation Time   PT Eval, Low Complexity  Minutes ([id number]) 38   Therapy Certification   Certification date from 08/04/22   Certification date to 10/27/22   Medical Diagnosis pain in joint involving ankle and foot, left     Dinora Torrez, PT

## 2022-08-11 ENCOUNTER — HOSPITAL ENCOUNTER (OUTPATIENT)
Dept: PHYSICAL THERAPY | Facility: REHABILITATION | Age: 72
Discharge: HOME | End: 2022-08-11
Payer: COMMERCIAL

## 2022-08-11 DIAGNOSIS — M25.572 PAIN IN JOINT INVOLVING ANKLE AND FOOT, LEFT: Primary | ICD-10-CM

## 2022-08-11 DIAGNOSIS — M76.822 LEFT TIBIALIS POSTERIOR TENDINITIS: ICD-10-CM

## 2022-08-11 PROCEDURE — 97110 THERAPEUTIC EXERCISES: CPT | Mod: GP

## 2022-08-11 PROCEDURE — 97140 MANUAL THERAPY 1/> REGIONS: CPT | Mod: GP

## 2022-08-18 ENCOUNTER — HOSPITAL ENCOUNTER (OUTPATIENT)
Dept: PHYSICAL THERAPY | Facility: REHABILITATION | Age: 72
Discharge: HOME | End: 2022-08-18
Payer: COMMERCIAL

## 2022-08-18 DIAGNOSIS — M76.822 LEFT TIBIALIS POSTERIOR TENDINITIS: ICD-10-CM

## 2022-08-18 DIAGNOSIS — M25.572 PAIN IN JOINT INVOLVING ANKLE AND FOOT, LEFT: Primary | ICD-10-CM

## 2022-08-18 PROCEDURE — 97140 MANUAL THERAPY 1/> REGIONS: CPT | Mod: GP

## 2022-08-18 PROCEDURE — 97110 THERAPEUTIC EXERCISES: CPT | Mod: GP

## 2022-08-23 ENCOUNTER — LAB (OUTPATIENT)
Dept: LAB | Facility: CLINIC | Age: 72
End: 2022-08-23
Attending: FAMILY MEDICINE
Payer: COMMERCIAL

## 2022-08-23 DIAGNOSIS — Z20.822 CLOSE EXPOSURE TO 2019 NOVEL CORONAVIRUS: ICD-10-CM

## 2022-08-23 LAB — SARS-COV-2 RNA RESP QL NAA+PROBE: NEGATIVE

## 2022-08-23 PROCEDURE — U0003 INFECTIOUS AGENT DETECTION BY NUCLEIC ACID (DNA OR RNA); SEVERE ACUTE RESPIRATORY SYNDROME CORONAVIRUS 2 (SARS-COV-2) (CORONAVIRUS DISEASE [COVID-19]), AMPLIFIED PROBE TECHNIQUE, MAKING USE OF HIGH THROUGHPUT TECHNOLOGIES AS DESCRIBED BY CMS-2020-01-R: HCPCS

## 2022-08-23 PROCEDURE — U0005 INFEC AGEN DETEC AMPLI PROBE: HCPCS

## 2022-09-27 ENCOUNTER — LAB (OUTPATIENT)
Dept: FAMILY MEDICINE | Facility: CLINIC | Age: 72
End: 2022-09-27
Attending: FAMILY MEDICINE
Payer: COMMERCIAL

## 2022-09-27 DIAGNOSIS — Z20.822 SUSPECTED 2019 NOVEL CORONAVIRUS INFECTION: ICD-10-CM

## 2022-09-27 LAB — SARS-COV-2 RNA RESP QL NAA+PROBE: POSITIVE

## 2022-09-27 PROCEDURE — U0005 INFEC AGEN DETEC AMPLI PROBE: HCPCS

## 2022-09-27 PROCEDURE — U0003 INFECTIOUS AGENT DETECTION BY NUCLEIC ACID (DNA OR RNA); SEVERE ACUTE RESPIRATORY SYNDROME CORONAVIRUS 2 (SARS-COV-2) (CORONAVIRUS DISEASE [COVID-19]), AMPLIFIED PROBE TECHNIQUE, MAKING USE OF HIGH THROUGHPUT TECHNOLOGIES AS DESCRIBED BY CMS-2020-01-R: HCPCS

## 2022-09-28 ENCOUNTER — VIRTUAL VISIT (OUTPATIENT)
Dept: FAMILY MEDICINE | Facility: CLINIC | Age: 72
End: 2022-09-28
Payer: COMMERCIAL

## 2022-09-28 DIAGNOSIS — U07.1 INFECTION DUE TO 2019 NOVEL CORONAVIRUS: Primary | ICD-10-CM

## 2022-09-28 DIAGNOSIS — Z85.3 PERSONAL HISTORY OF BREAST CANCER: ICD-10-CM

## 2022-09-28 PROCEDURE — 99214 OFFICE O/P EST MOD 30 MIN: CPT | Mod: CS | Performed by: NURSE PRACTITIONER

## 2022-09-28 NOTE — PROGRESS NOTES
"Camilla is a 72 year old who is being evaluated via a billable video visit.      How would you like to obtain your AVS? MyChart  If the video visit is dropped, the invitation should be resent by: Send to e-mail at: [email or web address]  Will anyone else be joining your video visit? No          Assessment & Plan     Infection due to 2019 novel coronavirus    - nirmatrelvir and ritonavir (PAXLOVID) therapy pack; Take 3 tablets by mouth 2 times daily for 5 days (Take 2 Nirmatrelvir tablets and 1 Ritonavir tablet twice daily for 5 days)    Personal history of breast cancer        Review of external notes as documented elsewhere in note  30 minutes spent on the date of the encounter doing chart review, review of test results, patient visit and documentation        BMI:   Estimated body mass index is 44.83 kg/m  as calculated from the following:    Height as of 1/10/22: 1.632 m (5' 4.25\").    Weight as of 3/24/22: 119.4 kg (263 lb 3.2 oz).       Patient Instructions     Instructions for Patients      What are the symptoms of COVID-19?  Symptoms can include fever, cough, shortness of breath, chills, headache, muscle pain sore throat, fatigue, runny or stuffy nose, and loss of taste and smell. Other less common symptoms include nausea, vomiting, or diarrhea (watery stools).    Know when to call 911. Emergency warning signs include:    Trouble breathing or shortness of breath    Pain or pressure in the chest that doesn't go away    Feeling confused like you haven't felt before, or not being able to wake up    Bluish-colored lips or face    How can I take care of myself?  1. Get lots of rest. Drink extra fluids (unless a doctor has told you not to).  2. Take Tylenol (acetaminophen) for fever or pain. If you have liver or kidney problems, ask your family doctor if it's okay to take Tylenol   Adults:   650 mg (two 325 mg pills or tablets) every 4 to 6 hours, or...   1,000 mg (two 500 mg pills or tablets) every 8 hours as " needed.  Note: Don't take more than 3,000 mg in one day. Acetaminophen is found in many medicines (both prescribed and over the counter). Read all labels to be sure you don't take too much.  For children, check the Tylenol bottle for the right dose. The dose is based on the child's age or weight.  3. Take over the counter medicines for your symptoms as needed. Talk to your pharmacist.  4. If you have other health problems (like cancer, heart failure, an organ transplant, or severe kidney disease): Call your specialty clinic if you don't feel better in the next 2 days.    Where can I get more information?    [facility] COVID-19 Resource Hub: www.[employer].org/covid19/     CDC Quarantine & Isolation: https://www.cdc.gov/coronavirus/2019-ncov/your-health/quarantine-isolation.html     CDC - What to Do If You're Sick: https://www.cdc.gov/coronavirus/2019-ncov/if-you-are-sick/index.html    [facility] clinical trials (COVID-19 research studies): [email or web address].[email or web address]/umn-clinical-trials    [place] Department of Health COVID-19 Public Hotline: 1-585.577.6480      No follow-ups on file.    JOEY Toro CNP  [facility]    Des Sampson is a 72 year old accompanied by her self, presenting for the following health issues:  Covid Concern      HPI   Tested positive yesterday   First episode of Covid   Onset of symptoms was 2 days ago but had a low grade scratchy throat since the 19th during travel to [place].     Breast CA in 2020      COVID-19 Symptom Review  How many days ago did these symptoms start? Unknown (around last Monday 9/19). Pt first thought was a cold vs allergies so ignored symptoms.     Are any of the following symptoms significant for you?    New or worsening difficulty breathing? No    Worsening cough? Yes, I am coughing up mucus.    Fever or chills? Yes, I felt feverish or had chills. Slight fever.     Headache: YES- on and off    Sore throat:  YES    Chest pain: No    Diarrhea: No    Body aches? No    What treatments has patient tried? Cough drops, Aleve   Does patient live in a nursing home, group home, or shelter? No  Does patient have a way to get food/medications during quarantined? Yes: exploring options now.       Review of Systems   Constitutional, HEENT, cardiovascular, pulmonary, gi and gu systems are negative, except as otherwise noted.      Objective           Vitals:  No vitals were obtained today due to virtual visit.    Physical Exam   GENERAL: Healthy, alert and no distress  EYES: Eyes grossly normal to inspection.  No discharge or erythema, or obvious scleral/conjunctival abnormalities.  RESP: No audible wheeze, cough, or visible cyanosis.  No visible retractions or increased work of breathing.    SKIN: Visible skin clear. No significant rash, abnormal pigmentation or lesions.  NEURO: Cranial nerves grossly intact.  Mentation and speech appropriate for age.  PSYCH: Mentation appears normal, affect normal/bright, judgement and insight intact, normal speech and appearance well-groomed.    Lab on 09/27/2022   Component Date Value Ref Range Status     SARS CoV2 PCR 09/27/2022 Positive (A) Negative Final    POSITIVE: SARS-CoV-2 (COVID-19) RNA detected, presumed positive.       Component Ref Range & Units 10 mo ago   (11/22/21) 1 yr ago   (11/19/20) 2 yr ago   (10/16/19) 2 yr ago   (10/16/19) 4 yr ago   (1/31/18)    Sodium 136 - 145 mmol/L 141  141  140       Potassium 3.5 - 5.0 mmol/L 4.2  4.3  4.0       Chloride 98 - 107 mmol/L 106  106  106       Carbon Dioxide (CO2) 22 - 31 mmol/L 26  27  27       Anion Gap 5 - 18 mmol/L 9  8  7       Urea Nitrogen 8 - 28 mg/dL 16  11  16       Creatinine 0.60 - 1.10 mg/dL 0.85  0.84  1.00       Calcium 8.5 - 10.5 mg/dL 9.7  9.5  9.7       Glucose 70 - 125 mg/dL 99  96  109   112 High  R     Alkaline Phosphatase 45 - 120 U/L 95  83   81      AST 0 - 40 U/L 19  19   16      ALT 0 - 45 U/L 25  29   20       Protein Total 6.0 - 8.0 g/dL 6.5  6.6   6.5      Albumin 3.5 - 5.0 g/dL 3.7  4.0   3.9      Bilirubin Total 0.0 - 1.0 mg/dL 0.7  0.7   0.5      GFR Estimate >60 mL/min/1.73m2 68  >60  55 Low                Video-Visit Details    Video Start Time: 4;16    Type of service:  Video Visit    Video End Time:4:46 PM    Originating Location (pt. Location): Home    Distant Location (provider location):  [facility]     Platform used for Video Visit: [employer]

## 2022-09-28 NOTE — PATIENT INSTRUCTIONS
Instructions for Patients      What are the symptoms of COVID-19?  Symptoms can include fever, cough, shortness of breath, chills, headache, muscle pain sore throat, fatigue, runny or stuffy nose, and loss of taste and smell. Other less common symptoms include nausea, vomiting, or diarrhea (watery stools).    Know when to call 911. Emergency warning signs include:    Trouble breathing or shortness of breath    Pain or pressure in the chest that doesn't go away    Feeling confused like you haven't felt before, or not being able to wake up    Bluish-colored lips or face    How can I take care of myself?  1. Get lots of rest. Drink extra fluids (unless a doctor has told you not to).  2. Take Tylenol (acetaminophen) for fever or pain. If you have liver or kidney problems, ask your family doctor if it's okay to take Tylenol   Adults:   650 mg (two 325 mg pills or tablets) every 4 to 6 hours, or...   1,000 mg (two 500 mg pills or tablets) every 8 hours as needed.  Note: Don't take more than 3,000 mg in one day. Acetaminophen is found in many medicines (both prescribed and over the counter). Read all labels to be sure you don't take too much.  For children, check the Tylenol bottle for the right dose. The dose is based on the child's age or weight.  3. Take over the counter medicines for your symptoms as needed. Talk to your pharmacist.  4. If you have other health problems (like cancer, heart failure, an organ transplant, or severe kidney disease): Call your specialty clinic if you don't feel better in the next 2 days.    Where can I get more information?     [facility] COVID-19 Resource Hub: www.[employer].org/covid19/     CDC Quarantine & Isolation: https://www.cdc.gov/coronavirus/2019-ncov/your-health/quarantine-isolation.html     CDC - What to Do If You're Sick: https://www.cdc.gov/coronavirus/2019-ncov/if-you-are-sick/index.html    [facility] clinical trials (COVID-19 research studies):  clinicalaffairs.[email or web address]/n-clinical-trials    [place] Department of Health COVID-19 Public Hotline: 1-660.682.1359

## 2022-10-01 ENCOUNTER — MYC MEDICAL ADVICE (OUTPATIENT)
Dept: FAMILY MEDICINE | Facility: CLINIC | Age: 72
End: 2022-10-01

## 2022-10-10 ENCOUNTER — HOSPITAL ENCOUNTER (OUTPATIENT)
Dept: BONE DENSITY | Facility: HOSPITAL | Age: 72
Discharge: HOME | End: 2022-10-10
Attending: INTERNAL MEDICINE | Admitting: INTERNAL MEDICINE
Payer: COMMERCIAL

## 2022-10-10 DIAGNOSIS — M80.08XA AGE-RELATED OSTEOPOROSIS WITH CURRENT PATHOLOGICAL FRACTURE, VERTEBRA(E), INITIAL ENCOUNTER FOR FRACTURE (H): ICD-10-CM

## 2022-10-10 DIAGNOSIS — J45.909 UNCOMPLICATED ASTHMA, UNSPECIFIED ASTHMA SEVERITY, UNSPECIFIED WHETHER PERSISTENT: ICD-10-CM

## 2022-10-10 DIAGNOSIS — C50.911 MUCINOUS CARCINOMA OF BREAST, RIGHT (H): ICD-10-CM

## 2022-10-10 PROCEDURE — 77080 DXA BONE DENSITY AXIAL: CPT | Mod: Q7

## 2022-10-10 RX ORDER — CETIRIZINE HYDROCHLORIDE 10 MG/1
TABLET ORAL
Qty: 90 TABLET | Refills: 11 | Status: SHIPPED | OUTPATIENT
Start: 2022-10-10 | End: 2023-10-23

## 2022-10-11 ENCOUNTER — HOSPITAL ENCOUNTER (OUTPATIENT)
Dept: PHYSICAL THERAPY | Facility: REHABILITATION | Age: 72
Discharge: HOME | End: 2022-10-11
Payer: COMMERCIAL

## 2022-10-11 DIAGNOSIS — M76.822 LEFT TIBIALIS POSTERIOR TENDINITIS: Primary | ICD-10-CM

## 2022-10-11 DIAGNOSIS — M25.572 PAIN IN JOINT INVOLVING ANKLE AND FOOT, LEFT: ICD-10-CM

## 2022-10-11 PROCEDURE — 97110 THERAPEUTIC EXERCISES: CPT | Mod: GP

## 2022-10-18 ENCOUNTER — HOSPITAL ENCOUNTER (OUTPATIENT)
Dept: PHYSICAL THERAPY | Facility: REHABILITATION | Age: 72
Discharge: HOME | End: 2022-10-18
Payer: COMMERCIAL

## 2022-10-18 DIAGNOSIS — M76.822 LEFT TIBIALIS POSTERIOR TENDINITIS: Primary | ICD-10-CM

## 2022-10-18 PROCEDURE — 97140 MANUAL THERAPY 1/> REGIONS: CPT | Mod: GP

## 2022-10-18 PROCEDURE — 97110 THERAPEUTIC EXERCISES: CPT | Mod: GP

## 2022-10-24 ENCOUNTER — ONCOLOGY VISIT (OUTPATIENT)
Dept: ONCOLOGY | Facility: HOSPITAL | Age: 72
End: 2022-10-24
Attending: INTERNAL MEDICINE
Payer: COMMERCIAL

## 2022-10-24 VITALS
SYSTOLIC BLOOD PRESSURE: 150 MMHG | HEART RATE: 69 BPM | HEIGHT: 64 IN | WEIGHT: 263.4 LBS | TEMPERATURE: 98.1 F | DIASTOLIC BLOOD PRESSURE: 73 MMHG | RESPIRATION RATE: 20 BRPM | OXYGEN SATURATION: 98 % | BODY MASS INDEX: 44.97 KG/M2

## 2022-10-24 DIAGNOSIS — C50.911 MUCINOUS CARCINOMA OF BREAST, RIGHT (H): ICD-10-CM

## 2022-10-24 DIAGNOSIS — C50.112 MALIGNANT NEOPLASM OF CENTRAL PORTION OF LEFT BREAST IN FEMALE, ESTROGEN RECEPTOR POSITIVE (H): ICD-10-CM

## 2022-10-24 DIAGNOSIS — Z17.0 MALIGNANT NEOPLASM OF CENTRAL PORTION OF LEFT BREAST IN FEMALE, ESTROGEN RECEPTOR POSITIVE (H): ICD-10-CM

## 2022-10-24 PROCEDURE — G0463 HOSPITAL OUTPT CLINIC VISIT: HCPCS

## 2022-10-24 PROCEDURE — 99213 OFFICE O/P EST LOW 20 MIN: CPT | Performed by: NURSE PRACTITIONER

## 2022-10-24 RX ORDER — KETOCONAZOLE 20 MG/G
CREAM TOPICAL
COMMUNITY
Start: 2022-10-10 | End: 2023-10-23

## 2022-10-24 RX ORDER — HYDROCORTISONE 2.5 %
CREAM (GRAM) TOPICAL
COMMUNITY
Start: 2022-09-12 | End: 2023-10-23

## 2022-10-24 RX ORDER — LANOLIN ALCOHOL/MO/W.PET/CERES
500 CREAM (GRAM) TOPICAL DAILY
COMMUNITY
Start: 2022-08-01

## 2022-10-24 RX ORDER — ANASTROZOLE 1 MG/1
1 TABLET ORAL DAILY
Qty: 90 TABLET | Refills: 3 | Status: SHIPPED | OUTPATIENT
Start: 2022-10-24 | End: 2023-10-23

## 2022-10-24 RX ORDER — MUPIROCIN 20 MG/G
OINTMENT TOPICAL
COMMUNITY
Start: 2022-05-27 | End: 2022-11-28

## 2022-10-24 ASSESSMENT — PAIN SCALES - GENERAL: PAINLEVEL: NO PAIN (0)

## 2022-10-24 NOTE — PROGRESS NOTES
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    1. T3 N1 M0, stage IIIa left breast cancer status post bilateral mastectomies September 15, 2020. Tumor 6.5 cm, 2 lymph nodes positive, Grade 2, ER/MS positive, HER-2 negative, Oncotype DX recurrence score of 13. Pt had radiation which completed in January 2021. She is arimidex. Had some arthralgias with exemestane so switched. Doing ok on it. Does have some issues with joint pain, but stable. Will return in 6 months.      2. Osteopenia: risk for worsening bone density with the aromatase inhibitor.  She will continue calcium and vitamin D supplement. Recent DEXA is stable. Repeat every 2 years.   Encourage also to participate in weightbearing exercises. Good calcium in diet.      3. Previous breast cancer in right breast in 2000. S/p 5 years of exemestane.    ECOG Performance    0 - Independent    Distress Screening (within last 30 days)    No data recorded     Pain  Pain Score: No Pain (0)    Problem List    Patient Active Problem List   Diagnosis     Breast cancer (H)     Dyslipidemia     Spinal Stenosis     Obesity     Osteopenia     GERD (gastroesophageal reflux disease)     Skin cancer     Morbid obesity (H)     Pain in joint involving lower leg     Malignant neoplasm of central portion of left breast in female, estrogen receptor positive (H)     Moderate persistent asthma without complication     Personal history of breast cancer     Health maintenance examination     Intramural, submucous, and subserous leiomyoma of uterus     Mucinous carcinoma of breast, right (H)     Post-mastectomy lymphedema  "syndrome     Leg swelling     Venous stasis of both lower extremities     Contracture of shoulder, right     Contracture of shoulder, left     Dyslipidemia        ______________________________________________________________________________    History of Present Illness    Measurable Disease:  3 months     Current therapy: Exemestane started around 10/23/2020  Adjuvant radiation started 12/4/2020     Treatment History:   Bilateral mastectomy 9/14/2020     Interim History: pt is here today for follow up visit. She is doing ok.  States that she still has some joint stiffness and pain but overall it stable.  She does take over-the-counter meds like Tylenol or Aleve every once in a while.  Tries to stay active.  She does notice it worse in the morning.  Other than that denies any other issues with the anastrozole.  She denies any changes in her breast.  Denies any new bone or back pain.  Denies any unexplained weight loss.  She had COVID a couple of weeks ago and is happy that she did well with that.  She has some questions about when she should get a booster.    Review of Systems    Pertinent items are noted in HPI.    Past History    Past Medical History:   Diagnosis Date     Breast cancer (H) 2000     Breast cyst      GERD (gastroesophageal reflux disease)      Hx of radiation therapy      Moderate persistent asthma without complication 8/14/2020     PONV (postoperative nausea and vomiting)      Uterine fibroid     embolized       PHYSICAL EXAM  BP (!) 150/73 (BP Location: Left arm, Patient Position: Sitting, Cuff Size: Adult Large)   Pulse 69   Temp 98.1  F (36.7  C) (Oral)   Resp 20   Ht 1.632 m (5' 4.25\")   Wt 119.5 kg (263 lb 6.4 oz)   SpO2 98%   BMI 44.86 kg/m      GENERAL: no acute distress. Cooperative in conversation. Here alone. Mask on  RESP: Regular respiratory rate. No expiratory wheezes   MUSCULOSKELETAL: no bilateral leg swelling  NEURO: non focal. Alert and oriented x3.   PSYCH: within normal " limits. No depression or anxiety.  SKIN: exposed skin is dry intact.   BREAST: s/p mastectomies. No local recurrence. Does have some excess skin.     Lab Results    No results found for this or any previous visit (from the past 168 hour(s)).    Imaging    DX Hip/Pelvis/Spine    Result Date: 10/10/2022  EXAM: DX HIP/PELVIS/SPINE LOCATION: [facility] DATE/TIME: 10/10/2022 11:07 AM INDICATION: Osteopenia. COMPARISON: None. TECHNIQUE: Dual-energy x-ray absorptiometry performed with routine technique. FINDINGS: Lumbar Spine: L1-L4: BMD: 1.095 g/cm2. T-score: -0.7. Z-score: 1.0 RIGHT Hip Total: BMD: 0.892 g/cm2. T-score: -0.9. Z-score: 0.8 RIGHT Hip Femoral neck: BMD: 0.833 g/cm2. T-score: -1.5. Z-score: 0.4 LEFT Hip Total: BMD: 0.975 g/cm2. T-score: -0.3. Z-score: 1.4 LEFT Hip Femoral neck: BMD: 0.878 g/cm2. T-score: -1.1. Z-score: 0.7 WHO Criteria: Normal: T score at or above -1 SD Osteopenia: T score between -1 and -2.5 SD Osteoporosis: T score at or below -2.5 SD COMPARISON: None. FRAX Results: Major osteoporotic fracture: 14.4% Hip fracture: 2.4% Based on right femoral neck BMD.     IMPRESSION: Low bone density (OSTEOPENIA). T score meets the World Health Organization (WHO) criteria for low bone density (osteopenia) at one or more measured sites. The risk of osteoporotic fracture increased approximately two-fold for each SD decrease  in T-score.        Signed by: JOEY Saenz CNP

## 2022-10-24 NOTE — LETTER
10/24/2022         RE: Camilla Wilson  [address] [place] [address]        Dear Colleague,    Thank you for referring your patient, Camilla Wilson, to the [facility] [place]. Please see a copy of my visit note below.    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    1. T3 N1 M0, stage IIIa left breast cancer status post bilateral mastectomies September 15, 2020. Tumor 6.5 cm, 2 lymph nodes positive, Grade 2, ER/AR positive, HER-2 negative, Oncotype DX recurrence score of 13. Pt had radiation which completed in January 2021. She is arimidex. Had some arthralgias with exemestane so switched. Doing ok on it. Does have some issues with joint pain, but stable. Will return in 6 months.      2. Osteopenia: risk for worsening bone density with the aromatase inhibitor.  She will continue calcium and vitamin D supplement. Recent DEXA is stable. Repeat every 2 years.   Encourage also to participate in weightbearing exercises. Good calcium in diet.      3. Previous breast cancer in right breast in 2000. S/p 5 years of exemestane.    ECOG Performance    0 - Independent    Distress Screening (within last 30 days)    No data recorded     Pain  Pain Score: No Pain (0)    Problem List    Patient Active Problem List   Diagnosis     Breast cancer (H)     Dyslipidemia     Spinal Stenosis     Obesity     Osteopenia     GERD (gastroesophageal reflux disease)     Skin cancer     Morbid obesity (H)     Pain in joint involving lower leg     Malignant neoplasm of central portion of left breast in female, estrogen receptor positive (H)      "Moderate persistent asthma without complication     Personal history of breast cancer     Health maintenance examination     Intramural, submucous, and subserous leiomyoma of uterus     Mucinous carcinoma of breast, right (H)     Post-mastectomy lymphedema syndrome     Leg swelling     Venous stasis of both lower extremities     Contracture of shoulder, right     Contracture of shoulder, left     Dyslipidemia        ______________________________________________________________________________    History of Present Illness    Measurable Disease:  3 months     Current therapy: Exemestane started around 10/23/2020  Adjuvant radiation started 12/4/2020     Treatment History:   Bilateral mastectomy 9/14/2020     Interim History: pt is here today for follow up visit. She is doing ok.  States that she still has some joint stiffness and pain but overall it stable.  She does take over-the-counter meds like Tylenol or Aleve every once in a while.  Tries to stay active.  She does notice it worse in the morning.  Other than that denies any other issues with the anastrozole.  She denies any changes in her breast.  Denies any new bone or back pain.  Denies any unexplained weight loss.  She had COVID a couple of weeks ago and is happy that she did well with that.  She has some questions about when she should get a booster.    Review of Systems    Pertinent items are noted in HPI.    Past History    Past Medical History:   Diagnosis Date     Breast cancer (H) 2000     Breast cyst      GERD (gastroesophageal reflux disease)      Hx of radiation therapy      Moderate persistent asthma without complication 8/14/2020     PONV (postoperative nausea and vomiting)      Uterine fibroid     embolized       PHYSICAL EXAM  BP (!) 150/73 (BP Location: Left arm, Patient Position: Sitting, Cuff Size: Adult Large)   Pulse 69   Temp 98.1  F (36.7  C) (Oral)   Resp 20   Ht 1.632 m (5' 4.25\")   Wt 119.5 kg (263 lb 6.4 oz)   SpO2 98%   BMI " 44.86 kg/m      GENERAL: no acute distress. Cooperative in conversation. Here alone. Mask on  RESP: Regular respiratory rate. No expiratory wheezes   MUSCULOSKELETAL: no bilateral leg swelling  NEURO: non focal. Alert and oriented x3.   PSYCH: within normal limits. No depression or anxiety.  SKIN: exposed skin is dry intact.   BREAST: s/p mastectomies. No local recurrence. Does have some excess skin.     Lab Results    No results found for this or any previous visit (from the past 168 hour(s)).    Imaging    DX Hip/Pelvis/Spine    Result Date: 10/10/2022  EXAM: DX HIP/PELVIS/SPINE LOCATION: [facility] DATE/TIME: 10/10/2022 11:07 AM INDICATION: Osteopenia. COMPARISON: None. TECHNIQUE: Dual-energy x-ray absorptiometry performed with routine technique. FINDINGS: Lumbar Spine: L1-L4: BMD: 1.095 g/cm2. T-score: -0.7. Z-score: 1.0 RIGHT Hip Total: BMD: 0.892 g/cm2. T-score: -0.9. Z-score: 0.8 RIGHT Hip Femoral neck: BMD: 0.833 g/cm2. T-score: -1.5. Z-score: 0.4 LEFT Hip Total: BMD: 0.975 g/cm2. T-score: -0.3. Z-score: 1.4 LEFT Hip Femoral neck: BMD: 0.878 g/cm2. T-score: -1.1. Z-score: 0.7 WHO Criteria: Normal: T score at or above -1 SD Osteopenia: T score between -1 and -2.5 SD Osteoporosis: T score at or below -2.5 SD COMPARISON: None. FRAX Results: Major osteoporotic fracture: 14.4% Hip fracture: 2.4% Based on right femoral neck BMD.     IMPRESSION: Low bone density (OSTEOPENIA). T score meets the World Health Organization (WHO) criteria for low bone density (osteopenia) at one or more measured sites. The risk of osteoporotic fracture increased approximately two-fold for each SD decrease  in T-score.        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    Weight as of this encounter: 119.5 kg (263 lb 6.4 oz). Body surface area is 2.33 meters squared.  No Pain (0) Comment: Data Unavailable   No LMP recorded.  Allergies reviewed: Yes  Medications reviewed: Yes    Medications: MEDICATION REFILLS NEEDED TODAY. Provider was notified.  Pharmacy name entered into [employer]: [id number] [address]    Clinical concerns:6 month return Mucinous carcinoma of breast, right, review 10-10 DEXA no labs        Danisha Spicer CMA          '      Again, thank you for allowing me to participate in the care of your patient.        Sincerely,        JOEY Saenz CNP    "

## 2022-10-24 NOTE — PROGRESS NOTES
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    Weight as of this encounter: 119.5 kg (263 lb 6.4 oz). Body surface area is 2.33 meters squared.  No Pain (0) Comment: Data Unavailable   No LMP recorded.  Allergies reviewed: Yes  Medications reviewed: Yes    Medications: MEDICATION REFILLS NEEDED TODAY. Provider was notified.  Pharmacy name entered into [employer]: [id number] - [place], [place] - [address]    Clinical concerns:6 month return Mucinous carcinoma of breast, right, review 10-10 DEXA no labs        Danisha Spicer CMA          '  "

## 2022-10-27 ENCOUNTER — HOSPITAL ENCOUNTER (OUTPATIENT)
Dept: PHYSICAL THERAPY | Facility: REHABILITATION | Age: 72
Discharge: HOME | End: 2022-10-27
Payer: COMMERCIAL

## 2022-10-27 DIAGNOSIS — M25.572 PAIN IN JOINT INVOLVING ANKLE AND FOOT, LEFT: ICD-10-CM

## 2022-10-27 DIAGNOSIS — M76.822 LEFT TIBIALIS POSTERIOR TENDINITIS: Primary | ICD-10-CM

## 2022-10-27 PROCEDURE — 97535 SELF CARE MNGMENT TRAINING: CPT | Mod: GP

## 2022-10-27 PROCEDURE — 97110 THERAPEUTIC EXERCISES: CPT | Mod: GP

## 2022-10-27 NOTE — PROGRESS NOTES
"                                                                           [facility]    OUTPATIENT PHYSICAL THERAPY  PLAN OF TREATMENT FOR OUTPATIENT REHABILITATION AND PROGRESS NOTE           Patient's Last Name, First Name, Camilla Louise Date of Birth  1950   Provider's Name  [facility] Medical Record No.  [id number]    Onset Date  04/29/2022 Start of Care Date  08/04/2022   Type:     _X_PT   ___OT   ___SLP Medical Diagnosis  Pain in joint involving ankle and foot   PT Diagnosis  Tibialis posterior tendinitis  Plan of Treatment  Frequency/Duration: 1/week up to every other week for an additional 6 weeks   Certification date from 10/27/22 to 12/08/22     Goals:  Goal Identifier HEP   Goal Description pt will demonstrate independence and report compliance with HEP to facilitate improved independent symptom mgmt.   Target Date 10/27/22   Date Met      Progress (detail required for progress note): progressing     Goal Identifier stairs   Goal Description pt will be able to descend a flight of stairs without left medial ankle pain.   Target Date 10/27/22   Date Met      Progress (detail required for progress note): progressing, improved-still feels discomfort but \"I can handle it\"     Goal Identifier strength   Goal Description pt will demonstrate improved strength via ability to perform greater than or equal to 10 single heel raises on the left.   Target Date 10/27/22   Date Met      Progress (detail required for progress note): progressing     Goal Identifier standing/walking   Goal Description pt will be able to stand and walk for greater than or equal to 30 minutes without onset of left medial ankle pain   Target Date 10/27/22   Date Met      Progress (detail required for progress note): progressing       Beginning/End Dates of Progress Note Reporting Period:  08/04/2022 to 10/27/2022    Progress Toward Goals:   Progress this " reporting period: Pt has attended 6 physical therapy sessions for left tibialis posterior tendinopathy. Pt demonstrates improved strength and stair and walking tolerance, however, has been experiencing global foot and plantar fascia pain intermittently possibly secondary to her exercises. HEP revised today and pt encouraged to increase consistency. Pt also encouraged to look into OTC medial arch support. Pt would benefit from a few additional PT sessions to maximize outcomes.     Progress limited due to: limited compliance, travel, onset of new joint pain in left foot    Client Self (Subjective) Report for Progress Note Reporting Period: Pt states she is frustrated by lack of progress and increased pain with delayed onset following exercises. Pt is wondering if there is a proper way to sequence her exercises.    Objective Measurements:   Objective Measure: stairs  Details: reciprocal, symmetrical weight bearing  Objective Measure: heel raises  Details: symmetrical weight bearing, good control                                    I CERTIFY THE NEED FOR THESE SERVICES FURNISHED UNDER        THIS PLAN OF TREATMENT AND WHILE UNDER MY CARE     (Physician co-signature of this document indicates review and certification of the therapy plan).                Referring Provider: Berna Morel MD Dana Amthor, PT

## 2022-11-08 NOTE — PROGRESS NOTES
Camilla comes in for continued follow-up of her bilateral mastectomies.  She called over the weekend because she was concerned that the drain on the left was coming out.  It indeed did fall out then yesterday.  She is otherwise feeling fine.  Her only complaint is that she has some pain in her left arm and yesterday noticed some swelling in the left hand.  That has since improved.    Physical exam:  Overall looks good.  Incisions both look good.  No evidence of seroma there is side.  The lateral portion on the right is nearly completely healed and I am can have her just keep this open to air.  Extremities: No obvious swelling of the left hand or arm.    Impression: Status post bilateral mastectomies.  Doing well.  The wound on the most lateral portion of the right is nearly completely healed.  Her drain came out still putting out about 30 cc a day so I do think she will probably get a seroma and I told her what to watch for.  I am going to have her come back and see me in a week but told her if she feels like it is building up before then to call sooner.  Her symptoms are also concerning for lymphedema on the left and she certainly is at high risk for that so I am going to set her up with the lymphedema clinic and also get her set up to get a sleeve for her left arm.    
Camilla presents to [facility] for a post op appointment with Dr. Cook .  She is accompanied by her friend for appointment.  She states she is doing well, minimal pain.  Her drain fell out on it's own last evening.  She hasn't noticed any swelling or drainage.  Patient met with Dr. Cook .  See dictation for details of visit.  Made her an appointment to come back to see Dr. Cook next week for seroma check.  Invited calls sooner if she has any questions.  RN time 12 mins  
no

## 2022-11-17 ENCOUNTER — HOSPITAL ENCOUNTER (OUTPATIENT)
Dept: PHYSICAL THERAPY | Facility: REHABILITATION | Age: 72
Discharge: HOME | End: 2022-11-17
Payer: COMMERCIAL

## 2022-11-17 DIAGNOSIS — M76.822 LEFT TIBIALIS POSTERIOR TENDINITIS: Primary | ICD-10-CM

## 2022-11-17 DIAGNOSIS — M25.572 PAIN IN JOINT INVOLVING ANKLE AND FOOT, LEFT: ICD-10-CM

## 2022-11-17 PROCEDURE — 97110 THERAPEUTIC EXERCISES: CPT | Mod: GP

## 2022-11-17 PROCEDURE — 97140 MANUAL THERAPY 1/> REGIONS: CPT | Mod: GP

## 2022-11-21 ENCOUNTER — HOSPITAL ENCOUNTER (OUTPATIENT)
Dept: PHYSICAL THERAPY | Facility: REHABILITATION | Age: 72
Discharge: HOME | End: 2022-11-21
Payer: COMMERCIAL

## 2022-11-21 DIAGNOSIS — M76.822 LEFT TIBIALIS POSTERIOR TENDINITIS: Primary | ICD-10-CM

## 2022-11-21 DIAGNOSIS — M25.572 PAIN IN JOINT INVOLVING ANKLE AND FOOT, LEFT: ICD-10-CM

## 2022-11-21 PROCEDURE — 97535 SELF CARE MNGMENT TRAINING: CPT | Mod: GP

## 2022-11-21 ASSESSMENT — ENCOUNTER SYMPTOMS
FREQUENCY: 0
DYSURIA: 0
ARTHRALGIAS: 1
HEADACHES: 0
PARESTHESIAS: 0
DIARRHEA: 0
BREAST MASS: 0
EYE PAIN: 0
CONSTIPATION: 0
PALPITATIONS: 0
JOINT SWELLING: 0
HEARTBURN: 1
HEMATURIA: 0
ABDOMINAL PAIN: 0
SHORTNESS OF BREATH: 0
HEMATOCHEZIA: 0
FEVER: 0
NERVOUS/ANXIOUS: 0
NAUSEA: 0
CHILLS: 0
SORE THROAT: 0
MYALGIAS: 1
WEAKNESS: 0
COUGH: 0
DIZZINESS: 0

## 2022-11-21 ASSESSMENT — ASTHMA QUESTIONNAIRES
ACT_TOTALSCORE: 25
ACT_TOTALSCORE: 25
QUESTION_1 LAST FOUR WEEKS HOW MUCH OF THE TIME DID YOUR ASTHMA KEEP YOU FROM GETTING AS MUCH DONE AT WORK, SCHOOL OR AT HOME: NONE OF THE TIME
QUESTION_5 LAST FOUR WEEKS HOW WOULD YOU RATE YOUR ASTHMA CONTROL: COMPLETELY CONTROLLED
QUESTION_2 LAST FOUR WEEKS HOW OFTEN HAVE YOU HAD SHORTNESS OF BREATH: NOT AT ALL
QUESTION_3 LAST FOUR WEEKS HOW OFTEN DID YOUR ASTHMA SYMPTOMS (WHEEZING, COUGHING, SHORTNESS OF BREATH, CHEST TIGHTNESS OR PAIN) WAKE YOU UP AT NIGHT OR EARLIER THAN USUAL IN THE MORNING: NOT AT ALL
QUESTION_4 LAST FOUR WEEKS HOW OFTEN HAVE YOU USED YOUR RESCUE INHALER OR NEBULIZER MEDICATION (SUCH AS ALBUTEROL): NOT AT ALL

## 2022-11-21 ASSESSMENT — ACTIVITIES OF DAILY LIVING (ADL): CURRENT_FUNCTION: NO ASSISTANCE NEEDED

## 2022-11-22 NOTE — PROGRESS NOTES
[facility]    Outpatient Physical Therapy Discharge Note  Patient: Camilla Wilson  : 1950    Beginning/End Dates of Reporting Period:  22 to 22    Referring Provider: Berna Morel MD    Therapy Diagnosis: tibialis posterior tendinitis      Client Self Report: Feels overall symptoms are mostly better. Having some discomfort along medial ankle/leg with stair descent for ~50% of steps. Not having arch pain. Feels she could be more compliant and consistent with HEP. Swiming 1 x /week and wanting to increase activity level but not sure how and how to motivate.    Objective Measurements:  Objective Measure: heel raise  Details: able to perform 15 bilateral raises with eccentric lowering on left without increased pain       Outcome Measures (most recent score):  LEFS: 69/80  (34/80 at evaluation)    Goals:  Goal Identifier HEP   Goal Description pt will demonstrate independence and report compliance with HEP to facilitate improved independent symptom mgmt.   Target Date 22   Date Met      Progress (detail required for progress note): not fully met- inconsistent compliance     Goal Identifier stairs   Goal Description pt will be able to descend a flight of stairs without left medial ankle pain.   Target Date 22   Date Met      Progress (detail required for progress note): not met, improved-able to go first 4-5 steps without discomfort and intensity of symptoms not as severe     Goal Identifier strength   Goal Description pt will demonstrate improved strength via ability to perform greater than or equal to 10 single heel raises on the left.   Target Date 22   Date Met      Progress (detail required for progress note): not met-able to perform bilateral raise with eccentric lowering on left without increased pain     Goal Identifier standing/walking   Goal Description pt will be  able to stand and walk for greater than or equal to 30 minutes without onset of left medial ankle pain   Target Date 12/08/22   Date Met  11/21/22   Progress (detail required for progress note): met       Assessment  Pt has attended physical therapy for 8 visits after medial ankle injury after exercising. Although pt is not meeting all of her goals, pt demonstrates significant gains in strength, activity tolerance and reduced severity and frequency of pain. Pt also demonstrates improved percieved level of function and pain via scoring on the LEFS from 34 at evaluation to 69 today. Pt appears satisfied with the progress made throughout the course of therapy and motivated to continue with HEP independently. Pt is appropriate for d/c from skilled physical therapy today.    Plan:  Discharge from therapy.    Discharge:    Reason for Discharge: No further expectation of progress.    Equipment Issued: [employer]    Discharge Plan: Patient to continue home program.   DISPLAY PLAN FREE TEXT

## 2022-11-28 ENCOUNTER — ANCILLARY PROCEDURE (OUTPATIENT)
Dept: GENERAL RADIOLOGY | Facility: CLINIC | Age: 72
End: 2022-11-28
Attending: INTERNAL MEDICINE
Payer: COMMERCIAL

## 2022-11-28 ENCOUNTER — OFFICE VISIT (OUTPATIENT)
Dept: INTERNAL MEDICINE | Facility: CLINIC | Age: 72
End: 2022-11-28
Payer: COMMERCIAL

## 2022-11-28 VITALS
TEMPERATURE: 97.6 F | SYSTOLIC BLOOD PRESSURE: 157 MMHG | HEIGHT: 65 IN | HEART RATE: 72 BPM | OXYGEN SATURATION: 97 % | DIASTOLIC BLOOD PRESSURE: 81 MMHG | BODY MASS INDEX: 44.47 KG/M2 | WEIGHT: 266.9 LBS

## 2022-11-28 DIAGNOSIS — Z00.00 HEALTH MAINTENANCE EXAMINATION: ICD-10-CM

## 2022-11-28 DIAGNOSIS — M65.30 TRIGGER FINGER, ACQUIRED: ICD-10-CM

## 2022-11-28 DIAGNOSIS — I10 ESSENTIAL HYPERTENSION: ICD-10-CM

## 2022-11-28 DIAGNOSIS — R79.9 ABNORMAL FINDING OF BLOOD CHEMISTRY, UNSPECIFIED: ICD-10-CM

## 2022-11-28 DIAGNOSIS — I51.7 MILD CONCENTRIC LEFT VENTRICULAR HYPERTROPHY (LVH): ICD-10-CM

## 2022-11-28 DIAGNOSIS — C50.911 MUCINOUS CARCINOMA OF BREAST, RIGHT (H): Primary | ICD-10-CM

## 2022-11-28 DIAGNOSIS — I87.8 VENOUS STASIS OF BOTH LOWER EXTREMITIES: ICD-10-CM

## 2022-11-28 DIAGNOSIS — J45.40 MODERATE PERSISTENT ASTHMA WITHOUT COMPLICATION: ICD-10-CM

## 2022-11-28 DIAGNOSIS — U07.1 INFECTION DUE TO 2019 NOVEL CORONAVIRUS: ICD-10-CM

## 2022-11-28 DIAGNOSIS — E78.2 MIXED HYPERLIPIDEMIA: ICD-10-CM

## 2022-11-28 LAB
ALBUMIN SERPL BCG-MCNC: 4.2 G/DL (ref 3.5–5.2)
ALP SERPL-CCNC: 97 U/L (ref 35–104)
ALT SERPL W P-5'-P-CCNC: 35 U/L (ref 10–35)
ANION GAP SERPL CALCULATED.3IONS-SCNC: 9 MMOL/L (ref 7–15)
AST SERPL W P-5'-P-CCNC: 35 U/L (ref 10–35)
BILIRUB SERPL-MCNC: 0.4 MG/DL
BUN SERPL-MCNC: 20.6 MG/DL (ref 8–23)
CALCIUM SERPL-MCNC: 9.7 MG/DL (ref 8.8–10.2)
CHLORIDE SERPL-SCNC: 106 MMOL/L (ref 98–107)
CHOLEST SERPL-MCNC: 292 MG/DL
CREAT SERPL-MCNC: 0.83 MG/DL (ref 0.51–0.95)
DEPRECATED HCO3 PLAS-SCNC: 25 MMOL/L (ref 22–29)
ERYTHROCYTE [DISTWIDTH] IN BLOOD BY AUTOMATED COUNT: 14.9 % (ref 10–15)
GFR SERPL CREATININE-BSD FRML MDRD: 74 ML/MIN/1.73M2
GLUCOSE SERPL-MCNC: 107 MG/DL (ref 70–99)
HBA1C MFR BLD: 5.6 % (ref 0–5.6)
HCT VFR BLD AUTO: 39.8 % (ref 35–47)
HDLC SERPL-MCNC: 90 MG/DL
HGB BLD-MCNC: 12.5 G/DL (ref 11.7–15.7)
LDLC SERPL CALC-MCNC: 187 MG/DL
MCH RBC QN AUTO: 31.1 PG (ref 26.5–33)
MCHC RBC AUTO-ENTMCNC: 31.4 G/DL (ref 31.5–36.5)
MCV RBC AUTO: 99 FL (ref 78–100)
NONHDLC SERPL-MCNC: 202 MG/DL
PLATELET # BLD AUTO: 227 10E3/UL (ref 150–450)
POTASSIUM SERPL-SCNC: 4.2 MMOL/L (ref 3.4–5.3)
PROT SERPL-MCNC: 6.4 G/DL (ref 6.4–8.3)
RBC # BLD AUTO: 4.02 10E6/UL (ref 3.8–5.2)
SODIUM SERPL-SCNC: 140 MMOL/L (ref 136–145)
TRIGL SERPL-MCNC: 77 MG/DL
TSH SERPL DL<=0.005 MIU/L-ACNC: 2.05 UIU/ML (ref 0.3–4.2)
WBC # BLD AUTO: 5.5 10E3/UL (ref 4–11)

## 2022-11-28 PROCEDURE — 80053 COMPREHEN METABOLIC PANEL: CPT | Performed by: INTERNAL MEDICINE

## 2022-11-28 PROCEDURE — 85027 COMPLETE CBC AUTOMATED: CPT | Performed by: INTERNAL MEDICINE

## 2022-11-28 PROCEDURE — 0134A COVID-19 VACCINE BIVALENT BOOSTER 18+ (MODERNA): CPT | Performed by: INTERNAL MEDICINE

## 2022-11-28 PROCEDURE — G0439 PPPS, SUBSEQ VISIT: HCPCS | Performed by: INTERNAL MEDICINE

## 2022-11-28 PROCEDURE — 91313 COVID-19 VACCINE BIVALENT BOOSTER 18+ (MODERNA): CPT | Performed by: INTERNAL MEDICINE

## 2022-11-28 PROCEDURE — 83036 HEMOGLOBIN GLYCOSYLATED A1C: CPT | Performed by: INTERNAL MEDICINE

## 2022-11-28 PROCEDURE — 36415 COLL VENOUS BLD VENIPUNCTURE: CPT | Performed by: INTERNAL MEDICINE

## 2022-11-28 PROCEDURE — 73130 X-RAY EXAM OF HAND: CPT | Mod: TC | Performed by: RADIOLOGY

## 2022-11-28 PROCEDURE — 99214 OFFICE O/P EST MOD 30 MIN: CPT | Mod: 25 | Performed by: INTERNAL MEDICINE

## 2022-11-28 PROCEDURE — 84443 ASSAY THYROID STIM HORMONE: CPT | Performed by: INTERNAL MEDICINE

## 2022-11-28 PROCEDURE — 80061 LIPID PANEL: CPT | Performed by: INTERNAL MEDICINE

## 2022-11-28 RX ORDER — LOSARTAN POTASSIUM 50 MG/1
50 TABLET ORAL DAILY
Qty: 90 TABLET | Refills: 3 | Status: SHIPPED | OUTPATIENT
Start: 2022-11-28 | End: 2023-12-07

## 2022-11-28 RX ORDER — NEOMYCIN SULFATE, POLYMYXIN B SULFATE, AND DEXAMETHASONE 3.5; 10000; 1 MG/G; [USP'U]/G; MG/G
OINTMENT OPHTHALMIC PRN
COMMUNITY
Start: 2022-11-04

## 2022-11-28 ASSESSMENT — ENCOUNTER SYMPTOMS
NAUSEA: 0
DYSURIA: 0
PARESTHESIAS: 0
ARTHRALGIAS: 1
FREQUENCY: 0
BREAST MASS: 0
ABDOMINAL PAIN: 0
EYE PAIN: 0
NERVOUS/ANXIOUS: 0
CHILLS: 0
PALPITATIONS: 0
SHORTNESS OF BREATH: 0
JOINT SWELLING: 0
DIARRHEA: 0
WEAKNESS: 0
CONSTIPATION: 0
HEMATOCHEZIA: 0
MYALGIAS: 1
FEVER: 0
DIZZINESS: 0
SORE THROAT: 0
COUGH: 0
HEADACHES: 0
HEARTBURN: 1
HEMATURIA: 0

## 2022-11-28 ASSESSMENT — ACTIVITIES OF DAILY LIVING (ADL): CURRENT_FUNCTION: NO ASSISTANCE NEEDED

## 2022-11-28 NOTE — PATIENT INSTRUCTIONS
SHINGRIX VACCINE CAN BE GIVEN AT THE PHARMACY  COLON DUE 2024   START LOSARTAN START AT HALF A TBALET AND THEN GO UP TO FULL TABLET    TARGET bp   /60 /80

## 2022-11-28 NOTE — PROGRESS NOTES
"SUBJECTIVE:   Camilla is a 72 year old who presents for Preventive Visit.    Patient has been advised of split billing requirements and indicates understanding: Yes  Are you in the first 12 months of your Medicare coverage?  No  EXD   Healthy Habits:     In general, how would you rate your overall health?  Good    Frequency of exercise:  1 day/week    Duration of exercise:  Less than 15 minutes    Do you usually eat at least 4 servings of fruit and vegetables a day, include whole grains    & fiber and avoid regularly eating high fat or \"junk\" foods?  No    Taking medications regularly:  Yes    Medication side effects:  Muscle aches    Ability to successfully perform activities of daily living:  No assistance needed    Home Safety:  Lack of grab bars in the bathroom    Hearing Impairment:  No hearing concerns    In the past 6 months, have you been bothered by leaking of urine? Yes    In general, how would you rate your overall mental or emotional health?  Good      PHQ-2 Total Score: 0    Additional concerns today:  Yes      Have you ever done Advance Care Planning? (For example, a Health Directive, POLST, or a discussion with a medical provider or your loved ones about your wishes): No, advance care planning information given to patient to review.  Patient plans to discuss their wishes with loved ones or provider.         Fall risk  Fallen 2 or more times in the past year?: No  Any fall with injury in the past year?: No    Cognitive Screening   1) Repeat 3 items (Leader, Season, Table)    2) Clock draw: NORMAL  3) 3 item recall: Recalls 3 objects  Results: 3 items recalled: COGNITIVE IMPAIRMENT LESS LIKELY    Mini-CogTM Copyright SALEEM Hernandez. Licensed by the author for use in [facility]; reprinted with permission (cristal@.[email or web address]). All rights reserved.      Do you have sleep apnea, excessive snoring or daytime drowsiness?: no    Reviewed and updated as needed this visit by clinical staff     Meds          "     Reviewed and updated as needed this visit by Provider                 Social History     Tobacco Use     Smoking status: Former     Packs/day: 0.50     Years: 15.00     Pack years: 7.50     Types: Cigarettes     Quit date: 1982     Years since quittin.5     Smokeless tobacco: Never   Substance Use Topics     Alcohol use: Yes     Alcohol/week: 5.0 standard drinks         Alcohol Use 2022   Prescreen: >3 drinks/day or >7 drinks/week? No   Prescreen: >3 drinks/day or >7 drinks/week? -               Current providers sharing in care for this patient include:   Patient Care Team:  Berna Morel MD as PCP - Anna Ring MD as MD (Hematology & Oncology)  Dyana Quiles APRN CNP as Nurse Practitioner (Hematology & Oncology)  Princess Laguna MD as MD (Hematology & Oncology)  Berna Morel MD as Assigned PCP  Susu Espinoza MD as Assigned Neuroscience Provider  Gemini Rosario MD as Assigned Pulmonology Provider  Deja Odom RN as Specialty Care Coordinator (Hematology & Oncology)  Dyana Quiles APRN CNP as Assigned Cancer Care Provider    The following health maintenance items are reviewed in Epic and correct as of today:  Health Maintenance   Topic Date Due     ASTHMA ACTION PLAN  Never done     ZOSTER IMMUNIZATION (2 of 3) 2012     COVID-19 Vaccine (5 - Booster for Pfizer series) 2022     ANNUAL REVIEW OF HM ORDERS  2022     MEDICARE ANNUAL WELLNESS VISIT  2022     ASTHMA CONTROL TEST  2023     FALL RISK ASSESSMENT  2023     COLORECTAL CANCER SCREENING  2024     LIPID  2026     ADVANCE CARE PLANNING  2026     DTAP/TDAP/TD IMMUNIZATION (3 - Td or Tdap) 2032     DEXA  10/10/2037     HEPATITIS C SCREENING  Completed     PHQ-2 (once per calendar year)  Completed     INFLUENZA VACCINE  Completed     Pneumococcal Vaccine: 65+ Years  Completed     IPV IMMUNIZATION  Aged Out     MENINGITIS IMMUNIZATION   Aged Out     MAMMO SCREENING  Discontinued               Review of Systems   Constitutional: Negative for chills and fever.   HENT: Positive for congestion. Negative for ear pain, hearing loss and sore throat.    Eyes: Negative for pain and visual disturbance.   Respiratory: Negative for cough and shortness of breath.    Cardiovascular: Positive for peripheral edema. Negative for chest pain and palpitations.   Gastrointestinal: Positive for heartburn. Negative for abdominal pain, constipation, diarrhea, hematochezia and nausea.   Breasts:  Negative for tenderness, breast mass and discharge.   Genitourinary: Negative for dysuria, frequency, genital sores, hematuria, pelvic pain, urgency, vaginal bleeding and vaginal discharge.   Musculoskeletal: Positive for arthralgias and myalgias. Negative for joint swelling.   Skin: Positive for rash.   Neurological: Negative for dizziness, weakness, headaches and paresthesias.   Psychiatric/Behavioral: The patient is not nervous/anxious.      Patient Active Problem List   Diagnosis     Breast cancer (H)     Dyslipidemia     Spinal Stenosis     Obesity     Osteopenia     GERD (gastroesophageal reflux disease)     Skin cancer     Morbid obesity (H)     Pain in joint involving lower leg     Malignant neoplasm of central portion of left breast in female, estrogen receptor positive (H)     Personal history of breast cancer     Health maintenance examination     Intramural, submucous, and subserous leiomyoma of uterus     Mucinous carcinoma of breast, right (H)     Post-mastectomy lymphedema syndrome     Leg swelling     Venous stasis of both lower extremities     Contracture of shoulder, right     Contracture of shoulder, left     Dyslipidemia     Mild concentric left ventricular hypertrophy (LVH)     Essential hypertension     Current Outpatient Medications   Medication     anastrozole (ARIMIDEX) 1 MG tablet     calcium citrate 250 mg calcium Tab     cetirizine (ZYRTEC) 10 MG tablet  "    cholecalciferol, vitamin D3, (VITAMIN D3) 2,000 unit cap     COQ10, UBIQUINOL, ORAL     famotidine (FOR PEPCID) 10 MG tablet     hydrocortisone 2.5 % cream     ketoconazole (NIZORAL) 2 % external cream     losartan (COZAAR) 50 MG tablet     melatonin 5 mg Tab tablet     naproxen sodium 220 MG capsule     neomycin-polymixin-dexamethasone (MAXITROL) ophthalmic ointment     niacin (SLO-NIACIN) 500 MG CR tablet     peg 400-propylene glycol (SYSTANE) 0.4-0.3 % Drop     SF 5000 PLUS 1.1 % CREA     sodium fluoride-pot nitrate (PREVIDENT 5000 SENSITIVE) 1.1-5 % Pste     vit C,J-Ni-fqpgx-lutein-zeaxan (OCUVITE LUTEIN & ZEAXANTHIN) 60 mg-30 unit- 15 mg-2 mg-6 mg cap     neomycin-polymyxin-dexamethasone (MAXITROL) 3.5-92570-3.1 ophthalmic ointment     No current facility-administered medications for this visit.         OBJECTIVE:   BP (!) 157/81 (BP Location: Left arm, Patient Position: Sitting, Cuff Size: Adult Large)   Pulse 72   Temp 97.6  F (36.4  C) (Tympanic)   Ht 1.657 m (5' 5.25\")   Wt 121.1 kg (266 lb 14.4 oz)   SpO2 97%   BMI 44.07 kg/m   Estimated body mass index is 44.07 kg/m  as calculated from the following:    Height as of this encounter: 1.657 m (5' 5.25\").    Weight as of this encounter: 121.1 kg (266 lb 14.4 oz).  Physical Exam  GENERAL: healthy, alert and no distress  EYES: Eyes grossly normal to inspection, PERRL and conjunctivae and sclerae normal  HENT: ear canals and TM's normal, nose and mouth without ulcers or lesions  NECK: no adenopathy, no asymmetry, masses, or scars and thyroid normal to palpation  RESP: lungs clear to auscultation - no rales, rhonchi or wheezes  BREAST: normal without masses, tenderness or nipple discharge and no palpable axillary masses or adenopathy  CV: regular rate and rhythm, normal S1 S2, no S3 or S4, SOFT systolic  Murmur,NO  click or rub, no peripheral edema and peripheral pulses strong  ABDOMEN: soft, nontender, no hepatosplenomegaly, no masses and bowel sounds " normal  MS: no gross musculoskeletal defects noted, no edema  SKIN: no suspicious lesions or rashes  NEURO: Normal strength and tone, mentation intact and speech normal  PSYCH: mentation appears normal, affect normal/bright  rombergs negative       ASSESSMENT / PLAN:   (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  Is still on Arimidex.  Plan:     (Z00.00) Health maintenance examination  Comment: Colon 2019   mammo   dexa 2018, 2020, 2022  Plan: Next colon is due 2024 she is due for Shingrix and vaccine other vaccination is up-to-date.    (J45.40) Moderate persistent asthma without complication  Comment: She does not really have asthma so this was resolved  Plan:     (M65.30) Trigger finger, acquired  Comment:   Plan: Orthopedic  Referral, CANCELED: XR         Hand Left G/E 3 Views            (I87.8) Venous stasis of both lower extremities  Comment:   Plan: advanced venous stasis dermatitis she is getting physical therapy or lymphedema treatment for that there is no skin breakdown or ulcerations.  Legs were not examined today because she had stockings on    (U07.1) Infection due to 2019 novel coronavirus  Comment:   Plan: Recovered uneventfully more than 2 months ago can get omicron variant  (E78.2) Mixed hyperlipidemia  Comment: She is intolerant to atorvastatin and is not keen on trying other statins.  Recommend a coronary calcium score.  Plan: Lipid panel reflex to direct LDL Fasting,         Comprehensive metabolic panel, TSH, Hemoglobin         A1c, CBC with platelets, CT Coronary Calcium         Scan            (R79.9) Abnormal finding of blood chemistry, unspecified  Comment:   Plan: Hemoglobin A1c            (I10) Essential hypertension  Comment:   Plan: Home Blood Pressure Monitor Order for DME -         ONLY FOR DME, losartan (COZAAR) 50 MG tablet        Blood pressure is elevated.  " She has soft systolic murmur echo shows mild left ventricular hypertrophy.  Recommend starting losartan.  Would try and target a lower blood pressure of around 120 x 70.    (I51.7) Mild concentric left ventricular hypertrophy (LVH)  Comment:   Plan:     Patient has been advised of split billing requirements and indicates understanding: Yes      COUNSELING:  Reviewed preventive health counseling, as reflected in patient instructions  COVID END OF SEPT     BMI:   Estimated body mass index is 44.86 kg/m  as calculated from the following:    Height as of 10/24/22: 1.632 m (5' 4.25\").    Weight as of 10/24/22: 119.5 kg (263 lb 6.4 oz).   2      She reports that she quit smoking about 40 years ago. Her smoking use included cigarettes. She has a 7.50 pack-year smoking history. She has never used smokeless tobacco.      Appropriate preventive services were discussed with this patient, including applicable screening as appropriate for cardiovascular disease, diabetes, osteopenia/osteoporosis, and glaucoma.  As appropriate for age/gender, discussed screening for colorectal cancer, prostate cancer, breast cancer, and cervical cancer. Checklist reviewing preventive services available has been given to the patient.    Reviewed patients plan of care and provided an AVS. The Basic Care Plan (routine screening as documented in Health Maintenance) for Camilla meets the Care Plan requirement. This Care Plan has been established and reviewed with the Patient.          Berna Morel MD  [facility]    Identified Health Risks:  "

## 2022-12-01 ENCOUNTER — OFFICE VISIT (OUTPATIENT)
Dept: VASCULAR SURGERY | Facility: CLINIC | Age: 72
End: 2022-12-01
Attending: PHYSICAL MEDICINE & REHABILITATION
Payer: COMMERCIAL

## 2022-12-01 VITALS
DIASTOLIC BLOOD PRESSURE: 82 MMHG | HEART RATE: 76 BPM | RESPIRATION RATE: 14 BRPM | SYSTOLIC BLOOD PRESSURE: 146 MMHG | WEIGHT: 266 LBS | TEMPERATURE: 98.1 F | OXYGEN SATURATION: 96 % | HEIGHT: 65 IN | BODY MASS INDEX: 44.32 KG/M2

## 2022-12-01 DIAGNOSIS — I97.2 POST-MASTECTOMY LYMPHEDEMA SYNDROME: Primary | ICD-10-CM

## 2022-12-01 DIAGNOSIS — E66.01 MORBID OBESITY (H): ICD-10-CM

## 2022-12-01 DIAGNOSIS — I87.8 VENOUS STASIS OF BOTH LOWER EXTREMITIES: ICD-10-CM

## 2022-12-01 DIAGNOSIS — Z17.0 MALIGNANT NEOPLASM OF CENTRAL PORTION OF LEFT BREAST IN FEMALE, ESTROGEN RECEPTOR POSITIVE (H): ICD-10-CM

## 2022-12-01 DIAGNOSIS — C50.112 MALIGNANT NEOPLASM OF CENTRAL PORTION OF LEFT BREAST IN FEMALE, ESTROGEN RECEPTOR POSITIVE (H): ICD-10-CM

## 2022-12-01 DIAGNOSIS — M24.512 CONTRACTURE OF SHOULDER, LEFT: ICD-10-CM

## 2022-12-01 DIAGNOSIS — M79.89 LEG SWELLING: ICD-10-CM

## 2022-12-01 DIAGNOSIS — R07.81 RIB PAIN ON LEFT SIDE: ICD-10-CM

## 2022-12-01 PROCEDURE — 99214 OFFICE O/P EST MOD 30 MIN: CPT | Mod: GC | Performed by: PHYSICAL MEDICINE & REHABILITATION

## 2022-12-01 PROCEDURE — G0463 HOSPITAL OUTPT CLINIC VISIT: HCPCS

## 2022-12-01 ASSESSMENT — PAIN SCALES - GENERAL: PAINLEVEL: NO PAIN (0)

## 2022-12-01 NOTE — PATIENT INSTRUCTIONS
Monitor area of tenderness along left chest/rib area.If not better in 2 weeks, notify your Oncologist.   Continue exercises to work on shoulder range of motion.  Trial without compression garment of left arm during the day - decrease amount of time wearing sleeve by 2 hours each day until you are without it during the day. Continue night sleeve. If this worsens swelling, let us know and we will start a trial of a lymphedema pump.     Follow up with Dr. Susu Espinoza in 1 year or sooner if needed.

## 2022-12-01 NOTE — PROGRESS NOTES
Arm Swelling Follow Up     Date of Service: 2022     Date last seen by Dr. Cantor: 2021; Date last seen by Dr. Espinoza: 3/24/2022              PCP: Berna Morel MD     Impression:      1. L arm swelling- stable  2. Bilateral leg swelling-stable   3. Secondary post mastectomy lymphedema  4. Bilateral shoulder contractures-left continues  5. Venous stasis with hypertension in the legs bilaterally  6. Bilateral breast carcinoma (bilateral: R lumpectomy-mucinous carcinoma treated with radiation,  R simple  and L mod rad +2 LN ER/MI + lobular, radiation and hormone)  7. Obesity  8. Left chest wall area of tenderness along rib- suspect costochondritis       Plan:   1. Questions were answered.  2. Continue to exercise to work on shoulder ROM  3. Monitor area of left chest wall tenderness/costochondritis. If no improvement in in 2 weeks, she will let her oncologist know.  4. Trial of using compression sleeve as needed during the day given arthritis. Continue night sleeve.   5. Continue swelling, followed by stretch with lymphedema exercises which are an excellent cool down.  Discussed.  6. If swelling not controlled and having difficulty doning compression due to arthritis, will consider trial of compression pump   7. Follow up 12 months, or when needed. She can see Dr. Espinoza. If any questions or concerns the patient will contact the clinic.          Physician Attestation   I, Annette Cantor MD, saw this patient and agree with the findings and plan of care as documented in the note.      Annette Cantor MD    On day of encounter time spent in chart review and with patient in consultation, exam, education and coordination of care, excluding procedures:  33 minutes          ---------------------------------------------------------------------------------------------------------------------     History of Present Illness:   Camilla Wilson returns to the [facility] for left arm swelling due to post mastectomy lymphedema after being diagnosed 7/22/20 with left breast lobular carcinoma ER/ID + Her2 - stage IIIa,  treated with bilateral mastectomies 9/15/20, (R simple and L mod rad with +2 positive LN) complicated by previous right breast mucinous cancer many years ago treated with lumpectomy, radiation and hormones. She sees Dr. Beckman (radiation) and Dr. Laguna (oncology). She also had long standing leg swelling with restless legs. She has been managed with lymphedema therapy, compression garments for her arms and legs. At her last visit she was referred to lymphedema therapy and new compression was ordered. She saw lymphedema therapy over the summer.      At follow up today she reports she is doing ok. Her swelling is under control.  She is getting trigger point injection into the right hand. It is hard to get the sleeve off and on due to hand issues/arthritis. She uses a night sleeve which is easier to apply. She is not sure if the therapy helped. She is not doing regular exercises for lymphedema but swims freestyle.   She is doing self-MLD and noted a tender spot in her left axilla just recently   She gets her socks online. She is planning to see her PCP about her cholesterol and blood pressure. She denies new numbness/tingling or sensory changes. Weight is stable. She denies vaginal bleeding. She remains on hormone therapy for cancer management.     Past Medical History:    Past Medical History:   Diagnosis Date     Breast cancer (H) 2000     Breast cyst      GERD (gastroesophageal reflux disease)      Hx of radiation therapy      Moderate persistent asthma without complication 8/14/2020     PONV (postoperative nausea and vomiting)      Uterine fibroid     embolized        Surgical History:   Past Surgical History:   Procedure Laterality Date     BIOPSY BREAST Left      EXCISE BREAST CYST/FIBROADENOMA/TUMOR/DUCT LESION/NIPPLE LESION/AREOLAR LESION       Description: Breast Surgery Lumpectomy;  Recorded: 12/18/2007;      SHLDR ARTHROSCOP,SURG,W/REMOVAL,LOOSE/FB      Description: Shoulder Arthroscopy;  Recorded: 02/25/2009;  Comments: right 2004; left 2007     IR MISCELLANEOUS PROCEDURE  9/26/2000     IR MISCELLANEOUS PROCEDURE  9/26/2000     IR MISCELLANEOUS PROCEDURE  9/26/2000     IR PELVIC ANGIOGRAM  9/26/2000     IR PELVIC ANGIOGRAM  9/26/2000     LUMPECTOMY BREAST Right 2007     LUMPECTOMY BREAST Left 8/21/2020    Procedure: Left Lumpectomy after Wire Localization: [place] Lymph Node Biopsy;  Surgeon: Margarita Cook MD;  Location: [facility];  Service: General     MASTECTOMY Bilateral 09/14/2020    Dr. Cook     OH EMBOLIZATION UTERINE FIBROID      Description: Uterine Fibroid Embolization;  Proc Date: 09/06/2000;     OH MASTECTOMY, SIMPLE, COMPLETE Bilateral 9/14/2020    Procedure: Bilateral Mastectomies;  Surgeon: Margarita Cook MD;  Location: [facility];  Service: General     US BREAST CORE BIOPSY LEFT Left 7/22/2020     US BREAST LOC W SENT NODE INJ LEFT Left 8/21/2020        Medications:    Current Outpatient Medications   Medication     anastrozole (ARIMIDEX) 1 MG tablet     calcium citrate 250 mg calcium Tab     cetirizine (ZYRTEC) 10 MG tablet     cholecalciferol, vitamin D3, (VITAMIN D3) 2,000 unit cap     COQ10, UBIQUINOL, ORAL     famotidine (FOR PEPCID) 10 MG tablet     hydrocortisone 2.5 % cream     ketoconazole (NIZORAL) 2 % external cream     losartan (COZAAR) 50 MG tablet     melatonin 5 mg Tab tablet     naproxen sodium 220 MG capsule     neomycin-polymyxin-dexamethasone (MAXITROL) 3.5-21089-8.1 ophthalmic ointment     niacin (SLO-NIACIN) 500 MG CR tablet     peg 400-propylene glycol (SYSTANE) 0.4-0.3 % Drop     SF 5000 PLUS 1.1 % CREA     sodium fluoride-pot nitrate (PREVIDENT 5000 SENSITIVE) 1.1-5 % Pste     vit C,U-Tr-eslus-lutein-zeaxan (OCUVITE LUTEIN & ZEAXANTHIN) 60 mg-30 unit- 15 mg-2 mg-6 mg cap     No current  facility-administered medications for this visit.        Allergies:    Allergies   Allergen Reactions     Atorvastatin Unknown     Memory  Impairment        Family history:   Family History   Problem Relation Age of Onset     Colon Cancer Maternal Aunt      Breast Cancer Maternal Aunt         40s     Kidney Disease Mother      Pneumonia Father      Hyperlipidemia Father      Cancer Cousin      Cancer Cousin         Social History:   Social History     Tobacco Use     Smoking status: Former     Packs/day: 0.50     Years: 15.00     Pack years: 7.50     Types: Cigarettes     Quit date: 1982     Years since quittin.6     Smokeless tobacco: Never   Substance Use Topics     Alcohol use: Yes     Alcohol/week: 5.0 standard drinks     Drug use: Never          Review of Systems:   ROS negative except as noted in HPI.     Labs:    I personally reviewed the following lab results today and those on care everywhere, if indicated     C-Reactive Protein High Sensitivity   Date Value Ref Range Status   2021 0.8 0.0 - 3.0 mg/L Final     Comment:     Low risk < 1.0 mg/L  Average risk 1.0 to 3.0 mg/L  High risk > 3.0 mg/L  Acute inflamation > 10.0 mg/L      No results found for: SED   Last Renal Panel:  Sodium   Date Value Ref Range Status   2022 140 136 - 145 mmol/L Final     Potassium   Date Value Ref Range Status   2022 4.2 3.4 - 5.3 mmol/L Final   2021 4.2 3.5 - 5.0 mmol/L Final     Chloride   Date Value Ref Range Status   2022 106 98 - 107 mmol/L Final   2021 106 98 - 107 mmol/L Final     Carbon Dioxide (CO2)   Date Value Ref Range Status   2022 25 22 - 29 mmol/L Final   2021 26 22 - 31 mmol/L Final     Anion Gap   Date Value Ref Range Status   2022 9 7 - 15 mmol/L Final   2021 9 5 - 18 mmol/L Final     Glucose   Date Value Ref Range Status   2022 107 (H) 70 - 99 mg/dL Final   2021 99 70 - 125 mg/dL Final     Urea Nitrogen   Date Value Ref Range  Status   11/28/2022 20.6 8.0 - 23.0 mg/dL Final   11/22/2021 16 8 - 28 mg/dL Final     Creatinine   Date Value Ref Range Status   11/28/2022 0.83 0.51 - 0.95 mg/dL Final     GFR Estimate   Date Value Ref Range Status   11/28/2022 74 >60 mL/min/1.73m2 Final     Comment:     Effective December 21, 2021 eGFRcr in adults is calculated using the 2021 CKD-EPI creatinine equation which includes age and gender (Korey et al., NE, DOI: 10.1056/SMIZje9109722)   11/19/2020 >60 >60 mL/min/1.73m2 Final     Calcium   Date Value Ref Range Status   11/28/2022 9.7 8.8 - 10.2 mg/dL Final     Albumin   Date Value Ref Range Status   11/28/2022 4.2 3.5 - 5.2 g/dL Final   11/22/2021 3.7 3.5 - 5.0 g/dL Final      Lab Results   Component Value Date    WBC 5.5 11/28/2022     Lab Results   Component Value Date    RBC 4.02 11/28/2022     Lab Results   Component Value Date    HGB 12.5 11/28/2022     Lab Results   Component Value Date    HCT 39.8 11/28/2022     No components found for: MCT  Lab Results   Component Value Date    MCV 99 11/28/2022     Lab Results   Component Value Date    MCH 31.1 11/28/2022     Lab Results   Component Value Date    MCHC 31.4 11/28/2022     Lab Results   Component Value Date    RDW 14.9 11/28/2022     Lab Results   Component Value Date     11/28/2022      Lab Results   Component Value Date    A1C 5.6 11/28/2022    A1C 5.8 11/22/2021    A1C 5.4 11/19/2020    A1C 5.6 03/16/2016      TSH   Date Value Ref Range Status   11/28/2022 2.05 0.30 - 4.20 uIU/mL Final   11/22/2021 1.75 0.30 - 5.00 uIU/mL Final      Lab Results   Component Value Date    VITDT 52 11/22/2021      Imaging:   Right hand Xray 11/2022  IMPRESSION: Severe degenerative change at the STT joint and first CMC joint. No evidence for fracture. No erosive changes are identified.    Physical Exam:     Vital Signs: BP (!) 146/82 (BP Location: Right arm, Patient Position: Sitting, Cuff Size: Adult Regular)   Pulse 76   Temp 98.1  F (36.7  C) (Oral)   " Resp 14    5' 5.25\" (1.657 m)   Wt 266 lb (120.7 kg)   SpO2 96%   BMI 43.93 kg/m   Body mass index is 43.93 kg/m .   Wt Readings from Last 2 Encounters:   12/01/22 266 lb (120.7 kg)   11/28/22 266 lb 14.4 oz (121.1 kg)        Circumferential volume measures:    Circumferential Measures 10/15/2020 2/11/2021 3/24/2022 12/1/2022   Right-just above MCP 19.3 19.3 20.8 18.6   Right Wrist 19 18.2 18 16.4   Right Up 10cm 28.3 27.5 25.2 26   Right Up 10cm From Elbow 43.8 44 46 43.1   Left-just above MCP 18.6 19.3 21.2 18.9   Left Wrist 19 18.8 19.2 17   Left Up 10cm 28.5 28 28.7 27.6   Left Up 10cm From Elbow 44.3 41.5 47.3 44.3   Right just above MTP 22.4 22.5 22.7 21.2   Right Ankle 32 26.7 26.5 25.3   Right Widest Calf 48.8 49.5 50.8 48.9   Right Thigh Up 10cm 64 - - -   Right Knee to Ankle 33 - - -   Left - just above MTP 22.2 23 22.8 21.5   Left Ankle 31.5 27.5 27 26.3   Left Widest Calf 51.3 52.5 52.3 50.8   Left Thigh Up 10cm 64.5 - - -   Left Knee to Ankle 32 - - -       Measurements were reviewed and are stable.    General: In no apparent distress.      Psych: Alert and oriented X 3. Affect normal.     HEENT: Atraumatic, normocephalic     Respiratory:  Lungs clear to ausculation throughout with full inspiration. No crackles or rales.    Cardiovascular: Normal S1, S2 without murmur, gallop or rub. Regular rhythm.     Abdominal: Soft without pain. No inguinal lymphadenopathy palpated.  Exam compromised by body habitus.    Musculoskeletal:  Reduced ROM left shoulder to ~160 degrees. Full ROM right shoulder. Range of motion of elbows, wrists is normal bilaterally without active joint synovitis, erythema, joint swelling, crepitus or joint laxity.  Range of motion of the neck is full.     Sensation: Intact to pinprick and light touch in the upper and lower extremities bilaterally.       Strength:  Normal strength testing in shoulder abduction, elbow flexion, elbow extension, wrist extension, forearm supination, " forearm pronation, hand intrinsics  bilaterally.  Normal strength testing in hip flexion, knee flexion, knee extension, ankle dorsiflexion and great toe extension bilaterally.    Lymph nodes: No cervical, supraclavicular, infraclavicular, axillary lymphadenopathy palpated. Area of tenderness to palpation in left lateral chest wall, inferior to axilla over rib. No masses palpated.    Vascular: No unusual venous distention. Radial arterial pulses strong and equal at the wrists bilaterally. Dorsalis pedis and posterior tib pulses strong and equal in the feet bilaterally. Numerous telangiectasias, ankle flaring and spider veins noted in both legs.      Skin: No unusual rubor, calor, or rashes along the skin in either arm or in either leg. No significant fibrosity or scarring of legs. Slight fibrosity of the left forearm. Numerous skin tags. Healed mastectomy incisions.     She was seen and discussed with Dr. Cantor.  Shannen Augustine MD  Wound Medicine Fellow       Annette Cantor MD, FAAPMR   Department Rehabilitation Medicine-Wound Fellowship Director  Adjunct  Department [facility] [employer] [facility] [facility] [employer]        This note was dictated using a voice recognition software.  Any grammatical or context distortion are unintentional and inherent to the software.

## 2022-12-05 NOTE — TELEPHONE ENCOUNTER
DIAGNOSIS: trigger finger left hand/ Berna Morel MD in [facility] INTERNAL [facility]ICINE/ x-ray in [facility]/ [facility]   APPOINTMENT DATE: 12.6.22   NOTES STATUS DETAILS   OFFICE NOTE from referring provider Internal 11.28.22 Berna Morel MD   MEDICATION LIST Internal

## 2022-12-05 NOTE — PROGRESS NOTES
ASSESSMENT/PLAN:    (M65.30) Trigger finger, acquired  Comment: exam consistent w/ trigger finger of R pinky finger   Plan: pain is minimal and triggering is just becoming problematic; will hold on injection and splint; will f/u prn for injection if needed     Attestation:  This patient has been seen and evaluated by me, Davis Sethi MD with the resident, Dr Ina Khan and the care team. I agree with the findings and plan of care as documented in this note.      Davis Sethi MD  12/6/2022  10:38 AM      Pt is a 72 year old female here today for:     Right Hand/finger pain   Has been using a lymphedema wrap on left arm and pulling it up her arm has been causing her pinky and middle finger to trigger. Neither have been stuck in flexed position able to extend them open. Middle finger has been present for years prior to compression sleeve use. Has been noticing clicking with both fingers. History of left 4th finger trigger finger release surgery around 2010.  Location: pinky and middle finger   Duration: Since October    Trauma/ Fall? Repetitive motion, no trauma    Swelling? No  Numbness/ Tingling? No  Weakness? No   Imaging? Xray hand 11/28/22:   IMPRESSION: Severe degenerative change at the STT joint and first CMC joint. No evidence for fracture. No erosive changes are identified.  Treatment? Takes Aleve most days for other joint pain     Past Medical History:   Diagnosis Date     Breast cancer (H) 2000     Breast cyst      GERD (gastroesophageal reflux disease)      Hx of radiation therapy      Moderate persistent asthma without complication 8/14/2020     PONV (postoperative nausea and vomiting)      Uterine fibroid     embolized      Past Surgical History:   Procedure Laterality Date     BIOPSY BREAST Left      EXCISE BREAST CYST/FIBROADENOMA/TUMOR/DUCT LESION/NIPPLE LESION/AREOLAR LESION      Description: Breast Surgery Lumpectomy;  Recorded: 12/18/2007;     [facility] SHLDR ARTHROSCOP,SURG,W/REMOVAL,LOOSE/FB       Description: Shoulder Arthroscopy;  Recorded: 02/25/2009;  Comments: right 2004; left 2007     IR MISCELLANEOUS PROCEDURE  9/26/2000     IR MISCELLANEOUS PROCEDURE  9/26/2000     IR MISCELLANEOUS PROCEDURE  9/26/2000     IR PELVIC ANGIOGRAM  9/26/2000     IR PELVIC ANGIOGRAM  9/26/2000     LUMPECTOMY BREAST Right 2007     LUMPECTOMY BREAST Left 8/21/2020    Procedure: Left Lumpectomy after Wire Localization: [place] Lymph Node Biopsy;  Surgeon: Margarita Cook MD;  Location: [facility];  Service: General     MASTECTOMY Bilateral 09/14/2020    Dr. Cook     MD EMBOLIZATION UTERINE FIBROID      Description: Uterine Fibroid Embolization;  Proc Date: 09/06/2000;     MD MASTECTOMY, SIMPLE, COMPLETE Bilateral 9/14/2020    Procedure: Bilateral Mastectomies;  Surgeon: Margarita Cook MD;  Location: [facility];  Service: General     US BREAST CORE BIOPSY LEFT Left 7/22/2020     US BREAST LOC W SENT NODE INJ LEFT Left 8/21/2020      Current Outpatient Medications   Medication Sig Dispense Refill     anastrozole (ARIMIDEX) 1 MG tablet Take 1 tablet (1 mg) by mouth daily 90 tablet 3     calcium citrate 250 mg calcium Tab [CALCIUM CITRATE 250 MG CALCIUM TAB] Take 500 mg by mouth daily.        cetirizine (ZYRTEC) 10 MG tablet Take 1 tablet (10 mg total) by mouth one time daily. Strength: 10 mg 90 tablet 11     cholecalciferol, vitamin D3, (VITAMIN D3) 2,000 unit cap [CHOLECALCIFEROL, VITAMIN D3, (VITAMIN D3) 2,000 UNIT CAP] Take 1 capsule by mouth daily.        COQ10, UBIQUINOL, ORAL [COQ10, UBIQUINOL, ORAL]        famotidine (FOR PEPCID) 10 MG tablet [FAMOTIDINE (FOR PEPCID) 10 MG TABLET] Take 10 mg by mouth daily.       hydrocortisone 2.5 % cream APPLY TO AFFECTED AREA ON BODY TWICE A DAY FOR FOR UP TO 2 WEEKS. TAKE 2 WEEK BREAK. REPEAT AS NEEDED FOR FLARES.       ketoconazole (NIZORAL) 2 % external cream APPLY THIN LAYER TO AFFECTED AREA ON FACE 1 TO 2 TIMES DAILY.       losartan (COZAAR) 50 MG tablet Take 1  "tablet (50 mg) by mouth daily 90 tablet 3     melatonin 5 mg Tab tablet [MELATONIN 5 MG TAB TABLET] Take 5 mg by mouth at bedtime.       naproxen sodium 220 MG capsule        neomycin-polymyxin-dexamethasone (MAXITROL) 3.5-41009-7.1 ophthalmic ointment Place into both eyes 4 times a day.       niacin (SLO-NIACIN) 500 MG CR tablet        peg 400-propylene glycol (SYSTANE) 0.4-0.3 % Drop [-PROPYLENE GLYCOL (SYSTANE) 0.4-0.3 % DROP] Administer 1 drop to both eyes 4 (four) times a day as needed.       SF 5000 PLUS 1.1 % CREA USE AS TOOTHPASTE AS DIRECTED       sodium fluoride-pot nitrate (PREVIDENT 5000 SENSITIVE) 1.1-5 % Pste [SODIUM FLUORIDE-POT NITRATE (PREVIDENT 5000 SENSITIVE) 1.1-5 % PSTE] Take by mouth daily.        vit C,S-Vc-mbmwv-lutein-zeaxan (OCUVITE LUTEIN & ZEAXANTHIN) 60 mg-30 unit- 15 mg-2 mg-6 mg cap [VIT C,Q-HU-IRRUL-LUTEIN-ZEAXAN (OCUVITE LUTEIN & ZEAXANTHIN) 60 MG-30 UNIT- 15 MG-2 MG-6 MG CAP] Take 1 tablet by mouth daily.         Allergies   Allergen Reactions     Atorvastatin Unknown     Memory  Impairment      ROS:   Gen- no fevers/chills   Rheum - no morning stiffness   Derm - no rash/ redness   Neuro - no numbness, no tingling   Remainder of ROS negative.     Exam:   Ht 1.657 m (5' 5.25\")   Wt 120.7 kg (266 lb)   BMI 43.93 kg/m         Right HAND:   Inspection: Swelling - no; Atrophy - no  Sensation: intact in median, radial, ulnar distribution   ROM:   Hand: Full flexion at PIP/DIP, finger abduction/ adduction.   Strength: 5/5 in all motions   Bony tenderness:   Hand: Metacarpals: mild TTP at R pinky MCP; Phalanges: no  Tendons: FDS/FDP/Extensor mechanism intact   Maneuvers: deferred   Other: + small nodule palpated in palmar aspect of pinky finger     "

## 2022-12-06 ENCOUNTER — OFFICE VISIT (OUTPATIENT)
Dept: ORTHOPEDICS | Facility: CLINIC | Age: 72
End: 2022-12-06
Attending: INTERNAL MEDICINE
Payer: COMMERCIAL

## 2022-12-06 ENCOUNTER — PRE VISIT (OUTPATIENT)
Dept: ORTHOPEDICS | Facility: CLINIC | Age: 72
End: 2022-12-06

## 2022-12-06 VITALS — BODY MASS INDEX: 44.32 KG/M2 | HEIGHT: 65 IN | WEIGHT: 266 LBS

## 2022-12-06 DIAGNOSIS — M65.30 TRIGGER FINGER, ACQUIRED: ICD-10-CM

## 2022-12-06 PROCEDURE — 99213 OFFICE O/P EST LOW 20 MIN: CPT | Mod: GC | Performed by: FAMILY MEDICINE

## 2022-12-06 NOTE — LETTER
12/6/2022      RE: Camilla Wilson  [address] [place] [address]     Dear Colleague,    Thank you for referring your patient, Camilla Wilson, to the [facility] [place]. Please see a copy of my visit note below.    ASSESSMENT/PLAN:  (M65.30) Trigger finger, acquired  Comment: exam consistent w/ trigger finger of R pinky finger   Plan: pain is minimal and triggering is just becoming problematic; will hold on injection and splint; will f/u prn for injection if needed     Attestation:  This patient has been seen and evaluated by me, Davis Sethi MD with the resident, Dr Ina Khan and the care team. I agree with the findings and plan of care as documented in this note.      Davis Sethi MD  12/6/2022  10:38 AM  Pt is a 72 year old female here today for:     Right Hand/finger pain   Has been using a lymphedema wrap on left arm and pulling it up her arm has been causing her pinky and middle finger to trigger. Neither have been stuck in flexed position able to extend them open. Middle finger has been present for years prior to compression sleeve use. Has been noticing clicking with both fingers. History of left 4th finger trigger finger release surgery around 2010.  Location: pinky and middle finger   Duration: Since October    Trauma/ Fall? Repetitive motion, no trauma    Swelling? No  Numbness/ Tingling? No  Weakness? No   Imaging? Xray hand 11/28/22:   IMPRESSION: Severe degenerative change at the STT joint and first CMC joint. No evidence for fracture. No erosive changes are identified.  Treatment? Takes Aleve most days for other joint pain     Past Medical History:   Diagnosis Date     Breast cancer (H) 2000     Breast cyst      GERD (gastroesophageal reflux disease)      Hx of radiation therapy      Moderate persistent asthma without complication 8/14/2020     PONV (postoperative nausea and vomiting)      Uterine fibroid     embolized      Past Surgical History:    Procedure Laterality Date     BIOPSY BREAST Left      EXCISE BREAST CYST/FIBROADENOMA/TUMOR/DUCT LESION/NIPPLE LESION/AREOLAR LESION      Description: Breast Surgery Lumpectomy;  Recorded: 12/18/2007;      SHLDR ARTHROSCOP,SURG,W/REMOVAL,LOOSE/FB      Description: Shoulder Arthroscopy;  Recorded: 02/25/2009;  Comments: right 2004; left 2007     IR MISCELLANEOUS PROCEDURE  9/26/2000     IR MISCELLANEOUS PROCEDURE  9/26/2000     IR MISCELLANEOUS PROCEDURE  9/26/2000     IR PELVIC ANGIOGRAM  9/26/2000     IR PELVIC ANGIOGRAM  9/26/2000     LUMPECTOMY BREAST Right 2007     LUMPECTOMY BREAST Left 8/21/2020    Procedure: Left Lumpectomy after Wire Localization: [place] Lymph Node Biopsy;  Surgeon: Margarita Cook MD;  Location: [facility];  Service: General     MASTECTOMY Bilateral 09/14/2020    Dr. Cook     CA EMBOLIZATION UTERINE FIBROID      Description: Uterine Fibroid Embolization;  Proc Date: 09/06/2000;     CA MASTECTOMY, SIMPLE, COMPLETE Bilateral 9/14/2020    Procedure: Bilateral Mastectomies;  Surgeon: Margarita Cook MD;  Location: [facility];  Service: General     US BREAST CORE BIOPSY LEFT Left 7/22/2020     US BREAST LOC W SENT NODE INJ LEFT Left 8/21/2020      Current Outpatient Medications   Medication Sig Dispense Refill     anastrozole (ARIMIDEX) 1 MG tablet Take 1 tablet (1 mg) by mouth daily 90 tablet 3     calcium citrate 250 mg calcium Tab [CALCIUM CITRATE 250 MG CALCIUM TAB] Take 500 mg by mouth daily.        cetirizine (ZYRTEC) 10 MG tablet Take 1 tablet (10 mg total) by mouth one time daily. Strength: 10 mg 90 tablet 11     cholecalciferol, vitamin D3, (VITAMIN D3) 2,000 unit cap [CHOLECALCIFEROL, VITAMIN D3, (VITAMIN D3) 2,000 UNIT CAP] Take 1 capsule by mouth daily.        COQ10, UBIQUINOL, ORAL [COQ10, UBIQUINOL, ORAL]        famotidine (FOR PEPCID) 10 MG tablet [FAMOTIDINE (FOR PEPCID) 10 MG TABLET] Take 10 mg by mouth daily.       hydrocortisone 2.5 % cream APPLY TO  "AFFECTED AREA ON BODY TWICE A DAY FOR FOR UP TO 2 WEEKS. TAKE 2 WEEK BREAK. REPEAT AS NEEDED FOR FLARES.       ketoconazole (NIZORAL) 2 % external cream APPLY THIN LAYER TO AFFECTED AREA ON FACE 1 TO 2 TIMES DAILY.       losartan (COZAAR) 50 MG tablet Take 1 tablet (50 mg) by mouth daily 90 tablet 3     melatonin 5 mg Tab tablet [MELATONIN 5 MG TAB TABLET] Take 5 mg by mouth at bedtime.       naproxen sodium 220 MG capsule        neomycin-polymyxin-dexamethasone (MAXITROL) 3.5-64370-6.1 ophthalmic ointment Place into both eyes 4 times a day.       niacin (SLO-NIACIN) 500 MG CR tablet        peg 400-propylene glycol (SYSTANE) 0.4-0.3 % Drop [-PROPYLENE GLYCOL (SYSTANE) 0.4-0.3 % DROP] Administer 1 drop to both eyes 4 (four) times a day as needed.       SF 5000 PLUS 1.1 % CREA USE AS TOOTHPASTE AS DIRECTED       sodium fluoride-pot nitrate (PREVIDENT 5000 SENSITIVE) 1.1-5 % Pste [SODIUM FLUORIDE-POT NITRATE (PREVIDENT 5000 SENSITIVE) 1.1-5 % PSTE] Take by mouth daily.        vit C,E-Pb-osqfv-lutein-zeaxan (OCUVITE LUTEIN & ZEAXANTHIN) 60 mg-30 unit- 15 mg-2 mg-6 mg cap [VIT C,P-VW-UCPJJ-LUTEIN-ZEAXAN (OCUVITE LUTEIN & ZEAXANTHIN) 60 MG-30 UNIT- 15 MG-2 MG-6 MG CAP] Take 1 tablet by mouth daily.         Allergies   Allergen Reactions     Atorvastatin Unknown     Memory  Impairment      ROS:   Gen- no fevers/chills   Rheum - no morning stiffness   Derm - no rash/ redness   Neuro - no numbness, no tingling   Remainder of ROS negative.     Exam:   Ht 1.657 m (5' 5.25\")   Wt 120.7 kg (266 lb)   BMI 43.93 kg/m         Right HAND:   Inspection: Swelling - no; Atrophy - no  Sensation: intact in median, radial, ulnar distribution   ROM:   Hand: Full flexion at PIP/DIP, finger abduction/ adduction.   Strength: 5/5 in all motions   Bony tenderness:   Hand: Metacarpals: mild TTP at R pinky MCP; Phalanges: no  Tendons: FDS/FDP/Extensor mechanism intact   Maneuvers: deferred   Other: + small nodule palpated in palmar " aspect of pinky finger     Again, thank you for allowing me to participate in the care of your patient.      Sincerely,    Davis Sethi MD

## 2022-12-15 ENCOUNTER — HOSPITAL ENCOUNTER (OUTPATIENT)
Dept: CT IMAGING | Facility: CLINIC | Age: 72
Discharge: HOME | End: 2022-12-15
Attending: INTERNAL MEDICINE | Admitting: INTERNAL MEDICINE
Payer: COMMERCIAL

## 2022-12-15 DIAGNOSIS — E78.2 MIXED HYPERLIPIDEMIA: ICD-10-CM

## 2022-12-15 PROCEDURE — 75571 CT HRT W/O DYE W/CA TEST: CPT | Mod: 26 | Performed by: INTERNAL MEDICINE

## 2022-12-15 PROCEDURE — 75571 CT HRT W/O DYE W/CA TEST: CPT

## 2022-12-16 LAB
CV CALCIUM SCORE AGATSTON LM: 0
CV CALCIUM SCORING AGATSON LAD: 0
CV CALCIUM SCORING AGATSTON CX: 0
CV CALCIUM SCORING AGATSTON RCA: 0
CV CALCIUM SCORING AGATSTON TOTAL: 0

## 2023-02-16 ENCOUNTER — OFFICE VISIT (OUTPATIENT)
Dept: INTERNAL MEDICINE | Facility: CLINIC | Age: 73
End: 2023-02-16
Payer: COMMERCIAL

## 2023-02-16 VITALS
BODY MASS INDEX: 45.48 KG/M2 | HEIGHT: 65 IN | OXYGEN SATURATION: 99 % | TEMPERATURE: 98.4 F | RESPIRATION RATE: 10 BRPM | WEIGHT: 273 LBS | HEART RATE: 59 BPM | DIASTOLIC BLOOD PRESSURE: 72 MMHG | SYSTOLIC BLOOD PRESSURE: 132 MMHG

## 2023-02-16 DIAGNOSIS — M79.643 PAIN OF HAND, UNSPECIFIED LATERALITY: ICD-10-CM

## 2023-02-16 DIAGNOSIS — I10 ESSENTIAL HYPERTENSION: Primary | ICD-10-CM

## 2023-02-16 PROCEDURE — 99213 OFFICE O/P EST LOW 20 MIN: CPT | Performed by: INTERNAL MEDICINE

## 2023-02-16 NOTE — PROGRESS NOTES
"  Assessment & Plan     Essential hypertension  I do believe her home readings are in error.  Today her blood pressure reading even on rechecking is normal she has no orthostatic drop.  I would advise continuing losartan.  And coming in for nurse only checks and getting blood pressure checked once a month if needed at the clinic and not doing home monitoring.    Pain of hand, unspecified laterality  Does have degenerative arthritis of the hand and did see hand orthopedics who advised conservative care.  Which she is very happy with               BMI:   Estimated body mass index is 45.43 kg/m  as calculated from the following:    Height as of this encounter: 1.651 m (5' 5\").    Weight as of this encounter: 123.8 kg (273 lb).           Return for Routine preventive.    Berna Morel MD  [facility]    Des Sampson is a 72 year old accompanied by her self, presenting for the following health issues:  Follow Up (Blood pressure check, stiffness in both hands can be painful.) and Recheck Medication    Very pleasant patient who noted to have mild persistent essential hypertension and was started on losartan here for follow-up.  She has been struggling to get a good home blood pressure check machine and has had to return several.  She primarily calf because the cuff size is not large enough.  Home readings are therefore all over the place and systolics range between 1 50-1 60s sometimes low.  At any rate she is tolerating the losartan.  History of Present Illness       Hypertension: She presents for follow up of hypertension.  She does check blood pressure  regularly outside of the clinic. Outside blood pressures have been over 140/90. She does not follow a low salt diet.       Patient Active Problem List   Diagnosis     Breast cancer (H)     Dyslipidemia     Spinal Stenosis     Obesity     Osteopenia     GERD (gastroesophageal reflux disease)     Skin cancer     Morbid obesity (H) " "    Pain in joint involving lower leg     Malignant neoplasm of central portion of left breast in female, estrogen receptor positive (H)     Personal history of breast cancer     Health maintenance examination     Intramural, submucous, and subserous leiomyoma of uterus     Mucinous carcinoma of breast, right (H)     Post-mastectomy lymphedema syndrome     Leg swelling     Venous stasis of both lower extremities     Contracture of shoulder, right     Contracture of shoulder, left     Dyslipidemia     Mild concentric left ventricular hypertrophy (LVH)     Essential hypertension     Current Outpatient Medications   Medication     anastrozole (ARIMIDEX) 1 MG tablet     calcium citrate 250 mg calcium Tab     cetirizine (ZYRTEC) 10 MG tablet     cholecalciferol, vitamin D3, (VITAMIN D3) 2,000 unit cap     COQ10, UBIQUINOL, ORAL     famotidine (FOR PEPCID) 10 MG tablet     hydrocortisone 2.5 % cream     ketoconazole (NIZORAL) 2 % external cream     losartan (COZAAR) 50 MG tablet     melatonin 5 mg Tab tablet     naproxen sodium 220 MG capsule     neomycin-polymyxin-dexamethasone (MAXITROL) 3.5-42439-6.1 ophthalmic ointment     niacin (SLO-NIACIN) 500 MG CR tablet     peg 400-propylene glycol (SYSTANE) 0.4-0.3 % Drop     SF 5000 PLUS 1.1 % CREA     sodium fluoride-pot nitrate (PREVIDENT 5000 SENSITIVE) 1.1-5 % Pste     vit C,H-Qx-vrbfa-lutein-zeaxan (OCUVITE LUTEIN & ZEAXANTHIN) 60 mg-30 unit- 15 mg-2 mg-6 mg cap     No current facility-administered medications for this visit.               Review of Systems   Constitutional, HEENT, cardiovascular, pulmonary, gi and gu systems are negative, except as otherwise noted.      Objective    /72 (BP Location: Right arm, Patient Position: Sitting, Cuff Size: Adult Large)   Pulse 59   Temp 98.4  F (36.9  C) (Tympanic)   Resp 10   Ht 1.651 m (5' 5\")   Wt 123.8 kg (273 lb)   SpO2 99%   BMI 45.43 kg/m    Body mass index is 45.43 kg/m .  Physical Exam               "

## 2023-04-11 ENCOUNTER — TELEPHONE (OUTPATIENT)
Dept: ONCOLOGY | Facility: HOSPITAL | Age: 73
End: 2023-04-11
Payer: COMMERCIAL

## 2023-04-24 ENCOUNTER — ONCOLOGY VISIT (OUTPATIENT)
Dept: ONCOLOGY | Facility: HOSPITAL | Age: 73
End: 2023-04-24
Attending: INTERNAL MEDICINE
Payer: COMMERCIAL

## 2023-04-24 VITALS
HEART RATE: 80 BPM | TEMPERATURE: 97.7 F | OXYGEN SATURATION: 98 % | RESPIRATION RATE: 16 BRPM | WEIGHT: 273.9 LBS | SYSTOLIC BLOOD PRESSURE: 153 MMHG | BODY MASS INDEX: 45.58 KG/M2 | DIASTOLIC BLOOD PRESSURE: 70 MMHG

## 2023-04-24 DIAGNOSIS — Z85.3 PERSONAL HISTORY OF BREAST CANCER: Primary | ICD-10-CM

## 2023-04-24 PROCEDURE — 99214 OFFICE O/P EST MOD 30 MIN: CPT | Performed by: NURSE PRACTITIONER

## 2023-04-24 PROCEDURE — G0463 HOSPITAL OUTPT CLINIC VISIT: HCPCS | Performed by: NURSE PRACTITIONER

## 2023-04-24 ASSESSMENT — PAIN SCALES - GENERAL: PAINLEVEL: MILD PAIN (3)

## 2023-04-24 NOTE — PROGRESS NOTES
[facility] Hematology and Oncology Progress Note    Patient: Camilla Wilson  MRN: [id number]  Date of Service: Apr 24, 2023          Reason for Visit    Chief Complaint   Patient presents with     Oncology Clinic Visit     Follow up left breast cancer       Assessment and Plan     Cancer Staging   No matching staging information was found for the patient.    1. T3 N1 M0, stage IIIa left breast cancer status post bilateral mastectomies September 15, 2020. Tumor 6.5 cm, 2 lymph nodes positive, Grade 2, ER/NM positive, HER-2 negative, Oncotype DX recurrence score of 13. Pt had radiation which completed in January 2021. She is on arimidex. Had some arthralgias with exemestane so switched. Doing ok on it. Does have some issues with joint pain, but not sure if it is from arimidex. She may try to hold for 2-3 weeks to see if there is improvement. If there is, she may want to switch to tamoxifen or will stay on AI. Will return in 6 months.      2. Osteopenia: risk for worsening bone density with the aromatase inhibitor.  She will continue calcium and vitamin D supplement. October 2022 DEXA was stable. Repeat every 2 years.   Encourage also to participate in weightbearing exercises. Good calcium in diet.      3. Previous breast cancer in right breast in 2000. S/p 5 years of exemestane.    ECOG Performance    0 - Independent    Distress Screening (within last 30 days)    No data recorded     Pain  Pain Score: Mild Pain (3)  Pain Loc: Foot (left)    Problem List    Patient Active Problem List   Diagnosis     Breast cancer (H)     Dyslipidemia     Spinal Stenosis     Obesity     Osteopenia     GERD (gastroesophageal reflux disease)     Skin cancer     Morbid obesity (H)     Pain in joint involving lower leg     Malignant neoplasm of central portion of left breast in female, estrogen receptor positive (H)     Personal history of breast cancer     Health maintenance examination     Intramural, submucous, and subserous  leiomyoma of uterus     Mucinous carcinoma of breast, right (H)     Post-mastectomy lymphedema syndrome     Leg swelling     Venous stasis of both lower extremities     Contracture of shoulder, right     Contracture of shoulder, left     Dyslipidemia     Mild concentric left ventricular hypertrophy (LVH)     Essential hypertension        ______________________________________________________________________________    History of Present Illness    Measurable Disease:  3 months     Current therapy: Exemestane started around 10/23/2020  Adjuvant radiation started 12/4/2020     Treatment History:   Bilateral mastectomy 9/14/2020     Interim History: pt is here today for follow up visit. She is doing ok.  States that she still has some joint stiffness and a lot of generalized pain.  Not sure if it is just getting older and worsening arthritis or if it is actually from the anastrozole.  She has some questions about whether is any data that says if she is taking it for 3 years instead of 5 years what kind of risk reduction it provided.  Other than that she denies any unexplained weight loss.  She says she did injure her left foot and that continues to be a problem with some pain so she is not able to do as much as she would like.  He denies any infectious complaints.    Review of Systems    Pertinent items are noted in HPI.    Past History    Past Medical History:   Diagnosis Date     Breast cancer (H) 2000     Breast cyst      GERD (gastroesophageal reflux disease)      Hx of radiation therapy      Moderate persistent asthma without complication 8/14/2020     PONV (postoperative nausea and vomiting)      Uterine fibroid     embolized       PHYSICAL EXAM  BP (!) 153/70   Pulse 80   Temp 97.7  F (36.5  C) (Oral)   Resp 16   Wt 124.2 kg (273 lb 14.4 oz)   SpO2 98%   BMI 45.58 kg/m      GENERAL: no acute distress. Cooperative in conversation. Here alone. Mask on  RESP: Regular respiratory rate. No expiratory wheezes    MUSCULOSKELETAL: no bilateral leg swelling  NEURO: non focal. Alert and oriented x3.   PSYCH: within normal limits. No depression or anxiety.  SKIN: exposed skin is dry intact.   BREAST: s/p mastectomies. No local recurrence. Does have some excess skin.     Lab Results    No results found for this or any previous visit (from the past 168 hour(s)).    Imaging    No results found.      Signed by: JOEY Saenz CNP

## 2023-04-24 NOTE — LETTER
"    4/24/2023         RE: Camilla Wilson  [address] [place] [address]        Dear Colleague,    Thank you for referring your patient, Camilla Wilson, to the [facility]. Please see a copy of my visit note below.    Oncology Rooming Note    April 24, 2023 1:59 PM   Camilla Wilson is a 73 year old female who presents for:    Chief Complaint   Patient presents with     Oncology Clinic Visit     Follow up left breast cancer     Initial Vitals: BP (!) 153/70   Pulse 80   Temp 97.7  F (36.5  C) (Oral)   Resp 16   Wt 124.2 kg (273 lb 14.4 oz)   SpO2 98%   BMI 45.58 kg/m   Estimated body mass index is 45.58 kg/m  as calculated from the following:    Height as of 2/16/23: 1.651 m (5' 5\").    Weight as of this encounter: 124.2 kg (273 lb 14.4 oz). Body surface area is 2.39 meters squared.  Mild Pain (3) Comment: Data Unavailable   No LMP recorded. Patient is postmenopausal.  Allergies reviewed: Yes  Medications reviewed: Yes    Medications: Medication refills not needed today.  Pharmacy name entered into [employer]: [id number] [place], [place] - [address]    Clinical concerns: Kady is here for follow up and reports mild pain in left foot, old injury.       Shelley Valentin RN                [facility] Hematology and Oncology Progress Note    Patient: Camilla Wilson  MRN: [id number]  Date of Service: Apr 24, 2023          Reason for Visit    Chief Complaint   Patient presents with     Oncology Clinic Visit     Follow up left breast cancer       Assessment and Plan     Cancer Staging   No matching staging information was found for the patient.    1. T3 N1 M0, stage IIIa left breast cancer status post bilateral mastectomies September 15, 2020. Tumor 6.5 cm, 2 lymph nodes positive, Grade 2, ER/IA positive, HER-2 negative, Oncotype DX recurrence score of 13. Pt had radiation which completed in January 2021. She is on arimidex. Had some arthralgias with exemestane " so switched. Doing ok on it. Does have some issues with joint pain, but not sure if it is from arimidex. She may try to hold for 2-3 weeks to see if there is improvement. If there is, she may want to switch to tamoxifen or will stay on AI. Will return in 6 months.      2. Osteopenia: risk for worsening bone density with the aromatase inhibitor.  She will continue calcium and vitamin D supplement. October 2022 DEXA was stable. Repeat every 2 years.   Encourage also to participate in weightbearing exercises. Good calcium in diet.      3. Previous breast cancer in right breast in 2000. S/p 5 years of exemestane.    ECOG Performance    0 - Independent    Distress Screening (within last 30 days)    No data recorded     Pain  Pain Score: Mild Pain (3)  Pain Loc: Foot (left)    Problem List    Patient Active Problem List   Diagnosis     Breast cancer (H)     Dyslipidemia     Spinal Stenosis     Obesity     Osteopenia     GERD (gastroesophageal reflux disease)     Skin cancer     Morbid obesity (H)     Pain in joint involving lower leg     Malignant neoplasm of central portion of left breast in female, estrogen receptor positive (H)     Personal history of breast cancer     Health maintenance examination     Intramural, submucous, and subserous leiomyoma of uterus     Mucinous carcinoma of breast, right (H)     Post-mastectomy lymphedema syndrome     Leg swelling     Venous stasis of both lower extremities     Contracture of shoulder, right     Contracture of shoulder, left     Dyslipidemia     Mild concentric left ventricular hypertrophy (LVH)     Essential hypertension        ______________________________________________________________________________    History of Present Illness    Measurable Disease:  3 months     Current therapy: Exemestane started around 10/23/2020  Adjuvant radiation started 12/4/2020     Treatment History:   Bilateral mastectomy 9/14/2020     Interim History: pt is here today for follow up visit.  She is doing ok.  States that she still has some joint stiffness and a lot of generalized pain.  Not sure if it is just getting older and worsening arthritis or if it is actually from the anastrozole.  She has some questions about whether is any data that says if she is taking it for 3 years instead of 5 years what kind of risk reduction it provided.  Other than that she denies any unexplained weight loss.  She says she did injure her left foot and that continues to be a problem with some pain so she is not able to do as much as she would like.  He denies any infectious complaints.    Review of Systems    Pertinent items are noted in HPI.    Past History    Past Medical History:   Diagnosis Date     Breast cancer (H) 2000     Breast cyst      GERD (gastroesophageal reflux disease)      Hx of radiation therapy      Moderate persistent asthma without complication 8/14/2020     PONV (postoperative nausea and vomiting)      Uterine fibroid     embolized       PHYSICAL EXAM  BP (!) 153/70   Pulse 80   Temp 97.7  F (36.5  C) (Oral)   Resp 16   Wt 124.2 kg (273 lb 14.4 oz)   SpO2 98%   BMI 45.58 kg/m      GENERAL: no acute distress. Cooperative in conversation. Here alone. Mask on  RESP: Regular respiratory rate. No expiratory wheezes   MUSCULOSKELETAL: no bilateral leg swelling  NEURO: non focal. Alert and oriented x3.   PSYCH: within normal limits. No depression or anxiety.  SKIN: exposed skin is dry intact.   BREAST: s/p mastectomies. No local recurrence. Does have some excess skin.     Lab Results    No results found for this or any previous visit (from the past 168 hour(s)).    Imaging    No results found.      Signed by: JOEY Saenz CNP      Again, thank you for allowing me to participate in the care of your patient.        Sincerely,        JOEY Saenz CNP

## 2023-04-24 NOTE — PROGRESS NOTES
"Oncology Rooming Note    April 24, 2023 1:59 PM   Camilla Wilson is a 73 year old female who presents for:    Chief Complaint   Patient presents with     Oncology Clinic Visit     Follow up left breast cancer     Initial Vitals: BP (!) 153/70   Pulse 80   Temp 97.7  F (36.5  C) (Oral)   Resp 16   Wt 124.2 kg (273 lb 14.4 oz)   SpO2 98%   BMI 45.58 kg/m   Estimated body mass index is 45.58 kg/m  as calculated from the following:    Height as of 2/16/23: 1.651 m (5' 5\").    Weight as of this encounter: 124.2 kg (273 lb 14.4 oz). Body surface area is 2.39 meters squared.  Mild Pain (3) Comment: Data Unavailable   No LMP recorded. Patient is postmenopausal.  Allergies reviewed: Yes  Medications reviewed: Yes    Medications: Medication refills not needed today.  Pharmacy name entered into [employer]: [id number] [place], [place] - [address]    Clinical concerns: Kady is here for follow up and reports mild pain in left foot, old injury.       Shellye Valentin RN              "

## 2023-07-07 ENCOUNTER — ONCOLOGY VISIT (OUTPATIENT)
Dept: ONCOLOGY | Facility: CLINIC | Age: 73
End: 2023-07-07
Attending: STUDENT IN AN ORGANIZED HEALTH CARE EDUCATION/TRAINING PROGRAM
Payer: COMMERCIAL

## 2023-07-07 VITALS
RESPIRATION RATE: 16 BRPM | TEMPERATURE: 98.2 F | SYSTOLIC BLOOD PRESSURE: 123 MMHG | WEIGHT: 266.7 LBS | DIASTOLIC BLOOD PRESSURE: 77 MMHG | OXYGEN SATURATION: 94 % | HEART RATE: 78 BPM | BODY MASS INDEX: 44.38 KG/M2

## 2023-07-07 DIAGNOSIS — Z17.0 MALIGNANT NEOPLASM OF CENTRAL PORTION OF LEFT BREAST IN FEMALE, ESTROGEN RECEPTOR POSITIVE (H): ICD-10-CM

## 2023-07-07 DIAGNOSIS — C50.911 MUCINOUS CARCINOMA OF BREAST, RIGHT (H): Primary | ICD-10-CM

## 2023-07-07 DIAGNOSIS — M25.512 LEFT SHOULDER PAIN, UNSPECIFIED CHRONICITY: ICD-10-CM

## 2023-07-07 DIAGNOSIS — C50.112 MALIGNANT NEOPLASM OF CENTRAL PORTION OF LEFT BREAST IN FEMALE, ESTROGEN RECEPTOR POSITIVE (H): ICD-10-CM

## 2023-07-07 PROCEDURE — G0463 HOSPITAL OUTPT CLINIC VISIT: HCPCS | Performed by: STUDENT IN AN ORGANIZED HEALTH CARE EDUCATION/TRAINING PROGRAM

## 2023-07-07 PROCEDURE — 99215 OFFICE O/P EST HI 40 MIN: CPT | Performed by: STUDENT IN AN ORGANIZED HEALTH CARE EDUCATION/TRAINING PROGRAM

## 2023-07-07 ASSESSMENT — PAIN SCALES - GENERAL: PAINLEVEL: NO PAIN (0)

## 2023-07-07 NOTE — NURSING NOTE
"Oncology Rooming Note    July 7, 2023 8:46 AM   Camilla Wilson is a 73 year old female who presents for:    Chief Complaint   Patient presents with     Oncology Clinic Visit     RTN for Lymphedema     Initial Vitals: Blood Pressure 123/77   Pulse 78   Temperature 98.2  F (36.8  C) (Oral)   Respiration 16   Weight 121 kg (266 lb 11.2 oz)   Oxygen Saturation 94%   Body Mass Index 44.38 kg/m   Estimated body mass index is 44.38 kg/m  as calculated from the following:    Height as of 2/16/23: 1.651 m (5' 5\").    Weight as of this encounter: 121 kg (266 lb 11.2 oz). Body surface area is 2.36 meters squared.  No Pain (0) Comment: Data Unavailable   No LMP recorded. Patient is postmenopausal.  Allergies reviewed: Yes  Medications reviewed: Yes    Medications: Medication refills not needed today.  Pharmacy name entered into [email or web address]: [id number] - [place], [place] - [address]    Clinical concerns: none       Maribeth Anguiano MA            "

## 2023-07-07 NOTE — PROGRESS NOTES
Arm Swelling Follow Up     Date of Service: July 7, 2023     Date last seen by me:  3/24/22, last seen by Dr. Cantor on 12/1/22.    PCP: Berna Morel MD     Impression:    1. L arm swelling- stable  2. Bilateral leg swelling-stable   3. Secondary post mastectomy lymphedema  4. Bilateral shoulder contractures-left continues  5. Venous stasis with hypertension in the legs bilaterally  6. Bilateral breast carcinoma (bilateral: R lumpectomy-mucinous carcinoma treated with radiation,  R simple 9/20 and L mod rad +2 LN ER/MT + lobular, radiation and hormone)  7. Obesity   8. Left shoulder pain         Plan:   1. Questions were answered.  2. Patient will follow-up with orthotics to be remeasured for lower extremity compression garments.    3. Continue daily nighttime compression for left upper extremity.  4. Continue home exercise regimen including MLD techniques and stretches daily.  5. For left shoulder pain-left shoulder x-ray ordered to further evaluate for bony pathology/arthritis or other joint related abnormalities.  Voltaren gel 4 times daily as needed prescribed for pain relief.  6. Follow up with me 6 months, or when needed.  If any questions or concerns she will contact the clinic.       On day of encounter time spent in chart review and with patient in consultation, exam, education and coordination of care, excluding procedures:  50 minutes          ---------------------------------------------------------------------------------------------------------------------     History of Present Illness:   Camilla Wilson returns to the [facility] for left arm swelling due to post mastectomy lymphedema after being diagnosed 7/22/20 with left breast lobular carcinoma ER/MT + Her2 - stage IIIa,  treated with bilateral mastectomies 9/15/20, (R simple and L mod rad with +2 positive LN) complicated by previous right breast mucinous cancer many years ago treated with  lumpectomy, radiation and hormones. She sees Dr. Beckman (radiation) and Dr. Laguna (oncology). She also had long standing leg swelling with restless legs.  Dr. Cantor sent her to therapy for work on the chest wall fibrosis and for the leg swelling which helped.  Bilateral compression sleeves were ordered with review on wearing indications.  She was to continue wearing her compression daily. Most recent follow up with oncology was on 1/10/22  and she was doing well with no signs of recurrence of her cancer. She has switched to anastrozole in Dec 21.    At follow up today, patient states that since she saw Dr. Cantor in December she has quit her daytime compression garment on her left arm and has just continued with overnight sleep.  She feels like swelling may be under decent control.  She has put on a few pounds over the last several months, and is wondering if she has any increased swelling in that left upper extremity.  Her left shoulder has been painful and she states that she is wondering if the sleeve is making it worse.  Pain is with abduction and lifting the arm.  Otherwise, she states that she has been doing pretty well.  She has been swimming at the [facility] which is pretty close to her home, though she is not going as often as she would like.    There has been no new numbness, tingling or weakness. There have been no new masses, rashes, or swellings of any other joints. There have been no infections.  There has been no new unexplained weight loss, loss of appetite, nausea and/or vomiting, shortness of breath, weight loss and chest pain.       Past Medical History:    Past Medical History:   Diagnosis Date     Breast cancer (H) 2000     Breast cyst      GERD (gastroesophageal reflux disease)      Hx of radiation therapy      Moderate persistent asthma without complication 8/14/2020     PONV (postoperative nausea and vomiting)      Uterine fibroid     embolized        Surgical  History:   Past Surgical History:   Procedure Laterality Date     BIOPSY BREAST Left      EXCISE BREAST CYST/FIBROADENOMA/TUMOR/DUCT LESION/NIPPLE LESION/AREOLAR LESION      Description: Breast Surgery Lumpectomy;  Recorded: 12/18/2007;      SHLDR ARTHROSCOP,SURG,W/REMOVAL,LOOSE/FB      Description: Shoulder Arthroscopy;  Recorded: 02/25/2009;  Comments: right 2004; left 2007     IR MISCELLANEOUS PROCEDURE  9/26/2000     IR MISCELLANEOUS PROCEDURE  9/26/2000     IR MISCELLANEOUS PROCEDURE  9/26/2000     IR PELVIC ANGIOGRAM  9/26/2000     IR PELVIC ANGIOGRAM  9/26/2000     LUMPECTOMY BREAST Right 2007     LUMPECTOMY BREAST Left 8/21/2020    Procedure: Left Lumpectomy after Wire Localization: Terry Lymph Node Biopsy;  Surgeon: Margarita Cook MD;  Location: [facility];  Service: General     MASTECTOMY Bilateral 09/14/2020    Dr. Cook     IN EMBOLIZATION UTERINE FIBROID      Description: Uterine Fibroid Embolization;  Proc Date: 09/06/2000;     IN MASTECTOMY, SIMPLE, COMPLETE Bilateral 9/14/2020    Procedure: Bilateral Mastectomies;  Surgeon: Margarita Cook MD;  Location: [facility];  Service: General     US BREAST CORE BIOPSY LEFT Left 7/22/2020     US BREAST LOC W SENT NODE INJ LEFT Left 8/21/2020        Medications:    Current Outpatient Medications   Medication     anastrozole (ARIMIDEX) 1 MG tablet     calcium citrate 250 mg calcium Tab     cetirizine (ZYRTEC) 10 MG tablet     cholecalciferol, vitamin D3, (VITAMIN D3) 2,000 unit cap     COQ10, UBIQUINOL, ORAL     diclofenac (VOLTAREN) 1 % topical gel     famotidine (FOR PEPCID) 10 MG tablet     losartan (COZAAR) 50 MG tablet     melatonin 5 mg Tab tablet     naproxen sodium 220 MG capsule     neomycin-polymyxin-dexamethasone (MAXITROL) 3.5-59077-3.1 ophthalmic ointment     niacin (SLO-NIACIN) 500 MG CR tablet     peg 400-propylene glycol (SYSTANE) 0.4-0.3 % Drop     sodium fluoride-pot nitrate (PREVIDENT 5000 SENSITIVE) 1.1-5 % Pste     vit  C,S-Gb-jmxwl-lutein-zeaxan (OCUVITE LUTEIN & ZEAXANTHIN) 60 mg-30 unit- 15 mg-2 mg-6 mg cap     hydrocortisone 2.5 % cream     ketoconazole (NIZORAL) 2 % external cream     No current facility-administered medications for this visit.        Allergies:    Allergies   Allergen Reactions     Atorvastatin Unknown     Memory  Impairment        Family history:   Family History   Problem Relation Age of Onset     Colon Cancer Maternal Aunt      Breast Cancer Maternal Aunt         40s     Kidney Disease Mother      Pneumonia Father      Hyperlipidemia Father      Cancer Cousin      Cancer Cousin         Social History:   Social History     Tobacco Use     Smoking status: Former     Packs/day: 0.50     Years: 15.00     Pack years: 7.50     Types: Cigarettes     Quit date: 1982     Years since quittin.1     Smokeless tobacco: Never   Vaping Use     Vaping Use: Never used   Substance Use Topics     Alcohol use: Yes     Alcohol/week: 5.0 standard drinks of alcohol     Drug use: Never          Review of Systems:     ROS negative except as noted in HPI.     Labs:      I personally reviewed the following lab results today and those on care everywhere, if indicated     C-Reactive Protein High Sensitivity   Date Value Ref Range Status   2021 0.8 0.0 - 3.0 mg/L Final     Comment:     Low risk < 1.0 mg/L  Average risk 1.0 to 3.0 mg/L  High risk > 3.0 mg/L  Acute inflamation > 10.0 mg/L      No results found for: SED   Last Renal Panel:  Sodium   Date Value Ref Range Status   2022 140 136 - 145 mmol/L Final     Potassium   Date Value Ref Range Status   2022 4.2 3.4 - 5.3 mmol/L Final   2021 4.2 3.5 - 5.0 mmol/L Final     Chloride   Date Value Ref Range Status   2022 106 98 - 107 mmol/L Final   2021 106 98 - 107 mmol/L Final     Carbon Dioxide (CO2)   Date Value Ref Range Status   2022 25 22 - 29 mmol/L Final   2021 26 22 - 31 mmol/L Final     Anion Gap   Date Value Ref Range  Status   11/28/2022 9 7 - 15 mmol/L Final   11/22/2021 9 5 - 18 mmol/L Final     Glucose   Date Value Ref Range Status   11/28/2022 107 (H) 70 - 99 mg/dL Final   11/22/2021 99 70 - 125 mg/dL Final     Urea Nitrogen   Date Value Ref Range Status   11/28/2022 20.6 8.0 - 23.0 mg/dL Final   11/22/2021 16 8 - 28 mg/dL Final     Creatinine   Date Value Ref Range Status   11/28/2022 0.83 0.51 - 0.95 mg/dL Final     GFR Estimate   Date Value Ref Range Status   11/28/2022 74 >60 mL/min/1.73m2 Final     Comment:     Effective December 21, 2021 eGFRcr in adults is calculated using the 2021 CKD-EPI creatinine equation which includes age and gender (Korey delcid al., [place], DOI: 10.1056/WNGPzx5117290)   11/19/2020 >60 >60 mL/min/1.73m2 Final     Calcium   Date Value Ref Range Status   11/28/2022 9.7 8.8 - 10.2 mg/dL Final     Albumin   Date Value Ref Range Status   11/28/2022 4.2 3.5 - 5.2 g/dL Final   11/22/2021 3.7 3.5 - 5.0 g/dL Final      Lab Results   Component Value Date    WBC 5.9 08/14/2020     Lab Results   Component Value Date    RBC 4.03 08/14/2020     Lab Results   Component Value Date    HGB 12.9 08/14/2020     Lab Results   Component Value Date    HCT 39.3 08/14/2020     No components found for: MCT  Lab Results   Component Value Date    MCV 98 08/14/2020     Lab Results   Component Value Date    MCH 32.0 08/14/2020     Lab Results   Component Value Date    MCHC 32.8 08/14/2020     Lab Results   Component Value Date    RDW 13.3 08/14/2020     Lab Results   Component Value Date     08/14/2020      Lab Results   Component Value Date    A1C 5.8 11/22/2021    A1C 5.4 11/19/2020    A1C 5.6 03/16/2016      TSH   Date Value Ref Range Status   11/28/2022 2.05 0.30 - 4.20 uIU/mL Final   11/22/2021 1.75 0.30 - 5.00 uIU/mL Final      Lab Results   Component Value Date    VITDT 52 11/22/2021         Imaging:     I personally reviewed the following imaging results today and those on care everywhere, if indicated     No  results found for this visit on 03/24/22.        Physical Exam:     Vital Signs: /77   Pulse 78   Temp 98.2  F (36.8  C) (Oral)   Resp 16   Wt 121 kg (266 lb 11.2 oz)   SpO2 94%   BMI 44.38 kg/m   Body mass index is 44.38 kg/m .   Wt Readings from Last 2 Encounters:   07/07/23 121 kg (266 lb 11.2 oz)   04/24/23 124.2 kg (273 lb 14.4 oz)        Circumferential volume measures:        10/15/2020    11:00 AM 2/11/2021    10:00 AM 3/24/2022    10:00 AM 12/1/2022    11:00 AM   Circumferential Measures   Right-just above MCP 19.3 19.3 20.8 18.6   Right Wrist 19 18.2 18 16.4   Right Up 10cm 28.3 27.5 25.2 26   Right Up 10cm From Elbow 43.8 44 46 43.1   Left-just above MCP 18.6 19.3 21.2 18.9   Left Wrist 19 18.8 19.2 17   Left Up 10cm 28.5 28 28.7 27.6   Left Up 10cm From Elbow 44.3 41.5 47.3 44.3   Right just above MTP 22.4 22.5 22.7 21.2   Right Ankle 32 26.7 26.5 25.3   Right Widest Calf 48.8 49.5 50.8 48.9   Right Thigh Up 10cm 64      Right Knee to Ankle 33      Left - just above MTP 22.2 23 22.8 21.5   Left Ankle 31.5 27.5 27 26.3   Left Widest Calf 51.3 52.5 52.3 50.8   Left Thigh Up 10cm 64.5      Left Knee to Ankle 32          General:  70 y.o. female in no apparent distress.       Psych: Alert and oriented x 3.  Cooperative. Affect normal.     HEENT: Atraumatic, normocephalic     Left shoulder exam: Some tenderness to palpation over left anterior joint line.  No obvious deformity, swelling, warmth or erythema on exam.  Some limitation with range of motion with shoulder abduction.  Matias slightly positive empty can negative.     Sensation: Intact to light touch in the upper and lower extremities bilaterally.      Moving all 4 extremities actively against gravity.     Lymph nodes: No cervical, supraclavicular, infraclavicular, or axillary lymphadenopathy palpated.     Vascular: No unusual venous distention. Dorsalis pedis and posterior tib pulses strong and equal in the feet bilaterally and to  hand-held Doppler.  Numerous telangiectasias, ankle flaring and spider veins noted in both legs from the thighs on down.     Skin: No unusual rubor, calor, masses or rashes along the skin in either arm or in either leg.  No significant fibrosity or scarring.  No pain to palpation.  Slight darkening of the left axillary anterior chest wall area without open ulcerations or unusual lesions.  No notable increase swelling in left upper extremity when compared to last visit.        Susu Espinoza MD  Physical Medicine & Rehabilitation      Continue nighttime compression for LUE  Follow up with jadiel for remeasurement of LE  voltaren gel  Left shoulder xray  Stretches and massage    Decided to quit the daytime compression on the left arm, decided to go with overnight sleeve on left arm.    Winter got into cocoa and thinks she put on a few pounds.    Her left shoulder has been painful and pops in and out, she stopped wearing sleeve overnight and thinks with the sleeve on it got worse.     Has been swimming at [facility] [employer] [facility] [place] which is pretty close to her house. She has not been going as often.

## 2023-07-07 NOTE — LETTER
7/7/2023         RE: Camilla Wilson  [address] [place] [address]        Dear Colleague,    Thank you for referring your patient, Camilla Wilson, to the [facility]. Please see a copy of my visit note below.        Arm Swelling Follow Up     Date of Service: July 7, 2023     Date last seen by me:  3/24/22, last seen by Dr. Cantor on 12/1/22.    PCP: Berna Morel MD     Impression:    L arm swelling- stable  Bilateral leg swelling-stable   Secondary post mastectomy lymphedema  Bilateral shoulder contractures-left continues  Venous stasis with hypertension in the legs bilaterally  Bilateral breast carcinoma (bilateral: R lumpectomy-mucinous carcinoma treated with radiation,  R simple 9/20 and L mod rad +2 LN ER/[place] + lobular, radiation and hormone)  Obesity   Left shoulder pain         Plan:   Questions were answered.  Patient will follow-up with orthotics to be remeasured for lower extremity compression garments.    Continue daily nighttime compression for left upper extremity.  Continue home exercise regimen including MLD techniques and stretches daily.  For left shoulder pain-left shoulder x-ray ordered to further evaluate for bony pathology/arthritis or other joint related abnormalities.  Voltaren gel 4 times daily as needed prescribed for pain relief.  Follow up with me 6 months, or when needed.  If any questions or concerns she will contact the clinic.       On day of encounter time spent in chart review and with patient in consultation, exam, education and coordination of care, excluding procedures:  50 minutes          ---------------------------------------------------------------------------------------------------------------------     History of Present Illness:   Camilla Wilson returns to the [facility] for left arm swelling due to post mastectomy lymphedema after being diagnosed 7/22/20 with left breast lobular  carcinoma ER/OK + Her2 - stage IIIa,  treated with bilateral mastectomies 9/15/20, (R simple and L mod rad with +2 positive LN) complicated by previous right breast mucinous cancer many years ago treated with lumpectomy, radiation and hormones. She sees Dr. Beckman (radiation) and Dr. Laguna (oncology). She also had long standing leg swelling with restless legs.  Dr. Cantor sent her to therapy for work on the chest wall fibrosis and for the leg swelling which helped.  Bilateral compression sleeves were ordered with review on wearing indications.  She was to continue wearing her compression daily. Most recent follow up with oncology was on 1/10/22  and she was doing well with no signs of recurrence of her cancer. She has switched to anastrozole in Dec 21.    At follow up today, patient states that since she saw Dr. Cantor in December she has quit her daytime compression garment on her left arm and has just continued with overnight sleep.  She feels like swelling may be under decent control.  She has put on a few pounds over the last several months, and is wondering if she has any increased swelling in that left upper extremity.  Her left shoulder has been painful and she states that she is wondering if the sleeve is making it worse.  Pain is with abduction and lifting the arm.  Otherwise, she states that she has been doing pretty well.  She has been swimming at the [facility] which is pretty close to her home, though she is not going as often as she would like.    There has been no new numbness, tingling or weakness. There have been no new masses, rashes, or swellings of any other joints. There have been no infections.  There has been no new unexplained weight loss, loss of appetite, nausea and/or vomiting, shortness of breath, weight loss and chest pain.       Past Medical History:    Past Medical History:   Diagnosis Date    Breast cancer (H) 2000    Breast cyst     GERD (gastroesophageal  reflux disease)     Hx of radiation therapy     Moderate persistent asthma without complication 8/14/2020    PONV (postoperative nausea and vomiting)     Uterine fibroid     embolized        Surgical History:   Past Surgical History:   Procedure Laterality Date    BIOPSY BREAST Left     EXCISE BREAST CYST/FIBROADENOMA/TUMOR/DUCT LESION/NIPPLE LESION/AREOLAR LESION      Description: Breast Surgery Lumpectomy;  Recorded: 12/18/2007;     SHLDR ARTHROSCOP,SURG,W/REMOVAL,LOOSE/FB      Description: Shoulder Arthroscopy;  Recorded: 02/25/2009;  Comments: right 2004; left 2007    IR MISCELLANEOUS PROCEDURE  9/26/2000    IR MISCELLANEOUS PROCEDURE  9/26/2000    IR MISCELLANEOUS PROCEDURE  9/26/2000    IR PELVIC ANGIOGRAM  9/26/2000    IR PELVIC ANGIOGRAM  9/26/2000    LUMPECTOMY BREAST Right 2007    LUMPECTOMY BREAST Left 8/21/2020    Procedure: Left Lumpectomy after Wire Localization: [place] Lymph Node Biopsy;  Surgeon: Margarita Cook MD;  Location: [facility];  Service: General    MASTECTOMY Bilateral 09/14/2020    Dr. Cook    KY EMBOLIZATION UTERINE FIBROID      Description: Uterine Fibroid Embolization;  Proc Date: 09/06/2000;    KY MASTECTOMY, SIMPLE, COMPLETE Bilateral 9/14/2020    Procedure: Bilateral Mastectomies;  Surgeon: Margarita Cook MD;  Location: [facility];  Service: General    US BREAST CORE BIOPSY LEFT Left 7/22/2020    US BREAST LOC W SENT NODE INJ LEFT Left 8/21/2020        Medications:    Current Outpatient Medications   Medication    anastrozole (ARIMIDEX) 1 MG tablet    calcium citrate 250 mg calcium Tab    cetirizine (ZYRTEC) 10 MG tablet    cholecalciferol, vitamin D3, (VITAMIN D3) 2,000 unit cap    COQ10, UBIQUINOL, ORAL    diclofenac (VOLTAREN) 1 % topical gel    famotidine (FOR PEPCID) 10 MG tablet    losartan (COZAAR) 50 MG tablet    melatonin 5 mg Tab tablet    naproxen sodium 220 MG capsule    neomycin-polymyxin-dexamethasone (MAXITROL) 3.5-12552-0.1 ophthalmic ointment     niacin (SLO-NIACIN) 500 MG CR tablet    peg 400-propylene glycol (SYSTANE) 0.4-0.3 % Drop    sodium fluoride-pot nitrate (PREVIDENT 5000 SENSITIVE) 1.1-5 % Pste    vit C,R-Ui-ynixd-lutein-zeaxan (OCUVITE LUTEIN & ZEAXANTHIN) 60 mg-30 unit- 15 mg-2 mg-6 mg cap    hydrocortisone 2.5 % cream    ketoconazole (NIZORAL) 2 % external cream     No current facility-administered medications for this visit.        Allergies:    Allergies   Allergen Reactions    Atorvastatin Unknown     Memory  Impairment        Family history:   Family History   Problem Relation Age of Onset    Colon Cancer Maternal Aunt     Breast Cancer Maternal Aunt         40s    Kidney Disease Mother     Pneumonia Father     Hyperlipidemia Father     Cancer Cousin     Cancer Cousin         Social History:   Social History     Tobacco Use    Smoking status: Former     Packs/day: 0.50     Years: 15.00     Pack years: 7.50     Types: Cigarettes     Quit date: 1982     Years since quittin.1    Smokeless tobacco: Never   Vaping Use    Vaping Use: Never used   Substance Use Topics    Alcohol use: Yes     Alcohol/week: 5.0 standard drinks of alcohol    Drug use: Never          Review of Systems:     ROS negative except as noted in HPI.     Labs:      I personally reviewed the following lab results today and those on care everywhere, if indicated     C-Reactive Protein High Sensitivity   Date Value Ref Range Status   2021 0.8 0.0 - 3.0 mg/L Final     Comment:     Low risk < 1.0 mg/L  Average risk 1.0 to 3.0 mg/L  High risk > 3.0 mg/L  Acute inflamation > 10.0 mg/L      No results found for: SED   Last Renal Panel:  Sodium   Date Value Ref Range Status   2022 140 136 - 145 mmol/L Final     Potassium   Date Value Ref Range Status   2022 4.2 3.4 - 5.3 mmol/L Final   2021 4.2 3.5 - 5.0 mmol/L Final     Chloride   Date Value Ref Range Status   2022 106 98 - 107 mmol/L Final   2021 106 98 - 107 mmol/L Final     Carbon  Dioxide (CO2)   Date Value Ref Range Status   11/28/2022 25 22 - 29 mmol/L Final   11/22/2021 26 22 - 31 mmol/L Final     Anion Gap   Date Value Ref Range Status   11/28/2022 9 7 - 15 mmol/L Final   11/22/2021 9 5 - 18 mmol/L Final     Glucose   Date Value Ref Range Status   11/28/2022 107 (H) 70 - 99 mg/dL Final   11/22/2021 99 70 - 125 mg/dL Final     Urea Nitrogen   Date Value Ref Range Status   11/28/2022 20.6 8.0 - 23.0 mg/dL Final   11/22/2021 16 8 - 28 mg/dL Final     Creatinine   Date Value Ref Range Status   11/28/2022 0.83 0.51 - 0.95 mg/dL Final     GFR Estimate   Date Value Ref Range Status   11/28/2022 74 >60 mL/min/1.73m2 Final     Comment:     Effective December 21, 2021 eGFRcr in adults is calculated using the 2021 CKD-EPI creatinine equation which includes age and gender (Korey et al., NEJ, DOI: 10.1056/YRLHxl7473268)   11/19/2020 >60 >60 mL/min/1.73m2 Final     Calcium   Date Value Ref Range Status   11/28/2022 9.7 8.8 - 10.2 mg/dL Final     Albumin   Date Value Ref Range Status   11/28/2022 4.2 3.5 - 5.2 g/dL Final   11/22/2021 3.7 3.5 - 5.0 g/dL Final      Lab Results   Component Value Date    WBC 5.9 08/14/2020     Lab Results   Component Value Date    RBC 4.03 08/14/2020     Lab Results   Component Value Date    HGB 12.9 08/14/2020     Lab Results   Component Value Date    HCT 39.3 08/14/2020     No components found for: MCT  Lab Results   Component Value Date    MCV 98 08/14/2020     Lab Results   Component Value Date    MCH 32.0 08/14/2020     Lab Results   Component Value Date    MCHC 32.8 08/14/2020     Lab Results   Component Value Date    RDW 13.3 08/14/2020     Lab Results   Component Value Date     08/14/2020      Lab Results   Component Value Date    A1C 5.8 11/22/2021    A1C 5.4 11/19/2020    A1C 5.6 03/16/2016      TSH   Date Value Ref Range Status   11/28/2022 2.05 0.30 - 4.20 uIU/mL Final   11/22/2021 1.75 0.30 - 5.00 uIU/mL Final      Lab Results   Component Value Date     VITDT 52 11/22/2021         Imaging:     I personally reviewed the following imaging results today and those on care everywhere, if indicated     No results found for this visit on 03/24/22.        Physical Exam:     Vital Signs: /77   Pulse 78   Temp 98.2  F (36.8  C) (Oral)   Resp 16   Wt 121 kg (266 lb 11.2 oz)   SpO2 94%   BMI 44.38 kg/m   Body mass index is 44.38 kg/m .   Wt Readings from Last 2 Encounters:   07/07/23 121 kg (266 lb 11.2 oz)   04/24/23 124.2 kg (273 lb 14.4 oz)        Circumferential volume measures:        10/15/2020    11:00 AM 2/11/2021    10:00 AM 3/24/2022    10:00 AM 12/1/2022    11:00 AM   Circumferential Measures   Right-just above MCP 19.3 19.3 20.8 18.6   Right Wrist 19 18.2 18 16.4   Right Up 10cm 28.3 27.5 25.2 26   Right Up 10cm From Elbow 43.8 44 46 43.1   Left-just above MCP 18.6 19.3 21.2 18.9   Left Wrist 19 18.8 19.2 17   Left Up 10cm 28.5 28 28.7 27.6   Left Up 10cm From Elbow 44.3 41.5 47.3 44.3   Right just above MTP 22.4 22.5 22.7 21.2   Right Ankle 32 26.7 26.5 25.3   Right Widest Calf 48.8 49.5 50.8 48.9   Right Thigh Up 10cm 64      Right Knee to Ankle 33      Left - just above MTP 22.2 23 22.8 21.5   Left Ankle 31.5 27.5 27 26.3   Left Widest Calf 51.3 52.5 52.3 50.8   Left Thigh Up 10cm 64.5      Left Knee to Ankle 32          General:  70 y.o. female in no apparent distress.       Psych: Alert and oriented x 3.  Cooperative. Affect normal.     HEENT: Atraumatic, normocephalic     Left shoulder exam: Some tenderness to palpation over left anterior joint line.  No obvious deformity, swelling, warmth or erythema on exam.  Some limitation with range of motion with shoulder abduction.  Matias slightly positive empty can negative.     Sensation: Intact to light touch in the upper and lower extremities bilaterally.      Moving all 4 extremities actively against gravity.     Lymph nodes: No cervical, supraclavicular, infraclavicular, or axillary lymphadenopathy  palpated.     Vascular: No unusual venous distention. Dorsalis pedis and posterior tib pulses strong and equal in the feet bilaterally and to hand-held Doppler.  Numerous telangiectasias, ankle flaring and spider veins noted in both legs from the thighs on down.     Skin: No unusual rubor, calor, masses or rashes along the skin in either arm or in either leg.  No significant fibrosity or scarring.  No pain to palpation.  Slight darkening of the left axillary anterior chest wall area without open ulcerations or unusual lesions.  No notable increase swelling in left upper extremity when compared to last visit.        Susu Espinoza MD  Physical Medicine & Rehabilitation      Continue nighttime compression for LUE  Follow up with jadiel for remeasurement of LE  voltaren gel  Left shoulder xray  Stretches and massage    Decided to quit the daytime compression on the left arm, decided to go with overnight sleeve on left arm.    Winter got into cocoa and thinks she put on a few pounds.    Her left shoulder has been painful and pops in and out, she stopped wearing sleeve overnight and thinks with the sleeve on it got worse.     Has been swimming at [facility] which is pretty close to her house. She has not been going as often.

## 2023-07-07 NOTE — PATIENT INSTRUCTIONS
1.  As we discussed, you will follow-up with orthotics to be remeasured for lower extremity compression garments.  2.  Continue to wear your nighttime compression for your left arm.  3.  Continue your home exercise regimen including massage techniques and stretches.  4.  For your left shoulder pain, a left shoulder x-ray has been ordered to further evaluate the joint.  5.  Voltaren gel that can be applied up to 4 times a day as needed was prescribed for your left shoulder pain.  6.  Follow-up with Dr. Espinoza in clinic in 6 months.

## 2023-07-11 ENCOUNTER — ANCILLARY PROCEDURE (OUTPATIENT)
Dept: GENERAL RADIOLOGY | Facility: CLINIC | Age: 73
End: 2023-07-11
Attending: STUDENT IN AN ORGANIZED HEALTH CARE EDUCATION/TRAINING PROGRAM
Payer: COMMERCIAL

## 2023-07-11 DIAGNOSIS — C50.911 MUCINOUS CARCINOMA OF BREAST, RIGHT (H): ICD-10-CM

## 2023-07-11 DIAGNOSIS — M25.512 LEFT SHOULDER PAIN, UNSPECIFIED CHRONICITY: ICD-10-CM

## 2023-07-11 DIAGNOSIS — Z17.0 MALIGNANT NEOPLASM OF CENTRAL PORTION OF LEFT BREAST IN FEMALE, ESTROGEN RECEPTOR POSITIVE (H): ICD-10-CM

## 2023-07-11 DIAGNOSIS — C50.112 MALIGNANT NEOPLASM OF CENTRAL PORTION OF LEFT BREAST IN FEMALE, ESTROGEN RECEPTOR POSITIVE (H): ICD-10-CM

## 2023-07-11 PROCEDURE — 73030 X-RAY EXAM OF SHOULDER: CPT | Mod: LT | Performed by: RADIOLOGY

## 2023-10-23 ENCOUNTER — ONCOLOGY VISIT (OUTPATIENT)
Dept: ONCOLOGY | Facility: HOSPITAL | Age: 73
End: 2023-10-23
Attending: INTERNAL MEDICINE
Payer: COMMERCIAL

## 2023-10-23 VITALS
SYSTOLIC BLOOD PRESSURE: 122 MMHG | WEIGHT: 248.9 LBS | DIASTOLIC BLOOD PRESSURE: 64 MMHG | TEMPERATURE: 97.7 F | HEART RATE: 64 BPM | OXYGEN SATURATION: 97 % | RESPIRATION RATE: 20 BRPM | HEIGHT: 65 IN | BODY MASS INDEX: 41.47 KG/M2

## 2023-10-23 DIAGNOSIS — C50.112 MALIGNANT NEOPLASM OF CENTRAL PORTION OF LEFT BREAST IN FEMALE, ESTROGEN RECEPTOR POSITIVE (H): ICD-10-CM

## 2023-10-23 DIAGNOSIS — Z17.0 MALIGNANT NEOPLASM OF CENTRAL PORTION OF LEFT BREAST IN FEMALE, ESTROGEN RECEPTOR POSITIVE (H): ICD-10-CM

## 2023-10-23 PROCEDURE — G0463 HOSPITAL OUTPT CLINIC VISIT: HCPCS | Performed by: NURSE PRACTITIONER

## 2023-10-23 PROCEDURE — 99213 OFFICE O/P EST LOW 20 MIN: CPT | Performed by: NURSE PRACTITIONER

## 2023-10-23 RX ORDER — ANASTROZOLE 1 MG/1
1 TABLET ORAL DAILY
Qty: 90 TABLET | Refills: 3 | Status: SHIPPED | OUTPATIENT
Start: 2023-10-23

## 2023-10-23 ASSESSMENT — PAIN SCALES - GENERAL: PAINLEVEL: NO PAIN (0)

## 2023-10-23 NOTE — PROGRESS NOTES
"Oncology Rooming Note    October 23, 2023 2:05 PM   Camilla Wilson is a 73 year old female who presents for:    Chief Complaint   Patient presents with    Oncology Clinic Visit     6 month follow up related to Personal history of breast cancer     Initial Vitals: /64 (BP Location: Right arm, Patient Position: Sitting, Cuff Size: Adult Large)   Pulse 64   Temp 97.7  F (36.5  C) (Tympanic)   Resp 20   Ht 1.651 m (5' 5\")   Wt 112.9 kg (248 lb 14.4 oz)   SpO2 97%   BMI 41.42 kg/m   Estimated body mass index is 41.42 kg/m  as calculated from the following:    Height as of this encounter: 1.651 m (5' 5\").    Weight as of this encounter: 112.9 kg (248 lb 14.4 oz). Body surface area is 2.28 meters squared.  No Pain (0) Comment: only has pain with standing and walking   No LMP recorded. Patient is postmenopausal.  Allergies reviewed: Yes  Medications reviewed: Yes    Medications: Medication refills not needed today.  Pharmacy name entered into [facility]:    [id number] - [place], [place] - [address]  [id number] - [place], [place] - [address]    Clinical concerns: 6 month follow up related to Personal history of breast cancer      CHETAN WINN CMA              "

## 2023-10-23 NOTE — LETTER
10/23/2023         RE: Camilla Wilson  [address] [place] [address] [address]  [place] [address]        Dear Colleague,    Thank you for referring your patient, Camilla Wilson, to the [facility] [place]. Please see a copy of my visit note below.    [facility] Progress Note    Patient: Camilla Wilson  MRN: [id number]  Date of Service: Oct 23, 2023          Reason for Visit    Chief Complaint   Patient presents with     Oncology Clinic Visit     6 month follow up related to Personal history of breast cancer       Assessment and Plan     Cancer Staging   No matching staging information was found for the patient.      1. T3 N1 M0, stage IIIa left breast cancer status post bilateral mastectomies September 15, 2020. Tumor 6.5 cm, 2 lymph nodes positive, Grade 2, ER/LA positive, HER-2 negative, Oncotype DX recurrence score of 13. Pt had radiation which completed in January 2021. She is on arimidex. Had some arthralgias with exemestane so switched to arimidex. Doing fairly well. Will return in 6 months.      2. Osteopenia: risk for worsening bone density with the aromatase inhibitor.  She will continue calcium and vitamin D supplement. October 2022 DEXA was stable. Repeat every 2 years.   Encourage also to participate in weightbearing exercises. Good calcium in diet.      3. Previous breast cancer in right breast in 2000. S/p 5 years of exemestane.    ECOG Performance    0 - Independent    Distress Screening (within last 30 days)    No data recorded     Pain  Pain Score: No Pain (0) (only has pain with standing and walking)  Pain Loc: Low Back    Problem List    Patient Active Problem List   Diagnosis     Breast cancer (H)     Dyslipidemia     Spinal Stenosis     Obesity     Osteopenia     GERD (gastroesophageal reflux disease)     Skin cancer     Morbid obesity (H)     Pain in joint involving lower leg     Malignant neoplasm of central portion of left breast in  "female, estrogen receptor positive (H)     Personal history of breast cancer     Health maintenance examination     Intramural, submucous, and subserous leiomyoma of uterus     Mucinous carcinoma of breast, right (H)     Post-mastectomy lymphedema syndrome     Leg swelling     Venous stasis of both lower extremities     Contracture of shoulder, right     Contracture of shoulder, left     Dyslipidemia     Mild concentric left ventricular hypertrophy (LVH)     Essential hypertension        ______________________________________________________________________________    History of Present Illness    Measurable Disease:  3 months     Current therapy: Exemestane started around 10/23/2020  Adjuvant radiation started 12/4/2020     Treatment History:   Bilateral mastectomy 9/14/2020     Interim History: pt is here today for follow up visit. She is doing ok.  No new issues with her health.  She did move to an assisted living/independent apartment and has been stressful.  She still working on trying to sell her house.  Other than that she denies any new bone or back pain.  She continues to have some achiness but that is not new.  She does take some NSAIDs and that seems to help.  She denies any unexplained weight loss.  Denies any changes to her chest wall.    Review of Systems    Pertinent items are noted in HPI.    Past History    Past Medical History:   Diagnosis Date     Breast cancer (H) 2000     Breast cyst      GERD (gastroesophageal reflux disease)      Hx of radiation therapy      Moderate persistent asthma without complication 8/14/2020     PONV (postoperative nausea and vomiting)      Uterine fibroid     embolized       PHYSICAL EXAM  /64 (BP Location: Right arm, Patient Position: Sitting, Cuff Size: Adult Large)   Pulse 64   Temp 97.7  F (36.5  C) (Tympanic)   Resp 20   Ht 1.651 m (5' 5\")   Wt 112.9 kg (248 lb 14.4 oz)   SpO2 97%   BMI 41.42 kg/m      GENERAL: no acute distress. Cooperative in " "conversation. Here alone. Mask on  RESP: Regular respiratory rate. No expiratory wheezes   MUSCULOSKELETAL: no bilateral leg swelling  NEURO: non focal. Alert and oriented x3.   PSYCH: within normal limits. No depression or anxiety.  SKIN: exposed skin is dry intact.   BREAST: s/p mastectomies. No local recurrence. Does have some excess skin.     Lab Results    No results found for this or any previous visit (from the past 168 hour(s)).    Imaging    No results found.      Signed by: JOEY Saenz CNP    Oncology Rooming Note    October 23, 2023 2:05 PM   Camilla Wilson is a 73 year old female who presents for:    Chief Complaint   Patient presents with     Oncology Clinic Visit     6 month follow up related to Personal history of breast cancer     Initial Vitals: /64 (BP Location: Right arm, Patient Position: Sitting, Cuff Size: Adult Large)   Pulse 64   Temp 97.7  F (36.5  C) (Tympanic)   Resp 20   Ht 1.651 m (5' 5\")   Wt 112.9 kg (248 lb 14.4 oz)   SpO2 97%   BMI 41.42 kg/m   Estimated body mass index is 41.42 kg/m  as calculated from the following:    Height as of this encounter: 1.651 m (5' 5\").    Weight as of this encounter: 112.9 kg (248 lb 14.4 oz). Body surface area is 2.28 meters squared.  No Pain (0) Comment: only has pain with standing and walking   No LMP recorded. Patient is postmenopausal.  Allergies reviewed: Yes  Medications reviewed: Yes    Medications: Medication refills not needed today.  Pharmacy name entered into [facility]:    [id number] - [place], [place] [id number] - [place], [place] - [address] [place] [address]    Clinical concerns: 6 month follow up related to Personal history of breast cancer      CHETAN WINN CMA                Again, thank you for allowing me to participate in the care of your patient.        Sincerely,        JOEY Saenz CNP  "

## 2023-10-23 NOTE — PROGRESS NOTES
[facility] Hematology and Oncology Progress Note    Patient: Camilla Wilson  MRN: [id number]  Date of Service: Oct 23, 2023          Reason for Visit    Chief Complaint   Patient presents with    Oncology Clinic Visit     6 month follow up related to Personal history of breast cancer       Assessment and Plan     Cancer Staging   No matching staging information was found for the patient.      1. T3 N1 M0, stage IIIa left breast cancer status post bilateral mastectomies September 15, 2020. Tumor 6.5 cm, 2 lymph nodes positive, Grade 2, ER/MN positive, HER-2 negative, Oncotype DX recurrence score of 13. Pt had radiation which completed in January 2021. She is on arimidex. Had some arthralgias with exemestane so switched to arimidex. Doing fairly well. Will return in 6 months.      2. Osteopenia: risk for worsening bone density with the aromatase inhibitor.  She will continue calcium and vitamin D supplement. October 2022 DEXA was stable. Repeat every 2 years.   Encourage also to participate in weightbearing exercises. Good calcium in diet.      3. Previous breast cancer in right breast in 2000. S/p 5 years of exemestane.    ECOG Performance    0 - Independent    Distress Screening (within last 30 days)    No data recorded     Pain  Pain Score: No Pain (0) (only has pain with standing and walking)  Pain Loc: Low Back    Problem List    Patient Active Problem List   Diagnosis    Breast cancer (H)    Dyslipidemia    Spinal Stenosis    Obesity    Osteopenia    GERD (gastroesophageal reflux disease)    Skin cancer    Morbid obesity (H)    Pain in joint involving lower leg    Malignant neoplasm of central portion of left breast in female, estrogen receptor positive (H)    Personal history of breast cancer    Health maintenance examination    Intramural, submucous, and subserous leiomyoma of uterus    Mucinous carcinoma of breast, right (H)    Post-mastectomy lymphedema syndrome    Leg swelling    Venous stasis of  "both lower extremities    Contracture of shoulder, right    Contracture of shoulder, left    Dyslipidemia    Mild concentric left ventricular hypertrophy (LVH)    Essential hypertension        ______________________________________________________________________________    History of Present Illness    Measurable Disease:  3 months     Current therapy: Exemestane started around 10/23/2020  Adjuvant radiation started 12/4/2020     Treatment History:   Bilateral mastectomy 9/14/2020     Interim History: pt is here today for follow up visit. She is doing ok.  No new issues with her health.  She did move to an assisted living/independent apartment and has been stressful.  She still working on trying to sell her house.  Other than that she denies any new bone or back pain.  She continues to have some achiness but that is not new.  She does take some NSAIDs and that seems to help.  She denies any unexplained weight loss.  Denies any changes to her chest wall.    Review of Systems    Pertinent items are noted in HPI.    Past History    Past Medical History:   Diagnosis Date    Breast cancer (H) 2000    Breast cyst     GERD (gastroesophageal reflux disease)     Hx of radiation therapy     Moderate persistent asthma without complication 8/14/2020    PONV (postoperative nausea and vomiting)     Uterine fibroid     embolized       PHYSICAL EXAM  /64 (BP Location: Right arm, Patient Position: Sitting, Cuff Size: Adult Large)   Pulse 64   Temp 97.7  F (36.5  C) (Tympanic)   Resp 20   Ht 1.651 m (5' 5\")   Wt 112.9 kg (248 lb 14.4 oz)   SpO2 97%   BMI 41.42 kg/m      GENERAL: no acute distress. Cooperative in conversation. Here alone. Mask on  RESP: Regular respiratory rate. No expiratory wheezes   MUSCULOSKELETAL: no bilateral leg swelling  NEURO: non focal. Alert and oriented x3.   PSYCH: within normal limits. No depression or anxiety.  SKIN: exposed skin is dry intact.   BREAST: s/p mastectomies. No local " recurrence. Does have some excess skin.     Lab Results    No results found for this or any previous visit (from the past 168 hour(s)).    Imaging    No results found.      Signed by: JOEY Saenz CNP

## 2023-12-07 DIAGNOSIS — I10 ESSENTIAL HYPERTENSION: ICD-10-CM

## 2023-12-07 RX ORDER — LOSARTAN POTASSIUM 50 MG/1
50 TABLET ORAL DAILY
Qty: 60 TABLET | Refills: 0 | Status: SHIPPED | OUTPATIENT
Start: 2023-12-07 | End: 2024-01-09

## 2024-01-02 ASSESSMENT — ENCOUNTER SYMPTOMS
PALPITATIONS: 0
ARTHRALGIAS: 1
HEMATOCHEZIA: 0
DIARRHEA: 0
CONSTIPATION: 0
JOINT SWELLING: 0
WEAKNESS: 0
PARESTHESIAS: 1
SHORTNESS OF BREATH: 0
FEVER: 0
ABDOMINAL PAIN: 0
DYSURIA: 0
EYE PAIN: 0
MYALGIAS: 1
NAUSEA: 0
COUGH: 0
HEARTBURN: 1
HEADACHES: 0
SORE THROAT: 0
NERVOUS/ANXIOUS: 0
CHILLS: 0
BREAST MASS: 0
FREQUENCY: 0
HEMATURIA: 0
DIZZINESS: 0

## 2024-01-02 ASSESSMENT — ACTIVITIES OF DAILY LIVING (ADL): CURRENT_FUNCTION: NO ASSISTANCE NEEDED

## 2024-01-05 ENCOUNTER — ONCOLOGY VISIT (OUTPATIENT)
Dept: ONCOLOGY | Facility: CLINIC | Age: 74
End: 2024-01-05
Attending: STUDENT IN AN ORGANIZED HEALTH CARE EDUCATION/TRAINING PROGRAM
Payer: COMMERCIAL

## 2024-01-05 VITALS
OXYGEN SATURATION: 97 % | SYSTOLIC BLOOD PRESSURE: 125 MMHG | BODY MASS INDEX: 41.59 KG/M2 | TEMPERATURE: 97.7 F | WEIGHT: 249.9 LBS | HEART RATE: 60 BPM | RESPIRATION RATE: 18 BRPM | DIASTOLIC BLOOD PRESSURE: 85 MMHG

## 2024-01-05 DIAGNOSIS — I89.0 LYMPHEDEMA: ICD-10-CM

## 2024-01-05 DIAGNOSIS — C50.911 MUCINOUS CARCINOMA OF BREAST, RIGHT (H): ICD-10-CM

## 2024-01-05 DIAGNOSIS — M25.512 LEFT SHOULDER PAIN, UNSPECIFIED CHRONICITY: ICD-10-CM

## 2024-01-05 DIAGNOSIS — C50.112 MALIGNANT NEOPLASM OF CENTRAL PORTION OF LEFT BREAST IN FEMALE, ESTROGEN RECEPTOR POSITIVE (H): Primary | ICD-10-CM

## 2024-01-05 DIAGNOSIS — Z17.0 MALIGNANT NEOPLASM OF CENTRAL PORTION OF LEFT BREAST IN FEMALE, ESTROGEN RECEPTOR POSITIVE (H): Primary | ICD-10-CM

## 2024-01-05 PROCEDURE — G0463 HOSPITAL OUTPT CLINIC VISIT: HCPCS | Performed by: STUDENT IN AN ORGANIZED HEALTH CARE EDUCATION/TRAINING PROGRAM

## 2024-01-05 PROCEDURE — 99215 OFFICE O/P EST HI 40 MIN: CPT | Performed by: STUDENT IN AN ORGANIZED HEALTH CARE EDUCATION/TRAINING PROGRAM

## 2024-01-05 ASSESSMENT — PAIN SCALES - GENERAL: PAINLEVEL: NO PAIN (0)

## 2024-01-05 NOTE — PATIENT INSTRUCTIONS
It was nice to see you again today!    1. A lymphedema therapy referral was placed for your left axillary area.  2. An orthotics referral was placed for fitting for left chest/axilla compression, a new left arm sleeve/glove and for assessment of your leg compression garments.  3. A podiatry referral was placed to evaluate your foot discomfort and flat feet management.  4. Follow up with Dr. Espinoza in 6 months.

## 2024-01-05 NOTE — NURSING NOTE
"Oncology Rooming Note    January 5, 2024 11:03 AM   Camilla Wilson is a 73 year old female who presents for:    Chief Complaint   Patient presents with    Oncology Clinic Visit     Breast cancer      Initial Vitals: /85   Pulse 60   Temp 97.7  F (36.5  C)   Resp 18   Wt 113.4 kg (249 lb 14.4 oz)   SpO2 97%   BMI 41.59 kg/m   Estimated body mass index is 41.59 kg/m  as calculated from the following:    Height as of 10/23/23: 1.651 m (5' 5\").    Weight as of this encounter: 113.4 kg (249 lb 14.4 oz). Body surface area is 2.28 meters squared.  No Pain (0) Comment: Data Unavailable   No LMP recorded. Patient is postmenopausal.  Allergies reviewed: Yes  Medications reviewed: Yes    Medications: Medication refills not needed today.  Pharmacy name entered into [employer]: [id number] [place], [place] - [address]    Frailty Screening:   Is the patient here for a new oncology consult visit in cancer care? 2. No      Clinical concerns: Pt requests arm sleeve prescription for lymphedema. She is still experiencing swelling and discomfort in her L chest and armpit area.     Oliver Farrell              "

## 2024-01-05 NOTE — LETTER
1/5/2024         RE: Camilla Wilson  [address] [place] [address] [address]  [place] [address]        Dear Colleague,    Thank you for referring your patient, Camilla Wilson, to the [facility]. Please see a copy of my visit note below.        Arm Swelling Follow Up     Date of Service: January 5, 2024    Date last seen by me:  7/7/23    PCP: Berna Morel MD     Impression:    L arm swelling- stable  Bilateral leg swelling-stable   Secondary post mastectomy lymphedema  Bilateral shoulder contractures-left continues  Venous stasis with hypertension in the legs bilaterally  Bilateral breast carcinoma (bilateral: R lumpectomy-mucinous carcinoma treated with radiation,  R simple 9/20 and L mod rad +2 LN ER/ME + lobular, radiation and hormone)  Obesity   Left shoulder pain         Plan:   Questions were answered.  Patient will follow-up with orthotics for new LUE compression sleeve, chest compression for lateral chest wall/axillary swelling and evaluation of foot for LE compression.  Continue home exercise regimen including MLD techniques and stretches daily.  Lymphedema therapy referral placed for LUE axillary lymphedema exacerbation.  A Podiatry referral was placed for assessment of foot discomfort/flat feet management.  Follow up with me 6 months, or when needed.  If any questions or concerns she will contact the clinic.       On day of encounter time spent in chart review and with patient in consultation, exam, education and coordination of care, excluding procedures:  50 minutes          ---------------------------------------------------------------------------------------------------------------------     History of Present Illness:   Camilla Wilson returns to the [facility] for left arm swelling due to post mastectomy lymphedema after being diagnosed 7/22/20 with left breast lobular carcinoma ER/ME + Her2 - stage IIIa,  treated with  bilateral mastectomies 9/15/20, (R simple and L mod rad with +2 positive LN) complicated by previous right breast mucinous cancer many years ago treated with lumpectomy, radiation and hormones. She sees Dr. Beckman (radiation) and Dr. Laguna (oncology). She also had long standing leg swelling with restless legs.  Dr. Cantor sent her to therapy for work on the chest wall fibrosis and for the leg swelling which helped.  Bilateral compression sleeves were ordered with review on wearing indications.  She was to continue wearing her compression daily. She was doing well with no signs of recurrence of her cancer. She has switched to anastrozole in Dec 21.    At follow up today, patient states that she has felt increased swelling in her left axillary area over the last few months. Her leg swelling has been well controlled with her current compression, medi and she orders these from [facility]. She is in need of new compression sleeve for LUE. She feels that her left arm has been ok, and swelling has been stable.   She also feels like she is struggling with flat feet, has a family history of this and has some pain on the lateral aspect of dorsum of left foot, and is wondering whether she is overcompensating with this. She also feels that compression stockings for LEs have too short foot for her current size, so her toes feel cramped when wearing it the usual way.  She has recently moved into an independent living facility and sold her home. She feels that she has been in a transition period, and is settling into her new place and new routine. She is enjoying it.  She denies any other concerns at today's visit.      Past Medical History:    Past Medical History:   Diagnosis Date    Breast cancer (H) 2000    Breast cyst     GERD (gastroesophageal reflux disease)     Hx of radiation therapy     Moderate persistent asthma without complication 8/14/2020    PONV (postoperative nausea and vomiting)     Uterine fibroid      embolized        Surgical History:   Past Surgical History:   Procedure Laterality Date    BIOPSY BREAST Left     EXCISE BREAST CYST/FIBROADENOMA/TUMOR/DUCT LESION/NIPPLE LESION/AREOLAR LESION      Description: Breast Surgery Lumpectomy;  Recorded: 12/18/2007;     SHLDR ARTHROSCOP,SURG,W/REMOVAL,LOOSE/FB      Description: Shoulder Arthroscopy;  Recorded: 02/25/2009;  Comments: right 2004; left 2007    IR MISCELLANEOUS PROCEDURE  9/26/2000    IR MISCELLANEOUS PROCEDURE  9/26/2000    IR MISCELLANEOUS PROCEDURE  9/26/2000    IR PELVIC ANGIOGRAM  9/26/2000    IR PELVIC ANGIOGRAM  9/26/2000    LUMPECTOMY BREAST Right 2007    LUMPECTOMY BREAST Left 8/21/2020    Procedure: Left Lumpectomy after Wire Localization: [place] Lymph Node Biopsy;  Surgeon: Margarita Cook MD;  Location: [facility];  Service: General    MASTECTOMY Bilateral 09/14/2020    Dr. Cook    MO EMBOLIZATION UTERINE FIBROID      Description: Uterine Fibroid Embolization;  Proc Date: 09/06/2000;    MO MASTECTOMY, SIMPLE, COMPLETE Bilateral 9/14/2020    Procedure: Bilateral Mastectomies;  Surgeon: Margarita Cook MD;  Location: [facility];  Service: General    US BREAST CORE BIOPSY LEFT Left 7/22/2020    US BREAST LOC W SENT NODE INJ LEFT Left 8/21/2020        Medications:    Current Outpatient Medications   Medication    anastrozole (ARIMIDEX) 1 MG tablet    calcium citrate 250 mg calcium Tab    cetirizine (ZYRTEC) 10 MG tablet    cholecalciferol, vitamin D3, (VITAMIN D3) 2,000 unit cap    COQ10, UBIQUINOL, ORAL    famotidine (FOR PEPCID) 10 MG tablet    losartan (COZAAR) 50 MG tablet    melatonin 5 mg Tab tablet    naproxen sodium 220 MG capsule    neomycin-polymyxin-dexamethasone (MAXITROL) 3.5-48694-9.1 ophthalmic ointment    niacin (SLO-NIACIN) 500 MG CR tablet    peg 400-propylene glycol (SYSTANE) 0.4-0.3 % Drop    sodium fluoride-pot nitrate (PREVIDENT 5000 SENSITIVE) 1.1-5 % Pste    vit C,D-Zb-hamqu-lutein-zeaxan (OCUVITE  "LUTEIN & ZEAXANTHIN) 60 mg-30 unit- 15 mg-2 mg-6 mg cap     No current facility-administered medications for this visit.        Allergies:    Allergies   Allergen Reactions    Atorvastatin Unknown     Memory  Impairment        Family history:   Family History   Problem Relation Age of Onset    Colon Cancer Maternal Aunt     Breast Cancer Maternal Aunt         40s    Kidney Disease Mother     Pneumonia Father     Hyperlipidemia Father     Cancer Cousin     Cancer Cousin         Social History:   Social History     Tobacco Use    Smoking status: Former     Packs/day: 0.50     Years: 15.00     Additional pack years: 0.00     Total pack years: 7.50     Types: Cigarettes     Quit date: 1982     Years since quittin.6    Smokeless tobacco: Never   Vaping Use    Vaping Use: Never used   Substance Use Topics    Alcohol use: Yes     Alcohol/week: 5.0 standard drinks of alcohol    Drug use: Never          Review of Systems:     ROS negative except as noted in HPI.     Labs:      I personally reviewed the following lab results today and those on care everywhere, if indicated     C-Reactive Protein High Sensitivity   Date Value Ref Range Status   2021 0.8 0.0 - 3.0 mg/L Final     Comment:     Low risk < 1.0 mg/L  Average risk 1.0 to 3.0 mg/L  High risk > 3.0 mg/L  Acute inflamation > 10.0 mg/L      No results found for: \"SED\"   Last Renal Panel:  Sodium   Date Value Ref Range Status   2022 140 136 - 145 mmol/L Final     Potassium   Date Value Ref Range Status   2022 4.2 3.4 - 5.3 mmol/L Final   2021 4.2 3.5 - 5.0 mmol/L Final     Chloride   Date Value Ref Range Status   2022 106 98 - 107 mmol/L Final   2021 106 98 - 107 mmol/L Final     Carbon Dioxide (CO2)   Date Value Ref Range Status   2022 25 22 - 29 mmol/L Final   2021 26 22 - 31 mmol/L Final     Anion Gap   Date Value Ref Range Status   2022 9 7 - 15 mmol/L Final   2021 9 5 - 18 mmol/L Final     Glucose "   Date Value Ref Range Status   11/28/2022 107 (H) 70 - 99 mg/dL Final   11/22/2021 99 70 - 125 mg/dL Final     Urea Nitrogen   Date Value Ref Range Status   11/28/2022 20.6 8.0 - 23.0 mg/dL Final   11/22/2021 16 8 - 28 mg/dL Final     Creatinine   Date Value Ref Range Status   11/28/2022 0.83 0.51 - 0.95 mg/dL Final     GFR Estimate   Date Value Ref Range Status   11/28/2022 74 >60 mL/min/1.73m2 Final     Comment:     Effective December 21, 2021 eGFRcr in adults is calculated using the 2021 CKD-EPI creatinine equation which includes age and gender (Korey et al., NEJ, DOI: 10.1056/ITMSed1613515)   11/19/2020 >60 >60 mL/min/1.73m2 Final     Calcium   Date Value Ref Range Status   11/28/2022 9.7 8.8 - 10.2 mg/dL Final     Albumin   Date Value Ref Range Status   11/28/2022 4.2 3.5 - 5.2 g/dL Final   11/22/2021 3.7 3.5 - 5.0 g/dL Final      Lab Results   Component Value Date    WBC 5.9 08/14/2020     Lab Results   Component Value Date    RBC 4.03 08/14/2020     Lab Results   Component Value Date    HGB 12.9 08/14/2020     Lab Results   Component Value Date    HCT 39.3 08/14/2020     No components found for: MCT  Lab Results   Component Value Date    MCV 98 08/14/2020     Lab Results   Component Value Date    MCH 32.0 08/14/2020     Lab Results   Component Value Date    MCHC 32.8 08/14/2020     Lab Results   Component Value Date    RDW 13.3 08/14/2020     Lab Results   Component Value Date     08/14/2020      Lab Results   Component Value Date    A1C 5.8 11/22/2021    A1C 5.4 11/19/2020    A1C 5.6 03/16/2016      TSH   Date Value Ref Range Status   11/28/2022 2.05 0.30 - 4.20 uIU/mL Final   11/22/2021 1.75 0.30 - 5.00 uIU/mL Final      Lab Results   Component Value Date    VITDT 52 11/22/2021         Imaging:     I personally reviewed the following imaging results today and those on care everywhere, if indicated     No results found for this visit on 03/24/22.        Physical Exam:     Vital Signs: /85    Pulse 60   Temp 97.7  F (36.5  C)   Resp 18   Wt 113.4 kg (249 lb 14.4 oz)   SpO2 97%   BMI 41.59 kg/m   Body mass index is 41.59 kg/m .   Wt Readings from Last 2 Encounters:   01/05/24 113.4 kg (249 lb 14.4 oz)   10/23/23 112.9 kg (248 lb 14.4 oz)        Circumferential volume measures:        10/15/2020    11:00 AM 2/11/2021    10:00 AM 3/24/2022    10:00 AM 12/1/2022    11:00 AM   Circumferential Measures   Right-just above MCP 19.3 19.3 20.8 18.6   Right Wrist 19 18.2 18 16.4   Right Up 10cm 28.3 27.5 25.2 26   Right Up 10cm From Elbow 43.8 44 46 43.1   Left-just above MCP 18.6 19.3 21.2 18.9   Left Wrist 19 18.8 19.2 17   Left Up 10cm 28.5 28 28.7 27.6   Left Up 10cm From Elbow 44.3 41.5 47.3 44.3   Right just above MTP 22.4 22.5 22.7 21.2   Right Ankle 32 26.7 26.5 25.3   Right Widest Calf 48.8 49.5 50.8 48.9   Right Thigh Up 10cm 64      Right Knee to Ankle 33      Left - just above MTP 22.2 23 22.8 21.5   Left Ankle 31.5 27.5 27 26.3   Left Widest Calf 51.3 52.5 52.3 50.8   Left Thigh Up 10cm 64.5      Left Knee to Ankle 32          General:  70 y.o. female in no apparent distress.       Psych: Alert and oriented x 3.  Cooperative. Affect normal.     HEENT: Atraumatic, normocephalic     Left shoulder exam: Some tenderness to palpation over left anterior joint line.  No obvious deformity, swelling, warmth or erythema on exam.  Some limitation with range of motion with shoulder abduction.       Sensation: Intact to light touch in the upper and lower extremities bilaterally.      Moving all 4 extremities actively against gravity.        Vascular: No unusual venous distention. Dorsalis pedis and posterior tib pulses strong and equal in the feet bilaterally     Skin: No unusual rubor, calor, masses or rashes along the skin in either arm or in either leg.  No significant fibrosity or scarring.  No pain to palpation.  Slight darkening of the left axillary anterior chest wall area without open ulcerations or  unusual lesions.  Increase in swelling in left axilla and left inferior upper arm on exam today when compared to last visit.        Susu Espinoza MD  Physical Medicine & Rehabilitation

## 2024-01-05 NOTE — PROGRESS NOTES
Arm Swelling Follow Up     Date of Service: January 5, 2024    Date last seen by me:  7/7/23    PCP: Berna Morel MD     Impression:    L arm swelling- stable  Bilateral leg swelling-stable   Secondary post mastectomy lymphedema  Bilateral shoulder contractures-left continues  Venous stasis with hypertension in the legs bilaterally  Bilateral breast carcinoma (bilateral: R lumpectomy-mucinous carcinoma treated with radiation,  R simple 9/20 and L mod rad +2 LN ER/UT + lobular, radiation and hormone)  Obesity   Left shoulder pain         Plan:   Questions were answered.  Patient will follow-up with orthotics for new LUE compression sleeve, chest compression for lateral chest wall/axillary swelling and evaluation of foot for LE compression.  Continue home exercise regimen including MLD techniques and stretches daily.  Lymphedema therapy referral placed for LUE axillary lymphedema exacerbation.  A Podiatry referral was placed for assessment of foot discomfort/flat feet management.  Follow up with me 6 months, or when needed.  If any questions or concerns she will contact the clinic.       On day of encounter time spent in chart review and with patient in consultation, exam, education and coordination of care, excluding procedures:  50 minutes          ---------------------------------------------------------------------------------------------------------------------     History of Present Illness:   Camilla Wilson returns to the [facility] for left arm swelling due to post mastectomy lymphedema after being diagnosed 7/22/20 with left breast lobular carcinoma ER/UT + Her2 - stage IIIa,  treated with bilateral mastectomies 9/15/20, (R simple and L mod rad with +2 positive LN) complicated by previous right breast mucinous cancer many years ago treated with lumpectomy, radiation and hormones. She sees Dr. Beckman (radiation) and Dr. Laguna (oncology). She also had long  standing leg swelling with restless legs.  Dr. Cantor sent her to therapy for work on the chest wall fibrosis and for the leg swelling which helped.  Bilateral compression sleeves were ordered with review on wearing indications.  She was to continue wearing her compression daily. She was doing well with no signs of recurrence of her cancer. She has switched to anastrozole in Dec 21.    At follow up today, patient states that she has felt increased swelling in her left axillary area over the last few months. Her leg swelling has been well controlled with her current compression, medi and she orders these from [employer]. She is in need of new compression sleeve for LUE. She feels that her left arm has been ok, and swelling has been stable.   She also feels like she is struggling with flat feet, has a family history of this and has some pain on the lateral aspect of dorsum of left foot, and is wondering whether she is overcompensating with this. She also feels that compression stockings for LEs have too short foot for her current size, so her toes feel cramped when wearing it the usual way.  She has recently moved into an independent living facility and sold her home. She feels that she has been in a transition period, and is settling into her new place and new routine. She is enjoying it.  She denies any other concerns at today's visit.      Past Medical History:    Past Medical History:   Diagnosis Date    Breast cancer (H) 2000    Breast cyst     GERD (gastroesophageal reflux disease)     Hx of radiation therapy     Moderate persistent asthma without complication 8/14/2020    PONV (postoperative nausea and vomiting)     Uterine fibroid     embolized        Surgical History:   Past Surgical History:   Procedure Laterality Date    BIOPSY BREAST Left     EXCISE BREAST CYST/FIBROADENOMA/TUMOR/DUCT LESION/NIPPLE LESION/AREOLAR LESION      Description: Breast Surgery Lumpectomy;  Recorded: 12/18/2007;      SHLDR ARTHROSCOP,SURG,W/REMOVAL,LOOSE/FB      Description: Shoulder Arthroscopy;  Recorded: 02/25/2009;  Comments: right 2004; left 2007    IR MISCELLANEOUS PROCEDURE  9/26/2000    IR MISCELLANEOUS PROCEDURE  9/26/2000    IR MISCELLANEOUS PROCEDURE  9/26/2000    IR PELVIC ANGIOGRAM  9/26/2000    IR PELVIC ANGIOGRAM  9/26/2000    LUMPECTOMY BREAST Right 2007    LUMPECTOMY BREAST Left 8/21/2020    Procedure: Left Lumpectomy after Wire Localization: [place] Lymph Node Biopsy;  Surgeon: Margarita Cook MD;  Location: [facility];  Service: General    MASTECTOMY Bilateral 09/14/2020    Dr. Cook    HI EMBOLIZATION UTERINE FIBROID      Description: Uterine Fibroid Embolization;  Proc Date: 09/06/2000;    HI MASTECTOMY, SIMPLE, COMPLETE Bilateral 9/14/2020    Procedure: Bilateral Mastectomies;  Surgeon: Margarita Cook MD;  Location: [facility];  Service: General    US BREAST CORE BIOPSY LEFT Left 7/22/2020    US BREAST LOC W SENT NODE INJ LEFT Left 8/21/2020        Medications:    Current Outpatient Medications   Medication    anastrozole (ARIMIDEX) 1 MG tablet    calcium citrate 250 mg calcium Tab    cetirizine (ZYRTEC) 10 MG tablet    cholecalciferol, vitamin D3, (VITAMIN D3) 2,000 unit cap    COQ10, UBIQUINOL, ORAL    famotidine (FOR PEPCID) 10 MG tablet    losartan (COZAAR) 50 MG tablet    melatonin 5 mg Tab tablet    naproxen sodium 220 MG capsule    neomycin-polymyxin-dexamethasone (MAXITROL) 3.5-39143-0.1 ophthalmic ointment    niacin (SLO-NIACIN) 500 MG CR tablet    peg 400-propylene glycol (SYSTANE) 0.4-0.3 % Drop    sodium fluoride-pot nitrate (PREVIDENT 5000 SENSITIVE) 1.1-5 % Pste    vit C,B-Ko-ndocg-lutein-zeaxan (OCUVITE LUTEIN & ZEAXANTHIN) 60 mg-30 unit- 15 mg-2 mg-6 mg cap     No current facility-administered medications for this visit.        Allergies:    Allergies   Allergen Reactions    Atorvastatin Unknown     Memory  Impairment        Family history:   Family History   Problem  "Relation Age of Onset    Colon Cancer Maternal Aunt     Breast Cancer Maternal Aunt         40s    Kidney Disease Mother     Pneumonia Father     Hyperlipidemia Father     Cancer Cousin     Cancer Cousin         Social History:   Social History     Tobacco Use    Smoking status: Former     Packs/day: 0.50     Years: 15.00     Additional pack years: 0.00     Total pack years: 7.50     Types: Cigarettes     Quit date: 1982     Years since quittin.6    Smokeless tobacco: Never   Vaping Use    Vaping Use: Never used   Substance Use Topics    Alcohol use: Yes     Alcohol/week: 5.0 standard drinks of alcohol    Drug use: Never          Review of Systems:     ROS negative except as noted in HPI.     Labs:      I personally reviewed the following lab results today and those on care everywhere, if indicated     C-Reactive Protein High Sensitivity   Date Value Ref Range Status   2021 0.8 0.0 - 3.0 mg/L Final     Comment:     Low risk < 1.0 mg/L  Average risk 1.0 to 3.0 mg/L  High risk > 3.0 mg/L  Acute inflamation > 10.0 mg/L      No results found for: \"SED\"   Last Renal Panel:  Sodium   Date Value Ref Range Status   2022 140 136 - 145 mmol/L Final     Potassium   Date Value Ref Range Status   2022 4.2 3.4 - 5.3 mmol/L Final   2021 4.2 3.5 - 5.0 mmol/L Final     Chloride   Date Value Ref Range Status   2022 106 98 - 107 mmol/L Final   2021 106 98 - 107 mmol/L Final     Carbon Dioxide (CO2)   Date Value Ref Range Status   2022 25 22 - 29 mmol/L Final   2021 26 22 - 31 mmol/L Final     Anion Gap   Date Value Ref Range Status   2022 9 7 - 15 mmol/L Final   2021 9 5 - 18 mmol/L Final     Glucose   Date Value Ref Range Status   2022 107 (H) 70 - 99 mg/dL Final   2021 99 70 - 125 mg/dL Final     Urea Nitrogen   Date Value Ref Range Status   2022 20.6 8.0 - 23.0 mg/dL Final   2021 16 8 - 28 mg/dL Final     Creatinine   Date Value Ref Range " Status   11/28/2022 0.83 0.51 - 0.95 mg/dL Final     GFR Estimate   Date Value Ref Range Status   11/28/2022 74 >60 mL/min/1.73m2 Final     Comment:     Effective December 21, 2021 eGFRcr in adults is calculated using the 2021 CKD-EPI creatinine equation which includes age and gender (Korey delcid al., [place], DOI: 10.1056/QSOEcn5083305)   11/19/2020 >60 >60 mL/min/1.73m2 Final     Calcium   Date Value Ref Range Status   11/28/2022 9.7 8.8 - 10.2 mg/dL Final     Albumin   Date Value Ref Range Status   11/28/2022 4.2 3.5 - 5.2 g/dL Final   11/22/2021 3.7 3.5 - 5.0 g/dL Final      Lab Results   Component Value Date    WBC 5.9 08/14/2020     Lab Results   Component Value Date    RBC 4.03 08/14/2020     Lab Results   Component Value Date    HGB 12.9 08/14/2020     Lab Results   Component Value Date    HCT 39.3 08/14/2020     No components found for: MCT  Lab Results   Component Value Date    MCV 98 08/14/2020     Lab Results   Component Value Date    MCH 32.0 08/14/2020     Lab Results   Component Value Date    MCHC 32.8 08/14/2020     Lab Results   Component Value Date    RDW 13.3 08/14/2020     Lab Results   Component Value Date     08/14/2020      Lab Results   Component Value Date    A1C 5.8 11/22/2021    A1C 5.4 11/19/2020    A1C 5.6 03/16/2016      TSH   Date Value Ref Range Status   11/28/2022 2.05 0.30 - 4.20 uIU/mL Final   11/22/2021 1.75 0.30 - 5.00 uIU/mL Final      Lab Results   Component Value Date    VITDT 52 11/22/2021         Imaging:     I personally reviewed the following imaging results today and those on care everywhere, if indicated     No results found for this visit on 03/24/22.        Physical Exam:     Vital Signs: /85   Pulse 60   Temp 97.7  F (36.5  C)   Resp 18   Wt 113.4 kg (249 lb 14.4 oz)   SpO2 97%   BMI 41.59 kg/m   Body mass index is 41.59 kg/m .   Wt Readings from Last 2 Encounters:   01/05/24 113.4 kg (249 lb 14.4 oz)   10/23/23 112.9 kg (248 lb 14.4 oz)         Circumferential volume measures:        10/15/2020    11:00 AM 2/11/2021    10:00 AM 3/24/2022    10:00 AM 12/1/2022    11:00 AM   Circumferential Measures   Right-just above MCP 19.3 19.3 20.8 18.6   Right Wrist 19 18.2 18 16.4   Right Up 10cm 28.3 27.5 25.2 26   Right Up 10cm From Elbow 43.8 44 46 43.1   Left-just above MCP 18.6 19.3 21.2 18.9   Left Wrist 19 18.8 19.2 17   Left Up 10cm 28.5 28 28.7 27.6   Left Up 10cm From Elbow 44.3 41.5 47.3 44.3   Right just above MTP 22.4 22.5 22.7 21.2   Right Ankle 32 26.7 26.5 25.3   Right Widest Calf 48.8 49.5 50.8 48.9   Right Thigh Up 10cm 64      Right Knee to Ankle 33      Left - just above MTP 22.2 23 22.8 21.5   Left Ankle 31.5 27.5 27 26.3   Left Widest Calf 51.3 52.5 52.3 50.8   Left Thigh Up 10cm 64.5      Left Knee to Ankle 32          General:  70 y.o. female in no apparent distress.       Psych: Alert and oriented x 3.  Cooperative. Affect normal.     HEENT: Atraumatic, normocephalic     Left shoulder exam: Some tenderness to palpation over left anterior joint line.  No obvious deformity, swelling, warmth or erythema on exam.  Some limitation with range of motion with shoulder abduction.       Sensation: Intact to light touch in the upper and lower extremities bilaterally.      Moving all 4 extremities actively against gravity.        Vascular: No unusual venous distention. Dorsalis pedis and posterior tib pulses strong and equal in the feet bilaterally     Skin: No unusual rubor, calor, masses or rashes along the skin in either arm or in either leg.  No significant fibrosity or scarring.  No pain to palpation.  Slight darkening of the left axillary anterior chest wall area without open ulcerations or unusual lesions.  Increase in swelling in left axilla and left inferior upper arm on exam today when compared to last visit.        Susu Espinoza MD  Physical Medicine & Rehabilitation

## 2024-01-09 ENCOUNTER — OFFICE VISIT (OUTPATIENT)
Dept: INTERNAL MEDICINE | Facility: CLINIC | Age: 74
End: 2024-01-09
Payer: COMMERCIAL

## 2024-01-09 VITALS
BODY MASS INDEX: 41.15 KG/M2 | TEMPERATURE: 97.9 F | DIASTOLIC BLOOD PRESSURE: 78 MMHG | HEART RATE: 75 BPM | HEIGHT: 65 IN | WEIGHT: 247 LBS | SYSTOLIC BLOOD PRESSURE: 118 MMHG | OXYGEN SATURATION: 97 % | RESPIRATION RATE: 14 BRPM

## 2024-01-09 DIAGNOSIS — I51.7 MILD CONCENTRIC LEFT VENTRICULAR HYPERTROPHY (LVH): ICD-10-CM

## 2024-01-09 DIAGNOSIS — R63.4 WEIGHT LOSS: ICD-10-CM

## 2024-01-09 DIAGNOSIS — Z00.00 ENCOUNTER FOR MEDICARE ANNUAL WELLNESS EXAM: ICD-10-CM

## 2024-01-09 DIAGNOSIS — Z23 NEED FOR SHINGLES VACCINE: ICD-10-CM

## 2024-01-09 DIAGNOSIS — Z12.11 ENCOUNTER FOR SCREENING FOR MALIGNANT NEOPLASM OF COLON: ICD-10-CM

## 2024-01-09 DIAGNOSIS — Z00.00 HEALTH MAINTENANCE EXAMINATION: ICD-10-CM

## 2024-01-09 DIAGNOSIS — Z29.11 NEED FOR VACCINATION AGAINST RESPIRATORY SYNCYTIAL VIRUS: ICD-10-CM

## 2024-01-09 DIAGNOSIS — I10 ESSENTIAL HYPERTENSION: ICD-10-CM

## 2024-01-09 DIAGNOSIS — I97.2 POST-MASTECTOMY LYMPHEDEMA SYNDROME: Primary | ICD-10-CM

## 2024-01-09 DIAGNOSIS — R79.9 ABNORMAL FINDING OF BLOOD CHEMISTRY, UNSPECIFIED: ICD-10-CM

## 2024-01-09 PROBLEM — E78.5 DYSLIPIDEMIA: Status: RESOLVED | Noted: 2021-08-25 | Resolved: 2024-01-09

## 2024-01-09 LAB
ALBUMIN SERPL BCG-MCNC: 4 G/DL (ref 3.5–5.2)
ALP SERPL-CCNC: 97 U/L (ref 40–150)
ALT SERPL W P-5'-P-CCNC: 33 U/L (ref 0–50)
ANION GAP SERPL CALCULATED.3IONS-SCNC: 9 MMOL/L (ref 7–15)
AST SERPL W P-5'-P-CCNC: 27 U/L (ref 0–45)
BILIRUB SERPL-MCNC: 0.4 MG/DL
BUN SERPL-MCNC: 19.1 MG/DL (ref 8–23)
CALCIUM SERPL-MCNC: 9.8 MG/DL (ref 8.8–10.2)
CHLORIDE SERPL-SCNC: 105 MMOL/L (ref 98–107)
CHOLEST SERPL-MCNC: 273 MG/DL
CREAT SERPL-MCNC: 1.08 MG/DL (ref 0.51–0.95)
DEPRECATED HCO3 PLAS-SCNC: 25 MMOL/L (ref 22–29)
EGFRCR SERPLBLD CKD-EPI 2021: 53 ML/MIN/1.73M2
ERYTHROCYTE [DISTWIDTH] IN BLOOD BY AUTOMATED COUNT: 14.6 % (ref 10–15)
FASTING STATUS PATIENT QL REPORTED: YES
GLUCOSE SERPL-MCNC: 99 MG/DL (ref 70–99)
HBA1C MFR BLD: 5.5 % (ref 0–5.6)
HCT VFR BLD AUTO: 37 % (ref 35–47)
HDLC SERPL-MCNC: 86 MG/DL
HGB BLD-MCNC: 11.7 G/DL (ref 11.7–15.7)
LDLC SERPL CALC-MCNC: 177 MG/DL
MCH RBC QN AUTO: 31.5 PG (ref 26.5–33)
MCHC RBC AUTO-ENTMCNC: 31.6 G/DL (ref 31.5–36.5)
MCV RBC AUTO: 100 FL (ref 78–100)
NONHDLC SERPL-MCNC: 187 MG/DL
PLATELET # BLD AUTO: 211 10E3/UL (ref 150–450)
POTASSIUM SERPL-SCNC: 4.4 MMOL/L (ref 3.4–5.3)
PROT SERPL-MCNC: 6.2 G/DL (ref 6.4–8.3)
RBC # BLD AUTO: 3.71 10E6/UL (ref 3.8–5.2)
SODIUM SERPL-SCNC: 139 MMOL/L (ref 135–145)
TRIGL SERPL-MCNC: 52 MG/DL
TSH SERPL DL<=0.005 MIU/L-ACNC: 2.72 UIU/ML (ref 0.3–4.2)
WBC # BLD AUTO: 5 10E3/UL (ref 4–11)

## 2024-01-09 PROCEDURE — G0439 PPPS, SUBSEQ VISIT: HCPCS | Performed by: INTERNAL MEDICINE

## 2024-01-09 PROCEDURE — 80053 COMPREHEN METABOLIC PANEL: CPT | Performed by: INTERNAL MEDICINE

## 2024-01-09 PROCEDURE — 85027 COMPLETE CBC AUTOMATED: CPT | Performed by: INTERNAL MEDICINE

## 2024-01-09 PROCEDURE — 83036 HEMOGLOBIN GLYCOSYLATED A1C: CPT | Performed by: INTERNAL MEDICINE

## 2024-01-09 PROCEDURE — 99214 OFFICE O/P EST MOD 30 MIN: CPT | Mod: 25 | Performed by: INTERNAL MEDICINE

## 2024-01-09 PROCEDURE — 36415 COLL VENOUS BLD VENIPUNCTURE: CPT | Performed by: INTERNAL MEDICINE

## 2024-01-09 PROCEDURE — 80061 LIPID PANEL: CPT | Performed by: INTERNAL MEDICINE

## 2024-01-09 PROCEDURE — 84443 ASSAY THYROID STIM HORMONE: CPT | Performed by: INTERNAL MEDICINE

## 2024-01-09 RX ORDER — LOSARTAN POTASSIUM 50 MG/1
50 TABLET ORAL DAILY
Qty: 90 TABLET | Refills: 3 | Status: SHIPPED | OUTPATIENT
Start: 2024-01-09 | End: 2024-04-22 | Stop reason: DRUGHIGH

## 2024-01-09 RX ORDER — RESPIRATORY SYNCYTIAL VIRUS VACCINE 120MCG/0.5
0.5 KIT INTRAMUSCULAR ONCE
Qty: 1 EACH | Refills: 0 | Status: SHIPPED | OUTPATIENT
Start: 2024-01-09 | End: 2024-01-09

## 2024-01-09 ASSESSMENT — ENCOUNTER SYMPTOMS
CHILLS: 0
NAUSEA: 0
FREQUENCY: 0
PARESTHESIAS: 1
DIARRHEA: 0
NERVOUS/ANXIOUS: 0
WEAKNESS: 0
CONSTIPATION: 0
HEMATOCHEZIA: 0
DIZZINESS: 0
JOINT SWELLING: 0
HEADACHES: 0
HEMATURIA: 0
FEVER: 0
BREAST MASS: 0
COUGH: 0
SORE THROAT: 0
ARTHRALGIAS: 1
HEARTBURN: 1
ABDOMINAL PAIN: 0
PALPITATIONS: 0
MYALGIAS: 1
EYE PAIN: 0
DYSURIA: 0
SHORTNESS OF BREATH: 0

## 2024-01-09 ASSESSMENT — ACTIVITIES OF DAILY LIVING (ADL): CURRENT_FUNCTION: NO ASSISTANCE NEEDED

## 2024-01-09 NOTE — PATIENT INSTRUCTIONS
Patient Education   Personalized Prevention Plan  You are due for the preventive services outlined below.  Your care team is available to assist you in scheduling these services.  If you have already completed any of these items, please share that information with your care team to update in your medical record.  Health Maintenance Due   Topic Date Due     Asthma Action Plan - yearly  Never done     RSV VACCINE (Pregnancy & 60+) (1 - 1-dose 60+ series) Never done     Zoster (Shingles) Vaccine (1 of 2) 03/02/2012     Annual Wellness Visit  11/28/2023     COVID-19 Vaccine (8 - 2023-24 season) 01/08/2024     Learning About Dietary Guidelines  What are the Dietary Guidelines for Americans?     Dietary Guidelines for Americans provide tips for eating well and staying healthy. This helps reduce the risk for long-term (chronic) diseases.  These guidelines recommend that you:  Eat and drink the right amount for you. The [place] government's food guide is called MyPlate. It can help you make your own well-balanced eating plan.  Try to balance your eating with your activity. This helps you stay at a healthy weight.  Drink alcohol in moderation, if at all.  Limit foods high in salt, saturated fat, trans fat, and added sugar.  These guidelines are from the U.S. Department of Agriculture and the U.S. Department of Health and Human Services. They are updated every 5 years.  What is MyPlate?  MyPlate is the U.[place] government's food guide. It can help you make your own well-balanced eating plan. A balanced eating plan means that you eat enough, but not too much, and that your food gives you the nutrients you need to stay healthy.  MyPlate focuses on eating plenty of whole grains, fruits, and vegetables, and on limiting fat and sugar. It is available online at www.ChooseMyPlate.gov.  How can you get started?  If you're trying to eat healthier, you can slowly change your eating habits over time. You don't have to make big changes all at  "once. Start by adding one or two healthy foods to your meals each day.  Grains  Choose whole-grain breads and cereals and whole-wheat pasta and whole-grain crackers.  Vegetables  Eat a variety of vegetables every day. They have lots of nutrients and are part of a heart-healthy diet.  Fruits  Eat a variety of fruits every day. Fruits contain lots of nutrients. Choose fresh fruit instead of fruit juice.  Protein foods  Choose fish and lean poultry more often. Eat red meat and fried meats less often. Dried beans, tofu, and nuts are also good sources of protein.  Dairy  Choose low-fat or fat-free products from this food group. If you have problems digesting milk, try eating cheese or yogurt instead.  Fats and oils  Limit fats and oils if you're trying to cut calories. Choose healthy fats when you cook. These include canola oil and olive oil.  Where can you learn more?  Go to https://www.[employer].net/patiented  Enter D676 in the search box to learn more about \"Learning About Dietary Guidelines.\"  Current as of: February 28, 2023               Content Version: 13.8    0523-8465 [employer].   Care instructions adapted under license by your healthcare professional. If you have questions about a medical condition or this instruction, always ask your healthcare professional. [employer] disclaims any warranty or liability for your use of this information.         Patient Education   Personalized Prevention Plan  You are due for the preventive services outlined below.  Your care team is available to assist you in scheduling these services.  If you have already completed any of these items, please share that information with your care team to update in your medical record.  Health Maintenance Due   Topic Date Due     Asthma Action Plan - yearly  Never done     RSV VACCINE (Pregnancy & 60+) (1 - 1-dose 60+ series) Never done     Zoster (Shingles) Vaccine (1 of 2) 03/02/2012     Annual Wellness Visit  " 11/28/2023     COVID-19 Vaccine (8 - 2023-24 season) 01/08/2024     Learning About Dietary Guidelines  What are the Dietary Guidelines for Americans?     Dietary Guidelines for Americans provide tips for eating well and staying healthy. This helps reduce the risk for long-term (chronic) diseases.  These guidelines recommend that you:  Eat and drink the right amount for you. The U.[place] government's food guide is called MyPlate. It can help you make your own well-balanced eating plan.  Try to balance your eating with your activity. This helps you stay at a healthy weight.  Drink alcohol in moderation, if at all.  Limit foods high in salt, saturated fat, trans fat, and added sugar.  These guidelines are from the U.S. Department of Agriculture and the U.S. Department of Health and Human Services. They are updated every 5 years.  What is MyPlate?  MyPlate is the U.[place]. government's food guide. It can help you make your own well-balanced eating plan. A balanced eating plan means that you eat enough, but not too much, and that your food gives you the nutrients you need to stay healthy.  MyPlate focuses on eating plenty of whole grains, fruits, and vegetables, and on limiting fat and sugar. It is available online at www.ChooseMyPlate.gov.  How can you get started?  If you're trying to eat healthier, you can slowly change your eating habits over time. You don't have to make big changes all at once. Start by adding one or two healthy foods to your meals each day.  Grains  Choose whole-grain breads and cereals and whole-wheat pasta and whole-grain crackers.  Vegetables  Eat a variety of vegetables every day. They have lots of nutrients and are part of a heart-healthy diet.  Fruits  Eat a variety of fruits every day. Fruits contain lots of nutrients. Choose fresh fruit instead of fruit juice.  Protein foods  Choose fish and lean poultry more often. Eat red meat and fried meats less often. Dried beans, tofu, and nuts are also good  "sources of protein.  Dairy  Choose low-fat or fat-free products from this food group. If you have problems digesting milk, try eating cheese or yogurt instead.  Fats and oils  Limit fats and oils if you're trying to cut calories. Choose healthy fats when you cook. These include canola oil and olive oil.  Where can you learn more?  Go to https://www.[employer].net/patiented  Enter D676 in the search box to learn more about \"Learning About Dietary Guidelines.\"  Current as of: February 28, 2023               Content Version: 13.8    7142-2177 [employer].   Care instructions adapted under license by your healthcare professional. If you have questions about a medical condition or this instruction, always ask your healthcare professional. [employer] disclaims any warranty or liability for your use of this information.         "

## 2024-01-09 NOTE — PROGRESS NOTES
"SUBJECTIVE:   Camilla is a 73 year old, presenting for the following:  Wellness Visit (Fasting )        1/9/2024     8:49 AM   Additional Questions   Roomed by Sherita   Accompanied by Self       Are you in the first 12 months of your Medicare coverage?  No    Healthy Habits:     In general, how would you rate your overall health?  Good    Frequency of exercise:  2-3 days/week    Duration of exercise:  15-30 minutes    Do you usually eat at least 4 servings of fruit and vegetables a day, include whole grains    & fiber and avoid regularly eating high fat or \"junk\" foods?  No    Taking medications regularly:  Yes    Medication side effects:  Muscle aches    Ability to successfully perform activities of daily living:  No assistance needed    Home Safety:  No safety concerns identified    Hearing Impairment:  No hearing concerns    In the past 6 months, have you been bothered by leaking of urine?  No    In general, how would you rate your overall mental or emotional health?  Good    Additional concerns today:  No      Today's PHQ-2 Score:       1/9/2024     8:49 AM   PHQ-2 ( 1999 [employer])   Q1: Little interest or pleasure in doing things 0   Q2: Feeling down, depressed or hopeless 0   PHQ-2 Score 0   Q1: Little interest or pleasure in doing things Not at all   Q2: Feeling down, depressed or hopeless Not at all   PHQ-2 Score 0           Have you ever done Advance Care Planning? (For example, a Health Directive, POLST, or a discussion with a medical provider or your loved ones about your wishes): No, advance care planning information given to patient to review.  Patient plans to discuss their wishes with loved ones or provider.         Fall risk  Fallen 2 or more times in the past year?: No  Any fall with injury in the past year?: No  click delete button to remove this line now  Cognitive Screening   1) Repeat 3 items (Leader, Season, Table)    2) Clock draw: NORMAL  3) 3 item recall: Recalls 3 objects  Results: NORMAL " clock, 1-2 items recalled: COGNITIVE IMPAIRMENT LESS LIKELY    Mini-CogTM Copyright SALEEM Hernandez. Licensed by the author for use in [facility]; reprinted with permission (cristal@[facility]). All rights reserved.      Do you have sleep apnea, excessive snoring or daytime drowsiness? : no    Reviewed and updated as needed this visit by clinical staff   Tobacco  Allergies  Meds              Reviewed and updated as needed this visit by Provider                 Social History     Tobacco Use    Smoking status: Former     Packs/day: 0.50     Years: 15.00     Additional pack years: 0.00     Total pack years: 7.50     Types: Cigarettes     Quit date: 1982     Years since quittin.7    Smokeless tobacco: Never   Substance Use Topics    Alcohol use: Yes     Alcohol/week: 5.0 standard drinks of alcohol             2024    12:34 PM   Alcohol Use   Prescreen: >3 drinks/day or >7 drinks/week? No     Do you have a current opioid prescription? No  Do you use any other controlled substances or medications that are not prescribed by a provider? None        Current providers sharing in care for this patient include:   Patient Care Team:  Berna Morel MD as PCP - General  Anna Beckman MD as MD (Hematology & Oncology)  Dyana Quiles APRN CNP as Nurse Practitioner (Hematology & Oncology)  Princess Laguna MD as MD (Hematology & Oncology)  Berna Morel MD as Assigned PCP  Deja Odom, RN as Specialty Care Coordinator (Hematology & Oncology)  Dyana Quiles APRN CNP as Assigned Cancer Care Provider  Susu Espinoza MD as Assigned Neuroscience Provider    The following health maintenance items are reviewed in Epic and correct as of today:  Health Maintenance   Topic Date Due    ASTHMA ACTION PLAN  Never done    RSV VACCINE (Pregnancy & 60+) (1 - 1-dose 60+ series) Never done    ZOSTER IMMUNIZATION (1 of 2) 2012    MEDICARE ANNUAL WELLNESS VISIT  2023    COVID-19 Vaccine (8 -  2023-24 season) 01/08/2024    ANNUAL REVIEW OF HM ORDERS  02/16/2024    COLORECTAL CANCER SCREENING  04/04/2024    ASTHMA CONTROL TEST  05/30/2024    FALL RISK ASSESSMENT  01/09/2025    LIPID  11/28/2027    ADVANCE CARE PLANNING  11/28/2027    DTAP/TDAP/TD IMMUNIZATION (4 - Td or Tdap) 04/16/2032    DEXA  10/10/2037    HEPATITIS C SCREENING  Completed    PHQ-2 (once per calendar year)  Completed    INFLUENZA VACCINE  Completed    Pneumococcal Vaccine: 65+ Years  Completed    IPV IMMUNIZATION  Aged Out    HPV IMMUNIZATION  Aged Out    MENINGITIS IMMUNIZATION  Aged Out    RSV MONOCLONAL ANTIBODY  Aged Out    MAMMO SCREENING  Discontinued           Current Outpatient Medications   Medication    anastrozole (ARIMIDEX) 1 MG tablet    calcium citrate 250 mg calcium Tab    cetirizine (ZYRTEC) 10 MG tablet    cholecalciferol, vitamin D3, (VITAMIN D3) 2,000 unit cap    COQ10, UBIQUINOL, ORAL    famotidine (FOR PEPCID) 10 MG tablet    losartan (COZAAR) 50 MG tablet    melatonin 5 mg Tab tablet    naproxen sodium 220 MG capsule    neomycin-polymyxin-dexamethasone (MAXITROL) 3.5-71732-6.1 ophthalmic ointment    niacin (SLO-NIACIN) 500 MG CR tablet    peg 400-propylene glycol (SYSTANE) 0.4-0.3 % Drop    respiratory syncytial virus vaccine, bivalent (ABRYSVO) injection    sodium fluoride-pot nitrate (PREVIDENT 5000 SENSITIVE) 1.1-5 % Pste    vit C,X-Hm-myral-lutein-zeaxan (OCUVITE LUTEIN & ZEAXANTHIN) 60 mg-30 unit- 15 mg-2 mg-6 mg cap    zoster vaccine recombinant adjuvanted (SHINGRIX) injection     No current facility-administered medications for this visit.       Pertinent mammograms are reviewed under the imaging tab.    Review of Systems   Constitutional:  Negative for chills and fever.   HENT:  Positive for congestion. Negative for ear pain, hearing loss and sore throat.    Eyes:  Negative for pain and visual disturbance.   Respiratory:  Negative for cough and shortness of breath.    Cardiovascular:  Positive for peripheral  "edema. Negative for chest pain and palpitations.   Gastrointestinal:  Positive for heartburn. Negative for abdominal pain, constipation, diarrhea, hematochezia and nausea.   Breasts:  Negative for tenderness, breast mass and discharge.   Genitourinary:  Negative for dysuria, frequency, genital sores, hematuria, pelvic pain, urgency, vaginal bleeding and vaginal discharge.   Musculoskeletal:  Positive for arthralgias and myalgias. Negative for joint swelling.   Skin:  Negative for rash.   Neurological:  Positive for paresthesias. Negative for dizziness, weakness and headaches.   Psychiatric/Behavioral:  Negative for mood changes. The patient is not nervous/anxious.          OBJECTIVE:   /78 (BP Location: Left arm, Patient Position: Sitting, Cuff Size: Adult Large)   Pulse 75   Temp 97.9  F (36.6  C) (Tympanic)   Resp 14   Ht 1.651 m (5' 5\")   Wt 112 kg (247 lb)   LMP  (LMP Unknown)   SpO2 97%   BMI 41.10 kg/m   Estimated body mass index is 41.1 kg/m  as calculated from the following:    Height as of this encounter: 1.651 m (5' 5\").    Weight as of this encounter: 112 kg (247 lb).  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  Plan: losartan (COZAAR) 50 MG tablet, Comprehensive         metabolic panel, Hemoglobin A1c, Lipid panel         reflex to direct LDL Fasting, TSH, CBC with         platelets            (Z00.00) Health maintenance examination  Comment:   Plan: Missed mammograms due to double mastectomy.  Colonoscopy is due this year.  Bone density can be done next year per oncology    (I51.7) Mild concentric left ventricular hypertrophy (LVH)  Comment:   Plan: She has no symptoms she is euvolemic    (Z12.11) Encounter for screening for malignant neoplasm of colon  Comment:   Plan: Colonoscopy Screening  Referral            (R79.9) Abnormal finding of blood chemistry, unspecified  Comment:   Plan: Hemoglobin A1c            (R63.4) Weight loss  Comment:   Plan: Has had known deliberate weight loss of about 30 pounds over the last 12 months.  But she feels very well she said she has been stressed because she has moved into senior housing.  But otherwise she is feels good she is eating 3 meals a day.  Her weight has been stable over the last 3 months.  Recommend follow-up visit in 3 months if she has still has weight loss will need further workup do labs today          COUNSELING:  Reviewed preventive health counseling, as reflected in patient instructions      Weight loss     BMI:   Estimated body mass index is 41.1 kg/m  as calculated from the following:    Height as of this encounter: 1.651 m (5' 5\").    Weight as of this encounter: 112 kg (247 lb).         She reports that she quit smoking about 41 years ago. Her smoking use included cigarettes. She has a 7.5 pack-year smoking history. She has " never used smokeless tobacco.      Appropriate preventive services were discussed with this patient, including applicable screening as appropriate for fall prevention, nutrition, physical activity, Tobacco-use cessation, weight loss and cognition.  Checklist reviewing preventive services available has been given to the patient.    Reviewed patients plan of care and provided an AVS. The Basic Care Plan (routine screening as documented in Health Maintenance) for Camilla meets the Care Plan requirement. This Care Plan has been established and reviewed with the Patient.        Berna Morel MD  [facility]    Identified Health Risks:  The patient was counseled and encouraged to consider modifying their diet and eating habits. She was provided with information on recommended healthy diet options.The patient was counseled and encouraged to consider modifying their diet and eating habits. She was provided with information on recommended healthy diet options.

## 2024-01-13 NOTE — TELEPHONE ENCOUNTER
DIAGNOSIS: Hx flat feet and new left foot pain, would like a work up, Sus[facility] Medicare Adv,     APPOINTMENT DATE: 1.15.24   NOTES STATUS DETAILS   OFFICE NOTE from referring provider Internal 1.5.24  Alexis  Oncology   MEDICATION LIST Internal    DEXA (osteoporosis/bone health) Internal 10.10.22, 12.12.20 + more

## 2024-01-15 ENCOUNTER — OFFICE VISIT (OUTPATIENT)
Dept: ORTHOPEDICS | Facility: CLINIC | Age: 74
End: 2024-01-15
Attending: STUDENT IN AN ORGANIZED HEALTH CARE EDUCATION/TRAINING PROGRAM
Payer: COMMERCIAL

## 2024-01-15 ENCOUNTER — PRE VISIT (OUTPATIENT)
Dept: ORTHOPEDICS | Facility: CLINIC | Age: 74
End: 2024-01-15

## 2024-01-15 DIAGNOSIS — M76.822 POSTERIOR TIBIAL TENDON DYSFUNCTION, LEFT: Primary | ICD-10-CM

## 2024-01-15 DIAGNOSIS — C50.112 MALIGNANT NEOPLASM OF CENTRAL PORTION OF LEFT BREAST IN FEMALE, ESTROGEN RECEPTOR POSITIVE (H): ICD-10-CM

## 2024-01-15 DIAGNOSIS — M21.41 PES PLANUS OF BOTH FEET: ICD-10-CM

## 2024-01-15 DIAGNOSIS — Z17.0 MALIGNANT NEOPLASM OF CENTRAL PORTION OF LEFT BREAST IN FEMALE, ESTROGEN RECEPTOR POSITIVE (H): ICD-10-CM

## 2024-01-15 DIAGNOSIS — M21.42 PES PLANUS OF BOTH FEET: ICD-10-CM

## 2024-01-15 PROCEDURE — 99213 OFFICE O/P EST LOW 20 MIN: CPT | Performed by: FAMILY MEDICINE

## 2024-01-15 NOTE — PROGRESS NOTES
Sports Medicine Clinic Visit    PCP: Berna Morel Steve is a 73 year old female who is seen  in consultation at the request of Dr. Espinoza presenting with left foot pain.    Injury: Patient was following a workout video 4 years ago when she went up on her toes and felt a pop in the bottom of her foot. She went to physical therapy at that time. She also notes that her foot has been caving in. She notes this the last 6 months. Her mother had severe flat foot deformity.    Location of Pain: left foot  Duration of Pain: 3-4 year(s)  Rating of Pain: 0/10  Pain is better with: Rest  Pain is worse with: Prolonged standing   Additional Features: flat foot deformity  Treatment so far consists of: Physical therapy   Prior History of related problems: No     LMP  (LMP Unknown)       Patient was seen by oncologist 1/5/2024, clinic note reviewed by me. She was doing well with no signs of recurrence of her cancer. She had switched to anastrozole in Dec 21.  Patient was referred at that visit to podiatry for bilateral flatfoot management.  Patient presents to sports medicine today to discuss this issue.  Pt longstanding h/o leg swelling with restless legs.   Previous history of compression sleeve use for bilateral lower extremity edema.    She has recently moved into an independent living facility and sold her home.     H/O bilateral breast carcinoma 7/2020 (bilateral: R lumpectomy-mucinous carcinoma treated with radiation,  R simple 9/20 and L mod rad +2 LN ER/AR + lobular, radiation and hormone tmt)         PMH:  Past Medical History:   Diagnosis Date    Breast cancer (H) 2000    Breast cyst     GERD (gastroesophageal reflux disease)     Hx of radiation therapy     Moderate persistent asthma without complication 8/14/2020    PONV (postoperative nausea and vomiting)     Uterine fibroid     embolized       Active problem list:  Patient Active Problem List   Diagnosis    Breast cancer (H)    Spinal Stenosis     Osteopenia    GERD (gastroesophageal reflux disease)    Skin cancer    Morbid obesity (H)    Malignant neoplasm of central portion of left breast in female, estrogen receptor positive (H)    Personal history of breast cancer    Health maintenance examination    Intramural, submucous, and subserous leiomyoma of uterus    Mucinous carcinoma of breast, right (H)    Post-mastectomy lymphedema syndrome    Leg swelling    Venous stasis of both lower extremities    Contracture of shoulder, right    Contracture of shoulder, left    Mild concentric left ventricular hypertrophy (LVH)    Essential hypertension       FH:  Family History   Problem Relation Age of Onset    Colon Cancer Maternal Aunt     Breast Cancer Maternal Aunt         40s    Kidney Disease Mother     Pneumonia Father     Hyperlipidemia Father     Cancer Cousin     Cancer Cousin        SH:  Social History     Socioeconomic History    Marital status: Single     Spouse name: Not on file    Number of children: Not on file    Years of education: Not on file    Highest education level: Not on file   Occupational History    Not on file   Tobacco Use    Smoking status: Former     Packs/day: 0.50     Years: 15.00     Additional pack years: 0.00     Total pack years: 7.50     Types: Cigarettes     Quit date: 1982     Years since quittin.7    Smokeless tobacco: Never   Vaping Use    Vaping Use: Never used   Substance and Sexual Activity    Alcohol use: Yes     Alcohol/week: 5.0 standard drinks of alcohol    Drug use: Never    Sexual activity: Not on file   Other Topics Concern    Not on file   Social History Narrative    Not on file     Social Determinants of Health     Financial Resource Strain: Low Risk  (2024)    Financial Resource Strain     Within the past 12 months, have you or your family members you live with been unable to get utilities (heat, electricity) when it was really needed?: No   Food Insecurity: Low Risk  (2024)    Food Insecurity      Within the past 12 months, did you worry that your food would run out before you got money to buy more?: No     Within the past 12 months, did the food you bought just not last and you didn t have money to get more?: No   Transportation Needs: Low Risk  (1/2/2024)    Transportation Needs     Within the past 12 months, has lack of transportation kept you from medical appointments, getting your medicines, non-medical meetings or appointments, work, or from getting things that you need?: No   Physical Activity: Not on file   Stress: Not on file   Social Connections: Not on file   Interpersonal Safety: Not on file   Housing Stability: Low Risk  (1/2/2024)    Housing Stability     Do you have housing? : Yes     Are you worried about losing your housing?: No       MEDS:  See EMR, reviewed  ALL:  See EMR, reviewed    REVIEW OF SYSTEMS:  CONSTITUTIONAL:NEGATIVE for fever, chills, change in weight  INTEGUMENTARY/SKIN: NEGATIVE for worrisome rashes, moles or lesions  EYES: NEGATIVE for vision changes or irritation  ENT/MOUTH: NEGATIVE for ear, mouth and throat problems  RESP:NEGATIVE for significant cough or SOB  BREAST: NEGATIVE for masses, tenderness or discharge  CV: NEGATIVE for chest pain, palpitations or peripheral edema  GI: NEGATIVE for nausea, abdominal pain, heartburn, or change in bowel habits  :NEGATIVE for frequency, dysuria, or hematuria  :NEGATIVE for frequency, dysuria, or hematuria  NEURO: NEGATIVE for weakness, dizziness or paresthesias  ENDOCRINE: NEGATIVE for temperature intolerance, skin/hair changes  HEME/ALLERGY/IMMUNE: NEGATIVE for bleeding problems  PSYCHIATRIC: NEGATIVE for changes in mood or affect          Objective: With weightbearing she has a prominent bilateral pes planus foot deformity although the symptomatic left foot does seem to be slightly more prominent and is pronation in the opposite foot.  She is mildly tender along the course of the posterior tibial tendon, nontender at the  Achilles tendon or peroneal tendon.  Nontender over the navicular.  Adequate range of motion of the great toe.  Sensation is intact distally.  Overlying skin is normal.  Appropriate conversation and affect.    Assessment: Posterior tibial tendon dysfunction, left with possible history of posterior tibial tendon disruption 4 years ago.  Pes planus of the bilateral feet    Plan: We discussed options including x-ray, MRI to evaluate the posterior tibial tendon, and surgical referral.  Patient is not interested in surgical options at this time.  She would like to maximize nonsurgical options first.  We agreed to have her see orthotics for a pair of custom made bilateral orthotics for her prominent pes planus foot.  She has a good supportive shoe.  She will follow-up if not improved.

## 2024-01-15 NOTE — LETTER
1/15/2024      RE: Camilla Wilson  [address] [place] [address] [address]  [place] [address]     Dear Colleague,    Thank you for referring your patient, Camilla Wilson, to the [facility] [place]. Please see a copy of my visit note below.    Sports Medicine Clinic Visit    PCP: Berna Morel    Camilla Wilson is a 73 year old female who is seen  in consultation at the request of Dr. Espinoza presenting with left foot pain.    Injury: Patient was following a workout video 4 years ago when she went up on her toes and felt a pop in the bottom of her foot. She went to physical therapy at that time. She also notes that her foot has been caving in. She notes this the last 6 months. Her mother had severe flat foot deformity.    Location of Pain: left foot  Duration of Pain: 3-4 year(s)  Rating of Pain: 0/10  Pain is better with: Rest  Pain is worse with: Prolonged standing   Additional Features: flat foot deformity  Treatment so far consists of: Physical therapy   Prior History of related problems: No     LMP  (LMP Unknown)       Patient was seen by oncologist 1/5/2024, clinic note reviewed by me. She was doing well with no signs of recurrence of her cancer. She had switched to anastrozole in Dec 21.  Patient was referred at that visit to podiatry for bilateral flatfoot management.  Patient presents to sports medicine today to discuss this issue.  Pt longstanding h/o leg swelling with restless legs.   Previous history of compression sleeve use for bilateral lower extremity edema.    She has recently moved into an independent living facility and sold her home.     H/O bilateral breast carcinoma 7/2020 (bilateral: R lumpectomy-mucinous carcinoma treated with radiation,  R simple 9/20 and L mod rad +2 LN ER/MO + lobular, radiation and hormone tmt)         PMH:  Past Medical History:   Diagnosis Date    Breast cancer (H) 2000    Breast cyst     GERD (gastroesophageal reflux disease)     Hx of  radiation therapy     Moderate persistent asthma without complication 2020    PONV (postoperative nausea and vomiting)     Uterine fibroid     embolized       Active problem list:  Patient Active Problem List   Diagnosis    Breast cancer (H)    Spinal Stenosis    Osteopenia    GERD (gastroesophageal reflux disease)    Skin cancer    Morbid obesity (H)    Malignant neoplasm of central portion of left breast in female, estrogen receptor positive (H)    Personal history of breast cancer    Health maintenance examination    Intramural, submucous, and subserous leiomyoma of uterus    Mucinous carcinoma of breast, right (H)    Post-mastectomy lymphedema syndrome    Leg swelling    Venous stasis of both lower extremities    Contracture of shoulder, right    Contracture of shoulder, left    Mild concentric left ventricular hypertrophy (LVH)    Essential hypertension       FH:  Family History   Problem Relation Age of Onset    Colon Cancer Maternal Aunt     Breast Cancer Maternal Aunt         40s    Kidney Disease Mother     Pneumonia Father     Hyperlipidemia Father     Cancer Cousin     Cancer Cousin        SH:  Social History     Socioeconomic History    Marital status: Single     Spouse name: Not on file    Number of children: Not on file    Years of education: Not on file    Highest education level: Not on file   Occupational History    Not on file   Tobacco Use    Smoking status: Former     Packs/day: 0.50     Years: 15.00     Additional pack years: 0.00     Total pack years: 7.50     Types: Cigarettes     Quit date: 1982     Years since quittin.7    Smokeless tobacco: Never   Vaping Use    Vaping Use: Never used   Substance and Sexual Activity    Alcohol use: Yes     Alcohol/week: 5.0 standard drinks of alcohol    Drug use: Never    Sexual activity: Not on file   Other Topics Concern    Not on file   Social History Narrative    Not on file     Social Determinants of Health     Financial Resource Strain:  Low Risk  (1/2/2024)    Financial Resource Strain     Within the past 12 months, have you or your family members you live with been unable to get utilities (heat, electricity) when it was really needed?: No   Food Insecurity: Low Risk  (1/2/2024)    Food Insecurity     Within the past 12 months, did you worry that your food would run out before you got money to buy more?: No     Within the past 12 months, did the food you bought just not last and you didn t have money to get more?: No   Transportation Needs: Low Risk  (1/2/2024)    Transportation Needs     Within the past 12 months, has lack of transportation kept you from medical appointments, getting your medicines, non-medical meetings or appointments, work, or from getting things that you need?: No   Physical Activity: Not on file   Stress: Not on file   Social Connections: Not on file   Interpersonal Safety: Not on file   Housing Stability: Low Risk  (1/2/2024)    Housing Stability     Do you have housing? : Yes     Are you worried about losing your housing?: No       MEDS:  See EMR, reviewed  ALL:  See EMR, reviewed    REVIEW OF SYSTEMS:  CONSTITUTIONAL:NEGATIVE for fever, chills, change in weight  INTEGUMENTARY/SKIN: NEGATIVE for worrisome rashes, moles or lesions  EYES: NEGATIVE for vision changes or irritation  ENT/MOUTH: NEGATIVE for ear, mouth and throat problems  RESP:NEGATIVE for significant cough or SOB  BREAST: NEGATIVE for masses, tenderness or discharge  CV: NEGATIVE for chest pain, palpitations or peripheral edema  GI: NEGATIVE for nausea, abdominal pain, heartburn, or change in bowel habits  :NEGATIVE for frequency, dysuria, or hematuria  :NEGATIVE for frequency, dysuria, or hematuria  NEURO: NEGATIVE for weakness, dizziness or paresthesias  ENDOCRINE: NEGATIVE for temperature intolerance, skin/hair changes  HEME/ALLERGY/IMMUNE: NEGATIVE for bleeding problems  PSYCHIATRIC: NEGATIVE for changes in mood or affect      Objective: With  weightbearing she has a prominent bilateral pes planus foot deformity although the symptomatic left foot does seem to be slightly more prominent and is pronation in the opposite foot.  She is mildly tender along the course of the posterior tibial tendon, nontender at the Achilles tendon or peroneal tendon.  Nontender over the navicular.  Adequate range of motion of the great toe.  Sensation is intact distally.  Overlying skin is normal.  Appropriate conversation and affect.    Assessment: Posterior tibial tendon dysfunction, left with possible history of posterior tibial tendon disruption 4 years ago.  Pes planus of the bilateral feet    Plan: We discussed options including x-ray, MRI to evaluate the posterior tibial tendon, and surgical referral.  Patient is not interested in surgical options at this time.  She would like to maximize nonsurgical options first.  We agreed to have her see orthotics for a pair of custom made bilateral orthotics for her prominent pes planus foot.  She has a good supportive shoe.  She will follow-up if not improved.      Again, thank you for allowing me to participate in the care of your patient.      Sincerely,    Malachi Wadsworth MD

## 2024-01-15 NOTE — PATIENT INSTRUCTIONS
Orthotics and Prosthetics    [place]  [facility]  [address]   [address]  [place] [address]  Phone [phone]  Fax: [phone]    [facility]  [address]  [place] [address]  Phone [phone]  Fax: [phone]    [facility]  [address]  [address]  [place] [address]  Phone [phone]  Fax: [phone]    [facility]  [address] [place] [address]  Phone [phone]  Fax: [phone]    [facility]  [address]  [address] (located in the Wound Clinic)  [place] [address]  Phone [phone]  Fax: [phone]    [facility]  (Please use [facility])  [address] [place] [address]  Phone [phone]  Fax: [phone]    [facility]  [address]  [address]  [place] [address]  Phone [phone]  Fax: [phone]    [facility]   [address] [place] [place]  Phone [phone]  Fax: [phone]    [facility]  [address]  [address]  [place] [address]  Phone [phone]  Fax: [phone]    [facility]  [address] [place] [address]  [address]  [place] [address]  Phone [phone]  Fax: [phone]    [facility]   [place]  [address] [place] [address]  Phone [phone]  Fax: [phone]

## 2024-01-19 ENCOUNTER — PATIENT OUTREACH (OUTPATIENT)
Dept: ONCOLOGY | Facility: CLINIC | Age: 74
End: 2024-01-19
Payer: COMMERCIAL

## 2024-01-19 NOTE — TELEPHONE ENCOUNTER
[facility]: Physical Medicine and Rehabilitation                                                                                   Follow up via telephone.   Patient has not yet received compression garments from 1/5/2024 visit with     She will call the [facility] [place] orthotics location  to schedule a fitting     Plan of Care Education Review:   Assessment completed with:: Patient    Plan of Care Education        Evaluation of Learning  Patient Education Provided: Yes  Readiness:: Acceptance  Method:: Explanation  Response:: Verbalizes understanding           Erica Benítez LPN  Oncology PM&R Care Coordination  [phone]

## 2024-01-25 ENCOUNTER — THERAPY VISIT (OUTPATIENT)
Dept: PHYSICAL THERAPY | Facility: CLINIC | Age: 74
End: 2024-01-25
Attending: STUDENT IN AN ORGANIZED HEALTH CARE EDUCATION/TRAINING PROGRAM
Payer: COMMERCIAL

## 2024-01-25 DIAGNOSIS — I89.0 LYMPHEDEMA: ICD-10-CM

## 2024-01-25 DIAGNOSIS — C50.112 MALIGNANT NEOPLASM OF CENTRAL PORTION OF LEFT BREAST IN FEMALE, ESTROGEN RECEPTOR POSITIVE (H): ICD-10-CM

## 2024-01-25 DIAGNOSIS — Z17.0 MALIGNANT NEOPLASM OF CENTRAL PORTION OF LEFT BREAST IN FEMALE, ESTROGEN RECEPTOR POSITIVE (H): ICD-10-CM

## 2024-01-25 DIAGNOSIS — M25.512 LEFT SHOULDER PAIN, UNSPECIFIED CHRONICITY: ICD-10-CM

## 2024-01-25 DIAGNOSIS — C50.911 MUCINOUS CARCINOMA OF BREAST, RIGHT (H): ICD-10-CM

## 2024-01-25 PROCEDURE — 97535 SELF CARE MNGMENT TRAINING: CPT | Mod: GP | Performed by: PHYSICAL THERAPIST

## 2024-01-25 PROCEDURE — 97110 THERAPEUTIC EXERCISES: CPT | Mod: GP | Performed by: PHYSICAL THERAPIST

## 2024-01-25 PROCEDURE — 97140 MANUAL THERAPY 1/> REGIONS: CPT | Mod: GP | Performed by: PHYSICAL THERAPIST

## 2024-01-25 PROCEDURE — 97161 PT EVAL LOW COMPLEX 20 MIN: CPT | Mod: GP | Performed by: PHYSICAL THERAPIST

## 2024-01-25 NOTE — PROGRESS NOTES
PHYSICAL THERAPY EVALUATION  Type of Visit: Evaluation    See electronic medical record for Abuse and Falls Screening details.    Subjective       Presenting condition or subjective complaint: fluid retention on left side of body due to radiation.  Been there forever, comes and goes over the last 4 years since radiation.    Date of onset: 09/15/20    Relevant medical history: Cancer; Menopause; Overweight; Radiation treatment   Dates & types of surgery: it's in the records.  mastectomy in 2020 R breast lumpectomy around 2000 with R side radiation, B mastectomy with L side radiation in 2020.      Prior therapy history for the same diagnosis, illness or injury: Yes lymphedema therapy in 2021 or 2022? mostly for arm swelling    Prior Level of Function  Transfers: Independent  Ambulation: Independent  ADL: Independent  IADL: Driving, Finances, Housekeeping, Laundry, Meal preparation, Medication management    Living Environment  Social support: Alone   Type of home: Apartment/condo   Stairs to enter the home: No       Ramp: No   Stairs inside the home: No       Help at home: Home management tasks (cooking, cleaning)  Equipment owned:       Employment: No    Hobbies/Interests: reading, swimming    Patient goals for therapy: move more easily    Pain assessment: Pain present in the L shoulder with IR at 90 degrees. She still can swim but isolated empty can test is painful.     Objective       EDEMA EVALUATION  Additional history:  Body part affected by edema:  L arm and lateral trunk  If cancer related, treatment:  radiation, SLNB  Sensation problems in hands/feet:    no  Edema etiology: Cancer with lymph node dissection, Radiation, Surgery   LIkley primary component as well since pt with edema in the legs and lipedema tissue presentation in arms with less hand involvement    FUNCTIONAL SCALES  LLIS: 10   FACIT: 45/52, pt reports that she hasn't had coffee or breakfast so that affected her answers    Cognitive Status  Examination  Orientation: Oriented to person, place and time   Level of Consciousness: Alert  Follows Commands and Answers Questions: 100% of the time  Personal Safety and Judgement: Intact  Memory: Intact    EDEMA  Skin Condition: Intact, Non-pitting in the L UQ.  Pt with history of LE edema but not assessed today, she wears compression stockings.  Pt with larger skin/tissue fold on the L trunk compared to right, feels this when swinging her arm. Tissue is soft. She and bra chest band permanent indentation/adhesion in the the tissue causing the fold and not allowing the tissue to just hang on trunk  Scar: Yes  Stemmer Sign: -  Ulceration: No    GIRTH MEASUREMENTS: Refer to separate girth measurement flowsheet.     VOLUME UE  Right UE (mL) 3301.7   Left UE (mL) 3556.09   UE Volume Comparison LUE volume greater than RUE volume   % Difference 7.7     RANGE OF MOTION:  Pt demonstrates functional shoulder ROM, flexion bilaterally >150.  Flexion with IR  causing pain radiating down the arm  STRENGTH: UE Strength WFL  POSTURE:  shoulders depressed and slightly forward  PALPATION: unremarkable, no pitting    Assessment & Plan   CLINICAL IMPRESSIONS  Medical Diagnosis: Malignant neoplasm of central portion of left breast in female, estrogen receptor positive (H)  Mucinous carcinoma of breast, right (H)  Left shoulder pain, unspecified chronicity  Lymphedema    Treatment Diagnosis: Lymphedema, shoulder pain   Impression/Assessment: Patient is a 73 year old female with edema and shoulder pain complaints.  The following significant findings have been identified: Pain, Edema, Decreased activity tolerance, and Impaired posture. These impairments interfere with their ability to perform household chores and risk of worsening  as compared to previous level of function.     Clinical Decision Making (Complexity):  Clinical Presentation: Stable/Uncomplicated  Clinical Presentation Rationale: based on medical and personal factors  listed in PT evaluation  Clinical Decision Making (Complexity): Low complexity    PLAN OF CARE  Treatment Interventions:  Interventions: Manual Therapy, Therapeutic Exercise, Self-Care/Home Management    Long Term Goals     PT Goal 1  Goal Identifier: HEP  Goal Description: Pt will be independent scapular stabilzation and posture exercises to decrease pain from impingemetn with reach during household and leisure activity.  Target Date: 03/07/24  PT Goal 2  Goal Identifier: Edema management  Goal Description: Pt will be independent with self massage, drainge exercise program, use of compression and skin care to decrease symptoms of lymphedem and prevent worsening condition.  Target Date: 03/07/24      Frequency of Treatment: 1x/week  Duration of Treatment: 6 weeks    Education Assessment:   Learner/Method: Patient;No Barriers to Learning;Demonstration;Pictures/Video  Education Comments: Plan, edema education, home program    Risks and benefits of evaluation/treatment have been explained.   Patient/Family/caregiver agrees with Plan of Care.     Evaluation Time:     PT Eval, Low Complexity Minutes (98364): 12     Signing Clinician: Yvette Ford, PT      [facility]                                                                                   OUTPATIENT PHYSICAL THERAPY      PLAN OF TREATMENT FOR OUTPATIENT REHABILITATION   Patient's Last Name, First Name, Camilla Louise YOB: 1950   Provider's Name   [facility]   Medical Record No.  [id number]     Onset Date: 09/15/20  Start of Care Date: 01/25/24     Medical Diagnosis:  Malignant neoplasm of central portion of left breast in female, estrogen receptor positive (H)  Mucinous carcinoma of breast, right (H)  Left shoulder pain, unspecified chronicity  Lymphedema      PT Treatment Diagnosis:  Lymphedema, shoulder pain Plan of Treatment  Frequency/Duration: 1x/week/ 6  weeks    Certification date from 01/25/24 to 03/07/24         See note for plan of treatment details and functional goals     Yvette Ford, PT                         I CERTIFY THE NEED FOR THESE SERVICES FURNISHED UNDER        THIS PLAN OF TREATMENT AND WHILE UNDER MY CARE     (Physician attestation of this document indicates review and certification of the therapy plan).              Referring Provider:  Susu Espinoza    Initial Assessment  See Epic Evaluation- Start of Care Date: 01/25/24

## 2024-01-29 ENCOUNTER — MYC REFILL (OUTPATIENT)
Dept: INTERNAL MEDICINE | Facility: CLINIC | Age: 74
End: 2024-01-29
Payer: COMMERCIAL

## 2024-01-29 DIAGNOSIS — J45.909 UNCOMPLICATED ASTHMA, UNSPECIFIED ASTHMA SEVERITY, UNSPECIFIED WHETHER PERSISTENT: ICD-10-CM

## 2024-01-29 RX ORDER — CETIRIZINE HYDROCHLORIDE 10 MG/1
TABLET ORAL
Qty: 90 TABLET | Refills: 4 | Status: SHIPPED | OUTPATIENT
Start: 2024-01-29 | End: 2024-08-29

## 2024-02-08 ENCOUNTER — THERAPY VISIT (OUTPATIENT)
Dept: PHYSICAL THERAPY | Facility: CLINIC | Age: 74
End: 2024-02-08
Attending: STUDENT IN AN ORGANIZED HEALTH CARE EDUCATION/TRAINING PROGRAM
Payer: COMMERCIAL

## 2024-02-08 DIAGNOSIS — I89.0 LYMPHEDEMA: ICD-10-CM

## 2024-02-08 DIAGNOSIS — Z17.0 MALIGNANT NEOPLASM OF CENTRAL PORTION OF LEFT BREAST IN FEMALE, ESTROGEN RECEPTOR POSITIVE (H): Primary | ICD-10-CM

## 2024-02-08 DIAGNOSIS — C50.112 MALIGNANT NEOPLASM OF CENTRAL PORTION OF LEFT BREAST IN FEMALE, ESTROGEN RECEPTOR POSITIVE (H): Primary | ICD-10-CM

## 2024-02-08 PROCEDURE — 97140 MANUAL THERAPY 1/> REGIONS: CPT | Mod: GP | Performed by: PHYSICAL THERAPIST

## 2024-02-08 PROCEDURE — 97535 SELF CARE MNGMENT TRAINING: CPT | Mod: GP | Performed by: PHYSICAL THERAPIST

## 2024-02-13 ENCOUNTER — PATIENT OUTREACH (OUTPATIENT)
Dept: ONCOLOGY | Facility: HOSPITAL | Age: 74
End: 2024-02-13
Payer: COMMERCIAL

## 2024-03-06 ENCOUNTER — E-VISIT (OUTPATIENT)
Dept: INTERNAL MEDICINE | Facility: CLINIC | Age: 74
End: 2024-03-06
Payer: COMMERCIAL

## 2024-03-06 DIAGNOSIS — R53.83 OTHER FATIGUE: Primary | ICD-10-CM

## 2024-03-06 PROCEDURE — 99421 OL DIG E/M SVC 5-10 MIN: CPT | Performed by: INTERNAL MEDICINE

## 2024-04-09 ENCOUNTER — OFFICE VISIT (OUTPATIENT)
Dept: INTERNAL MEDICINE | Facility: CLINIC | Age: 74
End: 2024-04-09
Payer: COMMERCIAL

## 2024-04-09 VITALS
HEART RATE: 63 BPM | BODY MASS INDEX: 41.75 KG/M2 | RESPIRATION RATE: 17 BRPM | WEIGHT: 250.6 LBS | SYSTOLIC BLOOD PRESSURE: 148 MMHG | HEIGHT: 65 IN | OXYGEN SATURATION: 99 % | TEMPERATURE: 98.3 F | DIASTOLIC BLOOD PRESSURE: 83 MMHG

## 2024-04-09 DIAGNOSIS — R63.4 WEIGHT LOSS: ICD-10-CM

## 2024-04-09 DIAGNOSIS — N18.31 STAGE 3A CHRONIC KIDNEY DISEASE (H): ICD-10-CM

## 2024-04-09 DIAGNOSIS — I10 ESSENTIAL HYPERTENSION: Primary | ICD-10-CM

## 2024-04-09 DIAGNOSIS — Z23 NEED FOR SHINGLES VACCINE: ICD-10-CM

## 2024-04-09 LAB
ALBUMIN UR-MCNC: NEGATIVE MG/DL
APPEARANCE UR: CLEAR
BACTERIA #/AREA URNS HPF: ABNORMAL /HPF
BILIRUB UR QL STRIP: NEGATIVE
COLOR UR AUTO: YELLOW
GLUCOSE UR STRIP-MCNC: NEGATIVE MG/DL
HGB UR QL STRIP: NEGATIVE
KETONES UR STRIP-MCNC: NEGATIVE MG/DL
LEUKOCYTE ESTERASE UR QL STRIP: ABNORMAL
NITRATE UR QL: NEGATIVE
PH UR STRIP: 7 [PH] (ref 5–8)
RBC #/AREA URNS AUTO: ABNORMAL /HPF
SP GR UR STRIP: 1.02 (ref 1–1.03)
SQUAMOUS #/AREA URNS AUTO: ABNORMAL /LPF
UROBILINOGEN UR STRIP-ACNC: 0.2 E.U./DL
WBC #/AREA URNS AUTO: ABNORMAL /HPF

## 2024-04-09 PROCEDURE — 90480 ADMN SARSCOV2 VAC 1/ONLY CMP: CPT | Performed by: INTERNAL MEDICINE

## 2024-04-09 PROCEDURE — 82570 ASSAY OF URINE CREATININE: CPT | Performed by: INTERNAL MEDICINE

## 2024-04-09 PROCEDURE — 91320 SARSCV2 VAC 30MCG TRS-SUC IM: CPT | Performed by: INTERNAL MEDICINE

## 2024-04-09 PROCEDURE — 99214 OFFICE O/P EST MOD 30 MIN: CPT | Performed by: INTERNAL MEDICINE

## 2024-04-09 PROCEDURE — G2211 COMPLEX E/M VISIT ADD ON: HCPCS | Performed by: INTERNAL MEDICINE

## 2024-04-09 PROCEDURE — 81001 URINALYSIS AUTO W/SCOPE: CPT | Performed by: INTERNAL MEDICINE

## 2024-04-09 PROCEDURE — 82043 UR ALBUMIN QUANTITATIVE: CPT | Performed by: INTERNAL MEDICINE

## 2024-04-09 RX ORDER — LOSARTAN POTASSIUM 100 MG/1
100 TABLET ORAL DAILY
Qty: 90 TABLET | Refills: 3 | Status: SHIPPED | OUTPATIENT
Start: 2024-04-09

## 2024-04-09 ASSESSMENT — PAIN SCALES - GENERAL: PAINLEVEL: MODERATE PAIN (5)

## 2024-04-09 NOTE — NURSING NOTE
Prior to immunization administration, verified patients identity using patient s name and date of birth. Please see Immunization Activity for additional information.     Screening Questionnaire for Adult Immunization    Are you sick today?   No   Do you have allergies to medications, food, a vaccine component or latex?   No   Have you ever had a serious reaction after receiving a vaccination?   No   Do you have a long-term health problem with heart, lung, kidney, or metabolic disease (e.g., diabetes), asthma, a blood disorder, no spleen, complement component deficiency, a cochlear implant, or a spinal fluid leak?  Are you on long-term aspirin therapy?   No   Do you have cancer, leukemia, HIV/AIDS, or any other immune system problem?   No   Do you have a parent, brother, or sister with an immune system problem?   No   In the past 3 months, have you taken medications that affect  your immune system, such as prednisone, other steroids, or anticancer drugs; drugs for the treatment of rheumatoid arthritis, Crohn s disease, or psoriasis; or have you had radiation treatments?   No   Have you had a seizure, or a brain or other nervous system problem?   No   During the past year, have you received a transfusion of blood or blood    products, or been given immune (gamma) globulin or antiviral drug?   No   For women: Are you pregnant or is there a chance you could become       pregnant during the next month?   No   Have you received any vaccinations in the past 4 weeks?   No     Immunization questionnaire answers were all negative.      Patient instructed to remain in clinic for 15 minutes afterwards, and to report any adverse reactions.     Screening performed by Sherita Bai CMA on 4/9/2024 at 12:42 PM.

## 2024-04-09 NOTE — PROGRESS NOTES
"  Assessment & Plan     Need for shingles vaccine    - zoster vaccine recombinant adjuvanted (SHINGRIX) injection; Inject 0.5 mLs into the muscle once for 1 dose Pharmacist administered    Weight loss  Weight has stabilized so will continue to monitor . She feels well     Stage 3a chronic kidney disease (H)  Creatinine   Date Value Ref Range Status   01/09/2024 1.08 (H) 0.51 - 0.95 mg/dL Final     Stable   - UA Macroscopic with reflex to Microscopic and Culture - Lab Collect; Future  - Albumin Random Urine Quantitative with Creat Ratio; Future  - UA Macroscopic with reflex to Microscopic and Culture - Lab Collect  - Albumin Random Urine Quantitative with Creat Ratio  - UA Microscopic with Reflex to Culture    Essential hypertension  Increase losartan to 100mg a day   - losartan (COZAAR) 100 MG tablet; Take 1 tablet (100 mg) by mouth daily            BMI  Estimated body mass index is 41.7 kg/m  as calculated from the following:    Height as of this encounter: 1.651 m (5' 5\").    Weight as of this encounter: 113.7 kg (250 lb 9.6 oz).             Des Sampson is a 74 year old, presenting for the following health issues: follow up BP and med check   Current Outpatient Medications   Medication Sig Dispense Refill    anastrozole (ARIMIDEX) 1 MG tablet Take 1 tablet (1 mg) by mouth daily 90 tablet 3    calcium citrate 250 mg calcium Tab [CALCIUM CITRATE 250 MG CALCIUM TAB] Take 500 mg by mouth daily.       cetirizine (ZYRTEC) 10 MG tablet Take 1 tablet (10 mg total) by mouth one time daily. Strength: 10 mg 90 tablet 4    cholecalciferol, vitamin D3, (VITAMIN D3) 2,000 unit cap [CHOLECALCIFEROL, VITAMIN D3, (VITAMIN D3) 2,000 UNIT CAP] Take 1 capsule by mouth daily.       COQ10, UBIQUINOL, ORAL Take 1 capsule by mouth daily      famotidine (FOR PEPCID) 10 MG tablet [FAMOTIDINE (FOR PEPCID) 10 MG TABLET] Take 10 mg by mouth daily.      losartan (COZAAR) 50 MG tablet Take 1 tablet (50 mg) by mouth daily 90 tablet 3 " "   melatonin 5 mg Tab tablet [MELATONIN 5 MG TAB TABLET] Take 5 mg by mouth at bedtime.      naproxen sodium 220 MG capsule Take 440 mg by mouth 2 times daily as needed      neomycin-polymyxin-dexamethasone (MAXITROL) 3.5-98490-4.1 ophthalmic ointment Place into both eyes as needed      niacin (SLO-NIACIN) 500 MG CR tablet Take 500 mg by mouth daily      peg 400-propylene glycol (SYSTANE) 0.4-0.3 % Drop [-PROPYLENE GLYCOL (SYSTANE) 0.4-0.3 % DROP] Administer 1 drop to both eyes 4 (four) times a day as needed.      sodium fluoride-pot nitrate (PREVIDENT 5000 SENSITIVE) 1.1-5 % Pste [SODIUM FLUORIDE-POT NITRATE (PREVIDENT 5000 SENSITIVE) 1.1-5 % PSTE] Take by mouth daily.       vit C,M-Kz-nozdt-lutein-zeaxan (OCUVITE LUTEIN & ZEAXANTHIN) 60 mg-30 unit- 15 mg-2 mg-6 mg cap [VIT C,F-JK-LHKFO-LUTEIN-ZEAXAN (OCUVITE LUTEIN & ZEAXANTHIN) 60 MG-30 UNIT- 15 MG-2 MG-6 MG CAP] Take 1 tablet by mouth daily.        No current facility-administered medications for this visit.       Recheck Medication and Follow Up (Regarding weight loss, bump on outer left thigh)        4/9/2024    11:58 AM   Additional Questions   Roomed by hermes ruiz     History of Present Illness       Reason for visit:  Weight loss    She eats 0-1 servings of fruits and vegetables daily.She consumes 0 sweetened beverage(s) daily.She exercises with enough effort to increase her heart rate 9 or less minutes per day.  She exercises with enough effort to increase her heart rate 3 or less days per week.   She is taking medications regularly.                     Objective    BP (!) 148/83 (BP Location: Left arm, Patient Position: Sitting, Cuff Size: Adult Large)   Pulse 63   Temp 98.3  F (36.8  C)   Resp 17   Ht 1.651 m (5' 5\")   Wt 113.7 kg (250 lb 9.6 oz)   LMP  (LMP Unknown)   SpO2 99%   BMI 41.70 kg/m    Body mass index is 41.7 kg/m .  Physical Exam   GENERAL: alert and no distress  NECK: no adenopathy, no asymmetry, masses, or scars  RESP: lungs " clear to auscultation - no rales, rhonchi or wheezes  CV: regular rate and rhythm, normal S1 S2, no S3 or S4, no murmur, click or rub, no peripheral edema  MS: no gross musculoskeletal defects noted, no edema            Signed Electronically by: Berna Morel MD

## 2024-04-10 LAB
CREAT UR-MCNC: 54.5 MG/DL
MICROALBUMIN UR-MCNC: <12 MG/L
MICROALBUMIN/CREAT UR: NORMAL MG/G{CREAT}

## 2024-04-22 ENCOUNTER — PATIENT OUTREACH (OUTPATIENT)
Dept: ONCOLOGY | Facility: HOSPITAL | Age: 74
End: 2024-04-22

## 2024-04-22 ENCOUNTER — ONCOLOGY VISIT (OUTPATIENT)
Dept: ONCOLOGY | Facility: HOSPITAL | Age: 74
End: 2024-04-22
Attending: NURSE PRACTITIONER
Payer: COMMERCIAL

## 2024-04-22 VITALS
WEIGHT: 250 LBS | SYSTOLIC BLOOD PRESSURE: 118 MMHG | OXYGEN SATURATION: 95 % | BODY MASS INDEX: 41.6 KG/M2 | RESPIRATION RATE: 16 BRPM | HEART RATE: 74 BPM | TEMPERATURE: 98.8 F | DIASTOLIC BLOOD PRESSURE: 62 MMHG

## 2024-04-22 DIAGNOSIS — Z17.0 MALIGNANT NEOPLASM OF CENTRAL PORTION OF LEFT BREAST IN FEMALE, ESTROGEN RECEPTOR POSITIVE (H): Primary | ICD-10-CM

## 2024-04-22 DIAGNOSIS — C50.112 MALIGNANT NEOPLASM OF CENTRAL PORTION OF LEFT BREAST IN FEMALE, ESTROGEN RECEPTOR POSITIVE (H): Primary | ICD-10-CM

## 2024-04-22 DIAGNOSIS — Z79.811 AROMATASE INHIBITOR USE: ICD-10-CM

## 2024-04-22 DIAGNOSIS — M85.89 OSTEOPENIA OF MULTIPLE SITES: ICD-10-CM

## 2024-04-22 PROCEDURE — G0463 HOSPITAL OUTPT CLINIC VISIT: HCPCS | Performed by: NURSE PRACTITIONER

## 2024-04-22 PROCEDURE — G2211 COMPLEX E/M VISIT ADD ON: HCPCS | Performed by: NURSE PRACTITIONER

## 2024-04-22 PROCEDURE — 99213 OFFICE O/P EST LOW 20 MIN: CPT | Performed by: NURSE PRACTITIONER

## 2024-04-22 ASSESSMENT — PAIN SCALES - GENERAL: PAINLEVEL: NO PAIN (0)

## 2024-04-22 NOTE — PROGRESS NOTES
[facility] Hematology and Oncology Progress Note    Patient: Camilla Wilson  MRN: [id number]  Date of Service: Apr 22, 2024          Reason for Visit    Chief Complaint   Patient presents with    Oncology Clinic Visit       Assessment and Plan     Cancer Staging   No matching staging information was found for the patient.      1. T3 N1 M0, stage IIIa left breast cancer status post bilateral mastectomies September 15, 2020. Tumor 6.5 cm, 2 lymph nodes positive, Grade 2, ER/ID positive, HER-2 negative, Oncotype DX recurrence score of 13. Pt had radiation which completed in January 2021. She is on arimidex. Had some arthralgias with exemestane so switched to arimidex. Doing fairly well. Will return in 6 months.      2. Osteopenia: risk for worsening bone density with the aromatase inhibitor.  She will continue calcium and vitamin D supplement. October 2022 DEXA was stable. Repeat every 2 years will be due in October 2024 at her next visit.   Encourage also to participate in weightbearing exercises. Good calcium in diet.      3. Previous breast cancer in right breast in 2000. S/p 5 years of exemestane.  Some questions about genetic testing which she would like to do.  She has been Jerad and will call for an appointment since she has had 2 breast cancers.    ECOG Performance    0 - Independent    Distress Screening (within last 30 days)    No data recorded     Pain  Pain Score: No Pain (0)    Problem List    Patient Active Problem List   Diagnosis    Breast cancer (H)    Spinal Stenosis    Osteopenia    GERD (gastroesophageal reflux disease)    Skin cancer    Morbid obesity (H)    Malignant neoplasm of central portion of left breast in female, estrogen receptor positive (H)    Personal history of breast cancer    Health maintenance examination    Intramural, submucous, and subserous leiomyoma of uterus    Mucinous carcinoma of breast, right (H)    Post-mastectomy lymphedema syndrome    Leg swelling    Venous  stasis of both lower extremities    Contracture of shoulder, right    Contracture of shoulder, left    Mild concentric left ventricular hypertrophy (LVH)    Essential hypertension    Lymphedema        ______________________________________________________________________________    History of Present Illness    Oncologist: Dr. Laguna    Diagnosis: Breast cancer, left breast.  Diagnosed in September 2020.  T3 N1 M0, stage stage IIIa.  ER positive, WI positive, HER2/vicki negative.  Oncotype score of 13.    Patient does also have a remote history of right breast cancer.  Patient is status postlumpectomy, radiation and 5 years of Exemestane.     Treatment:   Bilateral mastectomy 9/14/2020  Exemestane started around 10/23/2020  Adjuvant radiation started 12/4/2020  Switched to anastrozole in September 2021 due to intolerance to exemestane.      Interim History: pt is here today for follow up visit. She is doing well. Some joint achiness. Denies changes to chest wall.  Denies any unexplained weight loss.  Denies any new bone or back pain.  States that she is excited to be done with the hormonal therapy but overall is tolerating it well.    Review of Systems    Pertinent items are noted in HPI.    Past History    Past Medical History:   Diagnosis Date    Breast cancer (H) 2000    Breast cyst     GERD (gastroesophageal reflux disease)     Hx of radiation therapy     Moderate persistent asthma without complication 8/14/2020    PONV (postoperative nausea and vomiting)     Uterine fibroid     embolized       PHYSICAL EXAM  /62 (BP Location: Right arm, Patient Position: Sitting)   Pulse 74   Temp 98.8  F (37.1  C) (Oral)   Resp 16   Wt 113.4 kg (250 lb)   LMP  (LMP Unknown)   SpO2 95%   BMI 41.60 kg/m      GENERAL: no acute distress. Cooperative in conversation. Here alone.   RESP: Regular respiratory rate. No expiratory wheezes   MUSCULOSKELETAL: no bilateral leg swelling  NEURO: non focal. Alert and oriented  x3.   PSYCH: within normal limits. No depression or anxiety.  SKIN: exposed skin is dry intact.   BREAST: s/p mastectomies. No local recurrence. Does have some excess skin ,left greater than right.     Lab Results    No results found for this or any previous visit (from the past 168 hour(s)).    Imaging    No results found.    The longitudinal plan of care for the diagnosis(es)/condition(s) as documented were addressed during this visit. Due to the added complexity in care, I will continue to support Camilla in the subsequent management and with ongoing continuity of care.      Signed by: JOEY Saenz CNP

## 2024-04-22 NOTE — PROGRESS NOTES
"Oncology Rooming Note    04/22/24 11:21 AM     Camilla Wilson is a 74 year old female who presents for:    Chief Complaint   Patient presents with    Oncology Clinic Visit       Initial Vitals: /62 (BP Location: Right arm, Patient Position: Sitting)   Pulse 74   Temp 98.8  F (37.1  C) (Oral)   Resp 16   Wt 113.4 kg (250 lb)   LMP  (LMP Unknown)   SpO2 95%   BMI 41.60 kg/m   Estimated body mass index is 41.6 kg/m  as calculated from the following:    Height as of 4/9/24: 1.651 m (5' 5\").    Weight as of this encounter: 113.4 kg (250 lb). Body surface area is 2.28 meters squared.    No Pain (0) Comment: Data Unavailable     No LMP recorded (lmp unknown). Patient is postmenopausal.    Allergies reviewed: Yes  Medications reviewed: Yes    Medications: Medication refills not needed today.  Pharmacy name entered into [employer]: [id number] [place], [place] - [address]    Frailty Screening:   Is the patient here for a new oncology consult visit in cancer care? 2. No    Clinical concerns:  no concerns    Accompanied by: self    Deja Odom RN Care Coordinator  [facility]        "

## 2024-04-22 NOTE — LETTER
"    4/22/2024         RE: Camilla Wilson  [address]  [place] [address]        Dear Colleague,    Thank you for referring your patient, Camilla Wilson, to the [facility]. Please see a copy of my visit note below.    Oncology Rooming Note    04/22/24 11:21 AM     Camilla Wilson is a 74 year old female who presents for:    Chief Complaint   Patient presents with     Oncology Clinic Visit       Initial Vitals: /62 (BP Location: Right arm, Patient Position: Sitting)   Pulse 74   Temp 98.8  F (37.1  C) (Oral)   Resp 16   Wt 113.4 kg (250 lb)   LMP  (LMP Unknown)   SpO2 95%   BMI 41.60 kg/m   Estimated body mass index is 41.6 kg/m  as calculated from the following:    Height as of 4/9/24: 1.651 m (5' 5\").    Weight as of this encounter: 113.4 kg (250 lb). Body surface area is 2.28 meters squared.    No Pain (0) Comment: Data Unavailable     No LMP recorded (lmp unknown). Patient is postmenopausal.    Allergies reviewed: Yes  Medications reviewed: Yes    Medications: Medication refills not needed today.  Pharmacy name entered into [employer]: [id number] - [place], [place] - [address]    Frailty Screening:   Is the patient here for a new oncology consult visit in cancer care? 2. No    Clinical concerns:  no concerns    Accompanied by: self    Deja Odom  RN Care Coordinator  [facility] Hematology and Oncology Progress Note    Patient: Camilla Wilson  MRN: [id number]  Date of Service: Apr 22, 2024          Reason for Visit    Chief Complaint   Patient presents with     Oncology Clinic Visit       Assessment and Plan     Cancer Staging   No matching staging information was found for the patient.      1. T3 N1 M0, stage IIIa left breast cancer status post bilateral mastectomies September 15, 2020. Tumor 6.5 cm, 2 lymph nodes positive, Grade 2, ER/AR positive, HER-2 negative, " Oncotype DX recurrence score of 13. Pt had radiation which completed in January 2021. She is on arimidex. Had some arthralgias with exemestane so switched to arimidex. Doing fairly well. Will return in 6 months.      2. Osteopenia: risk for worsening bone density with the aromatase inhibitor.  She will continue calcium and vitamin D supplement. October 2022 DEXA was stable. Repeat every 2 years will be due in October 2024 at her next visit.   Encourage also to participate in weightbearing exercises. Good calcium in diet.      3. Previous breast cancer in right breast in 2000. S/p 5 years of exemestane.  Some questions about genetic testing which she would like to do.  She has been Jerad and will call for an appointment since she has had 2 breast cancers.    ECOG Performance    0 - Independent    Distress Screening (within last 30 days)    No data recorded     Pain  Pain Score: No Pain (0)    Problem List    Patient Active Problem List   Diagnosis     Breast cancer (H)     Spinal Stenosis     Osteopenia     GERD (gastroesophageal reflux disease)     Skin cancer     Morbid obesity (H)     Malignant neoplasm of central portion of left breast in female, estrogen receptor positive (H)     Personal history of breast cancer     Health maintenance examination     Intramural, submucous, and subserous leiomyoma of uterus     Mucinous carcinoma of breast, right (H)     Post-mastectomy lymphedema syndrome     Leg swelling     Venous stasis of both lower extremities     Contracture of shoulder, right     Contracture of shoulder, left     Mild concentric left ventricular hypertrophy (LVH)     Essential hypertension     Lymphedema        ______________________________________________________________________________    History of Present Illness    Oncologist: Dr. Laguna    Diagnosis: Breast cancer, left breast.  Diagnosed in September 2020.  T3 N1 M0, stage stage IIIa.  ER positive, MI positive, HER2/vicki negative.  Oncotype  score of 13.    Patient does also have a remote history of right breast cancer.  Patient is status postlumpectomy, radiation and 5 years of Exemestane.     Treatment:   Bilateral mastectomy 9/14/2020  Exemestane started around 10/23/2020  Adjuvant radiation started 12/4/2020  Switched to anastrozole in September 2021 due to intolerance to exemestane.      Interim History: pt is here today for follow up visit. She is doing well. Some joint achiness. Denies changes to chest wall.  Denies any unexplained weight loss.  Denies any new bone or back pain.  States that she is excited to be done with the hormonal therapy but overall is tolerating it well.    Review of Systems    Pertinent items are noted in HPI.    Past History    Past Medical History:   Diagnosis Date     Breast cancer (H) 2000     Breast cyst      GERD (gastroesophageal reflux disease)      Hx of radiation therapy      Moderate persistent asthma without complication 8/14/2020     PONV (postoperative nausea and vomiting)      Uterine fibroid     embolized       PHYSICAL EXAM  /62 (BP Location: Right arm, Patient Position: Sitting)   Pulse 74   Temp 98.8  F (37.1  C) (Oral)   Resp 16   Wt 113.4 kg (250 lb)   LMP  (LMP Unknown)   SpO2 95%   BMI 41.60 kg/m      GENERAL: no acute distress. Cooperative in conversation. Here alone.   RESP: Regular respiratory rate. No expiratory wheezes   MUSCULOSKELETAL: no bilateral leg swelling  NEURO: non focal. Alert and oriented x3.   PSYCH: within normal limits. No depression or anxiety.  SKIN: exposed skin is dry intact.   BREAST: s/p mastectomies. No local recurrence. Does have some excess skin ,left greater than right.     Lab Results    No results found for this or any previous visit (from the past 168 hour(s)).    Imaging    No results found.    The longitudinal plan of care for the diagnosis(es)/condition(s) as documented were addressed during this visit. Due to the added complexity in care, I will  continue to support Camilla in the subsequent management and with ongoing continuity of care.      Signed by: JOEY Saenz CNP      Again, thank you for allowing me to participate in the care of your patient.        Sincerely,        JOEY Saenz CNP

## 2024-05-09 ENCOUNTER — ALLIED HEALTH/NURSE VISIT (OUTPATIENT)
Dept: FAMILY MEDICINE | Facility: CLINIC | Age: 74
End: 2024-05-09
Payer: COMMERCIAL

## 2024-05-09 VITALS — SYSTOLIC BLOOD PRESSURE: 118 MMHG | DIASTOLIC BLOOD PRESSURE: 74 MMHG

## 2024-05-09 DIAGNOSIS — I10 ESSENTIAL HYPERTENSION: Primary | ICD-10-CM

## 2024-05-09 PROCEDURE — 99207 PR NO CHARGE NURSE ONLY: CPT

## 2024-05-09 NOTE — PROGRESS NOTES
I met with Camilla Wilson at the request of Dr. Morel to recheck her blood pressure.  Blood pressure medications on the med list were reviewed with patient.    Patient has taken all medications as per usual regimen: Yes  Patient reports tolerating them without any issues or concerns: Yes    Vitals:    05/09/24 1424   BP: 118/74   BP Location: Right arm   Patient Position: Sitting   Cuff Size: Adult Large       Blood pressure was taken, previous encounter was reviewed, recorded blood pressure below 140/90.  Patient was discharged and the note will be sent to the provider for final review.

## 2024-05-28 ENCOUNTER — E-VISIT (OUTPATIENT)
Dept: INTERNAL MEDICINE | Facility: CLINIC | Age: 74
End: 2024-05-28
Payer: COMMERCIAL

## 2024-05-28 DIAGNOSIS — J30.9 CHRONIC ALLERGIC RHINITIS: Primary | ICD-10-CM

## 2024-05-28 PROCEDURE — 99421 OL DIG E/M SVC 5-10 MIN: CPT | Performed by: INTERNAL MEDICINE

## 2024-05-28 RX ORDER — FLUTICASONE PROPIONATE 50 MCG
1 SPRAY, SUSPENSION (ML) NASAL DAILY
Qty: 16 G | Refills: 3 | Status: SHIPPED | OUTPATIENT
Start: 2024-05-28

## 2024-06-19 NOTE — PROGRESS NOTES
DISCHARGE  Reason for Discharge: Patient has met all goals.    Equipment Issued: home program, recommended to see fitter if no improvement with home progrma    Discharge Plan: Patient to continue home program.    Referring Provider:  Susu Espinoza     02/08/24 0500   Appointment Info   Signing clinician's name / credentials Yvette Ford, PT,CLT-ANGELA   Visits Used 2   Medical Diagnosis Malignant neoplasm of central portion of left breast in female, estrogen receptor positive (H)  Mucinous carcinoma of breast, right (H)  Left shoulder pain, unspecified chronicity  Lymphedema   PT Tx Diagnosis Lymphedema, shoulder pain   Quick Adds Certification   Progress Note/Certification   Start of Care Date 01/25/24   Onset of illness/injury or Date of Surgery 09/15/20   Therapy Frequency 1x/week   Predicted Duration 6 weeks   Certification date from 01/25/24   Certification date to 03/07/24   Progress Note Completed Date 01/25/24   PT Goal 1   Goal Identifier HEP   Goal Description Pt will be independent scapular stabilzation and posture exercises to decrease pain from impingement with reach during household and leisure activity.   Target Date 03/07/24   Date Met 02/08/24   PT Goal 2   Goal Identifier Edema management   Goal Description Pt will be independent with self massage, drainge exercise program, use of compression and skin care to decrease symptoms of lymphedem and prevent worsening condition.   Target Date 03/07/24   Date Met 02/08/24   Subjective Report   Subjective Report Pt admits to not doing all the exercises, has been busy with lots of appts but will get on them. Swelling not worse, not really changed.   Objective Measure 1   Objective Measure UE Volume   Details discrepency only 7.2% with current plan of L night garment only   Objective Measure 2   Objective Measure Trunk   Details assessment of edema and tissue mobility, pt really is moving pretty well, some tightness in the muscles noted but besides  littlel puffy area above the L chest incision, no over edema noted.   Manual Therapy   Manual Therapy: Mobilization, MFR, MLD, friction massage minutes (22604) 30   Manual Therapy 1 MLD and soft tissue   Manual Therapy 1 - Details Pt donned gown for assessment of tissue. As noted above, soft non-pitting tissue pocket above incision on the left.  B lateral trunk assessed in sitting from back.  Both sides have been radiated but pt is healed well, tissue mobility is good, more tissue noted on Left but not denser in feet. No pitting.  MLD is completed by CLT with focus of L side clearing. Pt educated on clearing to the L axilla and then directing down to the inguinal nodes but that axilla still is a good drainage pathway as shown with MLD imaging studies.  Pt tender in the L pec and also very tender in subscap. Passive pec stretch completed Superficial tissue mobility glides completed and the work deeper into muscles with pec and subscap bending.  TIssue mobility for trunk also completed sidelying.  Overall pt feeling well after mobilization.   Skilled Intervention MLD and tissue mobilization due to shoulder pain and forward posture.   Self Care/home Management   ADL/Home Mgmt Training (88546) 10   Self Care 1 compression   Self Care 1 - Details Pt did not see fitter, she felt that she needed to try some other things first for the trunk like therapists suggesting of a tighter fitting tank or shirt to just hold the tissue stable but not strong compression. She reports having bras to try. Pt educated to make sure the bras have full coverage high up into the armpit and also low enought to prevent loose tissue irritation. Pt doing okay with velcro night garments. She will make fitting appt in future if needed, knows where to go.   Skilled Intervention compression education   Patient Response/Progress Pt will try   Plan   Home program MLD, drainage and shoulder ex   Updates to plan of care leave open until June   Plan for  next session assess needs   Total Session Time   Timed Code Treatment Minutes 40   Total Treatment Time (sum of timed and untimed services) 40

## 2024-08-08 NOTE — PROGRESS NOTES
Arm Swelling Follow Up     Date of Service: January 5, 2024    Date last seen on clinic : 1/5/2024    PCP: Berna Morel MD     Impression:    L arm swelling- stable  Bilateral leg swelling-stable   Secondary post mastectomy lymphedema  Bilateral shoulder contractures-left continues  Venous stasis with hypertension in the legs bilaterally  Bilateral breast carcinoma (bilateral: R lumpectomy-mucinous carcinoma treated with radiation,  R simple 9/20 and L mod rad +2 LN ER/AZ + lobular, radiation and hormone)  Obesity   Left shoulder pain         Plan:   Questions were answered.  We discussed that since the edema has been stable and has not worsened we can trial being off the compression sleeve as patient has not been wearing it consistently for a while. Patient was instructed to start wearing the sleeve again regularly if she notices increased heaviness or early signs of swelling in the arm.  Continue home exercise regimen including MLD techniques and stretches daily, increase frequency.  Completed lymphedema therapy, we can consider sending a referral again if needed  No need for new garments at this time per the patient, we can always send the order if neded.  Follow up: 1 year.      On day of encounter time spent in chart review and with patient in consultation, exam, education and coordination of care, excluding procedures:  50 minutes          ---------------------------------------------------------------------------------------------------------------------     History of Present Illness:   Camilla Wilson returns to the [facility] for left arm swelling due to post mastectomy lymphedema after being diagnosed 7/22/20 with left breast lobular carcinoma ER/AZ + Her2 - stage IIIa,  treated with bilateral mastectomies 9/15/20, (R simple and L mod rad with +2 positive LN) complicated by previous right breast mucinous cancer many years ago treated with lumpectomy, radiation  and hormones. She sees Dr. Beckman (radiation) and Dr. Laguna (oncology). She also had long standing leg swelling with restless legs.  Dr. Cantor sent her to therapy for work on the chest wall fibrosis and for the leg swelling which helped.  Bilateral compression sleeves were ordered with review on wearing indications.  She was to continue wearing her compression daily. She was doing well with no signs of recurrence of her cancer. She has switched to anastrozole in Dec 21.    At follow up today, patient states that  Her axillary areas and arm lymphedema is stable. Regarding compression she is not using the sleeves due to the weather and feeling hot. She lives in and independent living facility and is doing the massaging that she remembers for lymphedema although not consistently. The swelling has been stable and has not worsened. The patient's clothes are fitting and has had no issues with this.     Has finished therapies a while ago, and has no desire to go back at this time unless absolutely needed. the weather and her aromatase inhibiter made the sleeve uncomfortable due to the heat so she has decided not to wear it  she notes the edema has been stable, and has not worsened a bit per her report.Has been doing some hand cyling (stationary and doing some band exercises about 3 times a week. Some gym machinery such as adductor's machine. Doing sit to stand exercises. (Classes at least 2 of those a week in the place where she lives). Has a chest compression garment as well that she is not using.     Her lymphedema has remained constant, overall. And wishes to try being off the compression garments.          Past Medical History:    Past Medical History:   Diagnosis Date    Breast cancer (H) 2000    Breast cyst     GERD (gastroesophageal reflux disease)     Hx of radiation therapy     Moderate persistent asthma without complication 8/14/2020    PONV (postoperative nausea and vomiting)     Uterine fibroid      embolized        Surgical History:   Past Surgical History:   Procedure Laterality Date    BIOPSY BREAST Left     EXCISE BREAST CYST/FIBROADENOMA/TUMOR/DUCT LESION/NIPPLE LESION/AREOLAR LESION      Description: Breast Surgery Lumpectomy;  Recorded: 12/18/2007;     SHLDR ARTHROSCOP,SURG,W/REMOVAL,LOOSE/FB      Description: Shoulder Arthroscopy;  Recorded: 02/25/2009;  Comments: right 2004; left 2007    IR MISCELLANEOUS PROCEDURE  9/26/2000    IR MISCELLANEOUS PROCEDURE  9/26/2000    IR MISCELLANEOUS PROCEDURE  9/26/2000    IR PELVIC ANGIOGRAM  9/26/2000    IR PELVIC ANGIOGRAM  9/26/2000    LUMPECTOMY BREAST Right 2007    LUMPECTOMY BREAST Left 8/21/2020    Procedure: Left Lumpectomy after Wire Localization: [place] Lymph Node Biopsy;  Surgeon: Margarita Cook MD;  Location: [facility];  Service: General    MASTECTOMY Bilateral 09/14/2020    Dr. Cook    PA EMBOLIZATION UTERINE FIBROID      Description: Uterine Fibroid Embolization;  Proc Date: 09/06/2000;    PA MASTECTOMY, SIMPLE, COMPLETE Bilateral 9/14/2020    Procedure: Bilateral Mastectomies;  Surgeon: Margarita Cook MD;  Location: [facility];  Service: General    US BREAST CORE BIOPSY LEFT Left 7/22/2020    US BREAST LOC W SENT NODE INJ LEFT Left 8/21/2020        Medications:    Current Outpatient Medications   Medication Sig Dispense Refill    anastrozole (ARIMIDEX) 1 MG tablet Take 1 tablet (1 mg) by mouth daily 90 tablet 3    calcium citrate 250 mg calcium Tab [CALCIUM CITRATE 250 MG CALCIUM TAB] Take 500 mg by mouth daily.       cetirizine (ZYRTEC) 10 MG tablet Take 1 tablet (10 mg total) by mouth one time daily. Strength: 10 mg 90 tablet 4    cholecalciferol, vitamin D3, (VITAMIN D3) 2,000 unit cap [CHOLECALCIFEROL, VITAMIN D3, (VITAMIN D3) 2,000 UNIT CAP] Take 1 capsule by mouth daily.       COQ10, UBIQUINOL, ORAL Take 1 capsule by mouth daily      famotidine (FOR PEPCID) 10 MG tablet [FAMOTIDINE (FOR PEPCID) 10 MG TABLET] Take 10 mg  by mouth daily.      fluticasone (FLONASE) 50 MCG/ACT nasal spray Spray 1 spray into both nostrils daily 16 g 3    losartan (COZAAR) 100 MG tablet Take 1 tablet (100 mg) by mouth daily 90 tablet 3    melatonin 5 mg Tab tablet [MELATONIN 5 MG TAB TABLET] Take 5 mg by mouth at bedtime.      naproxen sodium 220 MG capsule Take 440 mg by mouth 2 times daily as needed      neomycin-polymyxin-dexamethasone (MAXITROL) 3.5-76285-3.1 ophthalmic ointment Place into both eyes as needed      niacin (SLO-NIACIN) 500 MG CR tablet Take 500 mg by mouth daily      peg 400-propylene glycol (SYSTANE) 0.4-0.3 % Drop [-PROPYLENE GLYCOL (SYSTANE) 0.4-0.3 % DROP] Administer 1 drop to both eyes 4 (four) times a day as needed.      sodium fluoride-pot nitrate (PREVIDENT 5000 SENSITIVE) 1.1-5 % Pste [SODIUM FLUORIDE-POT NITRATE (PREVIDENT 5000 SENSITIVE) 1.1-5 % PSTE] Take by mouth daily.       vit C,O-Gj-sylvv-lutein-zeaxan (OCUVITE LUTEIN & ZEAXANTHIN) 60 mg-30 unit- 15 mg-2 mg-6 mg cap [VIT C,K-EG-FRLAW-LUTEIN-ZEAXAN (OCUVITE LUTEIN & ZEAXANTHIN) 60 MG-30 UNIT- 15 MG-2 MG-6 MG CAP] Take 1 tablet by mouth daily.        No current facility-administered medications for this visit.        Allergies:    Allergies   Allergen Reactions    Atorvastatin Unknown     Memory  Impairment        Family history:   Family History   Problem Relation Age of Onset    Colon Cancer Maternal Aunt     Breast Cancer Maternal Aunt         40s    Kidney Disease Mother     Pneumonia Father     Hyperlipidemia Father     Cancer Cousin     Cancer Cousin         Social History:   Social History     Tobacco Use    Smoking status: Former     Current packs/day: 0.00     Average packs/day: 0.5 packs/day for 15.0 years (7.5 ttl pk-yrs)     Types: Cigarettes     Start date: 1967     Quit date: 1982     Years since quittin.2    Smokeless tobacco: Never   Vaping Use    Vaping status: Never Used   Substance Use Topics    Alcohol use: Yes     Alcohol/week:  "5.0 standard drinks of alcohol    Drug use: Never          Review of Systems:     ROS negative except as noted in HPI.     Labs:      I personally reviewed the following lab results today and those on care everywhere, if indicated     C-Reactive Protein High Sensitivity   Date Value Ref Range Status   11/22/2021 0.8 0.0 - 3.0 mg/L Final     Comment:     Low risk < 1.0 mg/L  Average risk 1.0 to 3.0 mg/L  High risk > 3.0 mg/L  Acute inflamation > 10.0 mg/L      No results found for: \"SED\"   Last Renal Panel:  Sodium   Date Value Ref Range Status   01/09/2024 139 135 - 145 mmol/L Final     Comment:     Reference intervals for this test were updated on 09/26/2023 to more accurately reflect our healthy population. There may be differences in the flagging of prior results with similar values performed with this method. Interpretation of those prior results can be made in the context of the updated reference intervals.      Potassium   Date Value Ref Range Status   01/09/2024 4.4 3.4 - 5.3 mmol/L Final   11/22/2021 4.2 3.5 - 5.0 mmol/L Final     Chloride   Date Value Ref Range Status   01/09/2024 105 98 - 107 mmol/L Final   11/22/2021 106 98 - 107 mmol/L Final     Carbon Dioxide (CO2)   Date Value Ref Range Status   01/09/2024 25 22 - 29 mmol/L Final   11/22/2021 26 22 - 31 mmol/L Final     Anion Gap   Date Value Ref Range Status   01/09/2024 9 7 - 15 mmol/L Final   11/22/2021 9 5 - 18 mmol/L Final     Glucose   Date Value Ref Range Status   01/09/2024 99 70 - 99 mg/dL Final   11/22/2021 99 70 - 125 mg/dL Final     Urea Nitrogen   Date Value Ref Range Status   01/09/2024 19.1 8.0 - 23.0 mg/dL Final   11/22/2021 16 8 - 28 mg/dL Final     Creatinine   Date Value Ref Range Status   01/09/2024 1.08 (H) 0.51 - 0.95 mg/dL Final     GFR Estimate   Date Value Ref Range Status   01/09/2024 53 (L) >60 mL/min/1.73m2 Final   11/19/2020 >60 >60 mL/min/1.73m2 Final     Calcium   Date Value Ref Range Status   01/09/2024 9.8 8.8 - 10.2 " mg/dL Final     Albumin   Date Value Ref Range Status   01/09/2024 4.0 3.5 - 5.2 g/dL Final   11/22/2021 3.7 3.5 - 5.0 g/dL Final      Lab Results   Component Value Date    WBC 5.9 08/14/2020     Lab Results   Component Value Date    RBC 4.03 08/14/2020     Lab Results   Component Value Date    HGB 12.9 08/14/2020     Lab Results   Component Value Date    HCT 39.3 08/14/2020     No components found for: MCT  Lab Results   Component Value Date    MCV 98 08/14/2020     Lab Results   Component Value Date    MCH 32.0 08/14/2020     Lab Results   Component Value Date    MCHC 32.8 08/14/2020     Lab Results   Component Value Date    RDW 13.3 08/14/2020     Lab Results   Component Value Date     08/14/2020      Lab Results   Component Value Date    A1C 5.8 11/22/2021    A1C 5.4 11/19/2020    A1C 5.6 03/16/2016      TSH   Date Value Ref Range Status   01/09/2024 2.72 0.30 - 4.20 uIU/mL Final   11/22/2021 1.75 0.30 - 5.00 uIU/mL Final      Lab Results   Component Value Date    VITDT 52 11/22/2021         Imaging:     I personally reviewed the following imaging results today and those on care everywhere, if indicated     No results found for this visit on 03/24/22.        Physical Exam:     Vital Signs: LMP  (LMP Unknown)  There is no height or weight on file to calculate BMI.   Wt Readings from Last 2 Encounters:   04/22/24 113.4 kg (250 lb)   04/09/24 113.7 kg (250 lb 9.6 oz)        Circumferential volume measures:        10/15/2020    11:00 AM 2/11/2021    10:00 AM 3/24/2022    10:00 AM 12/1/2022    11:00 AM   Circumferential Measures   Right-just above MCP 19.3 19.3 20.8 18.6   Right Wrist 19 18.2 18 16.4   Right Up 10cm 28.3 27.5 25.2 26   Right Up 10cm From Elbow 43.8 44 46 43.1   Left-just above MCP 18.6 19.3 21.2 18.9   Left Wrist 19 18.8 19.2 17   Left Up 10cm 28.5 28 28.7 27.6   Left Up 10cm From Elbow 44.3 41.5 47.3 44.3   Right just above MTP 22.4 22.5 22.7 21.2   Right Ankle 32 26.7 26.5 25.3   Right  Widest Calf 48.8 49.5 50.8 48.9   Right Thigh Up 10cm 64      Right Knee to Ankle 33      Left - just above MTP 22.2 23 22.8 21.5   Left Ankle 31.5 27.5 27 26.3   Left Widest Calf 51.3 52.5 52.3 50.8   Left Thigh Up 10cm 64.5      Left Knee to Ankle 32          General:  74 y.o. female in no apparent distress.         Psych: Alert and oriented x 3.  Cooperative. Affect normal.     HEENT: Atraumatic, normocephalic     Left shoulder exam:  No obvious deformity, swelling, warmth or erythema on exam.  Some limitation with range of motion with shoulder abduction.       Sensation: Intact to light touch in the upper and lower extremities bilaterally.      Moving all 4 extremities actively against gravity., B.L upper extremities 5/5 to shoulder abduction, elbow flexion and extension, .        Vascular: No unusual venous distention.      Skin: No unusual rubor, calor, masses or rashes along the skin in either arm or in either leg.  No significant fibrosity or scarring.  No pain to palpation.    stable swelling in left axilla and left inferior upper arm on exam today as comparing with the last visit. Has steverson sign at mcp joint.        Patient seen and discussed with my attending Dr Alexis Merino MD   Resident Physician PGY-3  Physical Medicine and Rehabilitation  08/09/2024 11:38 AM     Physician Attestation   I, Susu Espinoza MD, saw this patient and agree with the findings and plan of care as documented in the note.      Items personally reviewed/procedural attestation: vitals, labs, and imaging and agree with the interpretation documented in the note.    Susu Espinoza MD  Physical Medicine & Rehabilitation

## 2024-08-09 ENCOUNTER — ONCOLOGY VISIT (OUTPATIENT)
Dept: ONCOLOGY | Facility: CLINIC | Age: 74
End: 2024-08-09
Attending: STUDENT IN AN ORGANIZED HEALTH CARE EDUCATION/TRAINING PROGRAM
Payer: COMMERCIAL

## 2024-08-09 VITALS
OXYGEN SATURATION: 97 % | RESPIRATION RATE: 16 BRPM | DIASTOLIC BLOOD PRESSURE: 87 MMHG | SYSTOLIC BLOOD PRESSURE: 146 MMHG | HEART RATE: 78 BPM | WEIGHT: 254 LBS | TEMPERATURE: 98.3 F | BODY MASS INDEX: 42.27 KG/M2

## 2024-08-09 DIAGNOSIS — C50.112 MALIGNANT NEOPLASM OF CENTRAL PORTION OF LEFT BREAST IN FEMALE, ESTROGEN RECEPTOR POSITIVE (H): Primary | ICD-10-CM

## 2024-08-09 DIAGNOSIS — M25.512 LEFT SHOULDER PAIN, UNSPECIFIED CHRONICITY: ICD-10-CM

## 2024-08-09 DIAGNOSIS — Z79.811 AROMATASE INHIBITOR USE: ICD-10-CM

## 2024-08-09 DIAGNOSIS — Z17.0 MALIGNANT NEOPLASM OF CENTRAL PORTION OF LEFT BREAST IN FEMALE, ESTROGEN RECEPTOR POSITIVE (H): Primary | ICD-10-CM

## 2024-08-09 DIAGNOSIS — I89.0 LYMPHEDEMA: ICD-10-CM

## 2024-08-09 PROCEDURE — G0463 HOSPITAL OUTPT CLINIC VISIT: HCPCS | Performed by: STUDENT IN AN ORGANIZED HEALTH CARE EDUCATION/TRAINING PROGRAM

## 2024-08-09 PROCEDURE — 99213 OFFICE O/P EST LOW 20 MIN: CPT | Mod: GC | Performed by: STUDENT IN AN ORGANIZED HEALTH CARE EDUCATION/TRAINING PROGRAM

## 2024-08-09 ASSESSMENT — PAIN SCALES - GENERAL: PAINLEVEL: NO PAIN (0)

## 2024-08-09 NOTE — LETTER
8/9/2024      Camilla Wilson  [address] [place] [address] [address]  [place] [address]      Dear Colleague,    Thank you for referring your patient, Camilla Wilson, to the [facility]. Please see a copy of my visit note below.        Arm Swelling Follow Up     Date of Service: January 5, 2024    Date last seen on clinic : 1/5/2024    PCP: Berna Morel MD     Impression:    L arm swelling- stable  Bilateral leg swelling-stable   Secondary post mastectomy lymphedema  Bilateral shoulder contractures-left continues  Venous stasis with hypertension in the legs bilaterally  Bilateral breast carcinoma (bilateral: R lumpectomy-mucinous carcinoma treated with radiation,  R simple 9/20 and L mod rad +2 LN ER/RI + lobular, radiation and hormone)  Obesity   Left shoulder pain         Plan:   Questions were answered.  We discussed that since the edema has been stable and has not worsened we can trial being off the compression sleeve as patient has not been wearing it consistently for a while. Patient was instructed to start wearing the sleeve again regularly if she notices increased heaviness or early signs of swelling in the arm.  Continue home exercise regimen including MLD techniques and stretches daily, increase frequency.  Completed lymphedema therapy, we can consider sending a referral again if needed  No need for new garments at this time per the patient, we can always send the order if neded.  Follow up: 1 year.      On day of encounter time spent in chart review and with patient in consultation, exam, education and coordination of care, excluding procedures:  50 minutes          ---------------------------------------------------------------------------------------------------------------------     History of Present Illness:   Camilla Wilson returns to the [facility] for left arm swelling due to post mastectomy lymphedema after being diagnosed  7/22/20 with left breast lobular carcinoma ER/DE + Her2 - stage IIIa,  treated with bilateral mastectomies 9/15/20, (R simple and L mod rad with +2 positive LN) complicated by previous right breast mucinous cancer many years ago treated with lumpectomy, radiation and hormones. She sees Dr. Beckman (radiation) and Dr. Laguna (oncology). She also had long standing leg swelling with restless legs.  Dr. Cantor sent her to therapy for work on the chest wall fibrosis and for the leg swelling which helped.  Bilateral compression sleeves were ordered with review on wearing indications.  She was to continue wearing her compression daily. She was doing well with no signs of recurrence of her cancer. She has switched to anastrozole in Dec 21.    At follow up today, patient states that  Her axillary areas and arm lymphedema is stable. Regarding compression she is not using the sleeves due to the weather and feeling hot. She lives in and independent living facility and is doing the massaging that she remembers for lymphedema although not consistently. The swelling has been stable and has not worsened. The patient's clothes are fitting and has had no issues with this.     Has finished therapies a while ago, and has no desire to go back at this time unless absolutely needed. the weather and her aromatase inhibiter made the sleeve uncomfortable due to the heat so she has decided not to wear it  she notes the edema has been stable, and has not worsened a bit per her report.Has been doing some hand cyling (stationary and doing some band exercises about 3 times a week. Some gym machinery such as adductor's machine. Doing sit to stand exercises. (Classes at least 2 of those a week in the place where she lives). Has a chest compression garment as well that she is not using.     Her lymphedema has remained constant, overall. And wishes to try being off the compression garments.          Past Medical History:    Past Medical History:    Diagnosis Date     Breast cancer (H) 2000     Breast cyst      GERD (gastroesophageal reflux disease)      Hx of radiation therapy      Moderate persistent asthma without complication 8/14/2020     PONV (postoperative nausea and vomiting)      Uterine fibroid     embolized        Surgical History:   Past Surgical History:   Procedure Laterality Date     BIOPSY BREAST Left      EXCISE BREAST CYST/FIBROADENOMA/TUMOR/DUCT LESION/NIPPLE LESION/AREOLAR LESION      Description: Breast Surgery Lumpectomy;  Recorded: 12/18/2007;     HC SHLDR ARTHROSCOP,SURG,W/REMOVAL,LOOSE/FB      Description: Shoulder Arthroscopy;  Recorded: 02/25/2009;  Comments: right 2004; left 2007     IR MISCELLANEOUS PROCEDURE  9/26/2000     IR MISCELLANEOUS PROCEDURE  9/26/2000     IR MISCELLANEOUS PROCEDURE  9/26/2000     IR PELVIC ANGIOGRAM  9/26/2000     IR PELVIC ANGIOGRAM  9/26/2000     LUMPECTOMY BREAST Right 2007     LUMPECTOMY BREAST Left 8/21/2020    Procedure: Left Lumpectomy after Wire Localization: [place] Lymph Node Biopsy;  Surgeon: Margarita Cook MD;  Location: [facility];  Service: General     MASTECTOMY Bilateral 09/14/2020    Dr. Cook     NM EMBOLIZATION UTERINE FIBROID      Description: Uterine Fibroid Embolization;  Proc Date: 09/06/2000;     NM MASTECTOMY, SIMPLE, COMPLETE Bilateral 9/14/2020    Procedure: Bilateral Mastectomies;  Surgeon: Margarita Cook MD;  Location: [facility];  Service: General     US BREAST CORE BIOPSY LEFT Left 7/22/2020      BREAST LOC W SENT NODE INJ LEFT Left 8/21/2020        Medications:    Current Outpatient Medications   Medication Sig Dispense Refill     anastrozole (ARIMIDEX) 1 MG tablet Take 1 tablet (1 mg) by mouth daily 90 tablet 3     calcium citrate 250 mg calcium Tab [CALCIUM CITRATE 250 MG CALCIUM TAB] Take 500 mg by mouth daily.        cetirizine (ZYRTEC) 10 MG tablet Take 1 tablet (10 mg total) by mouth one time daily. Strength: 10 mg 90 tablet 4      cholecalciferol, vitamin D3, (VITAMIN D3) 2,000 unit cap [CHOLECALCIFEROL, VITAMIN D3, (VITAMIN D3) 2,000 UNIT CAP] Take 1 capsule by mouth daily.        COQ10, UBIQUINOL, ORAL Take 1 capsule by mouth daily       famotidine (FOR PEPCID) 10 MG tablet [FAMOTIDINE (FOR PEPCID) 10 MG TABLET] Take 10 mg by mouth daily.       fluticasone (FLONASE) 50 MCG/ACT nasal spray Spray 1 spray into both nostrils daily 16 g 3     losartan (COZAAR) 100 MG tablet Take 1 tablet (100 mg) by mouth daily 90 tablet 3     melatonin 5 mg Tab tablet [MELATONIN 5 MG TAB TABLET] Take 5 mg by mouth at bedtime.       naproxen sodium 220 MG capsule Take 440 mg by mouth 2 times daily as needed       neomycin-polymyxin-dexamethasone (MAXITROL) 3.5-37024-5.1 ophthalmic ointment Place into both eyes as needed       niacin (SLO-NIACIN) 500 MG CR tablet Take 500 mg by mouth daily       peg 400-propylene glycol (SYSTANE) 0.4-0.3 % Drop [-PROPYLENE GLYCOL (SYSTANE) 0.4-0.3 % DROP] Administer 1 drop to both eyes 4 (four) times a day as needed.       sodium fluoride-pot nitrate (PREVIDENT 5000 SENSITIVE) 1.1-5 % Pste [SODIUM FLUORIDE-POT NITRATE (PREVIDENT 5000 SENSITIVE) 1.1-5 % PSTE] Take by mouth daily.        vit C,L-Iw-rhxey-lutein-zeaxan (OCUVITE LUTEIN & ZEAXANTHIN) 60 mg-30 unit- 15 mg-2 mg-6 mg cap [VIT C,V-LL-QDJKO-LUTEIN-ZEAXAN (OCUVITE LUTEIN & ZEAXANTHIN) 60 MG-30 UNIT- 15 MG-2 MG-6 MG CAP] Take 1 tablet by mouth daily.        No current facility-administered medications for this visit.        Allergies:    Allergies   Allergen Reactions     Atorvastatin Unknown     Memory  Impairment        Family history:   Family History   Problem Relation Age of Onset     Colon Cancer Maternal Aunt      Breast Cancer Maternal Aunt         40s     Kidney Disease Mother      Pneumonia Father      Hyperlipidemia Father      Cancer Cousin      Cancer Cousin         Social History:   Social History     Tobacco Use     Smoking status: Former     Current  "packs/day: 0.00     Average packs/day: 0.5 packs/day for 15.0 years (7.5 ttl pk-yrs)     Types: Cigarettes     Start date: 1967     Quit date: 1982     Years since quittin.2     Smokeless tobacco: Never   Vaping Use     Vaping status: Never Used   Substance Use Topics     Alcohol use: Yes     Alcohol/week: 5.0 standard drinks of alcohol     Drug use: Never          Review of Systems:     ROS negative except as noted in HPI.     Labs:      I personally reviewed the following lab results today and those on care everywhere, if indicated     C-Reactive Protein High Sensitivity   Date Value Ref Range Status   2021 0.8 0.0 - 3.0 mg/L Final     Comment:     Low risk < 1.0 mg/L  Average risk 1.0 to 3.0 mg/L  High risk > 3.0 mg/L  Acute inflamation > 10.0 mg/L      No results found for: \"SED\"   Last Renal Panel:  Sodium   Date Value Ref Range Status   2024 139 135 - 145 mmol/L Final     Comment:     Reference intervals for this test were updated on 2023 to more accurately reflect our healthy population. There may be differences in the flagging of prior results with similar values performed with this method. Interpretation of those prior results can be made in the context of the updated reference intervals.      Potassium   Date Value Ref Range Status   2024 4.4 3.4 - 5.3 mmol/L Final   2021 4.2 3.5 - 5.0 mmol/L Final     Chloride   Date Value Ref Range Status   2024 105 98 - 107 mmol/L Final   2021 106 98 - 107 mmol/L Final     Carbon Dioxide (CO2)   Date Value Ref Range Status   2024 25 22 - 29 mmol/L Final   2021 26 22 - 31 mmol/L Final     Anion Gap   Date Value Ref Range Status   2024 9 7 - 15 mmol/L Final   2021 9 5 - 18 mmol/L Final     Glucose   Date Value Ref Range Status   2024 99 70 - 99 mg/dL Final   2021 99 70 - 125 mg/dL Final     Urea Nitrogen   Date Value Ref Range Status   2024 19.1 8.0 - 23.0 mg/dL Final "   11/22/2021 16 8 - 28 mg/dL Final     Creatinine   Date Value Ref Range Status   01/09/2024 1.08 (H) 0.51 - 0.95 mg/dL Final     GFR Estimate   Date Value Ref Range Status   01/09/2024 53 (L) >60 mL/min/1.73m2 Final   11/19/2020 >60 >60 mL/min/1.73m2 Final     Calcium   Date Value Ref Range Status   01/09/2024 9.8 8.8 - 10.2 mg/dL Final     Albumin   Date Value Ref Range Status   01/09/2024 4.0 3.5 - 5.2 g/dL Final   11/22/2021 3.7 3.5 - 5.0 g/dL Final      Lab Results   Component Value Date    WBC 5.9 08/14/2020     Lab Results   Component Value Date    RBC 4.03 08/14/2020     Lab Results   Component Value Date    HGB 12.9 08/14/2020     Lab Results   Component Value Date    HCT 39.3 08/14/2020     No components found for: MCT  Lab Results   Component Value Date    MCV 98 08/14/2020     Lab Results   Component Value Date    MCH 32.0 08/14/2020     Lab Results   Component Value Date    MCHC 32.8 08/14/2020     Lab Results   Component Value Date    RDW 13.3 08/14/2020     Lab Results   Component Value Date     08/14/2020      Lab Results   Component Value Date    A1C 5.8 11/22/2021    A1C 5.4 11/19/2020    A1C 5.6 03/16/2016      TSH   Date Value Ref Range Status   01/09/2024 2.72 0.30 - 4.20 uIU/mL Final   11/22/2021 1.75 0.30 - 5.00 uIU/mL Final      Lab Results   Component Value Date    VITDT 52 11/22/2021         Imaging:     I personally reviewed the following imaging results today and those on care everywhere, if indicated     No results found for this visit on 03/24/22.        Physical Exam:     Vital Signs: LMP  (LMP Unknown)  There is no height or weight on file to calculate BMI.   Wt Readings from Last 2 Encounters:   04/22/24 113.4 kg (250 lb)   04/09/24 113.7 kg (250 lb 9.6 oz)        Circumferential volume measures:        10/15/2020    11:00 AM 2/11/2021    10:00 AM 3/24/2022    10:00 AM 12/1/2022    11:00 AM   Circumferential Measures   Right-just above MCP 19.3 19.3 20.8 18.6   Right Wrist 19  18.2 18 16.4   Right Up 10cm 28.3 27.5 25.2 26   Right Up 10cm From Elbow 43.8 44 46 43.1   Left-just above MCP 18.6 19.3 21.2 18.9   Left Wrist 19 18.8 19.2 17   Left Up 10cm 28.5 28 28.7 27.6   Left Up 10cm From Elbow 44.3 41.5 47.3 44.3   Right just above MTP 22.4 22.5 22.7 21.2   Right Ankle 32 26.7 26.5 25.3   Right Widest Calf 48.8 49.5 50.8 48.9   Right Thigh Up 10cm 64      Right Knee to Ankle 33      Left - just above MTP 22.2 23 22.8 21.5   Left Ankle 31.5 27.5 27 26.3   Left Widest Calf 51.3 52.5 52.3 50.8   Left Thigh Up 10cm 64.5      Left Knee to Ankle 32          General:  74 y.o. female in no apparent distress.         Psych: Alert and oriented x 3.  Cooperative. Affect normal.     HEENT: Atraumatic, normocephalic     Left shoulder exam:  No obvious deformity, swelling, warmth or erythema on exam.  Some limitation with range of motion with shoulder abduction.       Sensation: Intact to light touch in the upper and lower extremities bilaterally.      Moving all 4 extremities actively against gravity., B.L upper extremities 5/5 to shoulder abduction, elbow flexion and extension, .        Vascular: No unusual venous distention.      Skin: No unusual rubor, calor, masses or rashes along the skin in either arm or in either leg.  No significant fibrosity or scarring.  No pain to palpation.    stable swelling in left axilla and left inferior upper arm on exam today as comparing with the last visit. Has steverson sign at mcp joint.        Patient seen and discussed with my attending Dr Alexis Merino MD   Resident Physician PGY-3  Physical Medicine and Rehabilitation  08/09/2024 11:38 AM     Physician Attestation  I, Susu Espinoza MD, saw this patient and agree with the findings and plan of care as documented in the note.      Items personally reviewed/procedural attestation: vitals, labs, and imaging and agree with the interpretation documented in the note.    Susu Espinoza  MD  Physical Medicine & Rehabilitation         Again, thank you for allowing me to participate in the care of your patient.        Sincerely,        Susu Espinoza MD

## 2024-08-09 NOTE — PATIENT INSTRUCTIONS
It was a pleasure to see you today:    We discussed:    1) You can trial being off the compression garments for upper extremity lymphedema, if you develop any heaviness or early signs of swelling in that arm, start using your sleeve again regularly when up and active during the day. Let Dr. Espinoza know and we can get you back in with lymphedema therapy if needed.    2) Follow up with Dr. Espinoza in 1 year.

## 2024-08-09 NOTE — NURSING NOTE
"Oncology Rooming Note    August 9, 2024 11:02 AM   Camilla Wilson is a 74 year old female who presents for:    Chief Complaint   Patient presents with    Oncology Clinic Visit     Malignant neoplasm of central portion of left breast in female, estrogen receptor positive      Initial Vitals: BP (!) 146/87   Pulse 78   Temp 98.3  F (36.8  C)   Resp 16   Wt 115.2 kg (254 lb)   LMP  (LMP Unknown)   SpO2 97%   BMI 42.27 kg/m   Estimated body mass index is 42.27 kg/m  as calculated from the following:    Height as of 4/9/24: 1.651 m (5' 5\").    Weight as of this encounter: 115.2 kg (254 lb). Body surface area is 2.3 meters squared.  No Pain (0) Comment: Data Unavailable   No LMP recorded (lmp unknown). Patient is postmenopausal.  Allergies reviewed: Yes  Medications reviewed: Yes    Medications: Medication refills not needed today.  Pharmacy name entered into [facility]: [id number] [place], [place] - [address]    Frailty Screening:   Is the patient here for a new oncology consult visit in cancer care? 2. No      Clinical concerns: no other complaints      Oliver Farrell"

## 2024-08-29 ENCOUNTER — VIRTUAL VISIT (OUTPATIENT)
Dept: URGENT CARE | Facility: CLINIC | Age: 74
End: 2024-08-29
Payer: COMMERCIAL

## 2024-08-29 ENCOUNTER — APPOINTMENT (OUTPATIENT)
Dept: LAB | Facility: CLINIC | Age: 74
End: 2024-08-29
Attending: PHYSICIAN ASSISTANT
Payer: COMMERCIAL

## 2024-08-29 DIAGNOSIS — R53.81 MALAISE: Primary | ICD-10-CM

## 2024-08-29 PROCEDURE — 99212 OFFICE O/P EST SF 10 MIN: CPT | Mod: 95

## 2024-08-29 PROCEDURE — 87635 SARS-COV-2 COVID-19 AMP PRB: CPT | Mod: 95 | Performed by: PHYSICIAN ASSISTANT

## 2024-08-29 RX ORDER — CARBOXYMETHYLCELLULOSE SODIUM 5 MG/ML
1 SOLUTION/ DROPS OPHTHALMIC 2 TIMES DAILY
COMMUNITY

## 2024-08-29 RX ORDER — FEXOFENADINE HCL 60 MG/1
60 TABLET, FILM COATED ORAL 2 TIMES DAILY
COMMUNITY

## 2024-08-29 NOTE — PROGRESS NOTES
Assessment & Plan     Malaise  - Symptomatic COVID-19 Virus (Coronavirus) by PCR    Malaise with subjective fever and fatigue yesterday, feeling improved today.  Recommend clinic COVID test.  Fluids and rest today.  Follow-up to see if symptoms significantly worsen or fail to improve.    Return in about 1 week (around 9/5/2024) for visit with primary care provider if not improving.     Kalpana Alva PA-C  [facility]    Subjective   Camilla Wilson is a 74 year old who presents for the following health issues    HPI    Camilla presents video for evaluation of malaise.  [place] and on the phone around 8 PM.  She states that she felt terrible.  She felt feverish with bodyaches and went right to bed.  She woke up this morning with a temperature of 99.7 and temp came down to 98.7F later in the morning.  She is generally feeling pretty well today.  Home COVID test today was negative.    Review of Systems   ROS negative except as stated above.      Objective    Physical Exam   GENERAL: Healthy, alert and no distress  EYES: Eyes grossly normal to inspection.  No discharge or erythema, or obvious scleral/conjunctival abnormalities.  HENT: Normal cephalic/atraumatic.  External ears, nose and mouth without ulcers or lesions.  No nasal drainage visible.  RESP: No audible cough wheeze or visible cyanosis.  No visible retractions or increased work of breathing.    SKIN: Visible skin clear. No significant rash, abnormal pigmentation or lesions.  NEURO: Cranial nerves grossly intact.  Mentation and speech appropriate for age.  PSYCH: Mentation appears normal, affect normal/bright, judgement and insight intact, normal speech and appearance well-groomed.    Type of service:  Video Visit  Video Start Time: 12:03PM  Video End Time: 12:15PM  Originating Location (pt. Location): Home  Distant Location (provider location):  [facility]- offsite at home,  [place] [place]  Platform used for Video Visit: Mora    No results found for any visits on 08/29/24.

## 2024-08-29 NOTE — PATIENT INSTRUCTIONS
Stay home and away from others until your symptoms are improving AND you have been fever free for 24 hours.    From the CDC guidance:  When you go back to your normal activities, take added precaution over the next 5 days, such as taking additional steps for  air, hygiene, masks, physical distancing, and/or testing when you will be around other people indoors.  Keep in mind that you may still be able to spread the virus that made you sick, even if you are feeling better. You are likely to be less contagious at this time, depending on factors like how long you were sick or how sick you were.  If you develop a fever or you start to feel worse after you have gone back to normal activities, stay home and away from others again until, for at least 24 hours, both are true: your symptoms are improving overall, and you have not had a fever (and are not using fever-reducing medication). Then take added precaution for the next 5 days.    Check O2 levels. If your number stays at 90 or below at rest, go to the emergency room.    Visit the CDC websites for more information and most up to date guidelines:  www.cdc.gov/respiratory-viruses/prevention/precautions-when-sick.html    Free COVID tests from the government at: https://sayyeshometest.org/   https://www.[employer]/paxcess

## 2024-08-30 ENCOUNTER — OFFICE VISIT (OUTPATIENT)
Dept: URGENT CARE | Facility: URGENT CARE | Age: 74
End: 2024-08-30
Payer: COMMERCIAL

## 2024-08-30 VITALS
DIASTOLIC BLOOD PRESSURE: 53 MMHG | HEIGHT: 65 IN | HEART RATE: 108 BPM | RESPIRATION RATE: 17 BRPM | TEMPERATURE: 98.4 F | WEIGHT: 254 LBS | SYSTOLIC BLOOD PRESSURE: 85 MMHG | OXYGEN SATURATION: 95 % | BODY MASS INDEX: 42.32 KG/M2

## 2024-08-30 DIAGNOSIS — L03.818 CELLULITIS OF OTHER SPECIFIED SITE: Primary | ICD-10-CM

## 2024-08-30 LAB — SARS-COV-2 RNA RESP QL NAA+PROBE: NEGATIVE

## 2024-08-30 PROCEDURE — 99213 OFFICE O/P EST LOW 20 MIN: CPT | Performed by: FAMILY MEDICINE

## 2024-09-10 ENCOUNTER — TRANSFERRED RECORDS (OUTPATIENT)
Dept: HEALTH INFORMATION MANAGEMENT | Facility: CLINIC | Age: 74
End: 2024-09-10
Payer: COMMERCIAL

## 2024-10-15 ENCOUNTER — HOSPITAL ENCOUNTER (OUTPATIENT)
Dept: BONE DENSITY | Facility: HOSPITAL | Age: 74
Discharge: HOME | End: 2024-10-15
Attending: NURSE PRACTITIONER | Admitting: NURSE PRACTITIONER
Payer: COMMERCIAL

## 2024-10-15 DIAGNOSIS — Z79.811 AROMATASE INHIBITOR USE: ICD-10-CM

## 2024-10-15 DIAGNOSIS — M85.89 OSTEOPENIA OF MULTIPLE SITES: ICD-10-CM

## 2024-10-15 DIAGNOSIS — C50.112 MALIGNANT NEOPLASM OF CENTRAL PORTION OF LEFT BREAST IN FEMALE, ESTROGEN RECEPTOR POSITIVE (H): ICD-10-CM

## 2024-10-15 DIAGNOSIS — Z17.0 MALIGNANT NEOPLASM OF CENTRAL PORTION OF LEFT BREAST IN FEMALE, ESTROGEN RECEPTOR POSITIVE (H): ICD-10-CM

## 2024-10-15 PROCEDURE — 77080 DXA BONE DENSITY AXIAL: CPT

## 2024-10-22 ENCOUNTER — PATIENT OUTREACH (OUTPATIENT)
Dept: ONCOLOGY | Facility: HOSPITAL | Age: 74
End: 2024-10-22

## 2024-10-22 ENCOUNTER — HOSPITAL ENCOUNTER (OUTPATIENT)
Dept: RADIOLOGY | Facility: HOSPITAL | Age: 74
Discharge: HOME | End: 2024-10-22
Attending: NURSE PRACTITIONER
Payer: COMMERCIAL

## 2024-10-22 ENCOUNTER — ONCOLOGY VISIT (OUTPATIENT)
Dept: ONCOLOGY | Facility: HOSPITAL | Age: 74
End: 2024-10-22
Attending: NURSE PRACTITIONER
Payer: COMMERCIAL

## 2024-10-22 VITALS
TEMPERATURE: 97.2 F | OXYGEN SATURATION: 98 % | RESPIRATION RATE: 18 BRPM | HEART RATE: 76 BPM | SYSTOLIC BLOOD PRESSURE: 132 MMHG | DIASTOLIC BLOOD PRESSURE: 63 MMHG | HEIGHT: 65 IN | BODY MASS INDEX: 41.32 KG/M2 | WEIGHT: 248 LBS

## 2024-10-22 DIAGNOSIS — M79.672 LEFT FOOT PAIN: ICD-10-CM

## 2024-10-22 DIAGNOSIS — M79.672 LEFT FOOT PAIN: Primary | ICD-10-CM

## 2024-10-22 PROCEDURE — 73630 X-RAY EXAM OF FOOT: CPT | Mod: LT

## 2024-10-22 PROCEDURE — G0463 HOSPITAL OUTPT CLINIC VISIT: HCPCS | Performed by: NURSE PRACTITIONER

## 2024-10-22 PROCEDURE — 99214 OFFICE O/P EST MOD 30 MIN: CPT | Performed by: NURSE PRACTITIONER

## 2024-10-22 PROCEDURE — G2211 COMPLEX E/M VISIT ADD ON: HCPCS | Performed by: NURSE PRACTITIONER

## 2024-10-22 ASSESSMENT — PAIN SCALES - GENERAL: PAINLEVEL: SEVERE PAIN (7)

## 2024-10-22 NOTE — PROGRESS NOTES
[facility] Hematology and Oncology Progress Note    Patient: Camilla Wilson  MRN: [id number]  Date of Service: Oct 22, 2024          Reason for Visit    Chief Complaint   Patient presents with    Oncology Clinic Visit     6 month follow up with prior DEXA review       Assessment and Plan     Cancer Staging   No matching staging information was found for the patient.      1. T3 N1 M0, stage IIIa left breast cancer status post bilateral mastectomies September 15, 2020. Tumor 6.5 cm, 2 lymph nodes positive, Grade 2, ER/NH positive, HER-2 negative, Oncotype DX recurrence score of 13. Pt had radiation which completed in January 2021. She is on arimidex. Had some arthralgias with exemestane so switched to arimidex.  Is having a hard time and feels like her joints have gotten worse.  She is not sure that she wants to continue.  She is now been on hormonal therapy for about 4 years.  I did tell her with some of her risk it is officially recommended that she be on it for up to 10 years.  Patient does not feel like she can tolerate that.  I did go through her Oncotype score of 13 which gives her about a 13% risk of cancer returning in [place] years if she does the hormonal therapy for 5 years.  Patient would like to take a break and she will stop for 4 to 6 weeks.  If she feels significantly better she said that she may just stop or she will try a different 1.  We will try letrozole or tamoxifen.  Does not feel that much better after being on a break for 4 to 6 weeks and she will just continue to take it.     2. Osteopenia: risk for worsening bone density with the aromatase inhibitor.  She will continue calcium and vitamin D supplement. October 2022 DEXA was stable.  Patient did just repeat her DEXA scan again and it was stable.  Will likely repeat that in October 2026 encourage also to participate in weightbearing exercises. Good calcium in diet.      3. Previous breast cancer in right breast in 2000. S/p 5 years of  exemestane.  Some questions about genetic testing which she would like to do.  She has been Jerad and will call for an appointment since she has had 2 breast cancers.    4.  Left foot pain: This is a new finding for her.  She says she does not remember any injury.  We will get an x-ray today because it is quite sore when she is walking on it.  If it looks like there is an injury we will probably send her to orthopedic.  If not we will see if the pain gets better with stopping her aromatase inhibitor.  Encouraged her to take over-the-counter products as needed use he denies    ECOG Performance    0 - Independent    Distress Screening (within last 30 days)    No data recorded     Pain  Pain Score: Severe Pain (7)  Pain Loc: Foot (left foot swollen and painful)    Problem List    Patient Active Problem List   Diagnosis    Breast cancer (H)    Spinal Stenosis    Osteopenia    GERD (gastroesophageal reflux disease)    Skin cancer    Morbid obesity (H)    Malignant neoplasm of central portion of left breast in female, estrogen receptor positive (H)    Personal history of breast cancer    Health maintenance examination    Intramural, submucous, and subserous leiomyoma of uterus    Mucinous carcinoma of breast, right (H)    Post-mastectomy lymphedema syndrome    Leg swelling    Venous stasis of both lower extremities    Contracture of shoulder, right    Contracture of shoulder, left    Mild concentric left ventricular hypertrophy (LVH)    Essential hypertension    Lymphedema        ______________________________________________________________________________    History of Present Illness    Oncologist: Dr. Laguna    Diagnosis: Breast cancer, left breast.  Diagnosed in September 2020.  T3 N1 M0, stage stage IIIa.  ER positive, UT positive, HER2/vicki negative.  Oncotype score of 13.    Patient does also have a remote history of right breast cancer.  Patient is status postlumpectomy, radiation and 5 years of Exemestane.    "  Treatment:   Bilateral mastectomy 9/14/2020  Exemestane started around 10/23/2020  Adjuvant radiation started 12/4/2020  Switched to anastrozole in September 2021 due to intolerance to exemestane.      Interim History: pt is here today for follow up visit.  Patient states she is doing okay but she is feeling kind of frustrated because she just does not feel great on her anastrozole.  She says she is having more joint issues.  She started noticing some foot pain a couple of weeks ago.  She says it seems like it is getting worse and she does not remember an injury but it almost feels like it is broken.  She wondering what is the benefit of being on the hormonal therapy and if she could stop it.  Other than that she denies any changes in her chest wall.  She denies any new bone or back pain.    Review of Systems    Pertinent items are noted in HPI.    Past History    Past Medical History:   Diagnosis Date    Breast cancer (H) 2000    Breast cyst     GERD (gastroesophageal reflux disease)     Hx of radiation therapy     Moderate persistent asthma without complication 8/14/2020    PONV (postoperative nausea and vomiting)     Uterine fibroid     embolized       PHYSICAL EXAM  /63 (BP Location: Right arm, Patient Position: Sitting, Cuff Size: Adult Large)   Pulse 76   Temp 97.2  F (36.2  C) (Tympanic)   Resp 18   Ht 1.651 m (5' 5\")   Wt 112.5 kg (248 lb)   LMP  (LMP Unknown)   SpO2 98%   BMI 41.27 kg/m      GENERAL: no acute distress. Cooperative in conversation. Here alone.   RESP: Regular respiratory rate. No expiratory wheezes   MUSCULOSKELETAL: no bilateral leg swelling  NEURO: non focal. Alert and oriented x3.   PSYCH: within normal limits. No depression or anxiety.  SKIN: exposed skin is dry intact.   BREAST: Deferred    Lab Results    No results found for this or any previous visit (from the past 168 hour(s)).    Imaging    DX Bone Density    Result Date: 10/16/2024  EXAM: DX TBS AXIAL LOCATION: [facility] DATE: 10/15/2024 INDICATION: BMD screening. Follow-up. Aromatase inhibitor use. History of fracture in adult life. DEMOGRAPHICS: Age- 74 years. Gender- Female. Menopausal status- Postmenopausal. COMPARISON: 10/10/2022 TECHNIQUE: Dual-energy x-ray absorptiometry (DXA) performed with routine technique.  Trabecular bone score (TBS) analysis performed. FINDINGS: DXA RESULTS -Lumbar Spine: L1-L3: BMD: 1.002 g/cm2. T-score: -1.4. Z-score: 0.4. L4 omitted from lumbar spine due to greater than 1.0 T-score difference from adjacent vertebrae. This is likely due to degenerative changes, which may artifactually increase BMD. -RIGHT Hip Total: BMD: 0.871 g/cm2. T-score: -1.1. Z-score: 0.7. -RIGHT Hip Femoral neck: BMD: 0.788 g/cm2. T-score: -1.8. Z-score: 0.2. -LEFT Hip Total: BMD: 0.941 g/cm2. T-score: -0.5. Z-score: 1.3. -LEFT Hip Femoral neck: BMD: 0.832 g/cm2. T-score: -1.5. Z-score: 0.5. WHO T-SCORE CRITERIA -Normal: T-score at or above -1 SD -Osteopenia: T-score between -1 and -2.5 SD -Osteoporosis: T-score at or below -2.5 SD The World Health Organization (WHO) criteria is applicable to perimenopausal females, postmenopausal females, and men aged 50 years or older. TBS RESULTS -Lumbar Spine L1-L3: TBS: 1.431. TBS T-score: -0.7.TBS Z-score: 2.1. The TBS is a DXA derived measurement for fracture risk assessment, and reflects the structural condition of the bone microarchitecture. It can be used to adjust WHO Fracture Risk Assessment Tool (FRAX) probability of fracture in postmenopausal women and older men. The calculated probabilities of fracture have been shown to be more accurate when computed with the TBS. INTERVAL CHANGE -There has been a 1.7% decrease in lumbar spine BMD. -There has been a 2.9% decrease in bilateral hip BMD. FRACTURE RISK -The FRAX risk calculator is not applicable due to medication that is used for treatment of osteopenia/osteoporosis or can otherwise affect bone  mineral density (aromatase inhibitor use).     IMPRESSION: Low bone density (OSTEOPENIA) with an interval decrease in BMD. T-score meets the WHO criteria for low bone density (osteopenia) at one or more measured sites. The risk of osteoporotic fracture increases approximately two-fold for each standard deviation decrease in T-score.     The longitudinal plan of care for the diagnosis(es)/condition(s) as documented were addressed during this visit. Due to the added complexity in care, I will continue to support Camilla in the subsequent management and with ongoing continuity of care.    Total time spent with patient in face to face time, chart review and documentation was 30 minutes.      Signed by: JOEY Saenz CNP

## 2024-10-22 NOTE — PROGRESS NOTES
[facility]: Cancer Care                                                                                          Situation: Chart reviewed by RN Care Coordinator.    Background: Patient was seen in clinic today for follow-up regarding breast cancer.    Assessment: Patient is having worsening joint pain.  She will hold her anastrozole for the next 4 to 6 weeks.    Plan/Recommendations: Patient is to return to clinic in 6 months.  I plan to call her in about a month to check in on her regarding her joint pain.      Signature:  Deja Odom RN Care Coordinator  [facility]  Cancer [facility]

## 2024-10-22 NOTE — PROGRESS NOTES
"Oncology Rooming Note    October 22, 2024 10:58 AM   Camilla Wilson is a 74 year old female who presents for:    Chief Complaint   Patient presents with    Oncology Clinic Visit     6 month follow up with prior DEXA review     Initial Vitals: /63 (BP Location: Right arm, Patient Position: Sitting, Cuff Size: Adult Large)   Pulse 76   Temp 97.2  F (36.2  C) (Tympanic)   Resp 18   Ht 1.651 m (5' 5\")   Wt 112.5 kg (248 lb)   LMP  (LMP Unknown)   SpO2 98%   BMI 41.27 kg/m   Estimated body mass index is 41.27 kg/m  as calculated from the following:    Height as of this encounter: 1.651 m (5' 5\").    Weight as of this encounter: 112.5 kg (248 lb). Body surface area is 2.27 meters squared.  Severe Pain (7) Comment: Data Unavailable   No LMP recorded (lmp unknown). Patient is postmenopausal.  Allergies reviewed: Yes  Medications reviewed: Yes    Medications: Medication refills not needed today.  Pharmacy name entered into [facility]: [id number] [place], [place] - [address]    Frailty Screening:   Is the patient here for a new oncology consult visit in cancer care? 2. No      Clinical concerns: having pain and swelling in left foot, concerns about DEXA scan, discuss side effects of anastrozole as pt is tired of pain from medication      CHETAN WINN CMA              "

## 2024-10-22 NOTE — LETTER
10/22/2024      Camilla Wilson  [address] [place] [address] [address]  [place] [address]      Dear Colleague,    Thank you for referring your patient, Camilla Wilson, to the [facility] [place]. Please see a copy of my visit note below.    [facility] Hematology and Oncology Progress Note    Patient: Camilla Wilson  MRN: [id number]  Date of Service: Oct 22, 2024          Reason for Visit    Chief Complaint   Patient presents with     Oncology Clinic Visit     6 month follow up with prior DEXA review       Assessment and Plan     Cancer Staging   No matching staging information was found for the patient.      1. T3 N1 M0, stage IIIa left breast cancer status post bilateral mastectomies September 15, 2020. Tumor 6.5 cm, 2 lymph nodes positive, Grade 2, ER/NC positive, HER-2 negative, Oncotype DX recurrence score of 13. Pt had radiation which completed in January 2021. She is on arimidex. Had some arthralgias with exemestane so switched to arimidex.  Is having a hard time and feels like her joints have gotten worse.  She is not sure that she wants to continue.  She is now been on hormonal therapy for about 4 years.  I did tell her with some of her risk it is officially recommended that she be on it for up to 10 years.  Patient does not feel like she can tolerate that.  I did go through her Oncotype score of 13 which gives her about a 13% risk of cancer returning in [place] years if she does the hormonal therapy for 5 years.  Patient would like to take a break and she will stop for 4 to 6 weeks.  If she feels significantly better she said that she may just stop or she will try a different 1.  We will try letrozole or tamoxifen.  Does not feel that much better after being on a break for 4 to 6 weeks and she will just continue to take it.     2. Osteopenia: risk for worsening bone density with the aromatase inhibitor.  She will continue calcium and vitamin D supplement. October 2022 DEXA was  stable.  Patient did just repeat her DEXA scan again and it was stable.  Will likely repeat that in October 2026 encourage also to participate in weightbearing exercises. Good calcium in diet.      3. Previous breast cancer in right breast in 2000. S/p 5 years of exemestane.  Some questions about genetic testing which she would like to do.  She has been Jerad and will call for an appointment since she has had 2 breast cancers.    4.  Left foot pain: This is a new finding for her.  She says she does not remember any injury.  We will get an x-ray today because it is quite sore when she is walking on it.  If it looks like there is an injury we will probably send her to orthopedic.  If not we will see if the pain gets better with stopping her aromatase inhibitor.  Encouraged her to take over-the-counter products as needed use he denies    ECOG Performance    0 - Independent    Distress Screening (within last 30 days)    No data recorded     Pain  Pain Score: Severe Pain (7)  Pain Loc: Foot (left foot swollen and painful)    Problem List    Patient Active Problem List   Diagnosis     Breast cancer (H)     Spinal Stenosis     Osteopenia     GERD (gastroesophageal reflux disease)     Skin cancer     Morbid obesity (H)     Malignant neoplasm of central portion of left breast in female, estrogen receptor positive (H)     Personal history of breast cancer     Health maintenance examination     Intramural, submucous, and subserous leiomyoma of uterus     Mucinous carcinoma of breast, right (H)     Post-mastectomy lymphedema syndrome     Leg swelling     Venous stasis of both lower extremities     Contracture of shoulder, right     Contracture of shoulder, left     Mild concentric left ventricular hypertrophy (LVH)     Essential hypertension     Lymphedema        ______________________________________________________________________________    History of Present Illness    Oncologist: Dr. Laguna    Diagnosis: Breast cancer,  "left breast.  Diagnosed in September 2020.  T3 N1 M0, stage stage IIIa.  ER positive, ME positive, HER2/vicki negative.  Oncotype score of 13.    Patient does also have a remote history of right breast cancer.  Patient is status postlumpectomy, radiation and 5 years of Exemestane.     Treatment:   Bilateral mastectomy 9/14/2020  Exemestane started around 10/23/2020  Adjuvant radiation started 12/4/2020  Switched to anastrozole in September 2021 due to intolerance to exemestane.      Interim History: pt is here today for follow up visit.  Patient states she is doing okay but she is feeling kind of frustrated because she just does not feel great on her anastrozole.  She says she is having more joint issues.  She started noticing some foot pain a couple of weeks ago.  She says it seems like it is getting worse and she does not remember an injury but it almost feels like it is broken.  She wondering what is the benefit of being on the hormonal therapy and if she could stop it.  Other than that she denies any changes in her chest wall.  She denies any new bone or back pain.    Review of Systems    Pertinent items are noted in HPI.    Past History    Past Medical History:   Diagnosis Date     Breast cancer (H) 2000     Breast cyst      GERD (gastroesophageal reflux disease)      Hx of radiation therapy      Moderate persistent asthma without complication 8/14/2020     PONV (postoperative nausea and vomiting)      Uterine fibroid     embolized       PHYSICAL EXAM  /63 (BP Location: Right arm, Patient Position: Sitting, Cuff Size: Adult Large)   Pulse 76   Temp 97.2  F (36.2  C) (Tympanic)   Resp 18   Ht 1.651 m (5' 5\")   Wt 112.5 kg (248 lb)   LMP  (LMP Unknown)   SpO2 98%   BMI 41.27 kg/m      GENERAL: no acute distress. Cooperative in conversation. Here alone.   RESP: Regular respiratory rate. No expiratory wheezes   MUSCULOSKELETAL: no bilateral leg swelling  NEURO: non focal. Alert and oriented x3.   PSYCH: " within normal limits. No depression or anxiety.  SKIN: exposed skin is dry intact.   BREAST: Deferred    Lab Results    No results found for this or any previous visit (from the past 168 hour(s)).    Imaging    DX Bone Density    Result Date: 10/16/2024  EXAM: DX TBS AXIAL LOCATION: [facility] DATE: 10/15/2024 INDICATION: BMD screening. Follow-up. Aromatase inhibitor use. History of fracture in adult life. DEMOGRAPHICS: Age- 74 years. Gender- Female. Menopausal status- Postmenopausal. COMPARISON: 10/10/2022 TECHNIQUE: Dual-energy x-ray absorptiometry (DXA) performed with routine technique.  Trabecular bone score (TBS) analysis performed. FINDINGS: DXA RESULTS -Lumbar Spine: L1-L3: BMD: 1.002 g/cm2. T-score: -1.4. Z-score: 0.4. L4 omitted from lumbar spine due to greater than 1.0 T-score difference from adjacent vertebrae. This is likely due to degenerative changes, which may artifactually increase BMD. -RIGHT Hip Total: BMD: 0.871 g/cm2. T-score: -1.1. Z-score: 0.7. -RIGHT Hip Femoral neck: BMD: 0.788 g/cm2. T-score: -1.8. Z-score: 0.2. -LEFT Hip Total: BMD: 0.941 g/cm2. T-score: -0.5. Z-score: 1.3. -LEFT Hip Femoral neck: BMD: 0.832 g/cm2. T-score: -1.5. Z-score: 0.5. WHO T-SCORE CRITERIA -Normal: T-score at or above -1 SD -Osteopenia: T-score between -1 and -2.5 SD -Osteoporosis: T-score at or below -2.5 SD The World Health Organization (WHO) criteria is applicable to perimenopausal females, postmenopausal females, and men aged 50 years or older. TBS RESULTS -Lumbar Spine L1-L3: TBS: 1.431. TBS T-score: -0.7.TBS Z-score: 2.1. The TBS is a DXA derived measurement for fracture risk assessment, and reflects the structural condition of the bone microarchitecture. It can be used to adjust WHO Fracture Risk Assessment Tool (FRAX) probability of fracture in postmenopausal women and older men. The calculated probabilities of fracture have been shown to be more accurate when computed with the TBS.  "INTERVAL CHANGE -There has been a 1.7% decrease in lumbar spine BMD. -There has been a 2.9% decrease in bilateral hip BMD. FRACTURE RISK -The FRAX risk calculator is not applicable due to medication that is used for treatment of osteopenia/osteoporosis or can otherwise affect bone mineral density (aromatase inhibitor use).     IMPRESSION: Low bone density (OSTEOPENIA) with an interval decrease in BMD. T-score meets the WHO criteria for low bone density (osteopenia) at one or more measured sites. The risk of osteoporotic fracture increases approximately two-fold for each standard deviation decrease in T-score.     The longitudinal plan of care for the diagnosis(es)/condition(s) as documented were addressed during this visit. Due to the added complexity in care, I will continue to support Camilla in the subsequent management and with ongoing continuity of care.    Total time spent with patient in face to face time, chart review and documentation was 30 minutes.      Signed by: JOEY Saenz CNP    Oncology Rooming Note    October 22, 2024 10:58 AM   Camilla Wilson is a 74 year old female who presents for:    Chief Complaint   Patient presents with     Oncology Clinic Visit     6 month follow up with prior DEXA review     Initial Vitals: /63 (BP Location: Right arm, Patient Position: Sitting, Cuff Size: Adult Large)   Pulse 76   Temp 97.2  F (36.2  C) (Tympanic)   Resp 18   Ht 1.651 m (5' 5\")   Wt 112.5 kg (248 lb)   LMP  (LMP Unknown)   SpO2 98%   BMI 41.27 kg/m   Estimated body mass index is 41.27 kg/m  as calculated from the following:    Height as of this encounter: 1.651 m (5' 5\").    Weight as of this encounter: 112.5 kg (248 lb). Body surface area is 2.27 meters squared.  Severe Pain (7) Comment: Data Unavailable   No LMP recorded (lmp unknown). Patient is postmenopausal.  Allergies reviewed: Yes  Medications reviewed: Yes    Medications: Medication refills not needed today.  Pharmacy " name entered into [facility]: [id number] [place], [place] - [address]    Frailty Screening:   Is the patient here for a new oncology consult visit in cancer care? 2. No      Clinical concerns: having pain and swelling in left foot, concerns about DEXA scan, discuss side effects of anastrozole as pt is tired of pain from medication      CHETAN WINN CMA                Again, thank you for allowing me to participate in the care of your patient.        Sincerely,        JOEY Saenz CNP

## 2024-11-21 ENCOUNTER — TELEPHONE (OUTPATIENT)
Dept: ONCOLOGY | Facility: HOSPITAL | Age: 74
End: 2024-11-21
Payer: COMMERCIAL

## 2024-11-21 NOTE — TELEPHONE ENCOUNTER
[facility]: Cancer Care                                                                                          I called patient this morning to check in on her regarding her anastrozole.  She indicates that she sent Dynaa BRISENO a [employer] message.  In the message she indicates that she feels a little better but still has some stiffness.  She will continue on anastrozole and finish off for the year that is remaining.    She verbalized understanding and appreciation of my phone call.      Signature:  Deja Odom RN

## 2024-12-03 ENCOUNTER — TELEPHONE (OUTPATIENT)
Dept: ONCOLOGY | Facility: HOSPITAL | Age: 74
End: 2024-12-03
Payer: COMMERCIAL

## 2024-12-16 DIAGNOSIS — C50.112 MALIGNANT NEOPLASM OF CENTRAL PORTION OF LEFT BREAST IN FEMALE, ESTROGEN RECEPTOR POSITIVE (H): ICD-10-CM

## 2024-12-16 DIAGNOSIS — Z17.0 MALIGNANT NEOPLASM OF CENTRAL PORTION OF LEFT BREAST IN FEMALE, ESTROGEN RECEPTOR POSITIVE (H): ICD-10-CM

## 2024-12-16 RX ORDER — ANASTROZOLE 1 MG/1
1 TABLET ORAL DAILY
Qty: 90 TABLET | Refills: 1 | Status: SHIPPED | OUTPATIENT
Start: 2024-12-16

## 2024-12-16 NOTE — TELEPHONE ENCOUNTER
Therapy started 10/2020    Last Written Prescription Date:  10/23/23  Last Fill Quantity: 90,  # refills: 3   Last office visit provider:  10/22/24 with Dyana Quiles CNP, follow up in 6 months    Requested Prescriptions   Pending Prescriptions Disp Refills    anastrozole (ARIMIDEX) 1 MG tablet [Pharmacy Med Name: Anastrozole Oral Tablet 1 MG] 90 tablet 0     Sig: Take 1 tablet (1 mg) by mouth daily.       There is no refill protocol information for this order          Blaire Sahu RN 12/16/24 12:12 PM

## 2024-12-23 ENCOUNTER — TRANSFERRED RECORDS (OUTPATIENT)
Dept: HEALTH INFORMATION MANAGEMENT | Facility: CLINIC | Age: 74
End: 2024-12-23
Payer: COMMERCIAL

## 2025-01-10 SDOH — HEALTH STABILITY: PHYSICAL HEALTH: ON AVERAGE, HOW MANY DAYS PER WEEK DO YOU ENGAGE IN MODERATE TO STRENUOUS EXERCISE (LIKE A BRISK WALK)?: 2 DAYS

## 2025-01-10 SDOH — HEALTH STABILITY: PHYSICAL HEALTH: ON AVERAGE, HOW MANY MINUTES DO YOU ENGAGE IN EXERCISE AT THIS LEVEL?: 10 MIN

## 2025-01-10 ASSESSMENT — ASTHMA QUESTIONNAIRES
QUESTION_1 LAST FOUR WEEKS HOW MUCH OF THE TIME DID YOUR ASTHMA KEEP YOU FROM GETTING AS MUCH DONE AT WORK, SCHOOL OR AT HOME: NONE OF THE TIME
ACT_TOTALSCORE: 25
QUESTION_4 LAST FOUR WEEKS HOW OFTEN HAVE YOU USED YOUR RESCUE INHALER OR NEBULIZER MEDICATION (SUCH AS ALBUTEROL): NOT AT ALL
QUESTION_2 LAST FOUR WEEKS HOW OFTEN HAVE YOU HAD SHORTNESS OF BREATH: NOT AT ALL
ACT_TOTALSCORE: 25
QUESTION_5 LAST FOUR WEEKS HOW WOULD YOU RATE YOUR ASTHMA CONTROL: COMPLETELY CONTROLLED
QUESTION_3 LAST FOUR WEEKS HOW OFTEN DID YOUR ASTHMA SYMPTOMS (WHEEZING, COUGHING, SHORTNESS OF BREATH, CHEST TIGHTNESS OR PAIN) WAKE YOU UP AT NIGHT OR EARLIER THAN USUAL IN THE MORNING: NOT AT ALL

## 2025-01-10 ASSESSMENT — SOCIAL DETERMINANTS OF HEALTH (SDOH): HOW OFTEN DO YOU GET TOGETHER WITH FRIENDS OR RELATIVES?: MORE THAN THREE TIMES A WEEK

## 2025-01-15 ENCOUNTER — OFFICE VISIT (OUTPATIENT)
Dept: INTERNAL MEDICINE | Facility: CLINIC | Age: 75
End: 2025-01-15
Attending: INTERNAL MEDICINE
Payer: COMMERCIAL

## 2025-01-15 VITALS
SYSTOLIC BLOOD PRESSURE: 118 MMHG | TEMPERATURE: 96.9 F | OXYGEN SATURATION: 95 % | HEART RATE: 80 BPM | WEIGHT: 251.4 LBS | RESPIRATION RATE: 16 BRPM | BODY MASS INDEX: 42.92 KG/M2 | HEIGHT: 64 IN | DIASTOLIC BLOOD PRESSURE: 74 MMHG

## 2025-01-15 DIAGNOSIS — C50.919 MALIGNANT NEOPLASM OF FEMALE BREAST, UNSPECIFIED ESTROGEN RECEPTOR STATUS, UNSPECIFIED LATERALITY, UNSPECIFIED SITE OF BREAST (H): ICD-10-CM

## 2025-01-15 DIAGNOSIS — Z00.00 HEALTH MAINTENANCE EXAMINATION: ICD-10-CM

## 2025-01-15 DIAGNOSIS — I11.0 HYPERTENSIVE HEART DISEASE WITH HEART FAILURE (H): ICD-10-CM

## 2025-01-15 DIAGNOSIS — E66.01 MORBID OBESITY (H): ICD-10-CM

## 2025-01-15 DIAGNOSIS — E78.2 MIXED HYPERLIPIDEMIA: ICD-10-CM

## 2025-01-15 DIAGNOSIS — N18.31 STAGE 3A CHRONIC KIDNEY DISEASE (H): ICD-10-CM

## 2025-01-15 DIAGNOSIS — E55.9 VITAMIN D DEFICIENCY: ICD-10-CM

## 2025-01-15 DIAGNOSIS — I10 ESSENTIAL HYPERTENSION: Primary | ICD-10-CM

## 2025-01-15 DIAGNOSIS — R79.9 ABNORMAL BLOOD CHEMISTRY: ICD-10-CM

## 2025-01-15 LAB
ALBUMIN SERPL BCG-MCNC: 4.2 G/DL (ref 3.5–5.2)
ALP SERPL-CCNC: 99 U/L (ref 40–150)
ALT SERPL W P-5'-P-CCNC: 24 U/L (ref 0–50)
ANION GAP SERPL CALCULATED.3IONS-SCNC: 8 MMOL/L (ref 7–15)
AST SERPL W P-5'-P-CCNC: 17 U/L (ref 0–45)
BASOPHILS # BLD AUTO: 0.1 10E3/UL (ref 0–0.2)
BASOPHILS NFR BLD AUTO: 1 %
BILIRUB SERPL-MCNC: 0.3 MG/DL
BUN SERPL-MCNC: 23.6 MG/DL (ref 8–23)
CALCIUM SERPL-MCNC: 10.1 MG/DL (ref 8.8–10.4)
CHLORIDE SERPL-SCNC: 106 MMOL/L (ref 98–107)
CHOLEST SERPL-MCNC: 271 MG/DL
CREAT SERPL-MCNC: 0.97 MG/DL (ref 0.51–0.95)
EGFRCR SERPLBLD CKD-EPI 2021: 60 ML/MIN/1.73M2
EOSINOPHIL # BLD AUTO: 0.3 10E3/UL (ref 0–0.7)
EOSINOPHIL NFR BLD AUTO: 5 %
ERYTHROCYTE [DISTWIDTH] IN BLOOD BY AUTOMATED COUNT: 14.4 % (ref 10–15)
EST. AVERAGE GLUCOSE BLD GHB EST-MCNC: 117 MG/DL
FASTING STATUS PATIENT QL REPORTED: ABNORMAL
FASTING STATUS PATIENT QL REPORTED: ABNORMAL
GLUCOSE SERPL-MCNC: 102 MG/DL (ref 70–99)
HBA1C MFR BLD: 5.7 % (ref 0–5.6)
HCO3 SERPL-SCNC: 27 MMOL/L (ref 22–29)
HCT VFR BLD AUTO: 39 % (ref 35–47)
HDLC SERPL-MCNC: 90 MG/DL
HGB BLD-MCNC: 12.6 G/DL (ref 11.7–15.7)
IMM GRANULOCYTES # BLD: 0 10E3/UL
IMM GRANULOCYTES NFR BLD: 0 %
LDLC SERPL CALC-MCNC: 165 MG/DL
LYMPHOCYTES # BLD AUTO: 1.3 10E3/UL (ref 0.8–5.3)
LYMPHOCYTES NFR BLD AUTO: 25 %
MCH RBC QN AUTO: 31.7 PG (ref 26.5–33)
MCHC RBC AUTO-ENTMCNC: 32.3 G/DL (ref 31.5–36.5)
MCV RBC AUTO: 98 FL (ref 78–100)
MONOCYTES # BLD AUTO: 0.6 10E3/UL (ref 0–1.3)
MONOCYTES NFR BLD AUTO: 12 %
NEUTROPHILS # BLD AUTO: 2.9 10E3/UL (ref 1.6–8.3)
NEUTROPHILS NFR BLD AUTO: 56 %
NONHDLC SERPL-MCNC: 181 MG/DL
PLATELET # BLD AUTO: 231 10E3/UL (ref 150–450)
POTASSIUM SERPL-SCNC: 4.4 MMOL/L (ref 3.4–5.3)
PROT SERPL-MCNC: 6.7 G/DL (ref 6.4–8.3)
RBC # BLD AUTO: 3.97 10E6/UL (ref 3.8–5.2)
SODIUM SERPL-SCNC: 141 MMOL/L (ref 135–145)
TRIGL SERPL-MCNC: 82 MG/DL
TSH SERPL DL<=0.005 MIU/L-ACNC: 1.9 UIU/ML (ref 0.3–4.2)
VIT D+METAB SERPL-MCNC: 69 NG/ML (ref 20–50)
WBC # BLD AUTO: 5.1 10E3/UL (ref 4–11)

## 2025-01-15 PROCEDURE — G0439 PPPS, SUBSEQ VISIT: HCPCS | Performed by: INTERNAL MEDICINE

## 2025-01-15 PROCEDURE — 80053 COMPREHEN METABOLIC PANEL: CPT | Performed by: INTERNAL MEDICINE

## 2025-01-15 PROCEDURE — 82306 VITAMIN D 25 HYDROXY: CPT | Performed by: INTERNAL MEDICINE

## 2025-01-15 PROCEDURE — 80061 LIPID PANEL: CPT | Performed by: INTERNAL MEDICINE

## 2025-01-15 PROCEDURE — 85025 COMPLETE CBC W/AUTO DIFF WBC: CPT | Performed by: INTERNAL MEDICINE

## 2025-01-15 PROCEDURE — 84443 ASSAY THYROID STIM HORMONE: CPT | Performed by: INTERNAL MEDICINE

## 2025-01-15 PROCEDURE — 83036 HEMOGLOBIN GLYCOSYLATED A1C: CPT | Performed by: INTERNAL MEDICINE

## 2025-01-15 PROCEDURE — G2211 COMPLEX E/M VISIT ADD ON: HCPCS | Performed by: INTERNAL MEDICINE

## 2025-01-15 PROCEDURE — 99213 OFFICE O/P EST LOW 20 MIN: CPT | Mod: 25 | Performed by: INTERNAL MEDICINE

## 2025-01-15 PROCEDURE — 36415 COLL VENOUS BLD VENIPUNCTURE: CPT | Performed by: INTERNAL MEDICINE

## 2025-01-15 ASSESSMENT — PAIN SCALES - GENERAL: PAINLEVEL_OUTOF10: NO PAIN (0)

## 2025-01-15 NOTE — PROGRESS NOTES
Preventive Care Visit  [facility]  Berna Morel MD, Internal Medicine  Rah 15, 2025      Assessment & Plan     Essential hypertension  Excellent control she has some postural dizziness and she had hypertension on the 50 mg dosing with some LVH noted in her last echo.  So that is why the dose was increased to 100 mg could consider 75 but will continue for now    Hypertensive heart disease with heart failure (H)  LVH noticed on echo no symptoms continues to have a heart murmur  Stage 3a chronic kidney disease (H)  GFR Estimate   Date Value Ref Range Status   01/09/2024 53 (L) >60 mL/min/1.73m2 Final   11/28/2022 74 >60 mL/min/1.73m2 Final     Comment:     Effective December 21, 2021 eGFRcr in adults is calculated using the 2021 CKD-EPI creatinine equation which includes age and gender (oKrey delcid al., NEJ, DOI: 10.1056/LZJSjs4706442)   11/22/2021 68 >60 mL/min/1.73m2 Final     Comment:     As of July 11, 2021, eGFR is calculated by the CKD-EPI creatinine equation, without race adjustment. eGFR can be influenced by muscle mass, exercise, and diet. The reported eGFR is an estimation only and is only applicable if the renal function is stable.   11/19/2020 >60 >60 mL/min/1.73m2 Final   10/16/2019 55 (L) >60 mL/min/1.73m2 Final     Stable   - Comprehensive metabolic panel; Future  - Lipid panel reflex to direct LDL Fasting; Future    Morbid obesity (H)  Weight has been stable she has lost a few pounds    Malignant neoplasm of female breast, unspecified estrogen receptor status, unspecified laterality, unspecified site of breast (H)  1. T3 N1 M0, stage IIIa left breast cancer status post bilateral mastectomies September 15, 2020. Tumor 6.5 cm, 2 lymph nodes positive, Grade 2, ER/NC positive, HER-2 negative, Oncotype DX recurrence score of 13. Pt had radiation which completed in January 2021. She is on arimidex. Had some arthralgias with exemestane so switched to arimidex.  Is having a hard time and  "feels like her joints have gotten worse.  She is not sure that she wants to continue.  She is now been on hormonal therapy for about 4 years.  I did tell her with some of her risk it is officially recommended that she be on it for up to 10 years.     Previous breast cancer in right breast in 2000. S/p 5 years of exemestane.    Mixed hyperlipidemia  She has high LDL with a high HDL but coronary calcium score is 0 so does not need to take a statin at this point  - TSH; Future  - Lipid panel reflex to direct LDL Fasting; Future  - CBC with Platelets & Differential; Future    Abnormal blood chemistry    - Hemoglobin A1c; Future    Vitamin D deficiency    - Vitamin D Deficiency; Future    Health maintenance examination  Anoscopy 2024 she does not need any further bone density scan being done every 2 years through oncology and has been stable she has had double mastectomy so she does not need mammogram immunization is up-to-date  Are all doing well encouraged to increase her exercise            BMI  Estimated body mass index is 43.15 kg/m  as calculated from the following:    Height as of this encounter: 1.626 m (5' 4\").    Weight as of this encounter: 114 kg (251 lb 6.4 oz).       Counseling  Appropriate preventive services were addressed with this patient via screening, questionnaire, or discussion as appropriate for fall prevention, nutrition, physical activity, Tobacco-use cessation, social engagement, weight loss and cognition.  Checklist reviewing preventive services available has been given to the patient.  Reviewed patient's diet, addressing concerns and/or questions.   She is at risk for lack of exercise and has been provided with information to increase physical activity for the benefit of her well-being.   Information on urinary incontinence and treatment options given to patient.           Des Sampson is a 74 year old, presenting for the following:  Physical (Patient reports they are here for annual " wellness exam. Questionnaires - asked about colonoscopy, said she was due for one, but she reports having one last fall. Also went through medications - she is taking more than the recommended dosage of the antihistimine. )        1/15/2025     3:00 PM   Additional Questions   Roomed by Kimberley   Accompanied by alone         1/15/2025     3:00 PM   Patient Reported Additional Medications   Patient reports taking the following new medications none           HPI          Health Care Directive  Patient does not have a Health Care Directive: Discussed advance care planning with patient; information given to patient to review.      1/10/2025   General Health   How would you rate your overall physical health? Good   Feel stress (tense, anxious, or unable to sleep) Only a little   (!) STRESS CONCERN      1/10/2025   Nutrition   Diet: Regular (no restrictions)         1/10/2025   Exercise   Days per week of moderate/strenous exercise 2 days   Average minutes spent exercising at this level 10 min   (!) EXERCISE CONCERN      1/10/2025   Social Factors   Frequency of gathering with friends or relatives More than three times a week   Worry food won't last until get money to buy more No    No   Food not last or not have enough money for food? No    No   Do you have housing? (Housing is defined as stable permanent housing and does not include staying ouside in a car, in a tent, in an abandoned building, in an overnight shelter, or couch-surfing.) Yes    Yes   Are you worried about losing your housing? No    No   Lack of transportation? No    No   Unable to get utilities (heat,electricity)? No    No       Multiple values from one day are sorted in reverse-chronological order         1/10/2025   Fall Risk   Fallen 2 or more times in the past year? No    No   Trouble with walking or balance? No    No       Multiple values from one day are sorted in reverse-chronological order          1/10/2025   Activities of Daily Living- Home Safety    Needs help with the following daily activites None of the above   Safety concerns in the home None of the above         1/10/2025   Dental   Dentist two times every year? Yes         1/10/2025   Hearing Screening   Hearing concerns? None of the above         1/10/2025   Driving Risk Screening   Patient/family members have concerns about driving No         1/10/2025   General Alertness/Fatigue Screening   Have you been more tired than usual lately? No         1/10/2025   Urinary Incontinence Screening   Bothered by leaking urine in past 6 months Yes         1/10/2025   TB Screening   Were you born outside of the [place]? No         Today's PHQ-2 Score:       2025     4:23 PM   PHQ-2 (  [employer])   Q1: Little interest or pleasure in doing things 0   Q2: Feeling down, depressed or hopeless 0   PHQ-2 Score 0    Q1: Little interest or pleasure in doing things Not at all   Q2: Feeling down, depressed or hopeless Not at all   PHQ-2 Score 0       Patient-reported           1/10/2025   Substance Use   Alcohol more than 3/day or more than 7/wk No   Do you have a current opioid prescription? No   How severe/bad is pain from 1 to 10? 2/10   Do you use any other substances recreationally? No     Social History     Tobacco Use    Smoking status: Former     Current packs/day: 0.00     Average packs/day: 0.5 packs/day for 15.0 years (7.5 ttl pk-yrs)     Types: Cigarettes     Start date: 1967     Quit date: 1982     Years since quittin.7     Passive exposure: Never    Smokeless tobacco: Never   Vaping Use    Vaping status: Never Used   Substance Use Topics    Alcohol use: Yes     Alcohol/week: 5.0 standard drinks of alcohol    Drug use: Never              ASCVD Risk   The 10-year ASCVD risk score (Rip REGALADO, et al., 2019) is: 20.9%    Values used to calculate the score:      Age: 74 years      Sex: Female      Is Non- : No      Diabetic: No      Tobacco smoker: No      Systolic Blood  Pressure: 118 mmHg      Is BP treated: Yes      HDL Cholesterol: 86 mg/dL      Total Cholesterol: 273 mg/dL            Reviewed and updated as needed this visit by Provider                      Current providers sharing in care for this patient include:  Patient Care Team:  Berna Morel MD as PCP - General  Anna Beckman MD as MD (Hematology & Oncology)  Dyana Quiles APRN CNP as Nurse Practitioner (Hematology & Oncology)  Princess Laguna MD as MD (Hematology & Oncology)  Berna Morel MD as Assigned PCP  Deja Odom, RN as Specialty Care Coordinator (Hematology & Oncology)  Dyana Quiles APRN CNP as Assigned Cancer Care Provider  Susu Espinoza MD as Assigned Neuroscience Provider  Yvette Ford PT as Physical Therapist (Physical Therapy)  Malachi Wadsworth MD as Assigned Musculoskeletal Provider    The following health maintenance items are reviewed in Epic and correct as of today:  Health Maintenance   Topic Date Due    ASTHMA ACTION PLAN  Never done    BMP  01/09/2025    MEDICARE ANNUAL WELLNESS VISIT  01/09/2025    ANNUAL REVIEW OF HM ORDERS  04/09/2025    ASTHMA CONTROL TEST  07/15/2025    FALL RISK ASSESSMENT  01/15/2026    GLUCOSE  01/09/2027    LIPID  01/09/2029    ADVANCE CARE PLANNING  01/09/2029    DTAP/TDAP/TD IMMUNIZATION (4 - Td or Tdap) 04/16/2032    DEXA  10/15/2039    HEPATITIS C SCREENING  Completed    PHQ-2 (once per calendar year)  Completed    INFLUENZA VACCINE  Completed    Pneumococcal Vaccine: 50+ Years  Completed    ZOSTER IMMUNIZATION  Completed    RSV VACCINE  Completed    COVID-19 Vaccine  Completed    HPV IMMUNIZATION  Aged Out    MENINGITIS IMMUNIZATION  Aged Out    RSV MONOCLONAL ANTIBODY  Aged Out    MAMMO SCREENING  Discontinued    COLORECTAL CANCER SCREENING  Discontinued            Objective    Exam  /74 (BP Location: Right arm, Patient Position: Sitting, Cuff Size: Adult Large)   Pulse 80   Temp 96.9  F (36.1  C) (Temporal)    "Resp 16   Ht 1.626 m (5' 4\")   Wt 114 kg (251 lb 6.4 oz)   LMP  (LMP Unknown)   SpO2 95%   BMI 43.15 kg/m     Estimated body mass index is 43.15 kg/m  as calculated from the following:    Height as of this encounter: 1.626 m (5' 4\").    Weight as of this encounter: 114 kg (251 lb 6.4 oz).    Physical Exam  GENERAL: alert and no distress  NECK: no adenopathy, no asymmetry, masses, or scars  RESP: lungs clear to auscultation - no rales, rhonchi or wheezes  CV: regular rate and rhythm, normal S1 S2, no S3 or S4, heart murmur, click or rub, no peripheral edema  ABDOMEN: soft, nontender, no hepatosplenomegaly, no masses and bowel sounds normal  MS: no gross musculoskeletal defects noted, no edema  Chest mastectomy scars       1/15/2025   Mini Cog   Clock Draw Score 2 Normal   3 Item Recall 3 objects recalled   Mini Cog Total Score 5              Signed Electronically by: Berna Morel MD    "

## 2025-04-21 DIAGNOSIS — I10 ESSENTIAL HYPERTENSION: ICD-10-CM

## 2025-04-21 RX ORDER — LOSARTAN POTASSIUM 100 MG/1
100 TABLET ORAL DAILY
Qty: 90 TABLET | Refills: 3 | Status: SHIPPED | OUTPATIENT
Start: 2025-04-21

## 2025-04-22 ENCOUNTER — ONCOLOGY VISIT (OUTPATIENT)
Dept: ONCOLOGY | Facility: HOSPITAL | Age: 75
End: 2025-04-22
Attending: NURSE PRACTITIONER
Payer: COMMERCIAL

## 2025-04-22 ENCOUNTER — PATIENT OUTREACH (OUTPATIENT)
Dept: ONCOLOGY | Facility: HOSPITAL | Age: 75
End: 2025-04-22

## 2025-04-22 VITALS
TEMPERATURE: 97.9 F | RESPIRATION RATE: 20 BRPM | SYSTOLIC BLOOD PRESSURE: 136 MMHG | BODY MASS INDEX: 43.19 KG/M2 | HEIGHT: 64 IN | HEART RATE: 69 BPM | OXYGEN SATURATION: 98 % | WEIGHT: 253 LBS | DIASTOLIC BLOOD PRESSURE: 70 MMHG

## 2025-04-22 DIAGNOSIS — C50.112 MALIGNANT NEOPLASM OF CENTRAL PORTION OF LEFT BREAST IN FEMALE, ESTROGEN RECEPTOR POSITIVE (H): Primary | ICD-10-CM

## 2025-04-22 DIAGNOSIS — Z17.0 MALIGNANT NEOPLASM OF CENTRAL PORTION OF LEFT BREAST IN FEMALE, ESTROGEN RECEPTOR POSITIVE (H): Primary | ICD-10-CM

## 2025-04-22 PROCEDURE — G0463 HOSPITAL OUTPT CLINIC VISIT: HCPCS | Performed by: NURSE PRACTITIONER

## 2025-04-22 PROCEDURE — 99214 OFFICE O/P EST MOD 30 MIN: CPT | Performed by: NURSE PRACTITIONER

## 2025-04-22 PROCEDURE — G2211 COMPLEX E/M VISIT ADD ON: HCPCS | Performed by: NURSE PRACTITIONER

## 2025-04-22 ASSESSMENT — PAIN SCALES - GENERAL: PAINLEVEL_OUTOF10: MODERATE PAIN (5)

## 2025-04-22 NOTE — PROGRESS NOTES
[facility]: Cancer Care                                                                                          Situation: Chart reviewed by RN Care Coordinator.    Background: Patient was seen in clinic today for follow-up regarding breast cancer.    Assessment: On on anastrozole.  Able to tolerate this well.    Plan/Recommendations: Continue with medication as prescribed.  Patient is to return to clinic in 6 months.      Signature:  Deja Odom RN Care Coordinator  [facility]  Cancer [facility]

## 2025-04-22 NOTE — PROGRESS NOTES
"Oncology Rooming Note    April 22, 2025 11:07 AM   Camilla Wilson is a 75 year old female who presents for:    Chief Complaint   Patient presents with    Oncology Clinic Visit     6 month follow up     Initial Vitals: /70 (BP Location: Right arm, Patient Position: Sitting, Cuff Size: Adult Large)   Pulse 69   Temp 97.9  F (36.6  C) (Tympanic)   Resp 20   Ht 1.626 m (5' 4\")   Wt 114.8 kg (253 lb)   LMP  (LMP Unknown)   SpO2 98%   BMI 43.43 kg/m   Estimated body mass index is 43.43 kg/m  as calculated from the following:    Height as of this encounter: 1.626 m (5' 4\").    Weight as of this encounter: 114.8 kg (253 lb). Body surface area is 2.28 meters squared.  Moderate Pain (5) Comment: Data Unavailable   No LMP recorded (lmp unknown). Patient is postmenopausal.  Allergies reviewed: Yes  Medications reviewed: Yes    Medications: Medication refills not needed today.  Pharmacy name entered into [facility]: [id number] [place], [place] - [address]    Frailty Screening:   Is the patient here for a new oncology consult visit in cancer care? 2. No    PHQ9:  Did this patient require a PHQ9?: No      Clinical concerns: mid back pain concerns. Ongoing since surgery. Worse in the morning   Dyana was notified.      CHETAN WINN CMA            "

## 2025-04-22 NOTE — LETTER
"4/22/2025      Camilla Wilson  [address]  [place] [address]      Dear Colleague,    Thank you for referring your patient, Camilla Wilson, to the [facility] CANCER [facility]. Please see a copy of my visit note below.    Oncology Rooming Note    April 22, 2025 11:07 AM   Camilla Wilson is a 75 year old female who presents for:    Chief Complaint   Patient presents with     Oncology Clinic Visit     6 month follow up     Initial Vitals: /70 (BP Location: Right arm, Patient Position: Sitting, Cuff Size: Adult Large)   Pulse 69   Temp 97.9  F (36.6  C) (Tympanic)   Resp 20   Ht 1.626 m (5' 4\")   Wt 114.8 kg (253 lb)   LMP  (LMP Unknown)   SpO2 98%   BMI 43.43 kg/m   Estimated body mass index is 43.43 kg/m  as calculated from the following:    Height as of this encounter: 1.626 m (5' 4\").    Weight as of this encounter: 114.8 kg (253 lb). Body surface area is 2.28 meters squared.  Moderate Pain (5) Comment: Data Unavailable   No LMP recorded (lmp unknown). Patient is postmenopausal.  Allergies reviewed: Yes  Medications reviewed: Yes    Medications: Medication refills not needed today.  Pharmacy name entered into [employer]: [id number] - [place], [place] - [address]    Frailty Screening:   Is the patient here for a new oncology consult visit in cancer care? 2. No    PHQ9:  Did this patient require a PHQ9?: No      Clinical concerns: mid back pain concerns. Ongoing since surgery. Worse in the morning   Dyana was notified.      CHETAN WINN CMA              [facility] Progress Note    Patient: Camilla Wilson  MRN: [id number]  Date of Service: Apr 22, 2025          Reason for Visit    Chief Complaint   Patient presents with     Oncology Clinic Visit     6 month follow up       Assessment and Plan     Cancer Staging   No matching staging information was found for the patient.      1. T3 N1 M0, stage IIIa left breast cancer " status post bilateral mastectomies September 15, 2020. Tumor 6.5 cm, 2 lymph nodes positive, Grade 2, ER/NJ positive, HER-2 negative, Oncotype DX recurrence score of 13. Pt had radiation which completed in January 2021. She is on arimidex. Had some arthralgias with exemestane so switched to arimidex.  Patient continues to have some issues but feels like she can continue on it for now.  She will return in 6 months and at that time it will be her 5-year jc.  She will decide if she is going to continue for any longer.  I did tell her that at that point she can probably come to us once a year or just see her primary care doctor.  She does continue on the anastrozole then we would want to continue to see her once a year.       2. Osteopenia: risk for worsening bone density with the aromatase inhibitor.  She will continue calcium and vitamin D supplement. October 2022 DEXA was stable.  Patient did just repeat her DEXA scan again and it was stable.  Will likely repeat that in October 2026 encourage also to participate in weightbearing exercises. Good calcium in diet.      3. Previous breast cancer in right breast in 2000. S/p 5 years of exemestane.       ECOG Performance    0 - Independent    Distress Screening (within last 30 days)    No data recorded     Pain  Pain Score: Moderate Pain (5)  Pain Loc: Mid Back (pt having pain and tightness in mid back, especially in the morning. has felt for awhile after surgery)    Problem List    Patient Active Problem List   Diagnosis     Breast cancer (H)     Spinal Stenosis     Osteopenia     GERD (gastroesophageal reflux disease)     Skin cancer     Morbid obesity (H)     Malignant neoplasm of central portion of left breast in female, estrogen receptor positive (H)     Personal history of breast cancer     Health maintenance examination     Intramural, submucous, and subserous leiomyoma of uterus     Mucinous carcinoma of breast, right (H)     Post-mastectomy lymphedema syndrome      Leg swelling     Venous stasis of both lower extremities     Contracture of shoulder, right     Contracture of shoulder, left     Mild concentric left ventricular hypertrophy (LVH)     Essential hypertension     Lymphedema     Hypertensive heart disease with heart failure (H)     Stage 3a chronic kidney disease (H)        ______________________________________________________________________________    History of Present Illness    Oncologist: Dr. Laguna    Diagnosis: Breast cancer, left breast.  Diagnosed in September 2020.  T3 N1 M0, stage stage IIIa.  ER positive, AL positive, HER2/vicki negative.  Oncotype score of 13.    Patient does also have a remote history of right breast cancer.  Patient is status postlumpectomy, radiation and 5 years of Exemestane.     Treatment:   Bilateral mastectomy 9/14/2020  Exemestane started around 10/23/2020  Adjuvant radiation started 12/4/2020  Switched to anastrozole in September 2021 due to intolerance to exemestane.      Interim History: pt is here today for follow up visit.  She is doing fine.  She still feels like she is having some side effects from the anastrozole that she does not like but has decided to stay on for the 5 years which will be October.  She did stop it for about 4 weeks last fall and did not really notice a huge improvement in how she felt.  She does have a little bit of tightness when she wakes up in the morning in the right upper chest and upper back area usually gets better when she starts moving around.  She denies any changes in her chest wall.  She denies any unexplained weight loss.  Notes some hair thinning with the anastrozole.    Review of Systems    Pertinent items are noted in HPI.    Past History    Past Medical History:   Diagnosis Date     Breast cancer (H) 2000     Breast cyst      GERD (gastroesophageal reflux disease)      Hx of radiation therapy      Moderate persistent asthma without complication 8/14/2020     PONV (postoperative  "nausea and vomiting)      Uterine fibroid     embolized       PHYSICAL EXAM  /70 (BP Location: Right arm, Patient Position: Sitting, Cuff Size: Adult Large)   Pulse 69   Temp 97.9  F (36.6  C) (Tympanic)   Resp 20   Ht 1.626 m (5' 4\")   Wt 114.8 kg (253 lb)   LMP  (LMP Unknown)   SpO2 98%   BMI 43.43 kg/m      GENERAL: no acute distress. Cooperative in conversation. Here alone.   RESP: Regular respiratory rate. No expiratory wheezes   MUSCULOSKELETAL: no bilateral leg swelling  NEURO: non focal. Alert and oriented x3.   PSYCH: within normal limits. No depression or anxiety.  SKIN: exposed skin is dry intact.   BREAST: chest wall exam is normal. S/P bilateral mastectomies. Some excess skin on left side.  No rashes or lesions noted.  No signs of local recurrence    Lab Results    No results found for this or any previous visit (from the past week).    Imaging    No results found.    The longitudinal plan of care for the diagnosis(es)/condition(s) as documented were addressed during this visit. Due to the added complexity in care, I will continue to support Camilla in the subsequent management and with ongoing continuity of care.    Total time spent with patient in face to face time, chart review and documentation was 30 minutes.        Signed by: JOEY Saenz CNP      Again, thank you for allowing me to participate in the care of your patient.        Sincerely,        JOEY Saenz CNP    Electronically signed"

## 2025-04-22 NOTE — PROGRESS NOTES
[facility] Hematology and Oncology Progress Note    Patient: Camilla Wilson  MRN: [id number]  Date of Service: Apr 22, 2025          Reason for Visit    Chief Complaint   Patient presents with    Oncology Clinic Visit     6 month follow up       Assessment and Plan     Cancer Staging   No matching staging information was found for the patient.      1. T3 N1 M0, stage IIIa left breast cancer status post bilateral mastectomies September 15, 2020. Tumor 6.5 cm, 2 lymph nodes positive, Grade 2, ER/RI positive, HER-2 negative, Oncotype DX recurrence score of 13. Pt had radiation which completed in January 2021. She is on arimidex. Had some arthralgias with exemestane so switched to arimidex.  Patient continues to have some issues but feels like she can continue on it for now.  She will return in 6 months and at that time it will be her 5-year jc.  She will decide if she is going to continue for any longer.  I did tell her that at that point she can probably come to us once a year or just see her primary care doctor.  She does continue on the anastrozole then we would want to continue to see her once a year.       2. Osteopenia: risk for worsening bone density with the aromatase inhibitor.  She will continue calcium and vitamin D supplement. October 2022 DEXA was stable.  Patient did just repeat her DEXA scan again and it was stable.  Will likely repeat that in October 2026 encourage also to participate in weightbearing exercises. Good calcium in diet.      3. Previous breast cancer in right breast in 2000. S/p 5 years of exemestane.       ECOG Performance    0 - Independent    Distress Screening (within last 30 days)    No data recorded     Pain  Pain Score: Moderate Pain (5)  Pain Loc: Mid Back (pt having pain and tightness in mid back, especially in the morning. has felt for awhile after surgery)    Problem List    Patient Active Problem List   Diagnosis    Breast cancer (H)    Spinal Stenosis    Osteopenia     GERD (gastroesophageal reflux disease)    Skin cancer    Morbid obesity (H)    Malignant neoplasm of central portion of left breast in female, estrogen receptor positive (H)    Personal history of breast cancer    Health maintenance examination    Intramural, submucous, and subserous leiomyoma of uterus    Mucinous carcinoma of breast, right (H)    Post-mastectomy lymphedema syndrome    Leg swelling    Venous stasis of both lower extremities    Contracture of shoulder, right    Contracture of shoulder, left    Mild concentric left ventricular hypertrophy (LVH)    Essential hypertension    Lymphedema    Hypertensive heart disease with heart failure (H)    Stage 3a chronic kidney disease (H)        ______________________________________________________________________________    History of Present Illness    Oncologist: Dr. Laguna    Diagnosis: Breast cancer, left breast.  Diagnosed in September 2020.  T3 N1 M0, stage stage IIIa.  ER positive, LA positive, HER2/vicki negative.  Oncotype score of 13.    Patient does also have a remote history of right breast cancer.  Patient is status postlumpectomy, radiation and 5 years of Exemestane.     Treatment:   Bilateral mastectomy 9/14/2020  Exemestane started around 10/23/2020  Adjuvant radiation started 12/4/2020  Switched to anastrozole in September 2021 due to intolerance to exemestane.      Interim History: pt is here today for follow up visit.  She is doing fine.  She still feels like she is having some side effects from the anastrozole that she does not like but has decided to stay on for the 5 years which will be October.  She did stop it for about 4 weeks last fall and did not really notice a huge improvement in how she felt.  She does have a little bit of tightness when she wakes up in the morning in the right upper chest and upper back area usually gets better when she starts moving around.  She denies any changes in her chest wall.  She denies any unexplained  "weight loss.  Notes some hair thinning with the anastrozole.    Review of Systems    Pertinent items are noted in HPI.    Past History    Past Medical History:   Diagnosis Date    Breast cancer (H) 2000    Breast cyst     GERD (gastroesophageal reflux disease)     Hx of radiation therapy     Moderate persistent asthma without complication 8/14/2020    PONV (postoperative nausea and vomiting)     Uterine fibroid     embolized       PHYSICAL EXAM  /70 (BP Location: Right arm, Patient Position: Sitting, Cuff Size: Adult Large)   Pulse 69   Temp 97.9  F (36.6  C) (Tympanic)   Resp 20   Ht 1.626 m (5' 4\")   Wt 114.8 kg (253 lb)   LMP  (LMP Unknown)   SpO2 98%   BMI 43.43 kg/m      GENERAL: no acute distress. Cooperative in conversation. Here alone.   RESP: Regular respiratory rate. No expiratory wheezes   MUSCULOSKELETAL: no bilateral leg swelling  NEURO: non focal. Alert and oriented x3.   PSYCH: within normal limits. No depression or anxiety.  SKIN: exposed skin is dry intact.   BREAST: chest wall exam is normal. S/P bilateral mastectomies. Some excess skin on left side.  No rashes or lesions noted.  No signs of local recurrence    Lab Results    No results found for this or any previous visit (from the past week).    Imaging    No results found.    The longitudinal plan of care for the diagnosis(es)/condition(s) as documented were addressed during this visit. Due to the added complexity in care, I will continue to support Camilla in the subsequent management and with ongoing continuity of care.    Total time spent with patient in face to face time, chart review and documentation was 30 minutes.        Signed by: JOEY Saenz CNP  "

## 2025-05-06 ENCOUNTER — TRANSFERRED RECORDS (OUTPATIENT)
Dept: HEALTH INFORMATION MANAGEMENT | Facility: CLINIC | Age: 75
End: 2025-05-06
Payer: COMMERCIAL

## 2025-06-18 DIAGNOSIS — C50.112 MALIGNANT NEOPLASM OF CENTRAL PORTION OF LEFT BREAST IN FEMALE, ESTROGEN RECEPTOR POSITIVE (H): ICD-10-CM

## 2025-06-18 DIAGNOSIS — Z17.0 MALIGNANT NEOPLASM OF CENTRAL PORTION OF LEFT BREAST IN FEMALE, ESTROGEN RECEPTOR POSITIVE (H): ICD-10-CM

## 2025-06-18 RX ORDER — ANASTROZOLE 1 MG/1
1 TABLET ORAL DAILY
Qty: 90 TABLET | Refills: 0 | Status: SHIPPED | OUTPATIENT
Start: 2025-06-18

## 2025-06-25 ENCOUNTER — TRANSFERRED RECORDS (OUTPATIENT)
Dept: HEALTH INFORMATION MANAGEMENT | Facility: CLINIC | Age: 75
End: 2025-06-25
Payer: COMMERCIAL

## 2025-07-21 ENCOUNTER — TRANSFERRED RECORDS (OUTPATIENT)
Dept: HEALTH INFORMATION MANAGEMENT | Facility: CLINIC | Age: 75
End: 2025-07-21
Payer: COMMERCIAL

## 2025-08-07 ENCOUNTER — ONCOLOGY VISIT (OUTPATIENT)
Dept: ONCOLOGY | Facility: CLINIC | Age: 75
End: 2025-08-07
Attending: STUDENT IN AN ORGANIZED HEALTH CARE EDUCATION/TRAINING PROGRAM
Payer: COMMERCIAL

## 2025-08-07 VITALS
WEIGHT: 256 LBS | SYSTOLIC BLOOD PRESSURE: 115 MMHG | RESPIRATION RATE: 18 BRPM | HEART RATE: 58 BPM | OXYGEN SATURATION: 96 % | DIASTOLIC BLOOD PRESSURE: 69 MMHG | TEMPERATURE: 97.6 F | BODY MASS INDEX: 43.94 KG/M2

## 2025-08-07 DIAGNOSIS — I89.0 LYMPHEDEMA: ICD-10-CM

## 2025-08-07 DIAGNOSIS — C50.112 MALIGNANT NEOPLASM OF CENTRAL PORTION OF LEFT BREAST IN FEMALE, ESTROGEN RECEPTOR POSITIVE (H): Primary | ICD-10-CM

## 2025-08-07 DIAGNOSIS — Z79.811 AROMATASE INHIBITOR USE: ICD-10-CM

## 2025-08-07 DIAGNOSIS — Z17.0 MALIGNANT NEOPLASM OF CENTRAL PORTION OF LEFT BREAST IN FEMALE, ESTROGEN RECEPTOR POSITIVE (H): Primary | ICD-10-CM

## 2025-08-07 PROCEDURE — G0463 HOSPITAL OUTPT CLINIC VISIT: HCPCS | Performed by: STUDENT IN AN ORGANIZED HEALTH CARE EDUCATION/TRAINING PROGRAM

## 2025-08-07 ASSESSMENT — PAIN SCALES - GENERAL: PAINLEVEL_OUTOF10: NO PAIN (0)
